# Patient Record
Sex: MALE | Race: WHITE | Employment: OTHER | URBAN - NONMETROPOLITAN AREA
[De-identification: names, ages, dates, MRNs, and addresses within clinical notes are randomized per-mention and may not be internally consistent; named-entity substitution may affect disease eponyms.]

---

## 2017-01-17 ENCOUNTER — TELEPHONE (OUTPATIENT)
Dept: FAMILY MEDICINE CLINIC | Age: 77
End: 2017-01-17

## 2017-01-19 ENCOUNTER — OFFICE VISIT (OUTPATIENT)
Dept: CARDIOLOGY | Age: 77
End: 2017-01-19

## 2017-01-19 VITALS
DIASTOLIC BLOOD PRESSURE: 54 MMHG | WEIGHT: 201.2 LBS | BODY MASS INDEX: 29.8 KG/M2 | HEART RATE: 64 BPM | SYSTOLIC BLOOD PRESSURE: 132 MMHG | HEIGHT: 69 IN

## 2017-01-19 DIAGNOSIS — E78.01 FAMILIAL HYPERCHOLESTEROLEMIA: ICD-10-CM

## 2017-01-19 DIAGNOSIS — I25.10 CORONARY ARTERY DISEASE INVOLVING NATIVE CORONARY ARTERY OF NATIVE HEART WITHOUT ANGINA PECTORIS: Primary | ICD-10-CM

## 2017-01-19 DIAGNOSIS — I10 ESSENTIAL HYPERTENSION: ICD-10-CM

## 2017-01-19 PROCEDURE — G8598 ASA/ANTIPLAT THER USED: HCPCS | Performed by: NUCLEAR MEDICINE

## 2017-01-19 PROCEDURE — 99213 OFFICE O/P EST LOW 20 MIN: CPT | Performed by: NUCLEAR MEDICINE

## 2017-01-19 PROCEDURE — G8420 CALC BMI NORM PARAMETERS: HCPCS | Performed by: NUCLEAR MEDICINE

## 2017-01-19 PROCEDURE — G8484 FLU IMMUNIZE NO ADMIN: HCPCS | Performed by: NUCLEAR MEDICINE

## 2017-01-19 PROCEDURE — 1123F ACP DISCUSS/DSCN MKR DOCD: CPT | Performed by: NUCLEAR MEDICINE

## 2017-01-19 PROCEDURE — 1036F TOBACCO NON-USER: CPT | Performed by: NUCLEAR MEDICINE

## 2017-01-19 PROCEDURE — G8427 DOCREV CUR MEDS BY ELIG CLIN: HCPCS | Performed by: NUCLEAR MEDICINE

## 2017-01-19 PROCEDURE — 4040F PNEUMOC VAC/ADMIN/RCVD: CPT | Performed by: NUCLEAR MEDICINE

## 2017-01-19 RX ORDER — SIMETHICONE 80 MG
80 TABLET,CHEWABLE ORAL 4 TIMES DAILY
COMMUNITY
End: 2018-01-23 | Stop reason: SDUPTHER

## 2017-01-19 RX ORDER — ONDANSETRON 4 MG/1
4 TABLET, FILM COATED ORAL EVERY 8 HOURS PRN
COMMUNITY
End: 2017-11-28

## 2017-01-23 ENCOUNTER — OFFICE VISIT (OUTPATIENT)
Dept: FAMILY MEDICINE CLINIC | Age: 77
End: 2017-01-23

## 2017-01-23 VITALS
BODY MASS INDEX: 30.62 KG/M2 | SYSTOLIC BLOOD PRESSURE: 146 MMHG | HEIGHT: 68 IN | HEART RATE: 50 BPM | RESPIRATION RATE: 10 BRPM | WEIGHT: 202 LBS | DIASTOLIC BLOOD PRESSURE: 60 MMHG | TEMPERATURE: 99.7 F

## 2017-01-23 DIAGNOSIS — E78.00 PURE HYPERCHOLESTEROLEMIA: ICD-10-CM

## 2017-01-23 DIAGNOSIS — I10 ESSENTIAL HYPERTENSION: Primary | ICD-10-CM

## 2017-01-23 DIAGNOSIS — Z23 NEED FOR INFLUENZA VACCINATION: ICD-10-CM

## 2017-01-23 PROCEDURE — G0008 ADMIN INFLUENZA VIRUS VAC: HCPCS | Performed by: FAMILY MEDICINE

## 2017-01-23 PROCEDURE — 90686 IIV4 VACC NO PRSV 0.5 ML IM: CPT | Performed by: FAMILY MEDICINE

## 2017-01-23 PROCEDURE — G8484 FLU IMMUNIZE NO ADMIN: HCPCS | Performed by: FAMILY MEDICINE

## 2017-01-23 PROCEDURE — 1036F TOBACCO NON-USER: CPT | Performed by: FAMILY MEDICINE

## 2017-01-23 PROCEDURE — G8598 ASA/ANTIPLAT THER USED: HCPCS | Performed by: FAMILY MEDICINE

## 2017-01-23 PROCEDURE — 1123F ACP DISCUSS/DSCN MKR DOCD: CPT | Performed by: FAMILY MEDICINE

## 2017-01-23 PROCEDURE — 99213 OFFICE O/P EST LOW 20 MIN: CPT | Performed by: FAMILY MEDICINE

## 2017-01-23 PROCEDURE — G8427 DOCREV CUR MEDS BY ELIG CLIN: HCPCS | Performed by: FAMILY MEDICINE

## 2017-01-23 PROCEDURE — 4040F PNEUMOC VAC/ADMIN/RCVD: CPT | Performed by: FAMILY MEDICINE

## 2017-01-23 PROCEDURE — G8419 CALC BMI OUT NRM PARAM NOF/U: HCPCS | Performed by: FAMILY MEDICINE

## 2017-01-23 ASSESSMENT — PATIENT HEALTH QUESTIONNAIRE - PHQ9
1. LITTLE INTEREST OR PLEASURE IN DOING THINGS: 0
SUM OF ALL RESPONSES TO PHQ9 QUESTIONS 1 & 2: 0
SUM OF ALL RESPONSES TO PHQ QUESTIONS 1-9: 0
2. FEELING DOWN, DEPRESSED OR HOPELESS: 0

## 2017-04-26 LAB
ALBUMIN SERPL-MCNC: 4.3 G/DL
ALP BLD-CCNC: 96 U/L
ALT SERPL-CCNC: 13 U/L
AMYLASE: 44 UNITS/L
AST SERPL-CCNC: 17 U/L
BASOPHILS ABSOLUTE: NORMAL /ΜL
BASOPHILS RELATIVE PERCENT: NORMAL %
BILIRUB SERPL-MCNC: 0.8 MG/DL (ref 0.1–1.4)
BUN BLDV-MCNC: 37 MG/DL
C-REACTIVE PROTEIN: 2.48
CALCIUM SERPL-MCNC: 9.6 MG/DL
CHLORIDE BLD-SCNC: 104 MMOL/L
CO2: 29 MMOL/L
CREAT SERPL-MCNC: 2 MG/DL
EOSINOPHILS ABSOLUTE: NORMAL /ΜL
EOSINOPHILS RELATIVE PERCENT: NORMAL %
GFR CALCULATED: NORMAL
GLUCOSE BLD-MCNC: 111 MG/DL
HCT VFR BLD CALC: 42.6 % (ref 41–53)
HEMOGLOBIN: 14.1 G/DL (ref 13.5–17.5)
LIPASE: 40 UNITS/L
LYMPHOCYTES ABSOLUTE: NORMAL /ΜL
LYMPHOCYTES RELATIVE PERCENT: NORMAL %
MCH RBC QN AUTO: NORMAL PG
MCHC RBC AUTO-ENTMCNC: NORMAL G/DL
MCV RBC AUTO: NORMAL FL
MONOCYTES ABSOLUTE: NORMAL /ΜL
MONOCYTES RELATIVE PERCENT: NORMAL %
NEUTROPHILS ABSOLUTE: NORMAL /ΜL
NEUTROPHILS RELATIVE PERCENT: NORMAL %
PDW BLD-RTO: NORMAL %
PLATELET # BLD: 162 K/ΜL
PMV BLD AUTO: NORMAL FL
POTASSIUM SERPL-SCNC: 4.5 MMOL/L
RBC # BLD: 4.96 10^6/ΜL
SODIUM BLD-SCNC: 138 MMOL/L
TOTAL PROTEIN: 7
WBC # BLD: 4.9 10^3/ML

## 2017-06-09 ENCOUNTER — OFFICE VISIT (OUTPATIENT)
Dept: UROLOGY | Age: 77
End: 2017-06-09

## 2017-06-09 VITALS
SYSTOLIC BLOOD PRESSURE: 150 MMHG | WEIGHT: 206.7 LBS | HEIGHT: 70 IN | DIASTOLIC BLOOD PRESSURE: 70 MMHG | BODY MASS INDEX: 29.59 KG/M2

## 2017-06-09 DIAGNOSIS — N40.2 PROSTATE NODULE: Primary | ICD-10-CM

## 2017-06-09 DIAGNOSIS — R68.89 ABNORMAL DIGITAL RECTAL EXAM: ICD-10-CM

## 2017-06-09 LAB
BILIRUBIN URINE: NEGATIVE
BLOOD URINE, POC: NEGATIVE
CHARACTER, URINE: CLEAR
COLOR, URINE: YELLOW
GLUCOSE URINE: NEGATIVE MG/DL
KETONES, URINE: NEGATIVE
LEUKOCYTE CLUMPS, URINE: NEGATIVE
NITRITE, URINE: NEGATIVE
PH, URINE: 6.5
PROTEIN, URINE: NEGATIVE MG/DL
SPECIFIC GRAVITY, URINE: 1.02 (ref 1–1.03)
UROBILINOGEN, URINE: 0.2 EU/DL

## 2017-06-09 PROCEDURE — 1123F ACP DISCUSS/DSCN MKR DOCD: CPT | Performed by: NURSE PRACTITIONER

## 2017-06-09 PROCEDURE — 81003 URINALYSIS AUTO W/O SCOPE: CPT | Performed by: NURSE PRACTITIONER

## 2017-06-09 PROCEDURE — 4040F PNEUMOC VAC/ADMIN/RCVD: CPT | Performed by: NURSE PRACTITIONER

## 2017-06-09 PROCEDURE — G8427 DOCREV CUR MEDS BY ELIG CLIN: HCPCS | Performed by: NURSE PRACTITIONER

## 2017-06-09 PROCEDURE — G8598 ASA/ANTIPLAT THER USED: HCPCS | Performed by: NURSE PRACTITIONER

## 2017-06-09 PROCEDURE — 1036F TOBACCO NON-USER: CPT | Performed by: NURSE PRACTITIONER

## 2017-06-09 PROCEDURE — G8420 CALC BMI NORM PARAMETERS: HCPCS | Performed by: NURSE PRACTITIONER

## 2017-06-09 PROCEDURE — 99213 OFFICE O/P EST LOW 20 MIN: CPT | Performed by: NURSE PRACTITIONER

## 2017-06-09 RX ORDER — DICYCLOMINE HYDROCHLORIDE 10 MG/1
10 CAPSULE ORAL
COMMUNITY
End: 2022-09-26

## 2017-06-26 RX ORDER — LOSARTAN POTASSIUM 100 MG/1
TABLET ORAL
Qty: 30 TABLET | Refills: 11 | Status: SHIPPED | OUTPATIENT
Start: 2017-06-26 | End: 2018-01-19

## 2017-06-30 ENCOUNTER — TELEPHONE (OUTPATIENT)
Dept: UROLOGY | Age: 77
End: 2017-06-30

## 2017-10-17 ENCOUNTER — HOSPITAL ENCOUNTER (OUTPATIENT)
Age: 77
Discharge: HOME | End: 2017-10-17
Payer: MEDICARE

## 2017-10-17 DIAGNOSIS — N40.0 PROSTATE ENLARGEMENT: ICD-10-CM

## 2017-10-17 LAB — PROSTATE SPECIFIC ANTIGEN: 2.12 NG/ML (ref 0–1)

## 2017-10-17 PROCEDURE — 84153 ASSAY OF PSA TOTAL: CPT

## 2017-10-17 PROCEDURE — 36415 COLL VENOUS BLD VENIPUNCTURE: CPT

## 2017-11-28 ENCOUNTER — OFFICE VISIT (OUTPATIENT)
Dept: UROLOGY | Age: 77
End: 2017-11-28
Payer: MEDICARE

## 2017-11-28 VITALS
SYSTOLIC BLOOD PRESSURE: 136 MMHG | BODY MASS INDEX: 31.25 KG/M2 | DIASTOLIC BLOOD PRESSURE: 70 MMHG | HEIGHT: 69 IN | WEIGHT: 211 LBS

## 2017-11-28 DIAGNOSIS — R35.0 URINARY FREQUENCY: Primary | ICD-10-CM

## 2017-11-28 DIAGNOSIS — N40.2 PROSTATE NODULE: ICD-10-CM

## 2017-11-28 LAB
BILIRUBIN URINE: NEGATIVE
BLOOD URINE, POC: NEGATIVE
CHARACTER, URINE: CLEAR
COLOR, URINE: YELLOW
GLUCOSE URINE: NEGATIVE MG/DL
KETONES, URINE: NEGATIVE
LEUKOCYTE CLUMPS, URINE: NEGATIVE
NITRITE, URINE: NEGATIVE
PH, URINE: 5.5
POST VOID RESIDUAL (PVR): 0 ML
PROTEIN, URINE: NEGATIVE MG/DL
SPECIFIC GRAVITY, URINE: 1.02 (ref 1–1.03)
UROBILINOGEN, URINE: 0.2 EU/DL

## 2017-11-28 PROCEDURE — 51798 US URINE CAPACITY MEASURE: CPT | Performed by: NURSE PRACTITIONER

## 2017-11-28 PROCEDURE — G8598 ASA/ANTIPLAT THER USED: HCPCS | Performed by: NURSE PRACTITIONER

## 2017-11-28 PROCEDURE — 1036F TOBACCO NON-USER: CPT | Performed by: NURSE PRACTITIONER

## 2017-11-28 PROCEDURE — G8417 CALC BMI ABV UP PARAM F/U: HCPCS | Performed by: NURSE PRACTITIONER

## 2017-11-28 PROCEDURE — G8427 DOCREV CUR MEDS BY ELIG CLIN: HCPCS | Performed by: NURSE PRACTITIONER

## 2017-11-28 PROCEDURE — 81003 URINALYSIS AUTO W/O SCOPE: CPT | Performed by: NURSE PRACTITIONER

## 2017-11-28 PROCEDURE — 99213 OFFICE O/P EST LOW 20 MIN: CPT | Performed by: NURSE PRACTITIONER

## 2017-11-28 PROCEDURE — G8484 FLU IMMUNIZE NO ADMIN: HCPCS | Performed by: NURSE PRACTITIONER

## 2017-11-28 PROCEDURE — 1123F ACP DISCUSS/DSCN MKR DOCD: CPT | Performed by: NURSE PRACTITIONER

## 2017-11-28 PROCEDURE — 4040F PNEUMOC VAC/ADMIN/RCVD: CPT | Performed by: NURSE PRACTITIONER

## 2017-11-28 NOTE — PROGRESS NOTES
[facility] PROFESSIONAL SERVS  [facility]  [address] [address]  Dept: [phone]  Loc: [phone]  Visit Date: 11/28/2017      HPI:     Eleno Howard is a 76 y.o. male who is here today for follow up for abnormal prostate exam. He was last evaluated on 06/09/2017. He was noted to have abnormal prostate by GI physician after colonoscopy. Dr. Vamshi Gonzalez reported prostate mildly enlarged with a somewhat firm area, which may be secondary to prosthetic scar s/p TURPs in the past. There is a family history of prostate cancer with his brother. We have been monitoring his PSA every 6 months. PSA on 10/17/17 was 2.12   PSA on 04/20/2017 was 2.01,   6 months prior was 2.51. Currently complains of nocturia 1 time nightly, denies incomplete bladder emptying. We obtained a MRI pelvis with and without contrast on 6/20/17. Findings included transitional zone small consistent with previous TURP. Mild nodular appearance to the small residual portion of the transitional zone consistent with BPH, score 2. There are patchy areas of decreased T2 signal within the peripheral zone which may be post inflammatory, score 2. PI-RADS assessment category 2, low clinically significant cancer is unlikely to be present. There is a signal abnormality throughout the seminal vesicles which were bright signal intensity on the T1 images and low signal intensity on T2 consistent with hemorrhage, proteinaceous debris. There is no enhancement associated with this, etiology is indeterminate. Today he comes in by himself, history is obtained from the patient and the medical record.            Current Outpatient Prescriptions   Medication Sig Dispense Refill    losartan (COZAAR) 100 MG tablet TAKE 1 TABLET BY MOUTH EVERY DAY 30 tablet 11    dicyclomine (BENTYL) 10 MG capsule Take 10 mg by mouth 4 times daily (before meals and nightly)      triamterene-hydrochlorothiazide (MAXZIDE-25) 37.5-25 MG per tablet TAKE 1 10/17/2017    PSA 2.01 04/20/2017    PSA 2.51 (H) 11/02/2016        Recent BUN/Creatinine:  Lab Results   Component Value Date    BUN 37 04/26/2017    CREATININE 1.7 06/28/2017       Radiology  No recent imaging       Assessment & Plan:     Prostate Nodule  BPH with Darroll Orts follows up today for enlarged prostate. We have been monitoring his PSA closely over the past 6 months, most recent PSA was 2.12, in comparison to 1 year ago, PSA was 2.51. MRI Pelvis was consistent with PI-RADS score 2. Plan to obtain PSA every 6 months and follow up in 1 year. ROSE MARIE to be performed at next visit. PVR was obtained today and he his completely empty.      Shahbaz Arias CNP  Urology

## 2017-12-01 ENCOUNTER — HOSPITAL ENCOUNTER (OUTPATIENT)
Age: 77
Discharge: HOME | End: 2017-12-01
Payer: MEDICARE

## 2017-12-01 DIAGNOSIS — N18.30 CKD (CHRONIC KIDNEY DISEASE) STAGE 3, GFR 30-59 ML/MIN (HCC): ICD-10-CM

## 2017-12-01 LAB
ANION GAP SERPL CALCULATED.3IONS-SCNC: 7 MEQ/L (ref 8–16)
BASOPHILS # BLD: 1 %
BASOPHILS ABSOLUTE: 0 THOU/MM3 (ref 0–0.1)
BUN BLDV-MCNC: 31 MG/DL (ref 7–22)
CALCIUM SERPL-MCNC: 9.3 MG/DL (ref 8.5–10.5)
CHLORIDE BLD-SCNC: 107 MEQ/L (ref 98–111)
CO2: 27 MEQ/L (ref 23–33)
CREAT SERPL-MCNC: 1.7 MG/DL (ref 0.4–1.2)
EOSINOPHIL # BLD: 5 %
EOSINOPHILS ABSOLUTE: 0.2 THOU/MM3 (ref 0–0.4)
GFR SERPL CREATININE-BSD FRML MDRD: 39 ML/MIN/1.73M2
GLUCOSE BLD-MCNC: 116 MG/DL (ref 70–108)
HCT VFR BLD CALC: 43.4 % (ref 42–52)
HEMOGLOBIN: 14.4 GM/DL (ref 14–18)
LYMPHOCYTES # BLD: 21.5 %
LYMPHOCYTES ABSOLUTE: 1 THOU/MM3 (ref 1–4.8)
MCH RBC QN AUTO: 29.9 PG (ref 27–31)
MCHC RBC AUTO-ENTMCNC: 33.2 GM/DL (ref 33–37)
MCV RBC AUTO: 89.9 FL (ref 80–94)
MONOCYTES # BLD: 7.1 %
MONOCYTES ABSOLUTE: 0.3 THOU/MM3 (ref 0.4–1.3)
NUCLEATED RED BLOOD CELLS: 0 /100 WBC
PDW BLD-RTO: 14.1 % (ref 11.5–14.5)
PLATELET # BLD: 139 THOU/MM3 (ref 130–400)
PMV BLD AUTO: 8.9 MCM (ref 7.4–10.4)
POTASSIUM SERPL-SCNC: 4.5 MEQ/L (ref 3.5–5.2)
RBC # BLD: 4.83 MILL/MM3 (ref 4.7–6.1)
SEG NEUTROPHILS: 65.4 %
SEGMENTED NEUTROPHILS ABSOLUTE COUNT: 3 THOU/MM3 (ref 1.8–7.7)
SODIUM BLD-SCNC: 141 MEQ/L (ref 135–145)
WBC # BLD: 4.6 THOU/MM3 (ref 4.8–10.8)

## 2017-12-01 PROCEDURE — 36415 COLL VENOUS BLD VENIPUNCTURE: CPT

## 2017-12-01 PROCEDURE — 85025 COMPLETE CBC W/AUTO DIFF WBC: CPT

## 2017-12-01 PROCEDURE — 80048 BASIC METABOLIC PNL TOTAL CA: CPT

## 2017-12-06 ENCOUNTER — OFFICE VISIT (OUTPATIENT)
Dept: NEPHROLOGY | Age: 77
End: 2017-12-06
Payer: MEDICARE

## 2017-12-06 VITALS
HEIGHT: 69 IN | HEART RATE: 70 BPM | SYSTOLIC BLOOD PRESSURE: 148 MMHG | OXYGEN SATURATION: 98 % | WEIGHT: 211 LBS | DIASTOLIC BLOOD PRESSURE: 86 MMHG | RESPIRATION RATE: 18 BRPM | BODY MASS INDEX: 31.25 KG/M2

## 2017-12-06 DIAGNOSIS — N18.30 CKD (CHRONIC KIDNEY DISEASE), STAGE III (HCC): Primary | ICD-10-CM

## 2017-12-06 PROCEDURE — G8598 ASA/ANTIPLAT THER USED: HCPCS | Performed by: INTERNAL MEDICINE

## 2017-12-06 PROCEDURE — 99213 OFFICE O/P EST LOW 20 MIN: CPT | Performed by: INTERNAL MEDICINE

## 2017-12-06 PROCEDURE — 4040F PNEUMOC VAC/ADMIN/RCVD: CPT | Performed by: INTERNAL MEDICINE

## 2017-12-06 PROCEDURE — G8484 FLU IMMUNIZE NO ADMIN: HCPCS | Performed by: INTERNAL MEDICINE

## 2017-12-06 PROCEDURE — 1036F TOBACCO NON-USER: CPT | Performed by: INTERNAL MEDICINE

## 2017-12-06 PROCEDURE — G8427 DOCREV CUR MEDS BY ELIG CLIN: HCPCS | Performed by: INTERNAL MEDICINE

## 2017-12-06 PROCEDURE — G8417 CALC BMI ABV UP PARAM F/U: HCPCS | Performed by: INTERNAL MEDICINE

## 2017-12-06 PROCEDURE — 1123F ACP DISCUSS/DSCN MKR DOCD: CPT | Performed by: INTERNAL MEDICINE

## 2017-12-06 RX ORDER — FLUTICASONE PROPIONATE 50 MCG
1 SPRAY, SUSPENSION (ML) NASAL DAILY
COMMUNITY
End: 2022-03-24

## 2017-12-06 NOTE — PROGRESS NOTES
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 Subjective:  I last saw the patient in clinic 12/14/16. I follow the patient for Chronic Kidney disease stage IIIB. Since our last visit the patient has not been hospitalized. The patient is sleeping well at night with 1-2 times per night nocturia. The patient has a good appetite and is remaining active. The patient denied N/V/C/D/SOB/CP. EGFR=39 Ml/Min       Objective:  VITALS:  BP (!) 148/86 (Site: Right Arm, Position: Sitting, Cuff Size: Small Adult)   Pulse 70   Resp 18   Ht 5' 9\" (1.753 m)   Wt 211 lb (95.7 kg)   SpO2 98%   BMI 31.16 kg/m²   Weight:   Wt Readings from Last 3 Encounters:   12/06/17 211 lb (95.7 kg)   11/28/17 211 lb (95.7 kg)   06/09/17 206 lb 11.2 oz (93.8 kg)     Body mass index is 31.16 kg/m². Physical examination    General:  Alert and cooperative with exam  HEENT:  Head: Normocephalic, no lesions, without obvious abnormality. Neck:   No JVD and no bruits. Thyroid gland is normal  Lungs:  clear to auscultation bilaterally  Heart:  regular rate and rhythm, S1, S2 normal, no murmur, click, rub or gallop  Abdomen:  soft, non-tender; bowel sounds normal; no masses,  no organomegaly  Extremities:  extremities normal, atraumatic, no cyanosis or edema  Neurologic:  Mental status: Alert, oriented, thought content appropriate  Skin:                Warm and dry with no rashes. Muscles:         Hand  and leg strength are equal and strong bilaterally.      Lab Data      CBC:   Lab Results   Component Value Date    WBC 4.6 (L) 12/01/2017    HGB 14.4 12/01/2017    HCT 43.4 12/01/2017    MCV 89.9 12/01/2017     12/01/2017     BMP:    Lab Results   Component Value Date     12/01/2017     04/26/2017     12/06/2016    K 4.5 12/01/2017    K 4.5 04/26/2017    K 4.5 12/06/2016     12/01/2017     04/26/2017     12/06/2016    CO2 27 12/01/2017    CO2 29 04/26/2017    CO2 28 12/06/2016    BUN 31 (H) 12/01/2017    BUN 37 04/26/2017    BUN 23 (H) 12/06/2016    CREATININE 1.7 (H) 12/01/2017    CREATININE 1.7 (H) 06/28/2017    CREATININE 2.0 04/26/2017    GLUCOSE 116 (H) 12/01/2017    GLUCOSE 111 04/26/2017    GLUCOSE 113 (H) 12/06/2016      Hepatic:   Lab Results   Component Value Date    AST 17 04/26/2017    AST 21 05/02/2016    AST 15 04/27/2016    ALT 13 04/26/2017    ALT 15 05/02/2016    ALT 10 (L) 04/27/2016    BILITOT 0.8 04/26/2017    BILITOT 0.7 05/02/2016    BILITOT 1.0 04/27/2016    ALKPHOS 96 04/26/2017    ALKPHOS 84 05/02/2016    ALKPHOS 96 04/27/2016     BNP: No results found for: BNP  Lipids:   Lab Results   Component Value Date    CHOL 135 01/17/2017    HDL 41 01/17/2017     INR:   Lab Results   Component Value Date    INR 0.94 03/25/2015     URINE:   Lab Results   Component Value Date    PROTUR Negative 11/28/2017     Lab Results   Component Value Date    NITRU Negative 11/28/2017    COLORU Yellow 11/28/2017    COLORU DK YELLOW 05/02/2016    PHUR 6.0 05/02/2016    LABCAST NONE SEEN 05/26/2014    WBCUA 0-2 05/02/2016    RBCUA 0-2 05/02/2016    YEAST NONE SEEN 05/02/2016    BACTERIA NONE 05/02/2016    CLARITYU Clear 12/11/2013    SPECGRAV 1.016 05/26/2014    LEUKOCYTESUR neg 12/14/2016    LEUKOCYTESUR SMALL 05/02/2016    UROBILINOGEN 0.20 11/28/2017    BILIRUBINUR Negative 11/28/2017    BLOODU Negative 11/28/2017    BLOODU NEGATIVE 05/02/2016    GLUCOSEU Negative 11/28/2017    KETUA Negative 11/28/2017      Microalbumen/Creatinine ratio:  No components found for: RUCREAT Medications:    Current Outpatient Prescriptions   Medication Sig Dispense Refill    fluticasone (FLONASE) 50 MCG/ACT nasal spray 1 spray by Nasal route daily      losartan (COZAAR) 100 MG tablet TAKE 1 TABLET BY MOUTH EVERY DAY 30 tablet 11    dicyclomine (BENTYL) 10 MG capsule Take 10 mg by mouth 4 times daily (before meals and nightly)      triamterene-hydrochlorothiazide (MAXZIDE-25) 37.5-25 MG per tablet TAKE 1 TABLET BY MOUTH DAILY 30 tablet 11    pravastatin (PRAVACHOL) 40 MG tablet TAKE 1 TABLET BY MOUTH EVERY EVENING 30 tablet 11    simethicone (MYLICON) 80 MG chewable tablet Take 80 mg by mouth 4 times daily      folic acid (FOLVITE) 1 MG tablet Take 2.5 tablets by mouth daily 225 tablet 3    orphenadrine (NORFLEX) 100 MG ER tablet Take 1 tablet by mouth 2 times daily. (Patient taking differently: Take 100 mg by mouth 2 times daily as needed ) 60 tablet 5    pantoprazole (PROTONIX) 40 MG tablet Take 40 mg by mouth daily.  hydrocortisone (WESTCORT) 0.2 % cream Apply  topically daily as needed. Apply to facial rash      aspirin 325 MG tablet Take 325 mg by mouth nightly        No current facility-administered medications for this visit. IMPRESSIONS:      1.    CKD stage IIIA from HTN and bladder outlet obstruction  2. HTN         PLAN:  1. We discussed the eGFR today. 2. We will continue all current medications without changes. 3. We will see the patient back in 12 months.               _________________________________  Neldon Marker.  Turner Fernández DO  [facility]      CC  Barbie Murdock DO

## 2017-12-12 RX ORDER — FOLIC ACID 1 MG/1
1 TABLET ORAL DAILY
Qty: 225 TABLET | Refills: 3 | Status: SHIPPED | OUTPATIENT
Start: 2017-12-12 | End: 2017-12-12

## 2017-12-12 RX ORDER — FOLIC ACID 1 MG/1
2.5 TABLET ORAL DAILY
Qty: 225 TABLET | Refills: 3 | Status: SHIPPED | OUTPATIENT
Start: 2017-12-12 | End: 2019-02-15 | Stop reason: SDUPTHER

## 2018-01-19 ENCOUNTER — OFFICE VISIT (OUTPATIENT)
Dept: CARDIOLOGY CLINIC | Age: 78
End: 2018-01-19
Payer: MEDICARE

## 2018-01-19 VITALS
BODY MASS INDEX: 31.16 KG/M2 | WEIGHT: 211 LBS | SYSTOLIC BLOOD PRESSURE: 162 MMHG | DIASTOLIC BLOOD PRESSURE: 60 MMHG | HEART RATE: 64 BPM

## 2018-01-19 DIAGNOSIS — R06.09 DYSPNEA ON EXERTION: ICD-10-CM

## 2018-01-19 DIAGNOSIS — I10 ESSENTIAL HYPERTENSION: ICD-10-CM

## 2018-01-19 DIAGNOSIS — E78.01 FAMILIAL HYPERCHOLESTEROLEMIA: ICD-10-CM

## 2018-01-19 DIAGNOSIS — I25.10 CORONARY ARTERY DISEASE INVOLVING NATIVE CORONARY ARTERY OF NATIVE HEART WITHOUT ANGINA PECTORIS: Primary | ICD-10-CM

## 2018-01-19 PROCEDURE — G8598 ASA/ANTIPLAT THER USED: HCPCS | Performed by: NUCLEAR MEDICINE

## 2018-01-19 PROCEDURE — 4040F PNEUMOC VAC/ADMIN/RCVD: CPT | Performed by: NUCLEAR MEDICINE

## 2018-01-19 PROCEDURE — G8484 FLU IMMUNIZE NO ADMIN: HCPCS | Performed by: NUCLEAR MEDICINE

## 2018-01-19 PROCEDURE — 1123F ACP DISCUSS/DSCN MKR DOCD: CPT | Performed by: NUCLEAR MEDICINE

## 2018-01-19 PROCEDURE — 93000 ELECTROCARDIOGRAM COMPLETE: CPT | Performed by: NUCLEAR MEDICINE

## 2018-01-19 PROCEDURE — 1036F TOBACCO NON-USER: CPT | Performed by: NUCLEAR MEDICINE

## 2018-01-19 PROCEDURE — G8428 CUR MEDS NOT DOCUMENT: HCPCS | Performed by: NUCLEAR MEDICINE

## 2018-01-19 PROCEDURE — G8417 CALC BMI ABV UP PARAM F/U: HCPCS | Performed by: NUCLEAR MEDICINE

## 2018-01-19 PROCEDURE — 99214 OFFICE O/P EST MOD 30 MIN: CPT | Performed by: NUCLEAR MEDICINE

## 2018-01-19 RX ORDER — VALSARTAN 320 MG/1
320 TABLET ORAL DAILY
Qty: 30 TABLET | Refills: 11 | Status: SHIPPED | OUTPATIENT
Start: 2018-01-19 | End: 2018-07-26 | Stop reason: ALTCHOICE

## 2018-01-19 NOTE — PROGRESS NOTES
[facility]  [facility] [address]  [address]  [address] [facility] [address]  Dept: [phone]  Dept Fax: [phone]  Loc: [phone]    Visit Date: 1/19/2018    Susie Gardner is a 76 y.o. male who presents today for:  Chief Complaint   Patient presents with    1 Year Follow Up     cad    Hypertension    Coronary Artery Disease    Hyperlipidemia   stage 3 renal   Non obstructive CAD cath before  Some chest heaviness at times   Does have dyspnea on exertion   Cath 2010  Active for most part  No ER visits  BP is stable   Higher BP today   Concerning patient    HPI:  HPI  Past Medical History:   Diagnosis Date    Anemia     Arthritis     Bronchiolitis 11/2016    Cancer ([address]     SKIN CANCER    CKD (chronic kidney disease), stage III     Diverticula of colon 5/2014    tx with antibiotics per pt; NO surgery    Diverticulosis     DVT [facility]deep venous thrombosis) ([facility])     s/p    Esophagitis 12/10/2014    Hiatal hernia     HTN (hypertension)     Hyperlipidemia     Kidney stones     Nephrolithiasis     Obesity     Prostate enlargement     benign    Renal insufficiency       Past Surgical History:   Procedure Laterality Date    BLEPHAROPLASTY Bilateral 03/2010    CARDIAC CATHETERIZATION  2000,2010    CATARACT REMOVAL WITH IMPLANT Bilateral 2006,2007    COLONOSCOPY  05/26/2017    HERNIA REPAIR Bilateral 1996    NASAL SINUS SURGERY      SHOULDER SURGERY      SKIN BIOPSY      SKIN CANCER EXCISION  Feb 2015    top of head    TONSILLECTOMY      TURP  7396,7592    UPPER GASTROINTESTINAL ENDOSCOPY       Family History   Problem Relation Age of Onset    Diabetes Mother     High Blood Pressure Father     Heart Disease Father      Social History   Substance Use Topics    Smoking status: Never Smoker    Smokeless tobacco: Never Used    Alcohol use No      Current Outpatient Prescriptions   Medication Sig Dispense Refill    Coenzyme Q10 (COQ10) 400 MG CAPS Take by vomiting, no abdominal pain  Neuro:    No dizziness or light headedness,   Musculoskeletal:  No recent active issues  Extremities:   No edema, good peripheral pulses      Objective:  Physical Exam  BP (!) 160/68   Pulse 64   Wt 211 lb (95.7 kg)   BMI 31.16 kg/m²   General:   Well developed, well nourished  Lungs:    Clear to auscultation  Heart:    Normal S1 S2, Slight murmur. no rubs, no gallops  Abdomen:   Soft, non tender, no organomegalies, positive bowel sounds  Extremities:   No edema, no cyanosis, good peripheral pulses  Neurological:   Awake, alert, oriented. No obvious focal deficits  Musculoskelatal:  No obvious deformities    Assessment:     1. Coronary artery disease involving native coronary artery of native heart without angina pectoris  EKG 12 Lead   2. Essential hypertension     3. Familial hypercholesterolemia       Bp issues  Symptoms as above could be multifactorial   ECG in office was done today. I reviewed the ECG. No acute findings    Plan:  No Follow-up on file. Consider Diovan instead of losartan   Consider a follow  Up stress test and echo   Continue risk factor modification and medical management  Thank you for allowing me to participate in the care of your patient. Please don't hesitate to contact me regarding any further issues related to the patient care    Orders Placed:  Orders Placed This Encounter   Procedures    EKG 12 Lead     Order Specific Question:   Reason for Exam?     Answer: Other       Medications Prescribed:  No orders of the defined types were placed in this encounter. Discussed use, benefit, and side effects of prescribed medications. All patient questions answered. Pt voiced understanding. Instructed to continue current medications, diet and exercise. Continue risk factor modification and medical management. Patient agreed with treatment plan. Follow up as directed.     Electronically signed by Liz Norris MD on 1/19/2018 at 10:24 AM

## 2018-01-23 ENCOUNTER — OFFICE VISIT (OUTPATIENT)
Dept: FAMILY MEDICINE CLINIC | Age: 78
End: 2018-01-23
Payer: MEDICARE

## 2018-01-23 VITALS
HEIGHT: 69 IN | SYSTOLIC BLOOD PRESSURE: 138 MMHG | TEMPERATURE: 97.7 F | DIASTOLIC BLOOD PRESSURE: 58 MMHG | WEIGHT: 212.6 LBS | BODY MASS INDEX: 31.49 KG/M2 | HEART RATE: 49 BPM | RESPIRATION RATE: 12 BRPM

## 2018-01-23 DIAGNOSIS — I10 ESSENTIAL HYPERTENSION: ICD-10-CM

## 2018-01-23 DIAGNOSIS — E78.00 PURE HYPERCHOLESTEROLEMIA: Primary | ICD-10-CM

## 2018-01-23 DIAGNOSIS — R14.3 EXCESSIVE GAS: ICD-10-CM

## 2018-01-23 DIAGNOSIS — Z23 NEED FOR IMMUNIZATION AGAINST INFLUENZA: ICD-10-CM

## 2018-01-23 PROCEDURE — 90686 IIV4 VACC NO PRSV 0.5 ML IM: CPT | Performed by: FAMILY MEDICINE

## 2018-01-23 PROCEDURE — G8417 CALC BMI ABV UP PARAM F/U: HCPCS | Performed by: FAMILY MEDICINE

## 2018-01-23 PROCEDURE — 1123F ACP DISCUSS/DSCN MKR DOCD: CPT | Performed by: FAMILY MEDICINE

## 2018-01-23 PROCEDURE — G0008 ADMIN INFLUENZA VIRUS VAC: HCPCS | Performed by: FAMILY MEDICINE

## 2018-01-23 PROCEDURE — G8484 FLU IMMUNIZE NO ADMIN: HCPCS | Performed by: FAMILY MEDICINE

## 2018-01-23 PROCEDURE — 4040F PNEUMOC VAC/ADMIN/RCVD: CPT | Performed by: FAMILY MEDICINE

## 2018-01-23 PROCEDURE — 1036F TOBACCO NON-USER: CPT | Performed by: FAMILY MEDICINE

## 2018-01-23 PROCEDURE — 99214 OFFICE O/P EST MOD 30 MIN: CPT | Performed by: FAMILY MEDICINE

## 2018-01-23 PROCEDURE — G8598 ASA/ANTIPLAT THER USED: HCPCS | Performed by: FAMILY MEDICINE

## 2018-01-23 PROCEDURE — G8427 DOCREV CUR MEDS BY ELIG CLIN: HCPCS | Performed by: FAMILY MEDICINE

## 2018-01-23 RX ORDER — SIMETHICONE 80 MG
80 TABLET,CHEWABLE ORAL EVERY 6 HOURS PRN
Qty: 120 TABLET | Refills: 3 | Status: SHIPPED | OUTPATIENT
Start: 2018-01-23 | End: 2019-03-04 | Stop reason: SDUPTHER

## 2018-01-23 RX ORDER — ONDANSETRON 4 MG/1
4 TABLET, FILM COATED ORAL EVERY 8 HOURS PRN
COMMUNITY
End: 2020-06-11

## 2018-01-23 NOTE — PROGRESS NOTES
Visit Information    Have you changed or started any medications since your last visit including any over-the-counter medicines, vitamins, or herbal medicines? no   Are you having any side effects from any of your medications? -  no  Have you stopped taking any of your medications? Is so, why? -  no    Have you seen any other physician or provider since your last visit? Yes - Records Obtained  Have you had any other diagnostic tests since your last visit? Yes - Records Obtained  Have you been seen in the emergency room and/or had an admission to a hospital since we last saw you? No  Have you had your routine dental cleaning in the past 6 months? yes     Have you activated your [employer] account? If not, what are your barriers?  Yes     Patient Care Team:  Madeline Hannah DO as PCP - General  Madeline Hannah DO as PCP - [facility] Attributed Provider        Health Maintenance   Topic Date Due    DTaP/Tdap/Td vaccine (1 - Tdap) 10[birth year]/1957    Zostavax vaccine  10/14/1998    Pneumococcal low/med risk (2 of 2 - PPSV23) 01/20/2017    Flu vaccine (1) 09/01/2017    Potassium monitoring  12/01/2018    Creatinine monitoring  12/01/2018    Lipid screen  01/17/2022
Nephrolithiasis     Obesity     Prostate enlargement     benign    Renal insufficiency        Past Surgical History:   Procedure Laterality Date    BLEPHAROPLASTY Bilateral 03/2010    CARDIAC CATHETERIZATION  2000,2010    CATARACT REMOVAL WITH IMPLANT Bilateral 2006,2007    COLONOSCOPY  05/26/2017    HERNIA REPAIR Bilateral 1996    NASAL SINUS SURGERY      SHOULDER SURGERY      SKIN BIOPSY      SKIN CANCER EXCISION  Feb 2015    top of head    TONSILLECTOMY      TURP  5450,3161    UPPER GASTROINTESTINAL ENDOSCOPY         Social History   Substance Use Topics    Smoking status: Never Smoker    Smokeless tobacco: Never Used    Alcohol use No       Family History   Problem Relation Age of Onset    Diabetes Mother     High Blood Pressure Father     Heart Disease Father          I have reviewed the patient's past medical history, past surgical history, allergies, medications, social and family history and I have made updates where appropriate.     ROS        PHYSICAL EXAM:    BP (!) 138/58   Pulse (!) 49   Temp 97.7 °F (36.5 °C) (Oral)   Resp 12   Ht 5' 8.5\" (1.74 m)   Wt 212 lb 9.6 oz (96.4 kg)   BMI 31.86 kg/m²       General Appearance: well developed and well- nourished, in no acute distress  Head: normocephalic and atraumatic  ENT: external ear and ear canal normal bilaterally, nose without deformity, nasal mucosa and turbinates normal without polyps, oropharynx normal, dentition is normal for age, no lip or gum lesions noted  Neck: supple and non-tender without mass, no thyromegaly or thyroid nodules, no cervical lymphadenopathy  Pulmonary/Chest: clear to auscultation bilaterally- no wheezes, rales or rhonchi, normal air movement, no respiratory distress or retractions  Cardiovascular: normal rate, regular rhythm, normal S1 and S2, no murmurs, rubs, clicks, or gallops, distal pulses intact  Extremities: no cyanosis, clubbing or edema of the lower extremities  Psych:  Normal affect without

## 2018-01-26 ENCOUNTER — HOSPITAL ENCOUNTER (OUTPATIENT)
Dept: NON INVASIVE DIAGNOSTICS | Age: 78
Discharge: HOME | End: 2018-01-26
Payer: MEDICARE

## 2018-01-26 DIAGNOSIS — R06.09 DYSPNEA ON EXERTION: ICD-10-CM

## 2018-01-26 DIAGNOSIS — I10 ESSENTIAL HYPERTENSION: ICD-10-CM

## 2018-01-26 DIAGNOSIS — I25.10 CORONARY ARTERY DISEASE INVOLVING NATIVE CORONARY ARTERY OF NATIVE HEART WITHOUT ANGINA PECTORIS: ICD-10-CM

## 2018-01-26 LAB
LV EF: 50 %
LVEF MODALITY: NORMAL

## 2018-01-26 PROCEDURE — 78452 HT MUSCLE IMAGE SPECT MULT: CPT

## 2018-01-26 PROCEDURE — 93306 TTE W/DOPPLER COMPLETE: CPT

## 2018-01-26 PROCEDURE — A9500 TC99M SESTAMIBI: HCPCS | Performed by: NUCLEAR MEDICINE

## 2018-01-26 PROCEDURE — 3430000000 HC RX DIAGNOSTIC RADIOPHARMACEUTICAL: Performed by: NUCLEAR MEDICINE

## 2018-01-26 PROCEDURE — 93017 CV STRESS TEST TRACING ONLY: CPT | Performed by: NUCLEAR MEDICINE

## 2018-01-26 PROCEDURE — 6360000002 HC RX W HCPCS

## 2018-01-26 RX ADMIN — Medication 10.8 MILLICURIE: at 11:30

## 2018-01-26 RX ADMIN — Medication 35 MILLICURIE: at 12:20

## 2018-01-29 ENCOUNTER — TELEPHONE (OUTPATIENT)
Dept: CARDIOLOGY CLINIC | Age: 78
End: 2018-01-29

## 2018-02-16 ENCOUNTER — OFFICE VISIT (OUTPATIENT)
Dept: CARDIOLOGY CLINIC | Age: 78
End: 2018-02-16
Payer: MEDICARE

## 2018-02-16 VITALS
DIASTOLIC BLOOD PRESSURE: 78 MMHG | WEIGHT: 210 LBS | BODY MASS INDEX: 31.1 KG/M2 | HEART RATE: 60 BPM | HEIGHT: 69 IN | SYSTOLIC BLOOD PRESSURE: 174 MMHG

## 2018-02-16 DIAGNOSIS — I25.119 CORONARY ARTERY DISEASE INVOLVING NATIVE CORONARY ARTERY OF NATIVE HEART WITH ANGINA PECTORIS (HCC): ICD-10-CM

## 2018-02-16 DIAGNOSIS — E78.01 FAMILIAL HYPERCHOLESTEROLEMIA: ICD-10-CM

## 2018-02-16 DIAGNOSIS — I10 ESSENTIAL HYPERTENSION: Primary | ICD-10-CM

## 2018-02-16 DIAGNOSIS — I35.1 NONRHEUMATIC AORTIC VALVE INSUFFICIENCY: ICD-10-CM

## 2018-02-16 PROCEDURE — G8484 FLU IMMUNIZE NO ADMIN: HCPCS | Performed by: NUCLEAR MEDICINE

## 2018-02-16 PROCEDURE — G8598 ASA/ANTIPLAT THER USED: HCPCS | Performed by: NUCLEAR MEDICINE

## 2018-02-16 PROCEDURE — G8417 CALC BMI ABV UP PARAM F/U: HCPCS | Performed by: NUCLEAR MEDICINE

## 2018-02-16 PROCEDURE — 99214 OFFICE O/P EST MOD 30 MIN: CPT | Performed by: NUCLEAR MEDICINE

## 2018-02-16 PROCEDURE — G8427 DOCREV CUR MEDS BY ELIG CLIN: HCPCS | Performed by: NUCLEAR MEDICINE

## 2018-02-16 PROCEDURE — 1123F ACP DISCUSS/DSCN MKR DOCD: CPT | Performed by: NUCLEAR MEDICINE

## 2018-02-16 PROCEDURE — 1036F TOBACCO NON-USER: CPT | Performed by: NUCLEAR MEDICINE

## 2018-02-16 PROCEDURE — 4040F PNEUMOC VAC/ADMIN/RCVD: CPT | Performed by: NUCLEAR MEDICINE

## 2018-02-16 NOTE — PROGRESS NOTES
[facility]  [facility] [place]  [address]  [address]  [address] [facility] [address]  Dept: [phone]  Dept Fax: [phone]  Loc: [phone]    Visit Date: 2/16/2018    Tima Lloyd is a 76 y.o. male who presents today for:  Chief Complaint   Patient presents with    Follow Up After Procedure     stress test    Hyperlipidemia    Coronary Artery Disease    Hypertension     Cath 2010  Diffuse Cad  Here to discuss the stress test   ?/ possible need for a cath  Stage 3 renal   Creatinine 1.7  Does have some more dyspnea  Some more dyspnea on exertion   Some chest discomfort at times  No specific though   Not always exertional   Does have AI that is moderate in nature  BP is higher today   Running higher   HPI:  HPI  Past Medical History:   Diagnosis Date    Anemia     Arthritis     Bronchiolitis 11/2016    Cancer ([address]     SKIN CANCER    CKD (chronic kidney disease), stage III     Diverticula of colon 5/2014    tx with antibiotics per pt; NO surgery    Diverticulosis     DVT (deep venous thrombosis) ([facility])     s/p    Esophagitis 12/10/2014    Hiatal hernia     HTN (hypertension)     Hyperlipidemia     Kidney stones     Nephrolithiasis     Obesity     Prostate enlargement     benign    Renal insufficiency       Past Surgical History:   Procedure Laterality Date    BLEPHAROPLASTY Bilateral 03/2010    CARDIAC CATHETERIZATION  2000,2010    CATARACT REMOVAL WITH IMPLANT Bilateral 2006,2007    COLONOSCOPY  05/26/2017    HERNIA REPAIR Bilateral 1996    NASAL SINUS SURGERY      SHOULDER SURGERY      SKIN BIOPSY      SKIN CANCER EXCISION  Feb 2015    top of head    TONSILLECTOMY      TURP  1262,4319    UPPER GASTROINTESTINAL ENDOSCOPY       Family History   Problem Relation Age of Onset    Diabetes Mother     High Blood Pressure Father     Heart Disease Father      Social History   Substance Use Topics    Smoking status: Never Smoker    Smokeless tobacco: Never

## 2018-02-23 ENCOUNTER — PREP FOR PROCEDURE (OUTPATIENT)
Dept: CARDIOLOGY | Age: 78
End: 2018-02-23

## 2018-02-23 RX ORDER — SODIUM CHLORIDE 0.9 % (FLUSH) 0.9 %
10 SYRINGE (ML) INJECTION EVERY 12 HOURS SCHEDULED
Status: CANCELLED | OUTPATIENT
Start: 2018-02-23

## 2018-02-23 RX ORDER — ASPIRIN 325 MG
325 TABLET ORAL ONCE
Status: CANCELLED | OUTPATIENT
Start: 2018-02-23 | End: 2018-02-23

## 2018-02-23 RX ORDER — SODIUM CHLORIDE 0.9 % (FLUSH) 0.9 %
10 SYRINGE (ML) INJECTION PRN
Status: CANCELLED | OUTPATIENT
Start: 2018-02-23

## 2018-02-23 RX ORDER — SODIUM CHLORIDE 9 MG/ML
INJECTION, SOLUTION INTRAVENOUS CONTINUOUS
Status: CANCELLED | OUTPATIENT
Start: 2018-02-23

## 2018-02-23 RX ORDER — NITROGLYCERIN 0.4 MG/1
0.4 TABLET SUBLINGUAL EVERY 5 MIN PRN
Status: CANCELLED | OUTPATIENT
Start: 2018-02-23

## 2018-02-26 ENCOUNTER — APPOINTMENT (OUTPATIENT)
Dept: CARDIAC CATH/INVASIVE PROCEDURES | Age: 78
End: 2018-02-26
Payer: MEDICARE

## 2018-02-26 ENCOUNTER — HOSPITAL ENCOUNTER (OUTPATIENT)
Dept: INPATIENT UNIT | Age: 78
Discharge: HOME | End: 2018-02-26
Attending: NUCLEAR MEDICINE | Admitting: NUCLEAR MEDICINE
Payer: MEDICARE

## 2018-02-26 VITALS
BODY MASS INDEX: 31.1 KG/M2 | RESPIRATION RATE: 16 BRPM | OXYGEN SATURATION: 98 % | TEMPERATURE: 98.6 F | WEIGHT: 210 LBS | HEIGHT: 69 IN | SYSTOLIC BLOOD PRESSURE: 131 MMHG | DIASTOLIC BLOOD PRESSURE: 51 MMHG | HEART RATE: 63 BPM

## 2018-02-26 DIAGNOSIS — N18.30 CKD (CHRONIC KIDNEY DISEASE), STAGE III (HCC): Primary | ICD-10-CM

## 2018-02-26 DIAGNOSIS — I10 HYPERTENSION, UNSPECIFIED TYPE: ICD-10-CM

## 2018-02-26 LAB
ABO: NORMAL
ANION GAP SERPL CALCULATED.3IONS-SCNC: 12 MEQ/L (ref 8–16)
ANTIBODY SCREEN: NORMAL
BUN BLDV-MCNC: 33 MG/DL (ref 7–22)
CALCIUM SERPL-MCNC: 9.9 MG/DL (ref 8.5–10.5)
CHLORIDE BLD-SCNC: 101 MEQ/L (ref 98–111)
CO2: 27 MEQ/L (ref 23–33)
CREAT SERPL-MCNC: 1.7 MG/DL (ref 0.4–1.2)
EKG ATRIAL RATE: 45 BPM
EKG P AXIS: 53 DEGREES
EKG P-R INTERVAL: 214 MS
EKG Q-T INTERVAL: 530 MS
EKG QRS DURATION: 84 MS
EKG QTC CALCULATION (BAZETT): 458 MS
EKG R AXIS: -3 DEGREES
EKG T AXIS: 75 DEGREES
EKG VENTRICULAR RATE: 45 BPM
GFR SERPL CREATININE-BSD FRML MDRD: 39 ML/MIN/1.73M2
GLUCOSE BLD-MCNC: 120 MG/DL (ref 70–108)
HCT VFR BLD CALC: 41 % (ref 42–52)
HEMOGLOBIN: 13.8 GM/DL (ref 14–18)
INR BLD: 0.91 (ref 0.85–1.13)
MCH RBC QN AUTO: 29.3 PG (ref 27–31)
MCHC RBC AUTO-ENTMCNC: 33.5 GM/DL (ref 33–37)
MCV RBC AUTO: 87.3 FL (ref 80–94)
PDW BLD-RTO: 14.7 % (ref 11.5–14.5)
PLATELET # BLD: 149 THOU/MM3 (ref 130–400)
PMV BLD AUTO: 8.7 FL (ref 7.4–10.4)
POTASSIUM REFLEX MAGNESIUM: 4.3 MEQ/L (ref 3.5–5.2)
RBC # BLD: 4.7 MILL/MM3 (ref 4.7–6.1)
RH FACTOR: NORMAL
SODIUM BLD-SCNC: 140 MEQ/L (ref 135–145)
WBC # BLD: 6.9 THOU/MM3 (ref 4.8–10.8)

## 2018-02-26 PROCEDURE — 6360000002 HC RX W HCPCS: Performed by: NUCLEAR MEDICINE

## 2018-02-26 PROCEDURE — 2780000010 HC IMPLANT OTHER

## 2018-02-26 PROCEDURE — 2500000003 HC RX 250 WO HCPCS

## 2018-02-26 PROCEDURE — 2580000003 HC RX 258: Performed by: PHYSICIAN ASSISTANT

## 2018-02-26 PROCEDURE — 85610 PROTHROMBIN TIME: CPT

## 2018-02-26 PROCEDURE — 93010 ELECTROCARDIOGRAM REPORT: CPT | Performed by: NUCLEAR MEDICINE

## 2018-02-26 PROCEDURE — C1894 INTRO/SHEATH, NON-LASER: HCPCS

## 2018-02-26 PROCEDURE — 86900 BLOOD TYPING SEROLOGIC ABO: CPT

## 2018-02-26 PROCEDURE — 93458 L HRT ARTERY/VENTRICLE ANGIO: CPT | Performed by: NUCLEAR MEDICINE

## 2018-02-26 PROCEDURE — 85027 COMPLETE CBC AUTOMATED: CPT

## 2018-02-26 PROCEDURE — 86850 RBC ANTIBODY SCREEN: CPT

## 2018-02-26 PROCEDURE — 2720000010 HC SURG SUPPLY STERILE

## 2018-02-26 PROCEDURE — 36415 COLL VENOUS BLD VENIPUNCTURE: CPT

## 2018-02-26 PROCEDURE — C1769 GUIDE WIRE: HCPCS

## 2018-02-26 PROCEDURE — 86901 BLOOD TYPING SEROLOGIC RH(D): CPT

## 2018-02-26 PROCEDURE — 80048 BASIC METABOLIC PNL TOTAL CA: CPT

## 2018-02-26 PROCEDURE — 93005 ELECTROCARDIOGRAM TRACING: CPT | Performed by: PHYSICIAN ASSISTANT

## 2018-02-26 PROCEDURE — 6360000002 HC RX W HCPCS

## 2018-02-26 PROCEDURE — 6370000000 HC RX 637 (ALT 250 FOR IP): Performed by: PHYSICIAN ASSISTANT

## 2018-02-26 RX ORDER — SODIUM CHLORIDE 0.9 % (FLUSH) 0.9 %
10 SYRINGE (ML) INJECTION EVERY 12 HOURS SCHEDULED
Status: DISCONTINUED | OUTPATIENT
Start: 2018-02-26 | End: 2018-02-26 | Stop reason: HOSPADM

## 2018-02-26 RX ORDER — SODIUM CHLORIDE 0.9 % (FLUSH) 0.9 %
10 SYRINGE (ML) INJECTION PRN
Status: DISCONTINUED | OUTPATIENT
Start: 2018-02-26 | End: 2018-02-26 | Stop reason: SDUPTHER

## 2018-02-26 RX ORDER — SODIUM CHLORIDE 9 MG/ML
INJECTION, SOLUTION INTRAVENOUS CONTINUOUS
Status: DISCONTINUED | OUTPATIENT
Start: 2018-02-26 | End: 2018-02-26 | Stop reason: HOSPADM

## 2018-02-26 RX ORDER — SODIUM CHLORIDE 9 MG/ML
INJECTION, SOLUTION INTRAVENOUS CONTINUOUS
Status: DISCONTINUED | OUTPATIENT
Start: 2018-02-26 | End: 2018-02-26 | Stop reason: SDUPTHER

## 2018-02-26 RX ORDER — KETOROLAC TROMETHAMINE 5 MG/ML
1 SOLUTION OPHTHALMIC 3 TIMES DAILY
COMMUNITY
End: 2020-06-11

## 2018-02-26 RX ORDER — NITROGLYCERIN 0.4 MG/1
0.4 TABLET SUBLINGUAL EVERY 5 MIN PRN
Status: DISCONTINUED | OUTPATIENT
Start: 2018-02-26 | End: 2018-02-26 | Stop reason: HOSPADM

## 2018-02-26 RX ORDER — DIPHENHYDRAMINE HYDROCHLORIDE 50 MG/ML
25 INJECTION INTRAMUSCULAR; INTRAVENOUS ONCE
Status: COMPLETED | OUTPATIENT
Start: 2018-02-26 | End: 2018-02-26

## 2018-02-26 RX ORDER — ACETAMINOPHEN 325 MG/1
650 TABLET ORAL EVERY 4 HOURS PRN
Status: DISCONTINUED | OUTPATIENT
Start: 2018-02-26 | End: 2018-02-26 | Stop reason: HOSPADM

## 2018-02-26 RX ORDER — PREDNISOLONE ACETATE 10 MG/ML
1 SUSPENSION/ DROPS OPHTHALMIC 3 TIMES DAILY
COMMUNITY
End: 2020-06-11

## 2018-02-26 RX ORDER — ASPIRIN 325 MG
325 TABLET ORAL ONCE
Status: COMPLETED | OUTPATIENT
Start: 2018-02-26 | End: 2018-02-26

## 2018-02-26 RX ORDER — ATROPINE SULFATE 0.4 MG/ML
0.5 AMPUL (ML) INJECTION
Status: DISCONTINUED | OUTPATIENT
Start: 2018-02-26 | End: 2018-02-26 | Stop reason: HOSPADM

## 2018-02-26 RX ORDER — SODIUM CHLORIDE 0.9 % (FLUSH) 0.9 %
10 SYRINGE (ML) INJECTION PRN
Status: DISCONTINUED | OUTPATIENT
Start: 2018-02-26 | End: 2018-02-26 | Stop reason: HOSPADM

## 2018-02-26 RX ORDER — ONDANSETRON 2 MG/ML
4 INJECTION INTRAMUSCULAR; INTRAVENOUS EVERY 6 HOURS PRN
Status: DISCONTINUED | OUTPATIENT
Start: 2018-02-26 | End: 2018-02-26 | Stop reason: HOSPADM

## 2018-02-26 RX ADMIN — HYDROCORTISONE SODIUM SUCCINATE 100 MG: 100 INJECTION, POWDER, FOR SOLUTION INTRAMUSCULAR; INTRAVENOUS at 14:10

## 2018-02-26 RX ADMIN — DIPHENHYDRAMINE HYDROCHLORIDE 25 MG: 50 INJECTION, SOLUTION INTRAMUSCULAR; INTRAVENOUS at 14:10

## 2018-02-26 RX ADMIN — SODIUM CHLORIDE: 9 INJECTION, SOLUTION INTRAVENOUS at 09:20

## 2018-02-26 RX ADMIN — ASPIRIN 325 MG: 325 TABLET ORAL at 09:21

## 2018-02-26 NOTE — CONSULTS
[sucralfate] Other (See Comments) 05/02/2016    Flomax [tamsulosin hcl]  12/03/2013    Iv dye [iodides]  12/03/2013    Lipitor [atorvastatin] Other (See Comments) 03/18/2015    Motrin [ibuprofen]  05/27/2014     Additional information:       MEDICATIONS   Aspirin  [x] 81 mg  [] 325 mg  [] None  Antiplatelet drug therapy use last 5 days  []No []Yes  Coumadin Use Last 5 Days []No []Yes  Other anticoagulant use last 5 days  []No []Yes    Current Facility-Administered Medications:     0.9 % sodium chloride infusion, , Intravenous, Continuous, Tylene Croak, PA-C, Last Rate: 75 mL/hr at 02/26/18 0920    nitroGLYCERIN (NITROSTAT) SL tablet 0.4 mg, 0.4 mg, Sublingual, Q5 Min PRN, Tylene Croak, PA-C    sodium chloride flush 0.9 % injection 10 mL, 10 mL, Intravenous, PRN, Tylene Croak, PA-C  Prior to Admission medications    Medication Sig Start Date End Date Taking?  Authorizing Provider   prednisoLONE acetate (PRED FORTE) 1 % ophthalmic suspension 1 drop 3 times daily Left eye   Yes Historical Provider, MD   ketorolac (ACULAR) 0.5 % ophthalmic solution Place 1 drop into the left eye 3 times daily   Yes Historical Provider, MD   metoprolol tartrate (LOPRESSOR) 25 MG tablet Take 1 tablet by mouth 2 times daily 2/16/18  Yes Ti Lozano MD   simethicone (MYLICON) 80 MG chewable tablet Take 1 tablet by mouth every 6 hours as needed for Flatulence 1/23/18  Yes Dylon Shaikh, DO   Coenzyme Q10 (COQ10) 400 MG CAPS Take by mouth   Yes Historical Provider, MD   valsartan (DIOVAN) 320 MG tablet Take 1 tablet by mouth daily 1/19/18  Yes Ti Lozano MD   folic acid (FOLVITE) 1 MG tablet Take 2.5 tablets by mouth daily 12/12/17  Yes Chad Nathan,    fluticasone (FLONASE) 50 MCG/ACT nasal spray 1 spray by Nasal route daily   Yes Historical Provider, MD   dicyclomine (BENTYL) 10 MG capsule Take 10 mg by mouth 4 times daily (before meals and nightly)   Yes Historical Provider, MD

## 2018-02-27 ENCOUNTER — TELEPHONE (OUTPATIENT)
Dept: FAMILY MEDICINE CLINIC | Age: 78
End: 2018-02-27

## 2018-02-28 RX ORDER — PRAVASTATIN SODIUM 40 MG
TABLET ORAL
Qty: 30 TABLET | Refills: 11 | Status: SHIPPED | OUTPATIENT
Start: 2018-02-28 | End: 2018-10-22 | Stop reason: SDUPTHER

## 2018-03-06 ENCOUNTER — HOSPITAL ENCOUNTER (OUTPATIENT)
Age: 78
Discharge: HOME | End: 2018-03-06
Payer: MEDICARE

## 2018-03-06 LAB
ANION GAP SERPL CALCULATED.3IONS-SCNC: 12 MEQ/L (ref 8–16)
BUN BLDV-MCNC: 24 MG/DL (ref 7–22)
CALCIUM SERPL-MCNC: 9.2 MG/DL (ref 8.5–10.5)
CHLORIDE BLD-SCNC: 101 MEQ/L (ref 98–111)
CO2: 27 MEQ/L (ref 23–33)
CREAT SERPL-MCNC: 1.6 MG/DL (ref 0.4–1.2)
GFR SERPL CREATININE-BSD FRML MDRD: 42 ML/MIN/1.73M2
GLUCOSE BLD-MCNC: 114 MG/DL (ref 70–108)
POTASSIUM SERPL-SCNC: 4.3 MEQ/L (ref 3.5–5.2)
SODIUM BLD-SCNC: 140 MEQ/L (ref 135–145)

## 2018-03-06 PROCEDURE — 80048 BASIC METABOLIC PNL TOTAL CA: CPT

## 2018-03-06 PROCEDURE — 36415 COLL VENOUS BLD VENIPUNCTURE: CPT

## 2018-03-29 RX ORDER — TRIAMTERENE AND HYDROCHLOROTHIAZIDE 37.5; 25 MG/1; MG/1
TABLET ORAL
Qty: 90 TABLET | Refills: 3 | Status: SHIPPED | OUTPATIENT
Start: 2018-03-29 | End: 2019-03-04 | Stop reason: SDUPTHER

## 2018-04-07 ENCOUNTER — HOSPITAL ENCOUNTER (EMERGENCY)
Age: 78
Discharge: HOME | End: 2018-04-07
Attending: EMERGENCY MEDICINE
Payer: MEDICARE

## 2018-04-07 VITALS
DIASTOLIC BLOOD PRESSURE: 65 MMHG | SYSTOLIC BLOOD PRESSURE: 138 MMHG | TEMPERATURE: 98.3 F | BODY MASS INDEX: 31.01 KG/M2 | OXYGEN SATURATION: 98 % | RESPIRATION RATE: 16 BRPM | WEIGHT: 210 LBS | HEART RATE: 87 BPM

## 2018-04-07 DIAGNOSIS — N18.30 CKD (CHRONIC KIDNEY DISEASE), STAGE III (HCC): ICD-10-CM

## 2018-04-07 DIAGNOSIS — J02.9 ACUTE PHARYNGITIS, UNSPECIFIED ETIOLOGY: Primary | ICD-10-CM

## 2018-04-07 PROCEDURE — 99213 OFFICE O/P EST LOW 20 MIN: CPT

## 2018-04-07 PROCEDURE — 99213 OFFICE O/P EST LOW 20 MIN: CPT | Performed by: EMERGENCY MEDICINE

## 2018-04-07 RX ORDER — PROMETHAZINE HYDROCHLORIDE AND CODEINE PHOSPHATE 6.25; 1 MG/5ML; MG/5ML
5 SYRUP ORAL 4 TIMES DAILY PRN
Qty: 120 ML | Refills: 0 | Status: SHIPPED | OUTPATIENT
Start: 2018-04-07 | End: 2018-04-12

## 2018-04-07 RX ORDER — AMOXICILLIN 500 MG/1
500 CAPSULE ORAL 3 TIMES DAILY
Qty: 21 CAPSULE | Refills: 0 | Status: SHIPPED | OUTPATIENT
Start: 2018-04-07 | End: 2018-04-14

## 2018-04-07 ASSESSMENT — PAIN DESCRIPTION - LOCATION: LOCATION: RIB CAGE

## 2018-04-07 ASSESSMENT — ENCOUNTER SYMPTOMS
EYE DISCHARGE: 0
EYE REDNESS: 0
COUGH: 1
SHORTNESS OF BREATH: 0
WHEEZING: 0
ABDOMINAL PAIN: 0
DIARRHEA: 0
EYE PAIN: 0
NAUSEA: 0
TROUBLE SWALLOWING: 0
SINUS PRESSURE: 0
VOMITING: 0
SORE THROAT: 1
RHINORRHEA: 1
VOICE CHANGE: 0
STRIDOR: 0
BACK PAIN: 0

## 2018-04-07 ASSESSMENT — PAIN SCALES - GENERAL: PAINLEVEL_OUTOF10: 2

## 2018-04-07 ASSESSMENT — PAIN DESCRIPTION - PAIN TYPE: TYPE: ACUTE PAIN

## 2018-04-18 ENCOUNTER — APPOINTMENT (OUTPATIENT)
Dept: GENERAL RADIOLOGY | Age: 78
End: 2018-04-18
Payer: MEDICARE

## 2018-04-18 ENCOUNTER — HOSPITAL ENCOUNTER (EMERGENCY)
Age: 78
Discharge: HOME | End: 2018-04-18
Attending: INTERNAL MEDICINE
Payer: MEDICARE

## 2018-04-18 ENCOUNTER — APPOINTMENT (OUTPATIENT)
Dept: CT IMAGING | Age: 78
End: 2018-04-18
Payer: MEDICARE

## 2018-04-18 VITALS
DIASTOLIC BLOOD PRESSURE: 53 MMHG | RESPIRATION RATE: 19 BRPM | SYSTOLIC BLOOD PRESSURE: 136 MMHG | HEIGHT: 69 IN | TEMPERATURE: 97.6 F | OXYGEN SATURATION: 96 % | HEART RATE: 62 BPM

## 2018-04-18 DIAGNOSIS — F41.9 ANXIETY: ICD-10-CM

## 2018-04-18 DIAGNOSIS — R06.02 SHORTNESS OF BREATH: Primary | ICD-10-CM

## 2018-04-18 LAB
ALLEN TEST: POSITIVE
ANION GAP SERPL CALCULATED.3IONS-SCNC: 15 MEQ/L (ref 8–16)
BASE EXCESS (CALCULATED): -1.8 MMOL/L (ref -2.5–2.5)
BASOPHILS # BLD: 0.8 %
BASOPHILS ABSOLUTE: 0.1 THOU/MM3 (ref 0–0.1)
BILIRUBIN URINE: NEGATIVE
BLOOD, URINE: NEGATIVE
BUN BLDV-MCNC: 26 MG/DL (ref 7–22)
CALCIUM SERPL-MCNC: 10 MG/DL (ref 8.5–10.5)
CARBON MONOXIDE, BLOOD: 5.1 % CO SAT
CHARACTER, URINE: CLEAR
CHLORIDE BLD-SCNC: 100 MEQ/L (ref 98–111)
CO2: 22 MEQ/L (ref 23–33)
COLLECTED BY:: ABNORMAL
COLOR: YELLOW
CREAT SERPL-MCNC: 2 MG/DL (ref 0.4–1.2)
D-DIMER QUANTITATIVE: 458 NG/ML FEU (ref 0–500)
DEVICE: ABNORMAL
EKG ATRIAL RATE: 66 BPM
EKG P AXIS: 47 DEGREES
EKG P-R INTERVAL: 198 MS
EKG Q-T INTERVAL: 420 MS
EKG QRS DURATION: 82 MS
EKG QTC CALCULATION (BAZETT): 440 MS
EKG R AXIS: -13 DEGREES
EKG T AXIS: 79 DEGREES
EKG VENTRICULAR RATE: 66 BPM
EOSINOPHIL # BLD: 2.2 %
EOSINOPHILS ABSOLUTE: 0.2 THOU/MM3 (ref 0–0.4)
FLU A ANTIGEN: NEGATIVE
FLU B ANTIGEN: NEGATIVE
GFR SERPL CREATININE-BSD FRML MDRD: 32 ML/MIN/1.73M2
GLUCOSE BLD-MCNC: 110 MG/DL (ref 70–108)
GLUCOSE URINE: NEGATIVE MG/DL
HCO3: 19 MMOL/L (ref 23–28)
HCT VFR BLD CALC: 43.3 % (ref 42–52)
HEMOGLOBIN: 15 GM/DL (ref 14–18)
INR BLD: 1.03 (ref 0.85–1.13)
KETONES, URINE: NEGATIVE
LACTIC ACID: 1.5 MMOL/L (ref 0.5–2.2)
LEUKOCYTE ESTERASE, URINE: NEGATIVE
LYMPHOCYTES # BLD: 14.5 %
LYMPHOCYTES ABSOLUTE: 1.1 THOU/MM3 (ref 1–4.8)
MCH RBC QN AUTO: 29.7 PG (ref 27–31)
MCHC RBC AUTO-ENTMCNC: 34.6 GM/DL (ref 33–37)
MCV RBC AUTO: 86 FL (ref 80–94)
MONOCYTES # BLD: 5.9 %
MONOCYTES ABSOLUTE: 0.4 THOU/MM3 (ref 0.4–1.3)
NITRITE, URINE: NEGATIVE
NUCLEATED RED BLOOD CELLS: 0 /100 WBC
O2 SATURATION: 98 %
OSMOLALITY CALCULATION: 279.2 MOSMOL/KG (ref 275–300)
PCO2: 23 MMHG (ref 35–45)
PDW BLD-RTO: 14.5 % (ref 11.5–14.5)
PH BLOOD GAS: 7.52 (ref 7.35–7.45)
PH UA: 7
PLATELET # BLD: 210 THOU/MM3 (ref 130–400)
PMV BLD AUTO: 8.8 FL (ref 7.4–10.4)
PO2: 96 MMHG (ref 71–104)
POTASSIUM SERPL-SCNC: 4.3 MEQ/L (ref 3.5–5.2)
PRO-BNP: 111.2 PG/ML (ref 0–1800)
PROTEIN UA: NEGATIVE
RBC # BLD: 5.03 MILL/MM3 (ref 4.7–6.1)
SEG NEUTROPHILS: 76.6 %
SEGMENTED NEUTROPHILS ABSOLUTE COUNT: 5.6 THOU/MM3 (ref 1.8–7.7)
SODIUM BLD-SCNC: 137 MEQ/L (ref 135–145)
SOURCE, BLOOD GAS: ABNORMAL
SPECIFIC GRAVITY, URINE: 1.01 (ref 1–1.03)
TROPONIN T: < 0.01 NG/ML
UROBILINOGEN, URINE: 0.2 EU/DL
WBC # BLD: 7.3 THOU/MM3 (ref 4.8–10.8)

## 2018-04-18 PROCEDURE — 80048 BASIC METABOLIC PNL TOTAL CA: CPT

## 2018-04-18 PROCEDURE — 82803 BLOOD GASES ANY COMBINATION: CPT

## 2018-04-18 PROCEDURE — 71046 X-RAY EXAM CHEST 2 VIEWS: CPT

## 2018-04-18 PROCEDURE — 85025 COMPLETE CBC W/AUTO DIFF WBC: CPT

## 2018-04-18 PROCEDURE — 93005 ELECTROCARDIOGRAM TRACING: CPT | Performed by: INTERNAL MEDICINE

## 2018-04-18 PROCEDURE — 87804 INFLUENZA ASSAY W/OPTIC: CPT

## 2018-04-18 PROCEDURE — 85379 FIBRIN DEGRADATION QUANT: CPT

## 2018-04-18 PROCEDURE — 36415 COLL VENOUS BLD VENIPUNCTURE: CPT

## 2018-04-18 PROCEDURE — 36600 WITHDRAWAL OF ARTERIAL BLOOD: CPT

## 2018-04-18 PROCEDURE — 83605 ASSAY OF LACTIC ACID: CPT

## 2018-04-18 PROCEDURE — 85610 PROTHROMBIN TIME: CPT

## 2018-04-18 PROCEDURE — 99285 EMERGENCY DEPT VISIT HI MDM: CPT

## 2018-04-18 PROCEDURE — 84484 ASSAY OF TROPONIN QUANT: CPT

## 2018-04-18 PROCEDURE — 87040 BLOOD CULTURE FOR BACTERIA: CPT

## 2018-04-18 PROCEDURE — 70450 CT HEAD/BRAIN W/O DYE: CPT

## 2018-04-18 PROCEDURE — 82375 ASSAY CARBOXYHB QUANT: CPT

## 2018-04-18 PROCEDURE — 83880 ASSAY OF NATRIURETIC PEPTIDE: CPT

## 2018-04-18 PROCEDURE — 81003 URINALYSIS AUTO W/O SCOPE: CPT

## 2018-04-18 ASSESSMENT — ENCOUNTER SYMPTOMS
EYE REDNESS: 0
SHORTNESS OF BREATH: 1
NAUSEA: 0
SORE THROAT: 0
BACK PAIN: 0
EYE DISCHARGE: 0
DIARRHEA: 0
VOMITING: 0
COUGH: 0
RHINORRHEA: 0
WHEEZING: 0
ABDOMINAL PAIN: 0

## 2018-04-19 ENCOUNTER — TELEPHONE (OUTPATIENT)
Dept: FAMILY MEDICINE CLINIC | Age: 78
End: 2018-04-19

## 2018-04-19 ENCOUNTER — OFFICE VISIT (OUTPATIENT)
Dept: FAMILY MEDICINE CLINIC | Age: 78
End: 2018-04-19
Payer: MEDICARE

## 2018-04-19 VITALS
SYSTOLIC BLOOD PRESSURE: 138 MMHG | HEART RATE: 68 BPM | DIASTOLIC BLOOD PRESSURE: 70 MMHG | HEIGHT: 68 IN | TEMPERATURE: 97.9 F | RESPIRATION RATE: 20 BRPM | WEIGHT: 209 LBS | BODY MASS INDEX: 31.67 KG/M2

## 2018-04-19 DIAGNOSIS — F41.9 ANXIETY: Primary | ICD-10-CM

## 2018-04-19 DIAGNOSIS — R06.02 SHORTNESS OF BREATH: ICD-10-CM

## 2018-04-19 PROCEDURE — G8417 CALC BMI ABV UP PARAM F/U: HCPCS | Performed by: FAMILY MEDICINE

## 2018-04-19 PROCEDURE — G8598 ASA/ANTIPLAT THER USED: HCPCS | Performed by: FAMILY MEDICINE

## 2018-04-19 PROCEDURE — 99214 OFFICE O/P EST MOD 30 MIN: CPT | Performed by: FAMILY MEDICINE

## 2018-04-19 PROCEDURE — G8427 DOCREV CUR MEDS BY ELIG CLIN: HCPCS | Performed by: FAMILY MEDICINE

## 2018-04-19 PROCEDURE — 1036F TOBACCO NON-USER: CPT | Performed by: FAMILY MEDICINE

## 2018-04-19 PROCEDURE — 4040F PNEUMOC VAC/ADMIN/RCVD: CPT | Performed by: FAMILY MEDICINE

## 2018-04-19 PROCEDURE — 1123F ACP DISCUSS/DSCN MKR DOCD: CPT | Performed by: FAMILY MEDICINE

## 2018-04-19 RX ORDER — LORAZEPAM 0.5 MG/1
0.5 TABLET ORAL EVERY 8 HOURS PRN
Qty: 60 TABLET | Refills: 0 | Status: SHIPPED | OUTPATIENT
Start: 2018-04-19 | End: 2018-05-19

## 2018-04-19 RX ORDER — CITALOPRAM 20 MG/1
20 TABLET ORAL DAILY
Qty: 30 TABLET | Refills: 3 | Status: SHIPPED | OUTPATIENT
Start: 2018-04-19 | End: 2018-08-09 | Stop reason: SDUPTHER

## 2018-04-19 ASSESSMENT — PATIENT HEALTH QUESTIONNAIRE - PHQ9
6. FEELING BAD ABOUT YOURSELF - OR THAT YOU ARE A FAILURE OR HAVE LET YOURSELF OR YOUR FAMILY DOWN: 0
7. TROUBLE CONCENTRATING ON THINGS, SUCH AS READING THE NEWSPAPER OR WATCHING TELEVISION: 0
8. MOVING OR SPEAKING SO SLOWLY THAT OTHER PEOPLE COULD HAVE NOTICED. OR THE OPPOSITE, BEING SO FIGETY OR RESTLESS THAT YOU HAVE BEEN MOVING AROUND A LOT MORE THAN USUAL: 0
5. POOR APPETITE OR OVEREATING: 2
SUM OF ALL RESPONSES TO PHQ QUESTIONS 1-9: 9
SUM OF ALL RESPONSES TO PHQ9 QUESTIONS 1 & 2: 4
4. FEELING TIRED OR HAVING LITTLE ENERGY: 3
2. FEELING DOWN, DEPRESSED OR HOPELESS: 3
1. LITTLE INTEREST OR PLEASURE IN DOING THINGS: 1
3. TROUBLE FALLING OR STAYING ASLEEP: 0
10. IF YOU CHECKED OFF ANY PROBLEMS, HOW DIFFICULT HAVE THESE PROBLEMS MADE IT FOR YOU TO DO YOUR WORK, TAKE CARE OF THINGS AT HOME, OR GET ALONG WITH OTHER PEOPLE: 1
9. THOUGHTS THAT YOU WOULD BE BETTER OFF DEAD, OR OF HURTING YOURSELF: 0

## 2018-04-24 LAB
BLOOD CULTURE, ROUTINE: NORMAL
BLOOD CULTURE, ROUTINE: NORMAL

## 2018-04-25 ENCOUNTER — OFFICE VISIT (OUTPATIENT)
Dept: CARDIOLOGY CLINIC | Age: 78
End: 2018-04-25
Payer: MEDICARE

## 2018-04-25 VITALS
SYSTOLIC BLOOD PRESSURE: 124 MMHG | WEIGHT: 208.6 LBS | HEART RATE: 75 BPM | BODY MASS INDEX: 30.9 KG/M2 | DIASTOLIC BLOOD PRESSURE: 72 MMHG | HEIGHT: 69 IN

## 2018-04-25 DIAGNOSIS — I10 ESSENTIAL HYPERTENSION: ICD-10-CM

## 2018-04-25 DIAGNOSIS — F41.9 ANXIETY: ICD-10-CM

## 2018-04-25 DIAGNOSIS — I25.10 CORONARY ARTERY DISEASE INVOLVING NATIVE CORONARY ARTERY OF NATIVE HEART WITHOUT ANGINA PECTORIS: Primary | ICD-10-CM

## 2018-04-25 DIAGNOSIS — E78.01 FAMILIAL HYPERCHOLESTEROLEMIA: ICD-10-CM

## 2018-04-25 PROCEDURE — 1123F ACP DISCUSS/DSCN MKR DOCD: CPT | Performed by: PHYSICIAN ASSISTANT

## 2018-04-25 PROCEDURE — G8598 ASA/ANTIPLAT THER USED: HCPCS | Performed by: PHYSICIAN ASSISTANT

## 2018-04-25 PROCEDURE — G8417 CALC BMI ABV UP PARAM F/U: HCPCS | Performed by: PHYSICIAN ASSISTANT

## 2018-04-25 PROCEDURE — G8427 DOCREV CUR MEDS BY ELIG CLIN: HCPCS | Performed by: PHYSICIAN ASSISTANT

## 2018-04-25 PROCEDURE — 4040F PNEUMOC VAC/ADMIN/RCVD: CPT | Performed by: PHYSICIAN ASSISTANT

## 2018-04-25 PROCEDURE — 99213 OFFICE O/P EST LOW 20 MIN: CPT | Performed by: PHYSICIAN ASSISTANT

## 2018-04-25 PROCEDURE — 1036F TOBACCO NON-USER: CPT | Performed by: PHYSICIAN ASSISTANT

## 2018-05-02 ENCOUNTER — HOSPITAL ENCOUNTER (OUTPATIENT)
Dept: PULMONOLOGY | Age: 78
Discharge: HOME | End: 2018-05-02
Payer: MEDICARE

## 2018-05-02 DIAGNOSIS — R06.02 SHORTNESS OF BREATH: ICD-10-CM

## 2018-05-02 PROCEDURE — 94060 EVALUATION OF WHEEZING: CPT

## 2018-05-02 PROCEDURE — 94729 DIFFUSING CAPACITY: CPT

## 2018-05-02 PROCEDURE — 94726 PLETHYSMOGRAPHY LUNG VOLUMES: CPT

## 2018-05-07 ENCOUNTER — TELEPHONE (OUTPATIENT)
Dept: FAMILY MEDICINE CLINIC | Age: 78
End: 2018-05-07

## 2018-05-17 ENCOUNTER — OFFICE VISIT (OUTPATIENT)
Dept: FAMILY MEDICINE CLINIC | Age: 78
End: 2018-05-17
Payer: MEDICARE

## 2018-05-17 VITALS
HEIGHT: 69 IN | BODY MASS INDEX: 30.66 KG/M2 | DIASTOLIC BLOOD PRESSURE: 54 MMHG | TEMPERATURE: 98.3 F | SYSTOLIC BLOOD PRESSURE: 128 MMHG | HEART RATE: 53 BPM | RESPIRATION RATE: 14 BRPM | WEIGHT: 207 LBS

## 2018-05-17 DIAGNOSIS — G25.0 BENIGN ESSENTIAL TREMOR: Primary | ICD-10-CM

## 2018-05-17 DIAGNOSIS — F41.1 GAD (GENERALIZED ANXIETY DISORDER): ICD-10-CM

## 2018-05-17 PROCEDURE — G8417 CALC BMI ABV UP PARAM F/U: HCPCS | Performed by: FAMILY MEDICINE

## 2018-05-17 PROCEDURE — 1036F TOBACCO NON-USER: CPT | Performed by: FAMILY MEDICINE

## 2018-05-17 PROCEDURE — 4040F PNEUMOC VAC/ADMIN/RCVD: CPT | Performed by: FAMILY MEDICINE

## 2018-05-17 PROCEDURE — 1123F ACP DISCUSS/DSCN MKR DOCD: CPT | Performed by: FAMILY MEDICINE

## 2018-05-17 PROCEDURE — 99214 OFFICE O/P EST MOD 30 MIN: CPT | Performed by: FAMILY MEDICINE

## 2018-05-17 PROCEDURE — G8427 DOCREV CUR MEDS BY ELIG CLIN: HCPCS | Performed by: FAMILY MEDICINE

## 2018-05-17 PROCEDURE — G8598 ASA/ANTIPLAT THER USED: HCPCS | Performed by: FAMILY MEDICINE

## 2018-05-17 RX ORDER — PRIMIDONE 50 MG/1
50 TABLET ORAL 2 TIMES DAILY
Qty: 60 TABLET | Refills: 3 | Status: SHIPPED | OUTPATIENT
Start: 2018-05-17 | End: 2018-10-13 | Stop reason: SDUPTHER

## 2018-05-19 ENCOUNTER — HOSPITAL ENCOUNTER (OUTPATIENT)
Age: 78
Discharge: HOME | End: 2018-05-19
Payer: MEDICARE

## 2018-05-19 DIAGNOSIS — N40.2 PROSTATE NODULE: ICD-10-CM

## 2018-05-19 DIAGNOSIS — E78.00 PURE HYPERCHOLESTEROLEMIA: ICD-10-CM

## 2018-05-19 LAB
ALBUMIN SERPL-MCNC: 4.2 G/DL (ref 3.5–5.1)
ALP BLD-CCNC: 74 U/L (ref 38–126)
ALT SERPL-CCNC: 13 U/L (ref 11–66)
AST SERPL-CCNC: 18 U/L (ref 5–40)
BILIRUB SERPL-MCNC: 0.6 MG/DL (ref 0.3–1.2)
BILIRUBIN DIRECT: < 0.2 MG/DL (ref 0–0.3)
CHOLESTEROL, TOTAL: 158 MG/DL (ref 100–199)
HDLC SERPL-MCNC: 53 MG/DL
LDL CHOLESTEROL CALCULATED: 85 MG/DL
PROSTATE SPECIFIC ANTIGEN: 2.3 NG/ML (ref 0–1)
TOTAL PROTEIN: 7 G/DL (ref 6.1–8)
TRIGL SERPL-MCNC: 99 MG/DL (ref 0–199)

## 2018-05-19 PROCEDURE — 80061 LIPID PANEL: CPT

## 2018-05-19 PROCEDURE — 84153 ASSAY OF PSA TOTAL: CPT

## 2018-05-19 PROCEDURE — 36415 COLL VENOUS BLD VENIPUNCTURE: CPT

## 2018-05-19 PROCEDURE — 80076 HEPATIC FUNCTION PANEL: CPT

## 2018-05-21 ENCOUNTER — TELEPHONE (OUTPATIENT)
Dept: FAMILY MEDICINE CLINIC | Age: 78
End: 2018-05-21

## 2018-06-13 ENCOUNTER — OFFICE VISIT (OUTPATIENT)
Dept: CARDIOLOGY CLINIC | Age: 78
End: 2018-06-13
Payer: MEDICARE

## 2018-06-13 VITALS
WEIGHT: 212.6 LBS | SYSTOLIC BLOOD PRESSURE: 142 MMHG | HEART RATE: 56 BPM | BODY MASS INDEX: 31.49 KG/M2 | DIASTOLIC BLOOD PRESSURE: 68 MMHG | HEIGHT: 69 IN

## 2018-06-13 DIAGNOSIS — I10 ESSENTIAL HYPERTENSION: ICD-10-CM

## 2018-06-13 DIAGNOSIS — I25.10 CORONARY ARTERY DISEASE INVOLVING NATIVE CORONARY ARTERY OF NATIVE HEART WITHOUT ANGINA PECTORIS: Primary | ICD-10-CM

## 2018-06-13 DIAGNOSIS — E78.01 FAMILIAL HYPERCHOLESTEROLEMIA: ICD-10-CM

## 2018-06-13 PROCEDURE — G8598 ASA/ANTIPLAT THER USED: HCPCS | Performed by: NUCLEAR MEDICINE

## 2018-06-13 PROCEDURE — 99213 OFFICE O/P EST LOW 20 MIN: CPT | Performed by: NUCLEAR MEDICINE

## 2018-06-13 PROCEDURE — G8417 CALC BMI ABV UP PARAM F/U: HCPCS | Performed by: NUCLEAR MEDICINE

## 2018-06-13 PROCEDURE — G8427 DOCREV CUR MEDS BY ELIG CLIN: HCPCS | Performed by: NUCLEAR MEDICINE

## 2018-06-13 PROCEDURE — 1123F ACP DISCUSS/DSCN MKR DOCD: CPT | Performed by: NUCLEAR MEDICINE

## 2018-06-13 PROCEDURE — 1036F TOBACCO NON-USER: CPT | Performed by: NUCLEAR MEDICINE

## 2018-06-13 PROCEDURE — 4040F PNEUMOC VAC/ADMIN/RCVD: CPT | Performed by: NUCLEAR MEDICINE

## 2018-06-13 RX ORDER — TRAMADOL HYDROCHLORIDE 50 MG/1
50 TABLET ORAL EVERY 8 HOURS PRN
COMMUNITY
End: 2022-09-26

## 2018-06-13 RX ORDER — LORAZEPAM 0.5 MG/1
0.5 TABLET ORAL EVERY 8 HOURS PRN
COMMUNITY
End: 2022-03-24

## 2018-06-19 ENCOUNTER — PATIENT MESSAGE (OUTPATIENT)
Dept: FAMILY MEDICINE CLINIC | Age: 78
End: 2018-06-19

## 2018-07-26 NOTE — TELEPHONE ENCOUNTER
Per protocol Diovan 320 mg daily will be switched to Avapro 300 mg daily. [employer] message sent to patient.

## 2018-07-26 NOTE — TELEPHONE ENCOUNTER
From  Hardeep Left To  [facility] Clinical Support Pool Sent  2018  2:45 PM   Hi Dr. Shweta Lin   [birth year]  received a letter today from Tim Mason that the Valsartan is recalled. Last week you switched me to Avalide. , as I needed a refill at [address] and apparently you knew the Valsartan was not available. He just picked up Valsartan and has #25 left in his bottle. I tried to call [facility] and waited and waited and never did talk to anyone. I guess he needs to be switched also. Please send to [facility]([address]) I know you all must be thrilled that this is happening.  What a pain for the office. Yvonnie Ahumada for your help again. Edwige Pandya

## 2018-07-27 RX ORDER — IRBESARTAN 300 MG/1
300 TABLET ORAL DAILY
Qty: 90 TABLET | Refills: 0 | Status: SHIPPED | OUTPATIENT
Start: 2018-07-27 | End: 2018-10-27 | Stop reason: SDUPTHER

## 2018-08-09 DIAGNOSIS — F41.9 ANXIETY: ICD-10-CM

## 2018-08-09 DIAGNOSIS — R06.02 SHORTNESS OF BREATH: ICD-10-CM

## 2018-08-09 RX ORDER — CITALOPRAM 20 MG/1
20 TABLET ORAL DAILY
Qty: 30 TABLET | Refills: 5 | Status: SHIPPED | OUTPATIENT
Start: 2018-08-09 | End: 2019-02-25 | Stop reason: SDUPTHER

## 2018-10-13 DIAGNOSIS — G25.0 BENIGN ESSENTIAL TREMOR: ICD-10-CM

## 2018-10-13 RX ORDER — PRIMIDONE 50 MG/1
50 TABLET ORAL 2 TIMES DAILY
Qty: 60 TABLET | Refills: 3 | Status: SHIPPED | OUTPATIENT
Start: 2018-10-13 | End: 2019-01-23 | Stop reason: SDUPTHER

## 2018-10-22 RX ORDER — PRAVASTATIN SODIUM 40 MG
TABLET ORAL
Qty: 90 TABLET | Refills: 3 | Status: SHIPPED | OUTPATIENT
Start: 2018-10-22 | End: 2019-10-13 | Stop reason: SDUPTHER

## 2018-10-29 RX ORDER — IRBESARTAN 300 MG/1
300 TABLET ORAL DAILY
Qty: 90 TABLET | Refills: 1 | Status: SHIPPED | OUTPATIENT
Start: 2018-10-29 | End: 2019-04-24 | Stop reason: SDUPTHER

## 2018-11-21 ENCOUNTER — HOSPITAL ENCOUNTER (OUTPATIENT)
Age: 78
Discharge: HOME | End: 2018-11-21
Payer: MEDICARE

## 2018-11-21 DIAGNOSIS — N18.30 CKD (CHRONIC KIDNEY DISEASE), STAGE III (HCC): ICD-10-CM

## 2018-11-21 LAB
ANION GAP SERPL CALCULATED.3IONS-SCNC: 13 MEQ/L (ref 8–16)
BASOPHILS # BLD: 0.8 %
BASOPHILS ABSOLUTE: 0 THOU/MM3 (ref 0–0.1)
BUN BLDV-MCNC: 33 MG/DL (ref 7–22)
CALCIUM SERPL-MCNC: 9.7 MG/DL (ref 8.5–10.5)
CHLORIDE BLD-SCNC: 101 MEQ/L (ref 98–111)
CO2: 27 MEQ/L (ref 23–33)
CREAT SERPL-MCNC: 1.7 MG/DL (ref 0.4–1.2)
EOSINOPHIL # BLD: 4.6 %
EOSINOPHILS ABSOLUTE: 0.2 THOU/MM3 (ref 0–0.4)
ERYTHROCYTE [DISTWIDTH] IN BLOOD BY AUTOMATED COUNT: 13.5 % (ref 11.5–14.5)
ERYTHROCYTE [DISTWIDTH] IN BLOOD BY AUTOMATED COUNT: 45.4 FL (ref 35–45)
GFR SERPL CREATININE-BSD FRML MDRD: 39 ML/MIN/1.73M2
GLUCOSE BLD-MCNC: 106 MG/DL (ref 70–108)
HCT VFR BLD CALC: 44.3 % (ref 42–52)
HEMOGLOBIN: 14.4 GM/DL (ref 14–18)
IMMATURE GRANS (ABS): 0.02 THOU/MM3 (ref 0–0.07)
IMMATURE GRANULOCYTES: 0.4 %
LYMPHOCYTES # BLD: 23.5 %
LYMPHOCYTES ABSOLUTE: 1.2 THOU/MM3 (ref 1–4.8)
MCH RBC QN AUTO: 29.9 PG (ref 26–33)
MCHC RBC AUTO-ENTMCNC: 32.5 GM/DL (ref 32.2–35.5)
MCV RBC AUTO: 91.9 FL (ref 80–94)
MONOCYTES # BLD: 7.8 %
MONOCYTES ABSOLUTE: 0.4 THOU/MM3 (ref 0.4–1.3)
NUCLEATED RED BLOOD CELLS: 0 /100 WBC
PLATELET # BLD: 145 THOU/MM3 (ref 130–400)
PMV BLD AUTO: 11 FL (ref 9.4–12.4)
POTASSIUM SERPL-SCNC: 5 MEQ/L (ref 3.5–5.2)
PROSTATE SPECIFIC ANTIGEN: 2.56 NG/ML (ref 0–1)
RBC # BLD: 4.82 MILL/MM3 (ref 4.7–6.1)
SEG NEUTROPHILS: 62.9 %
SEGMENTED NEUTROPHILS ABSOLUTE COUNT: 3.1 THOU/MM3 (ref 1.8–7.7)
SODIUM BLD-SCNC: 141 MEQ/L (ref 135–145)
WBC # BLD: 5 THOU/MM3 (ref 4.8–10.8)

## 2018-11-21 PROCEDURE — 84153 ASSAY OF PSA TOTAL: CPT

## 2018-11-21 PROCEDURE — 80048 BASIC METABOLIC PNL TOTAL CA: CPT

## 2018-11-21 PROCEDURE — 85025 COMPLETE CBC W/AUTO DIFF WBC: CPT

## 2018-11-21 PROCEDURE — 36415 COLL VENOUS BLD VENIPUNCTURE: CPT

## 2018-11-28 ENCOUNTER — OFFICE VISIT (OUTPATIENT)
Dept: UROLOGY | Age: 78
End: 2018-11-28
Payer: MEDICARE

## 2018-11-28 VITALS
SYSTOLIC BLOOD PRESSURE: 120 MMHG | WEIGHT: 214 LBS | HEIGHT: 69 IN | BODY MASS INDEX: 31.7 KG/M2 | DIASTOLIC BLOOD PRESSURE: 82 MMHG

## 2018-11-28 DIAGNOSIS — R39.15 URINARY URGENCY: ICD-10-CM

## 2018-11-28 DIAGNOSIS — N13.8 BPH WITH OBSTRUCTION/LOWER URINARY TRACT SYMPTOMS: ICD-10-CM

## 2018-11-28 DIAGNOSIS — N40.1 BPH WITH OBSTRUCTION/LOWER URINARY TRACT SYMPTOMS: ICD-10-CM

## 2018-11-28 DIAGNOSIS — N40.2 PROSTATE NODULE: Primary | ICD-10-CM

## 2018-11-28 LAB
BILIRUBIN URINE: NEGATIVE
BLOOD URINE, POC: NEGATIVE
CHARACTER, URINE: CLEAR
COLOR, URINE: YELLOW
GLUCOSE URINE: NEGATIVE MG/DL
KETONES, URINE: NEGATIVE
LEUKOCYTE CLUMPS, URINE: NEGATIVE
NITRITE, URINE: NEGATIVE
PH, URINE: 7
POST VOID RESIDUAL (PVR): 0 ML
PROTEIN, URINE: NEGATIVE MG/DL
SPECIFIC GRAVITY, URINE: 1.02 (ref 1–1.03)
UROBILINOGEN, URINE: 0.2 EU/DL

## 2018-11-28 PROCEDURE — G8598 ASA/ANTIPLAT THER USED: HCPCS | Performed by: NURSE PRACTITIONER

## 2018-11-28 PROCEDURE — G8417 CALC BMI ABV UP PARAM F/U: HCPCS | Performed by: NURSE PRACTITIONER

## 2018-11-28 PROCEDURE — 4040F PNEUMOC VAC/ADMIN/RCVD: CPT | Performed by: NURSE PRACTITIONER

## 2018-11-28 PROCEDURE — G8484 FLU IMMUNIZE NO ADMIN: HCPCS | Performed by: NURSE PRACTITIONER

## 2018-11-28 PROCEDURE — 1101F PT FALLS ASSESS-DOCD LE1/YR: CPT | Performed by: NURSE PRACTITIONER

## 2018-11-28 PROCEDURE — 1036F TOBACCO NON-USER: CPT | Performed by: NURSE PRACTITIONER

## 2018-11-28 PROCEDURE — G8428 CUR MEDS NOT DOCUMENT: HCPCS | Performed by: NURSE PRACTITIONER

## 2018-11-28 PROCEDURE — 99213 OFFICE O/P EST LOW 20 MIN: CPT | Performed by: NURSE PRACTITIONER

## 2018-11-28 PROCEDURE — 1123F ACP DISCUSS/DSCN MKR DOCD: CPT | Performed by: NURSE PRACTITIONER

## 2018-11-28 PROCEDURE — 81003 URINALYSIS AUTO W/O SCOPE: CPT | Performed by: NURSE PRACTITIONER

## 2018-11-28 PROCEDURE — 51798 US URINE CAPACITY MEASURE: CPT | Performed by: NURSE PRACTITIONER

## 2018-11-28 RX ORDER — OXYBUTYNIN CHLORIDE 5 MG/1
5 TABLET, EXTENDED RELEASE ORAL DAILY
Qty: 30 TABLET | Refills: 2 | Status: SHIPPED | OUTPATIENT
Start: 2018-11-28 | End: 2018-11-28 | Stop reason: SDUPTHER

## 2018-11-28 RX ORDER — OXYBUTYNIN CHLORIDE 5 MG/1
5 TABLET, EXTENDED RELEASE ORAL DAILY
Qty: 90 TABLET | Refills: 2 | Status: SHIPPED | OUTPATIENT
Start: 2018-11-28 | End: 2020-11-17 | Stop reason: SDUPTHER

## 2018-11-28 NOTE — PROGRESS NOTES
Systems  No problems with ears, nose or throat. No problems with eyes. No chest pain, shortness of breath, abdominal pain, extremity pain or weakness, and no neurological deficits. No rashes. No swollen glands or lymph nodes.  symptoms per HPI. The remainder of the review of symptoms is negative. Objective:     PE:   Vitals:    11/28/18 1412   BP: 120/82   Weight: 214 lb (97.1 kg)   Height: 5' 9\" (1.753 m)       Constitutional: Alert and oriented times 3, no acute distress and cooperative to examination with appropriate mood and affect. HENT:   Head:        Normocephalic and atraumatic. Mouth/Throat:         Mucous membranes are normal.   Eyes:         EOM are normal. No scleral icterus. PERRLA. Neck:        Supple, symmetrical  Pulmonary/Chest:      Chest symmetric with normal A/P diameter,Normal respiratory rate and rhthym. No use of accessory muscles. Abdominal:         Soft. No tenderness. No rebound, no guarding and no CVA tenderness. Bowel sounds present. Musculoskeletal:         Normal range of motion. No edema or tenderness of lower extremities. Extremities: No cyanosis, clubbing, or edema present. Neurological:        Alert and oriented. Psychiatric:        Normal mood and affect. Rectal: firm prostate, median sulci palpated, nodule/scar noted along the right side of prostate, R>L,  induration palpated, smooth and freely movable    Labs   Urine dip demonstrates   No results found for this visit on 11/28/18. Patients recent PSA values are as follows  Lab Results   Component Value Date    PSA 2.56 (H) 11/21/2018    PSA 2.30 (H) 05/19/2018    PSA 2.12 (H) 10/17/2017        Recent BUN/Creatinine:  Lab Results   Component Value Date    BUN 33 11/21/2018    CREATININE 1.7 11/21/2018       Radiology  No recent imaging       Assessment & Plan:     Abnormal ROSE MARIE  BPH w/ LUTs  Urinary Urgency     Janes Luke f/u today for abnormal ROSE MARIE.  Firm, scar tissue noted along the right, median sulci

## 2018-12-05 ENCOUNTER — OFFICE VISIT (OUTPATIENT)
Dept: NEPHROLOGY | Age: 78
End: 2018-12-05
Payer: MEDICARE

## 2018-12-05 VITALS
BODY MASS INDEX: 31.7 KG/M2 | HEIGHT: 69 IN | WEIGHT: 214 LBS | SYSTOLIC BLOOD PRESSURE: 163 MMHG | HEART RATE: 56 BPM | OXYGEN SATURATION: 97 % | DIASTOLIC BLOOD PRESSURE: 67 MMHG | RESPIRATION RATE: 18 BRPM

## 2018-12-05 DIAGNOSIS — N18.30 CHRONIC KIDNEY DISEASE, STAGE III (MODERATE) (HCC): ICD-10-CM

## 2018-12-05 PROCEDURE — G8598 ASA/ANTIPLAT THER USED: HCPCS | Performed by: INTERNAL MEDICINE

## 2018-12-05 PROCEDURE — 99213 OFFICE O/P EST LOW 20 MIN: CPT | Performed by: INTERNAL MEDICINE

## 2018-12-05 PROCEDURE — G8417 CALC BMI ABV UP PARAM F/U: HCPCS | Performed by: INTERNAL MEDICINE

## 2018-12-05 PROCEDURE — 1123F ACP DISCUSS/DSCN MKR DOCD: CPT | Performed by: INTERNAL MEDICINE

## 2018-12-05 PROCEDURE — 1036F TOBACCO NON-USER: CPT | Performed by: INTERNAL MEDICINE

## 2018-12-05 PROCEDURE — 1101F PT FALLS ASSESS-DOCD LE1/YR: CPT | Performed by: INTERNAL MEDICINE

## 2018-12-05 PROCEDURE — 4040F PNEUMOC VAC/ADMIN/RCVD: CPT | Performed by: INTERNAL MEDICINE

## 2018-12-05 PROCEDURE — G8427 DOCREV CUR MEDS BY ELIG CLIN: HCPCS | Performed by: INTERNAL MEDICINE

## 2018-12-05 PROCEDURE — G8484 FLU IMMUNIZE NO ADMIN: HCPCS | Performed by: INTERNAL MEDICINE

## 2018-12-05 NOTE — PROGRESS NOTES
appropriate  Skin:                Warm and dry with no rashes. Muscles:         Hand  and leg strength are equal and strong bilaterally.      Lab Data      CBC:   Lab Results   Component Value Date    WBC 5.0 11/21/2018    HGB 14.4 11/21/2018    HCT 44.3 11/21/2018    MCV 91.9 11/21/2018     11/21/2018     BMP:    Lab Results   Component Value Date     11/21/2018     04/18/2018     03/06/2018    K 5.0 11/21/2018    K 4.3 04/18/2018    K 4.3 03/06/2018     11/21/2018     04/18/2018     03/06/2018    CO2 27 11/21/2018    CO2 22 (L) 04/18/2018    CO2 27 03/06/2018    BUN 33 (H) 11/21/2018    BUN 26 (H) 04/18/2018    BUN 24 (H) 03/06/2018    CREATININE 1.7 (H) 11/21/2018    CREATININE 2.0 (H) 04/18/2018    CREATININE 1.6 (H) 03/06/2018    GLUCOSE 106 11/21/2018    GLUCOSE 110 (H) 04/18/2018    GLUCOSE 114 (H) 03/06/2018      Hepatic:   Lab Results   Component Value Date    AST 18 05/19/2018    AST 17 04/26/2017    AST 21 05/02/2016    ALT 13 05/19/2018    ALT 13 04/26/2017    ALT 15 05/02/2016    BILITOT 0.6 05/19/2018    BILITOT 0.8 04/26/2017    BILITOT 0.7 05/02/2016    ALKPHOS 74 05/19/2018    ALKPHOS 96 04/26/2017    ALKPHOS 84 05/02/2016     BNP: No results found for: BNP  Lipids:   Lab Results   Component Value Date    CHOL 158 05/19/2018    HDL 53 05/19/2018     INR:   Lab Results   Component Value Date    INR 1.03 04/18/2018    INR 0.91 02/26/2018    INR 0.94 03/25/2015     URINE:   Lab Results   Component Value Date    PROTUR Negative 11/28/2018     Lab Results   Component Value Date    NITRU Negative 11/28/2018    COLORU Yellow 11/28/2018    COLORU YELLOW 04/18/2018    PHUR 7.0 04/18/2018    LABCAST NONE SEEN 05/26/2014    WBCUA 0-2 05/02/2016    RBCUA 0-2 05/02/2016    YEAST NONE SEEN 05/02/2016    BACTERIA NONE 05/02/2016    CLARITYU Clear 12/11/2013    SPECGRAV 1.016 05/26/2014    LEUKOCYTESUR NEGATIVE 04/18/2018    UROBILINOGEN 0.20 11/28/2018    BILIRUBINUR

## 2018-12-06 ENCOUNTER — NURSE ONLY (OUTPATIENT)
Dept: FAMILY MEDICINE CLINIC | Age: 78
End: 2018-12-06
Payer: MEDICARE

## 2018-12-06 DIAGNOSIS — Z23 NEED FOR INFLUENZA VACCINATION: Primary | ICD-10-CM

## 2018-12-06 PROCEDURE — G0008 ADMIN INFLUENZA VIRUS VAC: HCPCS | Performed by: FAMILY MEDICINE

## 2018-12-06 PROCEDURE — 90662 IIV NO PRSV INCREASED AG IM: CPT | Performed by: FAMILY MEDICINE

## 2019-01-03 ENCOUNTER — OFFICE VISIT (OUTPATIENT)
Dept: FAMILY MEDICINE CLINIC | Age: 79
End: 2019-01-03
Payer: MEDICARE

## 2019-01-03 VITALS
DIASTOLIC BLOOD PRESSURE: 62 MMHG | TEMPERATURE: 98.4 F | HEART RATE: 68 BPM | BODY MASS INDEX: 32.29 KG/M2 | RESPIRATION RATE: 16 BRPM | WEIGHT: 218 LBS | HEIGHT: 69 IN | SYSTOLIC BLOOD PRESSURE: 132 MMHG

## 2019-01-03 DIAGNOSIS — I20.8 ANGINA EFFORT: ICD-10-CM

## 2019-01-03 DIAGNOSIS — H60.502 ACUTE OTITIS EXTERNA OF LEFT EAR, UNSPECIFIED TYPE: Primary | ICD-10-CM

## 2019-01-03 DIAGNOSIS — N18.30 CHRONIC KIDNEY DISEASE, STAGE III (MODERATE) (HCC): ICD-10-CM

## 2019-01-03 PROCEDURE — 99213 OFFICE O/P EST LOW 20 MIN: CPT | Performed by: FAMILY MEDICINE

## 2019-01-03 PROCEDURE — G8482 FLU IMMUNIZE ORDER/ADMIN: HCPCS | Performed by: FAMILY MEDICINE

## 2019-01-03 PROCEDURE — 1036F TOBACCO NON-USER: CPT | Performed by: FAMILY MEDICINE

## 2019-01-03 PROCEDURE — 1123F ACP DISCUSS/DSCN MKR DOCD: CPT | Performed by: FAMILY MEDICINE

## 2019-01-03 PROCEDURE — G8417 CALC BMI ABV UP PARAM F/U: HCPCS | Performed by: FAMILY MEDICINE

## 2019-01-03 PROCEDURE — G8428 CUR MEDS NOT DOCUMENT: HCPCS | Performed by: FAMILY MEDICINE

## 2019-01-03 PROCEDURE — 1101F PT FALLS ASSESS-DOCD LE1/YR: CPT | Performed by: FAMILY MEDICINE

## 2019-01-03 PROCEDURE — 4040F PNEUMOC VAC/ADMIN/RCVD: CPT | Performed by: FAMILY MEDICINE

## 2019-01-03 PROCEDURE — G8598 ASA/ANTIPLAT THER USED: HCPCS | Performed by: FAMILY MEDICINE

## 2019-01-03 PROCEDURE — 4130F TOPICAL PREP RX AOE: CPT | Performed by: FAMILY MEDICINE

## 2019-01-23 ENCOUNTER — OFFICE VISIT (OUTPATIENT)
Dept: FAMILY MEDICINE CLINIC | Age: 79
End: 2019-01-23
Payer: MEDICARE

## 2019-01-23 VITALS
SYSTOLIC BLOOD PRESSURE: 146 MMHG | RESPIRATION RATE: 16 BRPM | WEIGHT: 214.4 LBS | TEMPERATURE: 97.9 F | DIASTOLIC BLOOD PRESSURE: 64 MMHG | BODY MASS INDEX: 31.76 KG/M2 | HEART RATE: 60 BPM | HEIGHT: 69 IN

## 2019-01-23 DIAGNOSIS — G25.0 BENIGN ESSENTIAL TREMOR: ICD-10-CM

## 2019-01-23 DIAGNOSIS — I10 ESSENTIAL HYPERTENSION: Primary | ICD-10-CM

## 2019-01-23 PROCEDURE — G8417 CALC BMI ABV UP PARAM F/U: HCPCS | Performed by: FAMILY MEDICINE

## 2019-01-23 PROCEDURE — 1123F ACP DISCUSS/DSCN MKR DOCD: CPT | Performed by: FAMILY MEDICINE

## 2019-01-23 PROCEDURE — 99213 OFFICE O/P EST LOW 20 MIN: CPT | Performed by: FAMILY MEDICINE

## 2019-01-23 PROCEDURE — G8428 CUR MEDS NOT DOCUMENT: HCPCS | Performed by: FAMILY MEDICINE

## 2019-01-23 PROCEDURE — 1036F TOBACCO NON-USER: CPT | Performed by: FAMILY MEDICINE

## 2019-01-23 PROCEDURE — G8598 ASA/ANTIPLAT THER USED: HCPCS | Performed by: FAMILY MEDICINE

## 2019-01-23 PROCEDURE — 1101F PT FALLS ASSESS-DOCD LE1/YR: CPT | Performed by: FAMILY MEDICINE

## 2019-01-23 PROCEDURE — 4040F PNEUMOC VAC/ADMIN/RCVD: CPT | Performed by: FAMILY MEDICINE

## 2019-01-23 PROCEDURE — G8482 FLU IMMUNIZE ORDER/ADMIN: HCPCS | Performed by: FAMILY MEDICINE

## 2019-01-23 RX ORDER — AMLODIPINE BESYLATE 5 MG/1
5 TABLET ORAL DAILY
Qty: 30 TABLET | Refills: 3 | Status: SHIPPED | OUTPATIENT
Start: 2019-01-23 | End: 2019-05-16 | Stop reason: SDUPTHER

## 2019-01-23 RX ORDER — PRIMIDONE 50 MG/1
50 TABLET ORAL 2 TIMES DAILY
Qty: 60 TABLET | Refills: 11 | Status: SHIPPED | OUTPATIENT
Start: 2019-01-23 | End: 2019-02-18 | Stop reason: SDUPTHER

## 2019-02-15 RX ORDER — FOLIC ACID 1 MG/1
TABLET ORAL
Qty: 525 TABLET | Refills: 1 | Status: SHIPPED | OUTPATIENT
Start: 2019-02-15 | End: 2020-04-23

## 2019-02-18 DIAGNOSIS — G25.0 BENIGN ESSENTIAL TREMOR: ICD-10-CM

## 2019-02-18 RX ORDER — PRIMIDONE 50 MG/1
50 TABLET ORAL 2 TIMES DAILY
Qty: 60 TABLET | Refills: 11 | Status: SHIPPED | OUTPATIENT
Start: 2019-02-18 | End: 2019-07-15

## 2019-02-25 DIAGNOSIS — R06.02 SHORTNESS OF BREATH: ICD-10-CM

## 2019-02-25 DIAGNOSIS — F41.9 ANXIETY: ICD-10-CM

## 2019-02-25 RX ORDER — CITALOPRAM 20 MG/1
20 TABLET ORAL DAILY
Qty: 30 TABLET | Refills: 5 | Status: SHIPPED | OUTPATIENT
Start: 2019-02-25 | End: 2019-03-04 | Stop reason: SDUPTHER

## 2019-03-04 ENCOUNTER — OFFICE VISIT (OUTPATIENT)
Dept: FAMILY MEDICINE CLINIC | Age: 79
End: 2019-03-04
Payer: MEDICARE

## 2019-03-04 VITALS
HEIGHT: 69 IN | DIASTOLIC BLOOD PRESSURE: 62 MMHG | RESPIRATION RATE: 16 BRPM | TEMPERATURE: 97.7 F | HEART RATE: 64 BPM | WEIGHT: 213 LBS | SYSTOLIC BLOOD PRESSURE: 136 MMHG | BODY MASS INDEX: 31.55 KG/M2

## 2019-03-04 DIAGNOSIS — I10 ESSENTIAL HYPERTENSION: Primary | ICD-10-CM

## 2019-03-04 DIAGNOSIS — R14.3 EXCESSIVE GAS: ICD-10-CM

## 2019-03-04 DIAGNOSIS — R06.02 SHORTNESS OF BREATH: ICD-10-CM

## 2019-03-04 DIAGNOSIS — F41.9 ANXIETY: ICD-10-CM

## 2019-03-04 PROCEDURE — 4040F PNEUMOC VAC/ADMIN/RCVD: CPT | Performed by: FAMILY MEDICINE

## 2019-03-04 PROCEDURE — G8417 CALC BMI ABV UP PARAM F/U: HCPCS | Performed by: FAMILY MEDICINE

## 2019-03-04 PROCEDURE — 1123F ACP DISCUSS/DSCN MKR DOCD: CPT | Performed by: FAMILY MEDICINE

## 2019-03-04 PROCEDURE — 1101F PT FALLS ASSESS-DOCD LE1/YR: CPT | Performed by: FAMILY MEDICINE

## 2019-03-04 PROCEDURE — 99213 OFFICE O/P EST LOW 20 MIN: CPT | Performed by: FAMILY MEDICINE

## 2019-03-04 PROCEDURE — G8482 FLU IMMUNIZE ORDER/ADMIN: HCPCS | Performed by: FAMILY MEDICINE

## 2019-03-04 PROCEDURE — G8427 DOCREV CUR MEDS BY ELIG CLIN: HCPCS | Performed by: FAMILY MEDICINE

## 2019-03-04 PROCEDURE — G8598 ASA/ANTIPLAT THER USED: HCPCS | Performed by: FAMILY MEDICINE

## 2019-03-04 PROCEDURE — 1036F TOBACCO NON-USER: CPT | Performed by: FAMILY MEDICINE

## 2019-03-04 RX ORDER — TRIAMTERENE AND HYDROCHLOROTHIAZIDE 37.5; 25 MG/1; MG/1
TABLET ORAL
Qty: 90 TABLET | Refills: 3 | Status: SHIPPED | OUTPATIENT
Start: 2019-03-04 | End: 2020-01-09 | Stop reason: SDUPTHER

## 2019-03-04 RX ORDER — SIMETHICONE 80 MG
80 TABLET,CHEWABLE ORAL EVERY 6 HOURS PRN
Qty: 60 TABLET | Refills: 3 | Status: SHIPPED | OUTPATIENT
Start: 2019-03-04 | End: 2022-09-26

## 2019-03-04 RX ORDER — CITALOPRAM 20 MG/1
20 TABLET ORAL DAILY
Qty: 30 TABLET | Refills: 5 | Status: SHIPPED | OUTPATIENT
Start: 2019-03-04 | End: 2019-07-15 | Stop reason: SDUPTHER

## 2019-03-04 ASSESSMENT — PATIENT HEALTH QUESTIONNAIRE - PHQ9
1. LITTLE INTEREST OR PLEASURE IN DOING THINGS: 0
SUM OF ALL RESPONSES TO PHQ QUESTIONS 1-9: 0
SUM OF ALL RESPONSES TO PHQ9 QUESTIONS 1 & 2: 0
SUM OF ALL RESPONSES TO PHQ QUESTIONS 1-9: 0
2. FEELING DOWN, DEPRESSED OR HOPELESS: 0

## 2019-04-24 RX ORDER — IRBESARTAN 300 MG/1
300 TABLET ORAL DAILY
Qty: 90 TABLET | Refills: 0 | Status: SHIPPED | OUTPATIENT
Start: 2019-04-24 | End: 2019-07-15 | Stop reason: SDUPTHER

## 2019-05-16 DIAGNOSIS — I10 ESSENTIAL HYPERTENSION: ICD-10-CM

## 2019-05-16 RX ORDER — AMLODIPINE BESYLATE 5 MG/1
5 TABLET ORAL DAILY
Qty: 90 TABLET | Refills: 3 | Status: SHIPPED | OUTPATIENT
Start: 2019-05-16 | End: 2020-03-05 | Stop reason: SDUPTHER

## 2019-05-22 ENCOUNTER — PATIENT MESSAGE (OUTPATIENT)
Dept: CARDIOLOGY CLINIC | Age: 79
End: 2019-05-22

## 2019-05-22 NOTE — TELEPHONE ENCOUNTER
From: Bernadine Rodriguez  To: Yovany Burns MD  Sent: 5/22/2019 10:07 AM EDT  Subject: Test Results Question    Why is there an outstanding $18.00 bill on the EKG from February 26. We have never paid any medical bills since we have Medicare and [employer]. What is the change and getting bills now? What was this $18.00 bill for, and why did Medicare and [employer] not pay you? Are we now having to expect bills to pay personally, because something has changed with our insurance? I do not know if this bill is from [facility] or [place]'s office. Just saw this on the computer.  Alex Pedersen

## 2019-06-13 ENCOUNTER — OFFICE VISIT (OUTPATIENT)
Dept: CARDIOLOGY CLINIC | Age: 79
End: 2019-06-13
Payer: MEDICARE

## 2019-06-13 VITALS
SYSTOLIC BLOOD PRESSURE: 140 MMHG | WEIGHT: 217 LBS | HEIGHT: 70 IN | DIASTOLIC BLOOD PRESSURE: 60 MMHG | BODY MASS INDEX: 31.07 KG/M2 | HEART RATE: 57 BPM

## 2019-06-13 DIAGNOSIS — I10 ESSENTIAL HYPERTENSION: ICD-10-CM

## 2019-06-13 DIAGNOSIS — I25.10 CORONARY ARTERY DISEASE INVOLVING NATIVE CORONARY ARTERY OF NATIVE HEART WITHOUT ANGINA PECTORIS: Primary | ICD-10-CM

## 2019-06-13 DIAGNOSIS — I35.1 NONRHEUMATIC AORTIC VALVE INSUFFICIENCY: ICD-10-CM

## 2019-06-13 DIAGNOSIS — E78.01 FAMILIAL HYPERCHOLESTEROLEMIA: ICD-10-CM

## 2019-06-13 PROCEDURE — G8427 DOCREV CUR MEDS BY ELIG CLIN: HCPCS | Performed by: NUCLEAR MEDICINE

## 2019-06-13 PROCEDURE — 93000 ELECTROCARDIOGRAM COMPLETE: CPT | Performed by: NUCLEAR MEDICINE

## 2019-06-13 PROCEDURE — 1123F ACP DISCUSS/DSCN MKR DOCD: CPT | Performed by: NUCLEAR MEDICINE

## 2019-06-13 PROCEDURE — 99213 OFFICE O/P EST LOW 20 MIN: CPT | Performed by: NUCLEAR MEDICINE

## 2019-06-13 PROCEDURE — G8598 ASA/ANTIPLAT THER USED: HCPCS | Performed by: NUCLEAR MEDICINE

## 2019-06-13 PROCEDURE — G8417 CALC BMI ABV UP PARAM F/U: HCPCS | Performed by: NUCLEAR MEDICINE

## 2019-06-13 PROCEDURE — 1036F TOBACCO NON-USER: CPT | Performed by: NUCLEAR MEDICINE

## 2019-06-13 PROCEDURE — 4040F PNEUMOC VAC/ADMIN/RCVD: CPT | Performed by: NUCLEAR MEDICINE

## 2019-06-13 NOTE — PROGRESS NOTES
[address] [facility] CARDIOLOGY  [address]  [address]  [address] [address]  Dept: [phone]  Dept Fax: [phone]  Loc: [phone]    Visit Date: 6/13/2019    Elissa Zavala is a [de-identified] y.o. male who presents todayfor:  Chief Complaint   Patient presents with    Check-Up    Hypertension    Hyperlipidemia     Know mild CAD  No chest pain   Does have some more fatigue  More tired  Does have AI   No changes in breathing  Moderate AI January 2018     HPI:  HPI  Past Medical History:   Diagnosis Date    Anemia     Arthritis     Bronchiolitis 11/2016    Cancer ([facility] [address]     SKIN CANCER    CKD (chronic kidney disease), stage III ([facility] [address]     Diverticula of colon 5/2014    tx with antibiotics per pt; NO surgery    Diverticulosis     DVT (deep venous thrombosis) (HCC)     s/p    Esophagitis 12/10/2014    Hiatal hernia     HTN (hypertension)     Hyperlipidemia     Kidney stones     Nephrolithiasis     Obesity     Prostate enlargement     benign    Renal insufficiency       Past Surgical History:   Procedure Laterality Date    BLEPHAROPLASTY Bilateral 03/2010    CARDIAC CATHETERIZATION  2000,2010    CATARACT REMOVAL WITH IMPLANT Bilateral 2006,2007    COLONOSCOPY  05/26/2017    HERNIA REPAIR Bilateral 1996    NASAL SINUS SURGERY      SHOULDER SURGERY      SKIN BIOPSY      SKIN CANCER EXCISION  Feb 2015    top of head    TONSILLECTOMY      TURP  4131,0715    UPPER GASTROINTESTINAL ENDOSCOPY       Family History   Problem Relation Age of Onset    Diabetes Mother     High Blood Pressure Father     Heart Disease Father      Social History     Tobacco Use    Smoking status: Never Smoker    Smokeless tobacco: Never Used   Substance Use Topics    Alcohol use: No      Current Outpatient Medications   Medication Sig Dispense Refill    amLODIPine (NORVASC) 5 MG tablet Take 1 tablet by mouth daily 90 tablet 3    irbesartan (AVAPRO) 300 MG tablet TAKE 1 TABLET BY MOUTH DAILY 90 tablet 0    triamterene-hydrochlorothiazide (MAXZIDE-25) 37.5-25 MG per tablet TAKE 1 TABLET BY MOUTH DAILY 90 tablet 3    simethicone (MYLICON) 80 MG chewable tablet Take 1 tablet by mouth every 6 hours as needed for Flatulence 60 tablet 3    citalopram (CELEXA) 20 MG tablet Take 1 tablet by mouth daily 30 tablet 5    primidone (MYSOLINE) 50 MG tablet Take 1 tablet by mouth 2 times daily 60 tablet 11    folic acid (FOLVITE) 1 MG tablet TAKE TWO AND ONE-HALF TABLETS BY MOUTH ONCE DAILY 525 tablet 1    oxybutynin (DITROPAN-XL) 5 MG extended release tablet TAKE 1 TABLET BY MOUTH DAILY 90 tablet 2    pravastatin (PRAVACHOL) 40 MG tablet TAKE 1 TABLET BY MOUTH EVERY EVENING 90 tablet 3    LORazepam (ATIVAN) 0.5 MG tablet Take 0.5 mg by mouth every 8 hours as needed for Anxiety. [facility] traMADol (ULTRAM) 50 MG tablet Take 50 mg by mouth every 8 hours as needed for Pain. [facility] prednisoLONE acetate (PRED FORTE) 1 % ophthalmic suspension 1 drop 3 times daily Left eye      ketorolac (ACULAR) 0.5 % ophthalmic solution Place 1 drop into the left eye 3 times daily      ondansetron (ZOFRAN) 4 MG tablet Take 4 mg by mouth every 8 hours as needed for Nausea or Vomiting      fluticasone (FLONASE) 50 MCG/ACT nasal spray 1 spray by Nasal route daily      dicyclomine (BENTYL) 10 MG capsule Take 10 mg by mouth 4 times daily (before meals and nightly)      pantoprazole (PROTONIX) 40 MG tablet Take 40 mg by mouth daily.  hydrocortisone (WESTCORT) 0.2 % cream Apply  topically daily as needed. Apply to facial rash      aspirin 325 MG tablet Take 325 mg by mouth nightly        No current facility-administered medications for this visit.       Allergies   Allergen Reactions    Ace Inhibitors      Intolerant to      Carafate [Sucralfate] Other (See Comments)     Tongue swelling    Flomax [Tamsulosin Hcl]     Iv Dye [Iodides]     Lipitor [Atorvastatin] Other (See Comments)     Myalgias    Lopressor [Metoprolol]     Motrin [Ibuprofen]      Was told not to take due to kidneys     Health Maintenance   Topic Date Due    DTaP/Tdap/Td vaccine (1 - Tdap) 10/14/1957    Shingles Vaccine (1 of 2) 10/14/1988    Pneumococcal 65+ years Vaccine (2 of 2 - PPSV23) 01/20/2017    Potassium monitoring  11/21/2019    Creatinine monitoring  11/21/2019    Flu vaccine  Completed       Subjective:  Review of Systems  General:   No fever, no chills, some fatigue or weight loss  Pulmonary:    some dyspnea, no wheezing  Cardiac:    Denies recent chest pain,   GI:     No nausea or vomiting, no abdominal pain  Neuro:    No dizziness or light headedness,   Musculoskeletal:  No recent active issues  Extremities:   No edema, good peripheral pulses      Objective:  Physical Exam  BP (!) 140/60   Pulse 57   Ht 5' 10\" (1.778 m)   Wt 217 lb (98.4 kg)   BMI 31.14 kg/m²   General:   Well developed, well nourished  Lungs:   Clear to auscultation  Heart:    Normal S1 S2, Slight murmur. no rubs, no gallops  Abdomen:   Soft, non tender, no organomegalies, positive bowel sounds  Extremities:   No edema, no cyanosis, good peripheral pulses  Neurological:   Awake, alert, oriented. No obvious focal deficits  Musculoskelatal:  No obvious deformities    Assessment:      Diagnosis Orders   1. Coronary artery disease involving native coronary artery of native heart without angina pectoris  EKG 12 lead   2. Essential hypertension  EKG 12 lead   3. Familial hypercholesterolemia  EKG 12 lead   as above  Some more dyspnea  AI needs followed  ECG in office was done today. I reviewed the ECG. No acute findings      Plan:  No follow-ups on file. Echo   Continue risk factor modification and medical management  Thank you for allowing me to participate in the care of your patient.  Please don't hesitate to contact me regarding any further issues related to the patient care    Orders Placed:  Orders Placed This Encounter   Procedures    EKG 12 lead     Order Specific Question:   Reason for Exam?     Answer: Other       Medications Prescribed:  No orders of the defined types were placed in this encounter. Discussed use, benefit, and side effects of prescribed medications. All patient questions answered. Pt voicedunderstanding. Instructed to continue current medications, diet and exercise. Continue risk factor modification and medical management. Patient agreed with treatment plan. Follow up as directed.     Electronically signedby Imtiaz Alas MD on 6/13/2019 at 10:13 AM

## 2019-06-14 ENCOUNTER — HOSPITAL ENCOUNTER (OUTPATIENT)
Dept: NON INVASIVE DIAGNOSTICS | Age: 79
Discharge: HOME | End: 2019-06-14
Payer: MEDICARE

## 2019-06-14 DIAGNOSIS — I35.1 NONRHEUMATIC AORTIC VALVE INSUFFICIENCY: ICD-10-CM

## 2019-06-14 LAB
LV EF: 50 %
LVEF MODALITY: NORMAL

## 2019-06-14 PROCEDURE — 93306 TTE W/DOPPLER COMPLETE: CPT

## 2019-07-15 RX ORDER — IRBESARTAN 300 MG/1
300 TABLET ORAL DAILY
Qty: 90 TABLET | Refills: 1 | Status: SHIPPED | OUTPATIENT
Start: 2019-07-15 | End: 2020-01-09

## 2019-09-05 ENCOUNTER — OFFICE VISIT (OUTPATIENT)
Dept: FAMILY MEDICINE CLINIC | Age: 79
End: 2019-09-05
Payer: MEDICARE

## 2019-09-05 VITALS
BODY MASS INDEX: 31.81 KG/M2 | HEART RATE: 54 BPM | DIASTOLIC BLOOD PRESSURE: 60 MMHG | TEMPERATURE: 97.8 F | HEIGHT: 69 IN | WEIGHT: 214.8 LBS | SYSTOLIC BLOOD PRESSURE: 138 MMHG

## 2019-09-05 DIAGNOSIS — F41.9 ANXIETY: ICD-10-CM

## 2019-09-05 DIAGNOSIS — G25.0 BENIGN ESSENTIAL TREMOR: ICD-10-CM

## 2019-09-05 DIAGNOSIS — Z00.00 ROUTINE GENERAL MEDICAL EXAMINATION AT A HEALTH CARE FACILITY: Primary | ICD-10-CM

## 2019-09-05 DIAGNOSIS — I10 ESSENTIAL HYPERTENSION: ICD-10-CM

## 2019-09-05 PROCEDURE — G0438 PPPS, INITIAL VISIT: HCPCS | Performed by: FAMILY MEDICINE

## 2019-09-05 PROCEDURE — 1036F TOBACCO NON-USER: CPT | Performed by: FAMILY MEDICINE

## 2019-09-05 PROCEDURE — 1123F ACP DISCUSS/DSCN MKR DOCD: CPT | Performed by: FAMILY MEDICINE

## 2019-09-05 PROCEDURE — G8417 CALC BMI ABV UP PARAM F/U: HCPCS | Performed by: FAMILY MEDICINE

## 2019-09-05 PROCEDURE — G8598 ASA/ANTIPLAT THER USED: HCPCS | Performed by: FAMILY MEDICINE

## 2019-09-05 PROCEDURE — 4040F PNEUMOC VAC/ADMIN/RCVD: CPT | Performed by: FAMILY MEDICINE

## 2019-09-05 PROCEDURE — G8427 DOCREV CUR MEDS BY ELIG CLIN: HCPCS | Performed by: FAMILY MEDICINE

## 2019-09-05 PROCEDURE — 99212 OFFICE O/P EST SF 10 MIN: CPT | Performed by: FAMILY MEDICINE

## 2019-09-05 ASSESSMENT — PATIENT HEALTH QUESTIONNAIRE - PHQ9
SUM OF ALL RESPONSES TO PHQ QUESTIONS 1-9: 0
SUM OF ALL RESPONSES TO PHQ QUESTIONS 1-9: 0

## 2019-09-05 ASSESSMENT — LIFESTYLE VARIABLES: HOW OFTEN DO YOU HAVE A DRINK CONTAINING ALCOHOL: 0

## 2019-09-05 NOTE — PATIENT INSTRUCTIONS
Personalized Preventive Plan for Noe Rebolledo - 9/5/2019  Medicare offers a range of preventive health benefits. Some of the tests and screenings are paid in full while other may be subject to a deductible, co-insurance, and/or copay. Some of these benefits include a comprehensive review of your medical history including lifestyle, illnesses that may run in your family, and various assessments and screenings as appropriate. After reviewing your medical record and screening and assessments performed today your provider may have ordered immunizations, labs, imaging, and/or referrals for you. A list of these orders (if applicable) as well as your Preventive Care list are included within your After Visit Summary for your review. Other Preventive Recommendations:    · A preventive eye exam performed by an eye specialist is recommended every 1-2 years to screen for glaucoma; cataracts, macular degeneration, and other eye disorders. · A preventive dental visit is recommended every 6 months. · Try to get at least 150 minutes of exercise per week or 10,000 steps per day on a pedometer . · Order or download the FREE \"Exercise & Physical Activity: Your Everyday Guide\" from [employer]. Call 1-488.715.4010 or search [employer] online. · You need 7140-4624 mg of calcium and 8572-9996 IU of vitamin D per day. It is possible to meet your calcium requirement with diet alone, but a vitamin D supplement is usually necessary to meet this goal.  · When exposed to the sun, use a sunscreen that protects against both UVA and UVB radiation with an SPF of 30 or greater. Reapply every 2 to 3 hours or after sweating, drying off with a towel, or swimming. · Always wear a seat belt when traveling in a car. Always wear a helmet when riding a bicycle or motorcycle.

## 2019-09-05 NOTE — PROGRESS NOTES
George Laura is a [de-identified] y.o. male that presents for     Chief Complaint   Patient presents with    Medicare AWV       /60   Pulse 54   Temp 97.8 °F (36.6 °C) (Oral)   Ht 5' 9\" (1.753 m)   Wt 214 lb 12.8 oz (97.4 kg)   BMI 31.72 kg/m²       HPI:    Currently on Primidone for essential tremor. States that he is noting the symptoms are getting a little worse. Notes this mostly with fine motor activities. We discussed neurology referral today, he wants to hold on this unless sx are worsening. Anxiety has been reasonably controlled. He is on celexa and ativan. He rarely has to take the ativan. Notes that it has been situation. Sleep is doing well. HTN    Does patient check BP regularly at home? - No  Current Medication regimen - Maxzide, irbesartan, amlodipine  Tolerating medications well? - yes    Shortness of breath or chest pain? No  Headache or visual complaints? No  Neurologic changes like confusion? No  Extremity edema?  Yes - 'slight swelling' around the ankles    BP Readings from Last 3 Encounters:   09/05/19 138/60   06/13/19 (!) 140/60   03/04/19 136/62         Health Maintenance   Topic Date Due    DTaP/Tdap/Td vaccine (1 - Tdap) 10/14/1957    Shingles Vaccine (1 of 2) 10/14/1988    Annual Wellness Visit (AWV)  10/14/2001    Pneumococcal 65+ years Vaccine (2 of 2 - PPSV23) 01/20/2017    Flu vaccine (1) 09/01/2019    Potassium monitoring  11/21/2019    Creatinine monitoring  11/21/2019       Past Medical History:   Diagnosis Date    Anemia     Arthritis     Bronchiolitis 11/2016    Cancer ([facility] [address]     SKIN CANCER    CKD (chronic kidney disease), stage III ([facility] [address]     Diverticula of colon 5/2014    tx with antibiotics per pt; NO surgery    Diverticulosis     DVT (deep venous thrombosis) (HCC)     s/p    Esophagitis 12/10/2014    Hiatal hernia     HTN (hypertension)     Hyperlipidemia     Kidney stones     Nephrolithiasis     Obesity     Prostate enlargement     benign clubbing or edema of the lower extremities  Psych:  Normal affect without evidence of depression oranxiety, insight and judgement are appropriate, memory appears intact  Skin: warm and dry, no rash or erythema      ASSESSMENT & PLAN  Arjun Mora was seen today for medicare awv. Diagnoses and all orders for this visit:    Routine general medical examination at a health care facility    Benign essential tremor    Essential hypertension    Anxiety    -Tremor is worsening, but he wants to hold on further evaluation on treatment, he will let me know if any worsening or symptoms or if he wants to see Neuro, continue primidone for now. -Chronic issues stable, continue current medications  -Advised to call if any issues      Return in about 6 months (around 3/5/2020), or if symptoms worsen or fail to improve. Controlled Substances Monitoring:                     Arjun Mora received counseling on the following healthy behaviors: medication adherence  Reviewed prior labs and health maintenance. Continue current medications, diet and exercise. Discussed use, benefit, and side effects of prescribed medications. Barriers to medication compliance addressed. Patient given educational materials - see patient instructions. All patient questions answered. Patient voiced understanding.

## 2019-09-05 NOTE — PROGRESS NOTES
ENDOSCOPY       Family History   Problem Relation Age of Onset    Diabetes Mother     High Blood Pressure Father     Heart Disease Father        CareTeam (Including outside providers/suppliers regularly involved in providing care):   Patient Care Team:  Marylou Parisi DO as PCP - General  Marylou Parisi DO as PCP - [facility] Empaneled Provider    Wt Readings from Last 3 Encounters:   09/05/19 214 lb 12.8 oz (97.4 kg)   06/13/19 217 lb (98.4 kg)   03/04/19 213 lb (96.6 kg)     Vitals:    09/05/19 1141   BP: 138/60   Pulse: 54   Temp: 97.8 °F (36.6 °C)   TempSrc: Oral   Weight: 214 lb 12.8 oz (97.4 kg)   Height: 5' 9\" (1.753 m)     Body mass index is 31.72 kg/m². Based upon direct observation of the patient, evaluation of cognition reveals recent and remote memory intact. Patient's complete Health Risk Assessment and screening values have been reviewed and are found in Flowsheets. The following problems were reviewed today and where indicated follow up appointments were made and/or referrals ordered. Positive Risk Factor Screenings with Interventions:     Health Habits/Nutrition:  Health Habits/Nutrition  Do you exercise for at least 20 minutes 2-3 times per week?: Yes  Have you lost any weight without trying in the past 3 months?: No  Do you eat fewer than 2 meals per day?: (!) Yes  Have you seen a dentist within the past year?: Yes  Body mass index is 31.72 kg/m².   Health Habits/Nutrition Interventions:  · Advised on diet and exercise    Hearing/Vision:  No exam data present  Hearing/Vision  Do you or your family notice any trouble with your hearing?: (!) Yes  Do you have difficulty driving, watching TV, or doing any of your daily activities because of your eyesight?: (!) Yes  Have you had an eye exam within the past year?: Yes  Hearing/Vision Interventions:  · Hearing concerns:  Patient has hearing aids, but does not wear consistnetly  · Vision concerns:  patient encouraged to make appointment with his/her eye specialist    Personalized Preventive Plan   Current Health Maintenance Status  Immunization History   Administered Date(s) Administered    Influenza Virus Vaccine 09/01/2014, 01/20/2016    Influenza, High Dose (Fluzone 65 yrs and older) 12/06/2018    Influenza, Quadv, IM, PF (6 mo and older Fluzone, Flulaval, Fluarix, and 3 yrs and older Afluria) 01/23/2017, 01/23/2018    Pneumococcal Conjugate 13-valent (Kee Headley) 01/20/2016        Health Maintenance   Topic Date Due    DTaP/Tdap/Td vaccine (1 - Tdap) 10/14/1957    Shingles Vaccine (1 of 2) 10/14/1988    Annual Wellness Visit (AWV)  10/14/2001    Pneumococcal 65+ years Vaccine (2 of 2 - PPSV23) 01/20/2017    Flu vaccine (1) 09/01/2019    Potassium monitoring  11/21/2019    Creatinine monitoring  11/21/2019     Recommendations for Preventive Services Due: see orders and patient instructions/AVS.  . Recommended screening schedule for the next 5-10 years is provided to the patient in written form: see Patient Instructions/AVS.    Edy Salinas was seen today for medicare awv.     Diagnoses and all orders for this visit:    Routine general medical examination at a health care facility

## 2019-11-20 ENCOUNTER — NURSE ONLY (OUTPATIENT)
Dept: LAB | Age: 79
End: 2019-11-20

## 2019-11-20 DIAGNOSIS — N18.30 CHRONIC KIDNEY DISEASE, STAGE III (MODERATE) (HCC): ICD-10-CM

## 2019-11-20 DIAGNOSIS — N40.1 BPH WITH OBSTRUCTION/LOWER URINARY TRACT SYMPTOMS: ICD-10-CM

## 2019-11-20 DIAGNOSIS — N13.8 BPH WITH OBSTRUCTION/LOWER URINARY TRACT SYMPTOMS: ICD-10-CM

## 2019-11-20 LAB
ANION GAP SERPL CALCULATED.3IONS-SCNC: 12 MEQ/L (ref 8–16)
BASOPHILS # BLD: 0.9 %
BASOPHILS ABSOLUTE: 0 THOU/MM3 (ref 0–0.1)
BUN BLDV-MCNC: 27 MG/DL (ref 7–22)
CALCIUM SERPL-MCNC: 9.6 MG/DL (ref 8.5–10.5)
CHLORIDE BLD-SCNC: 99 MEQ/L (ref 98–111)
CO2: 29 MEQ/L (ref 23–33)
CREAT SERPL-MCNC: 1.5 MG/DL (ref 0.4–1.2)
EOSINOPHIL # BLD: 5.9 %
EOSINOPHILS ABSOLUTE: 0.3 THOU/MM3 (ref 0–0.4)
ERYTHROCYTE [DISTWIDTH] IN BLOOD BY AUTOMATED COUNT: 13.2 % (ref 11.5–14.5)
ERYTHROCYTE [DISTWIDTH] IN BLOOD BY AUTOMATED COUNT: 44.2 FL (ref 35–45)
GFR SERPL CREATININE-BSD FRML MDRD: 45 ML/MIN/1.73M2
GLUCOSE BLD-MCNC: 116 MG/DL (ref 70–108)
HCT VFR BLD CALC: 42.8 % (ref 42–52)
HEMOGLOBIN: 13.8 GM/DL (ref 14–18)
IMMATURE GRANS (ABS): 0.02 THOU/MM3 (ref 0–0.07)
IMMATURE GRANULOCYTES: 0.5 %
LYMPHOCYTES # BLD: 24.5 %
LYMPHOCYTES ABSOLUTE: 1.1 THOU/MM3 (ref 1–4.8)
MCH RBC QN AUTO: 29.8 PG (ref 26–33)
MCHC RBC AUTO-ENTMCNC: 32.2 GM/DL (ref 32.2–35.5)
MCV RBC AUTO: 92.4 FL (ref 80–94)
MONOCYTES # BLD: 8.2 %
MONOCYTES ABSOLUTE: 0.4 THOU/MM3 (ref 0.4–1.3)
NUCLEATED RED BLOOD CELLS: 0 /100 WBC
PLATELET # BLD: 150 THOU/MM3 (ref 130–400)
PMV BLD AUTO: 10.9 FL (ref 9.4–12.4)
POTASSIUM SERPL-SCNC: 4.4 MEQ/L (ref 3.5–5.2)
PROSTATE SPECIFIC ANTIGEN: 2.97 NG/ML (ref 0–1)
RBC # BLD: 4.63 MILL/MM3 (ref 4.7–6.1)
SEG NEUTROPHILS: 60 %
SEGMENTED NEUTROPHILS ABSOLUTE COUNT: 2.6 THOU/MM3 (ref 1.8–7.7)
SODIUM BLD-SCNC: 140 MEQ/L (ref 135–145)
WBC # BLD: 4.4 THOU/MM3 (ref 4.8–10.8)

## 2019-11-26 ENCOUNTER — OFFICE VISIT (OUTPATIENT)
Dept: UROLOGY | Age: 79
End: 2019-11-26
Payer: MEDICARE

## 2019-11-26 VITALS
WEIGHT: 212 LBS | DIASTOLIC BLOOD PRESSURE: 62 MMHG | BODY MASS INDEX: 31.4 KG/M2 | SYSTOLIC BLOOD PRESSURE: 124 MMHG | HEIGHT: 69 IN

## 2019-11-26 DIAGNOSIS — N40.1 BENIGN PROSTATIC HYPERPLASIA WITH NOCTURIA: ICD-10-CM

## 2019-11-26 DIAGNOSIS — N40.0 PROSTATE ENLARGEMENT: Primary | ICD-10-CM

## 2019-11-26 DIAGNOSIS — R35.1 BENIGN PROSTATIC HYPERPLASIA WITH NOCTURIA: ICD-10-CM

## 2019-11-26 PROCEDURE — G8427 DOCREV CUR MEDS BY ELIG CLIN: HCPCS | Performed by: NURSE PRACTITIONER

## 2019-11-26 PROCEDURE — 1123F ACP DISCUSS/DSCN MKR DOCD: CPT | Performed by: NURSE PRACTITIONER

## 2019-11-26 PROCEDURE — G8484 FLU IMMUNIZE NO ADMIN: HCPCS | Performed by: NURSE PRACTITIONER

## 2019-11-26 PROCEDURE — 1036F TOBACCO NON-USER: CPT | Performed by: NURSE PRACTITIONER

## 2019-11-26 PROCEDURE — G8598 ASA/ANTIPLAT THER USED: HCPCS | Performed by: NURSE PRACTITIONER

## 2019-11-26 PROCEDURE — G8417 CALC BMI ABV UP PARAM F/U: HCPCS | Performed by: NURSE PRACTITIONER

## 2019-11-26 PROCEDURE — 4040F PNEUMOC VAC/ADMIN/RCVD: CPT | Performed by: NURSE PRACTITIONER

## 2019-11-26 PROCEDURE — 99213 OFFICE O/P EST LOW 20 MIN: CPT | Performed by: NURSE PRACTITIONER

## 2019-12-04 ENCOUNTER — OFFICE VISIT (OUTPATIENT)
Dept: NEPHROLOGY | Age: 79
End: 2019-12-04
Payer: MEDICARE

## 2019-12-04 VITALS
BODY MASS INDEX: 31.99 KG/M2 | SYSTOLIC BLOOD PRESSURE: 128 MMHG | WEIGHT: 216 LBS | OXYGEN SATURATION: 98 % | HEART RATE: 60 BPM | DIASTOLIC BLOOD PRESSURE: 60 MMHG | HEIGHT: 69 IN | RESPIRATION RATE: 18 BRPM

## 2019-12-04 DIAGNOSIS — N18.30 CKD (CHRONIC KIDNEY DISEASE), STAGE III (HCC): Primary | ICD-10-CM

## 2019-12-04 PROCEDURE — G8417 CALC BMI ABV UP PARAM F/U: HCPCS | Performed by: INTERNAL MEDICINE

## 2019-12-04 PROCEDURE — G8598 ASA/ANTIPLAT THER USED: HCPCS | Performed by: INTERNAL MEDICINE

## 2019-12-04 PROCEDURE — 99213 OFFICE O/P EST LOW 20 MIN: CPT | Performed by: INTERNAL MEDICINE

## 2019-12-04 PROCEDURE — G8484 FLU IMMUNIZE NO ADMIN: HCPCS | Performed by: INTERNAL MEDICINE

## 2019-12-04 PROCEDURE — 1123F ACP DISCUSS/DSCN MKR DOCD: CPT | Performed by: INTERNAL MEDICINE

## 2019-12-04 PROCEDURE — 4040F PNEUMOC VAC/ADMIN/RCVD: CPT | Performed by: INTERNAL MEDICINE

## 2019-12-04 PROCEDURE — G8427 DOCREV CUR MEDS BY ELIG CLIN: HCPCS | Performed by: INTERNAL MEDICINE

## 2019-12-04 PROCEDURE — 1036F TOBACCO NON-USER: CPT | Performed by: INTERNAL MEDICINE

## 2019-12-05 ENCOUNTER — NURSE ONLY (OUTPATIENT)
Dept: FAMILY MEDICINE CLINIC | Age: 79
End: 2019-12-05
Payer: MEDICARE

## 2019-12-05 DIAGNOSIS — Z23 NEED FOR INFLUENZA VACCINATION: Primary | ICD-10-CM

## 2019-12-05 PROCEDURE — G0008 ADMIN INFLUENZA VIRUS VAC: HCPCS | Performed by: FAMILY MEDICINE

## 2019-12-05 PROCEDURE — 90653 IIV ADJUVANT VACCINE IM: CPT | Performed by: FAMILY MEDICINE

## 2020-01-09 RX ORDER — IRBESARTAN 300 MG/1
300 TABLET ORAL DAILY
Qty: 90 TABLET | Refills: 1 | Status: SHIPPED | OUTPATIENT
Start: 2020-01-09 | End: 2020-07-09

## 2020-01-09 RX ORDER — TRIAMTERENE AND HYDROCHLOROTHIAZIDE 37.5; 25 MG/1; MG/1
TABLET ORAL
Qty: 90 TABLET | Refills: 3 | Status: SHIPPED | OUTPATIENT
Start: 2020-01-09 | End: 2020-02-13 | Stop reason: SDUPTHER

## 2020-02-13 ENCOUNTER — PATIENT MESSAGE (OUTPATIENT)
Dept: FAMILY MEDICINE CLINIC | Age: 80
End: 2020-02-13

## 2020-02-14 RX ORDER — TRIAMTERENE AND HYDROCHLOROTHIAZIDE 37.5; 25 MG/1; MG/1
TABLET ORAL
Qty: 90 TABLET | Refills: 3 | Status: SHIPPED | OUTPATIENT
Start: 2020-02-14 | End: 2020-12-31

## 2020-03-05 ENCOUNTER — OFFICE VISIT (OUTPATIENT)
Dept: FAMILY MEDICINE CLINIC | Age: 80
End: 2020-03-05
Payer: MEDICARE

## 2020-03-05 VITALS
BODY MASS INDEX: 31.84 KG/M2 | WEIGHT: 215 LBS | HEART RATE: 50 BPM | TEMPERATURE: 98.1 F | HEIGHT: 69 IN | DIASTOLIC BLOOD PRESSURE: 60 MMHG | RESPIRATION RATE: 14 BRPM | SYSTOLIC BLOOD PRESSURE: 126 MMHG

## 2020-03-05 PROCEDURE — G8417 CALC BMI ABV UP PARAM F/U: HCPCS | Performed by: FAMILY MEDICINE

## 2020-03-05 PROCEDURE — 1123F ACP DISCUSS/DSCN MKR DOCD: CPT | Performed by: FAMILY MEDICINE

## 2020-03-05 PROCEDURE — G8427 DOCREV CUR MEDS BY ELIG CLIN: HCPCS | Performed by: FAMILY MEDICINE

## 2020-03-05 PROCEDURE — 99214 OFFICE O/P EST MOD 30 MIN: CPT | Performed by: FAMILY MEDICINE

## 2020-03-05 PROCEDURE — 4040F PNEUMOC VAC/ADMIN/RCVD: CPT | Performed by: FAMILY MEDICINE

## 2020-03-05 PROCEDURE — G8482 FLU IMMUNIZE ORDER/ADMIN: HCPCS | Performed by: FAMILY MEDICINE

## 2020-03-05 PROCEDURE — 1036F TOBACCO NON-USER: CPT | Performed by: FAMILY MEDICINE

## 2020-03-05 RX ORDER — PREDNISONE 20 MG/1
40 TABLET ORAL DAILY
Qty: 10 TABLET | Refills: 0 | Status: SHIPPED | OUTPATIENT
Start: 2020-03-05 | End: 2020-03-10

## 2020-03-05 RX ORDER — AMLODIPINE BESYLATE 5 MG/1
5 TABLET ORAL DAILY
Qty: 90 TABLET | Refills: 3 | Status: SHIPPED | OUTPATIENT
Start: 2020-03-05 | End: 2021-03-08 | Stop reason: SDUPTHER

## 2020-03-05 ASSESSMENT — PATIENT HEALTH QUESTIONNAIRE - PHQ9
SUM OF ALL RESPONSES TO PHQ QUESTIONS 1-9: 0
2. FEELING DOWN, DEPRESSED OR HOPELESS: 0
SUM OF ALL RESPONSES TO PHQ QUESTIONS 1-9: 0
1. LITTLE INTEREST OR PLEASURE IN DOING THINGS: 0
SUM OF ALL RESPONSES TO PHQ9 QUESTIONS 1 & 2: 0

## 2020-03-05 NOTE — PROGRESS NOTES
Fazal Roman is a 78 y.o. male that presents for     Chief Complaint   Patient presents with    6 Month Follow-Up    Arm Pain     x1mo shoulder area in certain positions (left upper arm shoulder)       /60   Pulse 50   Temp 98.1 °F (36.7 °C) (Oral)   Resp 14   Ht 5' 8.5\" (1.74 m)   Wt 215 lb (97.5 kg)   BMI 32.22 kg/m²       HPI:    Shoulder Pain    HPI:    Symptoms have been present for 1 month(s) in the left shoulder and arm. The pain is intermittent. The patient describes the pain as aching. Inciting injury or history of trauma? No  Pain is relieved by - resting at the side  Pain is aggravated by - laying on the left side at night, flexing at the elbow with weight  Radiation of the pain? Yes, pain tends to radiate to the anterior bicepts. Paresthesias of the extremities? No  Decreased ROM? No      Currently on Primidone for essential tremor. 'I still got it'. Has not noted any significant worsening of sx recently. Still notes some difficulty with using utensils, but this is tolerable. Anxiety has been reasonably controlled. Reports that he is feeling 'pretty calm'. He is on celexa and ativan. Tolerating meds well. HTN    Does patient check BP regularly at home? - No  Current Medication regimen - Maxzide, irbesartan, amlodipine  Tolerating medications well? - yes    Shortness of breath or chest pain? No  Headache or visual complaints? No  Neurologic changes like confusion? No  Extremity edema?  Yes - mild, chronic issue    BP Readings from Last 3 Encounters:   03/05/20 126/60   12/04/19 128/60   11/26/19 124/62         Health Maintenance   Topic Date Due    DTaP/Tdap/Td vaccine (1 - Tdap) 10[birth year]14/1949    Shingles Vaccine (1 of 2) 10/14/1988    Pneumococcal 65+ years Vaccine (2 of 2 - PPSV23) 01/20/2017    Lipid screen  05/19/2019    Annual Wellness Visit (AWV)  09/05/2020    Potassium monitoring  11/20/2020    Creatinine monitoring  11/20/2020    Flu vaccine  Completed    Hepatitis A vaccine  Aged Out    Hepatitis B vaccine  Aged Out    Hib vaccine  Aged Out    Meningococcal (ACWY) vaccine  Aged Out       Past Medical History:   Diagnosis Date    Anemia     Arthritis     Bronchiolitis 11/2016    Cancer ([facility] [address]     SKIN CANCER    CKD (chronic kidney disease), stage III (HCC)     Diverticula of colon 5/2014    tx with antibiotics per pt; NO surgery    Diverticulosis     DVT (deep venous thrombosis) (HCC)     s/p    Esophagitis 12/10/2014    Hiatal hernia     HTN (hypertension)     Hyperlipidemia     Kidney stones     Nephrolithiasis     Obesity     Prostate enlargement     benign    Renal insufficiency        Past Surgical History:   Procedure Laterality Date    BLEPHAROPLASTY Bilateral 03/2010    CARDIAC CATHETERIZATION  2000,2010    CATARACT REMOVAL WITH IMPLANT Bilateral 2006,2007    COLONOSCOPY  05/26/2017    HERNIA REPAIR Bilateral 1996    NASAL SINUS SURGERY      SHOULDER SURGERY      SKIN BIOPSY      SKIN CANCER EXCISION  Feb 2015    top of head    TONSILLECTOMY      TURP  9590,0948    UPPER GASTROINTESTINAL ENDOSCOPY         Social History     Tobacco Use    Smoking status: Never Smoker    Smokeless tobacco: Never Used   Substance Use Topics    Alcohol use: No    Drug use: No       Family History   Problem Relation Age of Onset    Diabetes Mother     High Blood Pressure Father     Heart Disease Father          I have reviewed the patient's past medical history, past surgical history, allergies, medications, social and family history and I have made updates where appropriate.     Review of Systems        PHYSICAL EXAM:  /60   Pulse 50   Temp 98.1 °F (36.7 °C) (Oral)   Resp 14   Ht 5' 8.5\" (1.74 m)   Wt 215 lb (97.5 kg)   BMI 32.22 kg/m²     General Appearance: well developed and well- nourished, in no acute distress  Head: normocephalic and atraumatic  ENT: external ear and ear canal normal bilaterally, nose without deformity, nasal mucosa and turbinates normal without polyps, oropharynx normal, dentition is normal for age, no lipor gum lesions noted  Neck: supple and non-tender without mass, no thyromegaly or thyroid nodules, no cervical lymphadenopathy  Pulmonary/Chest: clear to auscultation bilaterally- no wheezes, rales or rhonchi, normal air movement, no respiratory distress or retractions  Cardiovascular: normal rate, regular rhythm, normal S1 and S2, no murmurs, rubs, clicks, or gallops, distal pulses intact  Extremities: no cyanosis, clubbing or edema of the lower extremities  Psych:  Normal affect without evidence of depression oranxiety, insight and judgement are appropriate, memory appears intact  Skin: warm and dry, no rash or erythema    5/5 strength in the UEs  ROM is normal bilaterally. Palpitation of the clavicle, AC joint and shoulder joint revealed no tenderness  The patient has a negative standard lift off test.  Neer's test is negative  Empty Can test is negative  Shi Test is negative      ASSESSMENT & PLAN  Leno Hollins was seen today for 6 month follow-up and arm pain. Diagnoses and all orders for this visit:    Biceps strain, left, initial encounter  -     XR SHOULDER LEFT (MIN 2 VIEWS); Future  -     predniSONE (DELTASONE) 20 MG tablet; Take 2 tablets by mouth daily for 5 days    Essential hypertension  -     amLODIPine (NORVASC) 5 MG tablet; Take 1 tablet by mouth daily    Benign essential tremor    Anxiety    -Left shoulder/arm pain consistent with biceps strain, will start prednisone for 5 days and obtian XR. If sx persist, will refer to PT. -Chronic issues stable, continue current medications  -Advised to call if any issues      Return in about 6 months (around 9/5/2020). Controlled Substances Monitoring:                     Leno Hollins received counseling on the following healthy behaviors: medication adherence  Reviewed prior labs and health maintenance.   Continue current medications, diet and exercise. Discussed use, benefit, and side effects of prescribed medications. Barriers to medication compliance addressed. Patient given educational materials - see patient instructions. All patient questions answered. Patient voiced understanding.

## 2020-03-06 ENCOUNTER — HOSPITAL ENCOUNTER (OUTPATIENT)
Age: 80
Discharge: HOME | End: 2020-03-06
Payer: MEDICARE

## 2020-03-06 ENCOUNTER — HOSPITAL ENCOUNTER (OUTPATIENT)
Dept: GENERAL RADIOLOGY | Age: 80
Discharge: HOME | End: 2020-03-06
Payer: MEDICARE

## 2020-03-06 PROCEDURE — 73030 X-RAY EXAM OF SHOULDER: CPT

## 2020-03-09 ENCOUNTER — TELEPHONE (OUTPATIENT)
Dept: FAMILY MEDICINE CLINIC | Age: 80
End: 2020-03-09

## 2020-03-09 NOTE — TELEPHONE ENCOUNTER
----- Message from Iona Hinson DO sent at 3/9/2020  8:13 AM EDT -----  Janna Vieira of the shoulder looked ok. Is his pain doing any better?

## 2020-04-23 RX ORDER — FOLIC ACID 1 MG/1
TABLET ORAL
Qty: 225 TABLET | Refills: 1 | Status: SHIPPED | OUTPATIENT
Start: 2020-04-23 | End: 2020-05-11

## 2020-05-11 RX ORDER — FOLIC ACID 1 MG/1
TABLET ORAL
Qty: 180 TABLET | Refills: 1 | Status: SHIPPED | OUTPATIENT
Start: 2020-05-11 | End: 2020-05-11 | Stop reason: SDUPTHER

## 2020-05-12 RX ORDER — FOLIC ACID 1 MG/1
TABLET ORAL
Qty: 180 TABLET | Refills: 1 | Status: SHIPPED | OUTPATIENT
Start: 2020-05-12 | End: 2020-11-17 | Stop reason: SDUPTHER

## 2020-06-11 ENCOUNTER — OFFICE VISIT (OUTPATIENT)
Dept: CARDIOLOGY CLINIC | Age: 80
End: 2020-06-11
Payer: MEDICARE

## 2020-06-11 ENCOUNTER — TELEPHONE (OUTPATIENT)
Dept: CARDIOLOGY CLINIC | Age: 80
End: 2020-06-11

## 2020-06-11 VITALS
HEIGHT: 69 IN | WEIGHT: 211.6 LBS | DIASTOLIC BLOOD PRESSURE: 82 MMHG | BODY MASS INDEX: 31.34 KG/M2 | HEART RATE: 59 BPM | SYSTOLIC BLOOD PRESSURE: 140 MMHG

## 2020-06-11 PROCEDURE — 93000 ELECTROCARDIOGRAM COMPLETE: CPT | Performed by: NUCLEAR MEDICINE

## 2020-06-11 PROCEDURE — 1123F ACP DISCUSS/DSCN MKR DOCD: CPT | Performed by: NUCLEAR MEDICINE

## 2020-06-11 PROCEDURE — 99213 OFFICE O/P EST LOW 20 MIN: CPT | Performed by: NUCLEAR MEDICINE

## 2020-06-11 PROCEDURE — 1036F TOBACCO NON-USER: CPT | Performed by: NUCLEAR MEDICINE

## 2020-06-11 PROCEDURE — G8417 CALC BMI ABV UP PARAM F/U: HCPCS | Performed by: NUCLEAR MEDICINE

## 2020-06-11 PROCEDURE — G8427 DOCREV CUR MEDS BY ELIG CLIN: HCPCS | Performed by: NUCLEAR MEDICINE

## 2020-06-11 PROCEDURE — 4040F PNEUMOC VAC/ADMIN/RCVD: CPT | Performed by: NUCLEAR MEDICINE

## 2020-06-11 NOTE — PROGRESS NOTES
[address] [address]  Dept: [phone]  Dept Fax: [phone]  Loc: [phone]    Visit Date: 6/11/2020    Veronika Le is a 78 y.o. male who presents todayfor:  Chief Complaint   Patient presents with    1 Year Follow Up    Hypertension    Coronary Artery Disease    Hyperlipidemia    Cardiac Valve Problem   known mild CAD  Known mild AI  No chest pain   No changes in breathing  Active patient   No new symptoms  BP is stable   No dizziness        HPI:  HPI  Past Medical History:   Diagnosis Date    Anemia     Arthritis     Bronchiolitis 11/2016    Cancer ([facility] [address]     SKIN CANCER    CKD (chronic kidney disease), stage III ([facility] [address]     Diverticula of colon 5/2014    tx with antibiotics per pt; NO surgery    Diverticulosis     DVT (deep venous thrombosis) (HCC)     s/p    Esophagitis 12/10/2014    Hiatal hernia     HTN (hypertension)     Hyperlipidemia     Kidney stones     Nephrolithiasis     Obesity     Prostate enlargement     benign    Renal insufficiency       Past Surgical History:   Procedure Laterality Date    BLEPHAROPLASTY Bilateral 03/2010    CARDIAC CATHETERIZATION  2000,2010    CATARACT REMOVAL WITH IMPLANT Bilateral 2006,2007    COLONOSCOPY  05/26/2017    HERNIA REPAIR Bilateral 1996    NASAL SINUS SURGERY      SHOULDER SURGERY      SKIN BIOPSY      SKIN CANCER EXCISION  Feb 2015    top of head    TONSILLECTOMY      TURP  9426,9303    UPPER GASTROINTESTINAL ENDOSCOPY       Family History   Problem Relation Age of Onset    Diabetes Mother     High Blood Pressure Father     Heart Disease Father      Social History     Tobacco Use    Smoking status: Never Smoker    Smokeless tobacco: Never Used   Substance Use Topics    Alcohol use: No      Current Outpatient Medications   Medication Sig Dispense Refill    folic acid (FOLVITE) 1 MG tablet TAKE 2 & 1/2 (TWO & ONE-HALF) years Vaccine (2 of 2 - PPSV23) 01/20/2017    Lipid screen  05/19/2019    Annual Wellness Visit (AWV)  09/05/2020    Potassium monitoring  11/20/2020    Creatinine monitoring  11/20/2020    Flu vaccine  Completed    Hepatitis A vaccine  Aged Out    Hepatitis B vaccine  Aged Out    Hib vaccine  Aged Out    Meningococcal (ACWY) vaccine  Aged Out       Subjective:  Review of Systems  General:   No fever, no chills, some fatigue or weight loss  Pulmonary:    some dyspnea, no wheezing  Cardiac:    Denies recent chest pain,   GI:     No nausea or vomiting, no abdominal pain  Neuro:     No dizziness or light headedness,   Musculoskeletal:  No recent active issues  Extremities:   No edema, no obvious claudication       Objective:  Physical Exam  BP (!) 150/78   Pulse 59   Ht 5' 9\" (1.753 m)   Wt 211 lb 9.6 oz (96 kg)   BMI 31.25 kg/m²   General:   Well developed, well nourished  Lungs:    Clear to auscultation  Heart:    Normal S1 S2, Slight murmur. no rubs, no gallops  Abdomen:   Soft, non tender, no organomegalies, positive bowel sounds  Extremities:   No edema, no cyanosis, good peripheral pulses  Neurological:   Awake, alert, oriented. No obvious focal deficits  Musculoskelatal:  No obvious deformities    Assessment:      Diagnosis Orders   1. Coronary artery disease involving native coronary artery of native heart without angina pectoris  EKG 12 lead   2. Essential hypertension  EKG 12 lead   3. Familial hypercholesterolemia  EKG 12 lead   4. Nonrheumatic aortic valve insufficiency     cardiac fair for now   ECG in office was done today. I reviewed the ECG. No acute findings      Plan:  No follow-ups on file. As above  Continue risk factor modification and medical management  Thank you for allowing me to participate in the care of your patient.  Please don't hesitate to contact me regarding any further issues related to the patient care    Orders Placed:  Orders Placed This Encounter   Procedures    EKG

## 2020-06-11 NOTE — TELEPHONE ENCOUNTER
Patient left his medications. LM for patient to call our office back. Medications in the sample room. Patient stopped and picked his medications up.

## 2020-06-20 ENCOUNTER — PATIENT MESSAGE (OUTPATIENT)
Dept: FAMILY MEDICINE CLINIC | Age: 80
End: 2020-06-20

## 2020-07-09 RX ORDER — IRBESARTAN 300 MG/1
300 TABLET ORAL DAILY
Qty: 90 TABLET | Refills: 3 | Status: SHIPPED | OUTPATIENT
Start: 2020-07-09 | End: 2021-06-11 | Stop reason: SDUPTHER

## 2020-07-09 RX ORDER — PRIMIDONE 50 MG/1
TABLET ORAL
Qty: 180 TABLET | Refills: 3 | Status: SHIPPED | OUTPATIENT
Start: 2020-07-09 | End: 2021-06-25

## 2020-07-09 RX ORDER — CITALOPRAM 20 MG/1
20 TABLET ORAL DAILY
Qty: 90 TABLET | Refills: 3 | Status: SHIPPED | OUTPATIENT
Start: 2020-07-09 | End: 2021-06-25

## 2020-07-20 ENCOUNTER — NURSE ONLY (OUTPATIENT)
Dept: LAB | Age: 80
End: 2020-07-20

## 2020-07-20 LAB
CHOLESTEROL, TOTAL: 192 MG/DL (ref 100–199)
HDLC SERPL-MCNC: 50 MG/DL
LDL CHOLESTEROL CALCULATED: 103 MG/DL
TRIGL SERPL-MCNC: 193 MG/DL (ref 0–199)

## 2020-09-08 ENCOUNTER — OFFICE VISIT (OUTPATIENT)
Dept: FAMILY MEDICINE CLINIC | Age: 80
End: 2020-09-08
Payer: MEDICARE

## 2020-09-08 VITALS
HEIGHT: 69 IN | BODY MASS INDEX: 31.7 KG/M2 | TEMPERATURE: 97.7 F | WEIGHT: 214 LBS | DIASTOLIC BLOOD PRESSURE: 70 MMHG | SYSTOLIC BLOOD PRESSURE: 132 MMHG | HEART RATE: 60 BPM | RESPIRATION RATE: 16 BRPM

## 2020-09-08 PROCEDURE — 4040F PNEUMOC VAC/ADMIN/RCVD: CPT | Performed by: FAMILY MEDICINE

## 2020-09-08 PROCEDURE — G0439 PPPS, SUBSEQ VISIT: HCPCS | Performed by: FAMILY MEDICINE

## 2020-09-08 PROCEDURE — 1123F ACP DISCUSS/DSCN MKR DOCD: CPT | Performed by: FAMILY MEDICINE

## 2020-09-08 ASSESSMENT — PATIENT HEALTH QUESTIONNAIRE - PHQ9
1. LITTLE INTEREST OR PLEASURE IN DOING THINGS: 0
SUM OF ALL RESPONSES TO PHQ9 QUESTIONS 1 & 2: 0
SUM OF ALL RESPONSES TO PHQ QUESTIONS 1-9: 0
2. FEELING DOWN, DEPRESSED OR HOPELESS: 0
SUM OF ALL RESPONSES TO PHQ QUESTIONS 1-9: 0

## 2020-09-08 ASSESSMENT — LIFESTYLE VARIABLES: HOW OFTEN DO YOU HAVE A DRINK CONTAINING ALCOHOL: 0

## 2020-09-08 NOTE — PROGRESS NOTES
Nataliya Cho is a 78 y.o. male that presents for     Chief Complaint   Patient presents with    Medicare AWV    Other     bicep is \"pretty much normal\"       /70   Pulse 60   Temp 97.7 °F (36.5 °C)   Resp 16   Ht 5' 8.5\" (1.74 m)   Wt 214 lb (97.1 kg)   BMI 32.07 kg/m²       HPI:      Currently on Primidone for essential tremor. States that symptoms are at his baseline. Notes intermittent worsening of the sx, but tolerable overall. CAD    History of myocardial infarction? No    History of heart failure? No.    Current symptoms of angina? Has had this intermittently in the past, stable recently   Patient compliant with therapy? Yes  Tobacco use? No      Antiplatelet therapy? Yes    Beta-blocker therapy? No   ACE/ARB therapy - Yes      HTN  BMP ordered by Dr Eleanor Duffy  Does patient check BP regularly at home? - No  Current Medication regimen - Maxzide, irbesartan, amlodipine  Tolerating medications well? - yes    Shortness of breath or chest pain? No  Headache or visual complaints? No  Neurologic changes like confusion? No  Extremity edema?  Yes - mild, chronic issue    BP Readings from Last 3 Encounters:   09/08/20 132/70   06/11/20 (!) 140/82   03/05/20 126/60         Health Maintenance   Topic Date Due    DTaP/Tdap/Td vaccine (1 - Tdap) 10/14/1957    Shingles Vaccine (1 of 2) 10/14/1988    Pneumococcal 65+ years Vaccine (2 of 2 - PPSV23) 01/20/2017    Annual Wellness Visit (AWV)  05/29/2019    Flu vaccine (1) 09/01/2020    Potassium monitoring  11/20/2020    Creatinine monitoring  11/20/2020    Lipid screen  07/20/2021    Hepatitis A vaccine  Aged Out    Hepatitis B vaccine  Aged Out    Hib vaccine  Aged Out    Meningococcal (ACWY) vaccine  Aged Out       Past Medical History:   Diagnosis Date    Anemia     Arthritis     Bronchiolitis 11/2016    Cancer ([facility] [address]     SKIN CANCER    CKD (chronic kidney disease), stage III ([facility] [address]     Diverticula of colon 5/2014    tx with antibiotics per pt; NO surgery    Diverticulosis     DVT [facility] thrombosis) ([facility])     s/p    Esophagitis 12/10/2014    Hiatal hernia     HTN (hypertension)     Hyperlipidemia     Kidney stones     Nephrolithiasis     Obesity     Prostate enlargement     benign    Renal insufficiency        Past Surgical History:   Procedure Laterality Date    BLEPHAROPLASTY Bilateral 03/2010    CARDIAC CATHETERIZATION  2000,2010    CATARACT REMOVAL WITH IMPLANT Bilateral 2006,2007    COLONOSCOPY  05/26/2017    HERNIA REPAIR Bilateral 1996    NASAL SINUS SURGERY      SHOULDER SURGERY      SKIN BIOPSY      SKIN CANCER EXCISION  Feb 2015    top of head    TONSILLECTOMY      TURP  0923,0395    UPPER GASTROINTESTINAL ENDOSCOPY         Social History     Tobacco Use    Smoking status: Never Smoker    Smokeless tobacco: Never Used   Substance Use Topics    Alcohol use: No    Drug use: No       Family History   Problem Relation Age of Onset    Diabetes Mother     High Blood Pressure Father     Heart Disease Father          I have reviewed the patient's past medical history, past surgical history, allergies, medications, social and family history and I have made updates where appropriate.     Review of Systems        PHYSICAL EXAM:  /70   Pulse 60   Temp 97.7 °F (36.5 °C)   Resp 16   Ht 5' 8.5\" (1.74 m)   Wt 214 lb (97.1 kg)   BMI 32.07 kg/m²     General Appearance: well developed and well- nourished, in no acute distress  Head: normocephalic and atraumatic  ENT: external ear and ear canal normal bilaterally, nose without deformity, nasal mucosa and turbinates normal without polyps, oropharynx normal, dentition is normal for age, no lipor gum lesions noted  Neck: supple and non-tender without mass, no thyromegaly or thyroid nodules, no cervical lymphadenopathy  Pulmonary/Chest: clear to auscultation bilaterally- no wheezes, rales or rhonchi, normal air movement, no respiratory distress or retractions  Cardiovascular: normal rate, regular rhythm, normal S1 and S2, no murmurs, rubs, clicks, or gallops, distal pulses intact  Extremities: no cyanosis, clubbing or edema of the lower extremities  Psych:  Normal affect without evidence of depression oranxiety, insight and judgement are appropriate, memory appears intact  Skin: warm and dry, no rash or erythema      ASSESSMENT & PLAN  Melissa Fuchs was seen today for medicare awv and other. Diagnoses and all orders for this visit:    Routine general medical examination at a health care facility    Other forms of angina pectoris ([facility] [address]    Benign essential tremor    Essential hypertension      -Chronic issues stable, continue current medications  -Advised to call if any issues      Return for Medicare Annual Wellness Visit in 1 year. Controlled Substances Monitoring:                     Melissa Fuchs received counseling on the following healthy behaviors: medication adherence  Reviewed prior labs and health maintenance. Continue current medications, diet and exercise. Discussed use, benefit, and side effects of prescribed medications. Barriers to medication compliance addressed. Patient given educational materials - see patient instructions. All patient questions answered. Patient voiced understanding.

## 2020-09-08 NOTE — PROGRESS NOTES
Medicare Annual Wellness Visit  Name: Olimpia Min Date: 2020   MRN: [id number] Sex: Male   Age: 78 y.o. Ethnicity: Non-/Non    : 1940 Race: Halle Sandhu is here for Medicare AWV and Other (bicep is \"pretty much normal\")    Screenings for behavioral, psychosocial and functional/safety risks, and cognitive dysfunction are all negative except as indicated below. These results, as well as other patient data from the [address] form, are documented in Flowsheets linked to this Encounter. Allergies   Allergen Reactions    Ace Inhibitors      Intolerant to      Carafate [Sucralfate] Other (See Comments)     Tongue swelling    Flomax [Tamsulosin Hcl]     Iv Dye [Iodides]     Lipitor [Atorvastatin] Other (See Comments)     Myalgias    Lopressor [Metoprolol]     Motrin [Ibuprofen]      Was told not to take due to kidneys         Prior to Visit Medications    Medication Sig Taking? Authorizing Provider   irbesartan (AVAPRO) 300 MG tablet TAKE 1 TABLET BY MOUTH DAILY Yes Vika Angeles MD   primidone (MYSOLINE) 50 MG tablet TAKE 1 TABLET BY MOUTH TWICE DAILY Yes Dylon Shaikh, DO   citalopram (CELEXA) 20 MG tablet Take 1 tablet by mouth daily Yes Maribel Kingsley, DO   folic acid (FOLVITE) 1 MG tablet TAKE 2 & 1/2 (TWO & ONE-HALF) TABLETS BY MOUTH ONCE DAILY Yes Shavonne Nathan, DO   amLODIPine (NORVASC) 5 MG tablet Take 1 tablet by mouth daily Yes Dylon Shaikh, DO   triamterene-hydrochlorothiazide (MAXZIDE-25) 37.5-25 MG per tablet TAKE 1 TABLET BY MOUTH DAILY Yes Dylon Shaikh, DO   pravastatin (PRAVACHOL) 40 MG tablet TAKE 1 TABLET BY MOUTH EVERY EVENING Yes Dylon Shaikh, DO   simethicone (MYLICON) 80 MG chewable tablet Take 1 tablet by mouth every 6 hours as needed for Flatulence Yes Dylon Shaikh, DO   LORazepam (ATIVAN) 0.5 MG tablet Take 0.5 mg by mouth every 8 hours as needed for Anxiety. Terry Hough Historical Provider, MD   traMADol (ULTRAM) 50 MG tablet Take 50 mg by mouth every 8 hours as needed for Pain. . Yes Historical Provider, MD   fluticasone (FLONASE) 50 MCG/ACT nasal spray 1 spray by Nasal route daily Yes Historical Provider, MD   dicyclomine (BENTYL) 10 MG capsule Take 10 mg by mouth 4 times daily (before meals and nightly) Yes Historical Provider, MD   pantoprazole (PROTONIX) 40 MG tablet Take 40 mg by mouth daily. Yes Historical Provider, MD   hydrocortisone (WESTCORT) 0.2 % cream Apply  topically daily as needed.  Apply to facial rash Yes Historical Provider, MD   aspirin 325 MG tablet Take 325 mg by mouth nightly  Yes Historical Provider, MD   oxybutynin (DITROPAN-XL) 5 MG extended release tablet TAKE 1 TABLET BY MOUTH DAILY  Garland Perez, APRN - CNP         Past Medical History:   Diagnosis Date    Anemia     Arthritis     Bronchiolitis 11/2016    Cancer [facility])     SKIN CANCER    CKD (chronic kidney disease), stage III ([facility] [address]     Diverticula of colon 5/2014    tx with antibiotics per pt; NO surgery    Diverticulosis     [facility]) ([facility])     s/p    Esophagitis 12/10/2014    Hiatal hernia     HTN (hypertension)     Hyperlipidemia     Kidney stones     Nephrolithiasis     Obesity     Prostate enlargement     benign    Renal insufficiency        Past Surgical History:   Procedure Laterality Date    BLEPHAROPLASTY Bilateral 03/2010    CARDIAC CATHETERIZATION  2000,2010    CATARACT REMOVAL WITH IMPLANT Bilateral 2006,2007    COLONOSCOPY  05/26/2017    HERNIA REPAIR Bilateral 1996    NASAL SINUS SURGERY      SHOULDER SURGERY      SKIN BIOPSY      SKIN CANCER EXCISION  Feb 2015    top of head    TONSILLECTOMY      TURP  7361,5254    UPPER GASTROINTESTINAL ENDOSCOPY           Family History   Problem Relation Age of Onset    Diabetes Mother     High Blood Pressure Father     Heart Disease Father        CareTeam (Including outside providers/suppliers regularly involved in providing care):   Patient Care Team:  Eduardo Schofield DO as PCP - General  Eduardo Schofield DO as PCP - [facility] Empaneled Provider    Wt Readings from Last 3 Encounters:   09/08/20 214 lb (97.1 kg)   06/11/20 211 lb 9.6 oz (96 kg)   03/05/20 215 lb (97.5 kg)     Vitals:    09/08/20 1406   BP: 132/70   Pulse: 60   Resp: 16   Temp: 97.7 °F (36.5 °C)   Weight: 214 lb (97.1 kg)   Height: 5' 8.5\" (1.74 m)     Body mass index is 32.07 kg/m². Based upon direct observation of the patient, evaluation of cognition reveals recent and remote memory intact. Patient's complete Health Risk Assessment and screening values have been reviewed and are found in Flowsheets. The following problems were reviewed today and where indicated follow up appointments were made and/or referrals ordered. Positive Risk Factor Screenings with Interventions:     Health Habits/Nutrition:  Health Habits/Nutrition  Do you exercise for at least 20 minutes 2-3 times per week?: (!) No  Have you lost any weight without trying in the past 3 months?: No  Do you eat fewer than 2 meals per day?: No  Have you seen a dentist within the past year?: Yes  Body mass index is 32.07 kg/m².   Health Habits/Nutrition Interventions:  · Inadequate physical activity:  encouraged on activity as tolerated    Hearing/Vision:  No exam data present  Hearing/Vision  Do you or your family notice any trouble with your hearing?: (!) Yes(hearing aids work- when he wears them)  Do you have difficulty driving, watching TV, or doing any of your daily activities because of your eyesight?: No  Have you had an eye exam within the past year?: Yes  Hearing/Vision Interventions:  · Hearing concerns:  patient declines any further evaluation/treatment for hearing issues    Safety:  Safety  Do you have working smoke detectors?: Yes  Have all throw rugs been removed or fastened?: (!) No  Do you have non-slip mats or surfaces in all bathtubs/showers?: Yes  Do all of your stairways have a railing or banister?: Yes  Are your doorways, halls and stairs free of clutter?: Yes  Do you always fasten your seatbelt when you are in a car?: Yes  Safety Interventions:  · Home safety tips provided    Personalized Preventive Plan   Current Health Maintenance Status  Immunization History   Administered Date(s) Administered    Influenza Virus Vaccine 09/01/2014, 01/20/2016    Influenza Whole 09/01/2014    Influenza, High Dose (Fluzone 65 yrs and older) 12/06/2018    Influenza, Quadv, IM, PF (6 mo and older Fluzone, Flulaval, Fluarix, and 3 yrs and older Afluria) 01/23/2017, 01/23/2018    Influenza, Triv, inactivated, subunit, adjuvanted, IM (Fluad 65 yrs and older) 12/05/2019    Pneumococcal Conjugate 13-valent ([facility]) 01/20/2016        Health Maintenance   Topic Date Due    DTaP/Tdap/Td vaccine (1 - Tdap) 10/14/1957    Shingles Vaccine (1 of 2) 10/14/1988    Pneumococcal 65+ years Vaccine (2 of 2 - PPSV23) 01/20/2017    Annual Wellness Visit (AWV)  05/29/2019    Flu vaccine (1) 09/01/2020    Potassium monitoring  11/20/2020    Creatinine monitoring  11/20/2020    Lipid screen  07/20/2021    Hepatitis A vaccine  Aged Out    Hepatitis B vaccine  Aged Out    Hib vaccine  Aged Out    Meningococcal (ACWY) vaccine  Aged Out     Recommendations for [employer] Due: see orders and patient instructions/AVS.  . Recommended screening schedule for the next 5-10 years is provided to the patient in written form: see Patient Instructions/AVS.    Dax Alves was seen today for medicare awv and other.     Diagnoses and all orders for this visit:    Routine general medical examination at a health care facility    Other forms of angina pectoris ([address]

## 2020-10-05 RX ORDER — PRAVASTATIN SODIUM 40 MG
TABLET ORAL
Qty: 90 TABLET | Refills: 3 | Status: SHIPPED | OUTPATIENT
Start: 2020-10-05 | End: 2021-12-17 | Stop reason: SDUPTHER

## 2020-11-17 ENCOUNTER — TELEPHONE (OUTPATIENT)
Dept: UROLOGY | Age: 80
End: 2020-11-17

## 2020-11-17 RX ORDER — OXYBUTYNIN CHLORIDE 5 MG/1
5 TABLET, EXTENDED RELEASE ORAL DAILY
Qty: 90 TABLET | Refills: 2 | Status: SHIPPED | OUTPATIENT
Start: 2020-11-17 | End: 2021-07-28

## 2020-11-17 RX ORDER — FOLIC ACID 1 MG/1
TABLET ORAL
Qty: 180 TABLET | Refills: 1 | Status: SHIPPED | OUTPATIENT
Start: 2020-11-17 | End: 2021-05-07 | Stop reason: SDUPTHER

## 2020-11-17 NOTE — TELEPHONE ENCOUNTER
Patient request refill for oxybutynin. Next office appointment is 12/08/2020 with Dr Juanis Stephens. Thank you.

## 2020-11-20 ENCOUNTER — NURSE ONLY (OUTPATIENT)
Dept: LAB | Age: 80
End: 2020-11-20

## 2020-11-20 ENCOUNTER — NURSE ONLY (OUTPATIENT)
Dept: FAMILY MEDICINE CLINIC | Age: 80
End: 2020-11-20
Payer: MEDICARE

## 2020-11-20 LAB
ANION GAP SERPL CALCULATED.3IONS-SCNC: 7 MEQ/L (ref 8–16)
BASOPHILS # BLD: 1.2 %
BASOPHILS ABSOLUTE: 0.1 THOU/MM3 (ref 0–0.1)
BUN BLDV-MCNC: 28 MG/DL (ref 7–22)
CALCIUM SERPL-MCNC: 9.4 MG/DL (ref 8.5–10.5)
CHLORIDE BLD-SCNC: 100 MEQ/L (ref 98–111)
CO2: 28 MEQ/L (ref 23–33)
CREAT SERPL-MCNC: 1.8 MG/DL (ref 0.4–1.2)
EOSINOPHIL # BLD: 6.5 %
EOSINOPHILS ABSOLUTE: 0.3 THOU/MM3 (ref 0–0.4)
ERYTHROCYTE [DISTWIDTH] IN BLOOD BY AUTOMATED COUNT: 13.3 % (ref 11.5–14.5)
ERYTHROCYTE [DISTWIDTH] IN BLOOD BY AUTOMATED COUNT: 45.5 FL (ref 35–45)
GFR SERPL CREATININE-BSD FRML MDRD: 36 ML/MIN/1.73M2
GLUCOSE BLD-MCNC: 117 MG/DL (ref 70–108)
HCT VFR BLD CALC: 41.5 % (ref 42–52)
HEMOGLOBIN: 13.6 GM/DL (ref 14–18)
IMMATURE GRANS (ABS): 0.01 THOU/MM3 (ref 0–0.07)
IMMATURE GRANULOCYTES: 0.2 %
LYMPHOCYTES # BLD: 23.6 %
LYMPHOCYTES ABSOLUTE: 1 THOU/MM3 (ref 1–4.8)
MCH RBC QN AUTO: 30.4 PG (ref 26–33)
MCHC RBC AUTO-ENTMCNC: 32.8 GM/DL (ref 32.2–35.5)
MCV RBC AUTO: 92.6 FL (ref 80–94)
MONOCYTES # BLD: 8.1 %
MONOCYTES ABSOLUTE: 0.3 THOU/MM3 (ref 0.4–1.3)
NUCLEATED RED BLOOD CELLS: 0 /100 WBC
PLATELET # BLD: 145 THOU/MM3 (ref 130–400)
PMV BLD AUTO: 10.9 FL (ref 9.4–12.4)
POTASSIUM SERPL-SCNC: 4.3 MEQ/L (ref 3.5–5.2)
PROSTATE SPECIFIC ANTIGEN: 2.82 NG/ML (ref 0–1)
RBC # BLD: 4.48 MILL/MM3 (ref 4.7–6.1)
SEG NEUTROPHILS: 60.4 %
SEGMENTED NEUTROPHILS ABSOLUTE COUNT: 2.6 THOU/MM3 (ref 1.8–7.7)
SODIUM BLD-SCNC: 135 MEQ/L (ref 135–145)
WBC # BLD: 4.3 THOU/MM3 (ref 4.8–10.8)

## 2020-11-20 PROCEDURE — 90694 VACC AIIV4 NO PRSRV 0.5ML IM: CPT | Performed by: FAMILY MEDICINE

## 2020-11-20 PROCEDURE — G0008 ADMIN INFLUENZA VIRUS VAC: HCPCS | Performed by: FAMILY MEDICINE

## 2020-11-20 NOTE — PROGRESS NOTES
Immunization(s) given during visit:    Immunizations Administered     Name Date Dose Route    Influenza, Quadv, adjuvanted, 65 yrs +, IM, PF (Fluad) 11/20/2020 0.5 mL Intramuscular    Site: Deltoid- Left    Lot: [id number]    NDC: 88935-349-23          Most recent Vaccine Information Sheet dated 08.15.2019 given to pt

## 2020-11-25 ENCOUNTER — OFFICE VISIT (OUTPATIENT)
Dept: NEPHROLOGY | Age: 80
End: 2020-11-25
Payer: MEDICARE

## 2020-11-25 VITALS
DIASTOLIC BLOOD PRESSURE: 56 MMHG | HEART RATE: 72 BPM | RESPIRATION RATE: 18 BRPM | SYSTOLIC BLOOD PRESSURE: 115 MMHG | OXYGEN SATURATION: 96 % | HEIGHT: 69 IN | BODY MASS INDEX: 31.7 KG/M2 | WEIGHT: 214 LBS | TEMPERATURE: 97 F

## 2020-11-25 PROCEDURE — G8427 DOCREV CUR MEDS BY ELIG CLIN: HCPCS | Performed by: INTERNAL MEDICINE

## 2020-11-25 PROCEDURE — G8484 FLU IMMUNIZE NO ADMIN: HCPCS | Performed by: INTERNAL MEDICINE

## 2020-11-25 PROCEDURE — 99214 OFFICE O/P EST MOD 30 MIN: CPT | Performed by: INTERNAL MEDICINE

## 2020-11-25 PROCEDURE — 1123F ACP DISCUSS/DSCN MKR DOCD: CPT | Performed by: INTERNAL MEDICINE

## 2020-11-25 PROCEDURE — G8417 CALC BMI ABV UP PARAM F/U: HCPCS | Performed by: INTERNAL MEDICINE

## 2020-11-25 PROCEDURE — 1036F TOBACCO NON-USER: CPT | Performed by: INTERNAL MEDICINE

## 2020-11-25 PROCEDURE — 4040F PNEUMOC VAC/ADMIN/RCVD: CPT | Performed by: INTERNAL MEDICINE

## 2020-11-25 NOTE — PATIENT INSTRUCTIONS
KNOW YOUR KIDNEY NUMBERS    Your kidney speed (eGFR) was 36 ml/min this visit (normal is  ml/min)(Ml/min=milliliters of blood filtered per minute). The higher this number is, the better your kidney function is. Your serum creatinine was 1.8 (normal 0.8-1.2 mg/dl at most labs). The higher this number is, the worse your kidney function is. You are in stage G-IIIB-A1 of chronic kidney disease. Your kidney function has declined as compared to your last visit. Your last eGFR was  45 Ml/Min. Stages of kidney disease    EGFR (estimated glomerular filtration rate)    G-I > 90 ml/min Kidney damage with normal kidney function (blood or protein in the urine)  G-II 60-89 ml/min Normal kidney function with mild damage with or without blood or protein in the urine  G-IIIA 45-59 ml/min Mild to moderate loss of kidney function. G-IIIB 30-44 ml/min Moderate to severe loss of kidney function  G-IV 15-29 ml/min Severe loss of kidney function  G-V < 15 mlmin     May need dialysis or kidney transplant    ACR (urine albumin/creatinine ratio) (Mg/Gm)    A-1      ACR<30 Normal to mildly increased protein in the urine. A-2 ACR  Moderate increase in urine protein loss. A-3 ACR >300 Severe increase in urine protein loss    Our goal is to keep your eGFR going as fast as possible ( ml/min is normal). If your eGFR declines to 15-24 ml/min and stays there without recovery,  we will begin to educate you about dialysis or kidney transplant. We also want to keep the protein in your urine as low as possible. The leading cause of kidney disease in the world is diabetes mellitus. Keep your sugar  as much as possible. The second leading cause is hypertension. Keep Your blood pressure 120-140/70-80 as much as possible. If you need refills, call the office or your drug store. You may call the office any time with any questions or concerns.     DUE TO THE CORONAVIRUS CONCERN, PLEASE LIMIT YOUR TIME IN PUBLIC. [facility] YOUR HANDS COMPLETELY AND FREQUENTLY. Padmini Muro

## 2020-11-25 NOTE — PROGRESS NOTES
bruits. Thyroid gland is normal  Lungs:  Breathing easily. No rales nor rhonchi. No cough nor sputum production. Heart[de-identified]            RRR. No murmurs nor rubs. PMI is not enlarged nor displaced. Abdomen:  Soft and non tender. Bowel sounds are active in all four quadrants. Extremities:  1+ edema  Neurologic:  CN II-XII are intact. No deficits noted. Muscle strength and tone are equal throughout. Skin:                Warm and dry with no rashes. Muscles:         Hand  and leg strength are equal and strong bilaterally.      Lab Data      CBC:   Lab Results   Component Value Date    WBC 4.3 (L) 11/20/2020    HGB 13.6 (L) 11/20/2020    HCT 41.5 (L) 11/20/2020    MCV 92.6 11/20/2020     11/20/2020     BMP:    Lab Results   Component Value Date     11/20/2020     11/20/2019     11/21/2018    K 4.3 11/20/2020    K 4.4 11/20/2019    K 5.0 11/21/2018     11/20/2020    CL 99 11/20/2019     11/21/2018    CO2 28 11/20/2020    CO2 29 11/20/2019    CO2 27 11/21/2018    BUN 28 (H) 11/20/2020    BUN 27 (H) 11/20/2019    BUN 33 (H) 11/21/2018    CREATININE 1.8 (H) 11/20/2020    CREATININE 1.5 (H) 11/20/2019    CREATININE 1.7 (H) 11/21/2018    GLUCOSE 117 (H) 11/20/2020    GLUCOSE 116 (H) 11/20/2019    GLUCOSE 106 11/21/2018      Hepatic:   Lab Results   Component Value Date    AST 18 05/19/2018    AST 17 04/26/2017    AST 21 05/02/2016    ALT 13 05/19/2018    ALT 13 04/26/2017    ALT 15 05/02/2016    BILITOT 0.6 05/19/2018    BILITOT 0.8 04/26/2017    BILITOT 0.7 05/02/2016    ALKPHOS 74 05/19/2018    ALKPHOS 96 04/26/2017    ALKPHOS 84 05/02/2016     BNP: No results found for: BNP  Lipids:   Lab Results   Component Value Date    CHOL 192 07/20/2020    HDL 50 07/20/2020     INR:   Lab Results   Component Value Date    INR 1.03 04/18/2018    INR 0.91 02/26/2018    INR 0.94 03/25/2015     URINE:   Lab Results   Component Value Date    PROTUR Negative 11/28/2018     Lab Results   Component Value Date    NITRU Negative 11/28/2018    COLORU Yellow 11/28/2018    COLORU YELLOW 04/18/2018    PHUR 7.0 04/18/2018    LABCAST NONE SEEN 05/26/2014    WBCUA 0-2 05/02/2016    RBCUA 0-2 05/02/2016    YEAST NONE SEEN 05/02/2016    BACTERIA NONE 05/02/2016    CLARITYU Clear 12/11/2013    SPECGRAV 1.016 05/26/2014    LEUKOCYTESUR NEGATIVE 04/18/2018    UROBILINOGEN 0.20 11/28/2018    BILIRUBINUR Negative 11/28/2018    BLOODU Negative 11/28/2018    BLOODU NEGATIVE 04/18/2018    GLUCOSEU Negative 11/28/2018    KETUA Negative 11/28/2018      Microalbumen/Creatinine ratio:  No components found for: RUCREAT        Medications:    Current Outpatient Medications   Medication Sig Dispense Refill    folic acid (FOLVITE) 1 MG tablet TAKE 2 & 1/2 (TWO & ONE-HALF) TABLETS BY MOUTH ONCE DAILY 180 tablet 1    oxybutynin (DITROPAN-XL) 5 MG extended release tablet Take 1 tablet by mouth daily 90 tablet 2    pravastatin (PRAVACHOL) 40 MG tablet TAKE 1 TABLET BY MOUTH EVERY EVENING 90 tablet 3    irbesartan (AVAPRO) 300 MG tablet TAKE 1 TABLET BY MOUTH DAILY 90 tablet 3    primidone (MYSOLINE) 50 MG tablet TAKE 1 TABLET BY MOUTH TWICE DAILY 180 tablet 3    citalopram (CELEXA) 20 MG tablet Take 1 tablet by mouth daily 90 tablet 3    amLODIPine (NORVASC) 5 MG tablet Take 1 tablet by mouth daily 90 tablet 3    triamterene-hydrochlorothiazide (MAXZIDE-25) 37.5-25 MG per tablet TAKE 1 TABLET BY MOUTH DAILY 90 tablet 3    simethicone (MYLICON) 80 MG chewable tablet Take 1 tablet by mouth every 6 hours as needed for Flatulence 60 tablet 3    LORazepam (ATIVAN) 0.5 MG tablet Take 0.5 mg by mouth every 8 hours as needed for Anxiety. Adriana Mosley traMADol (ULTRAM) 50 MG tablet Take 50 mg by mouth every 8 hours as needed for Pain. .      fluticasone (FLONASE) 50 MCG/ACT nasal spray 1 spray by Nasal route daily      dicyclomine (BENTYL) 10 MG capsule Take 10 mg by mouth 4 times daily (before meals and nightly)      pantoprazole (PROTONIX) 40 MG tablet Take 40 mg by mouth daily.  hydrocortisone (WESTCORT) 0.2 % cream Apply  topically daily as needed. Apply to facial rash      aspirin 325 MG tablet Take 325 mg by mouth nightly        No current facility-administered medications for this visit. IMPRESSIONS:      Kidney disease: CKD stage [id number]  Anemia: Anemia remains stable. No need for an erythrocyte stimulating agent (SHELBY). Bone and mineral metabolism: There is no complaint of bone pain. PLAN:  1. We discussed the eGFR today. 2. We will continue all current medications without changes. 3. He ha sbeen advised to take the oxybutinin every day. 4. Checking iron, K46, folic acid for anemia. 5. We will see the patient back in 3 months.       _________________________________  Ladell Clines.  Zak Mcnair, DO  [facility]      BROWN Stewart, DO

## 2020-12-08 ENCOUNTER — OFFICE VISIT (OUTPATIENT)
Dept: UROLOGY | Age: 80
End: 2020-12-08
Payer: MEDICARE

## 2020-12-08 VITALS — HEIGHT: 69 IN | WEIGHT: 215 LBS | BODY MASS INDEX: 31.84 KG/M2 | TEMPERATURE: 97 F

## 2020-12-08 LAB
BILIRUBIN URINE: NEGATIVE
BLOOD URINE, POC: NEGATIVE
CHARACTER, URINE: CLEAR
COLOR, URINE: YELLOW
GLUCOSE URINE: NEGATIVE MG/DL
KETONES, URINE: ABNORMAL
LEUKOCYTE CLUMPS, URINE: NEGATIVE
NITRITE, URINE: NEGATIVE
PH, URINE: 6 (ref 5–9)
POST VOID RESIDUAL (PVR): 133 ML
PROTEIN, URINE: 30 MG/DL
SPECIFIC GRAVITY, URINE: 1.02 (ref 1–1.03)
UROBILINOGEN, URINE: 0.2 EU/DL (ref 0–1)

## 2020-12-08 PROCEDURE — G8427 DOCREV CUR MEDS BY ELIG CLIN: HCPCS | Performed by: UROLOGY

## 2020-12-08 PROCEDURE — G8417 CALC BMI ABV UP PARAM F/U: HCPCS | Performed by: UROLOGY

## 2020-12-08 PROCEDURE — 99214 OFFICE O/P EST MOD 30 MIN: CPT | Performed by: UROLOGY

## 2020-12-08 PROCEDURE — 81003 URINALYSIS AUTO W/O SCOPE: CPT | Performed by: UROLOGY

## 2020-12-08 PROCEDURE — 1123F ACP DISCUSS/DSCN MKR DOCD: CPT | Performed by: UROLOGY

## 2020-12-08 PROCEDURE — 51798 US URINE CAPACITY MEASURE: CPT | Performed by: UROLOGY

## 2020-12-08 PROCEDURE — 1036F TOBACCO NON-USER: CPT | Performed by: UROLOGY

## 2020-12-08 PROCEDURE — G8484 FLU IMMUNIZE NO ADMIN: HCPCS | Performed by: UROLOGY

## 2020-12-08 PROCEDURE — 4040F PNEUMOC VAC/ADMIN/RCVD: CPT | Performed by: UROLOGY

## 2020-12-08 NOTE — PROGRESS NOTES
Brandie Marcano MD        [address] [place] [address] [id number]  Dept: [phone]  Dept Fax: [phone]: [address] [address] Urology Office Note      Patient:  Larry Qureshi  YOB: 1940    The patient is a 78 y.o. male who presents today for evaluation of the following problems:   Chief Complaint   Patient presents with    Follow-up     psa prior    Benign Prostatic Hypertrophy        HISTORY OF PRESENT ILLNESS:     BPH  Onset was  Years ago  Overall, the problem(s) are worse. Severity is described as moderate to severe. Associated Symptoms: No dysuria, no gross hematuria. Current Pain Severity: 2      Worsening urinary stream  Had possible BNC vs regrowth after his initial turp  Now with pencil like stream  aua ss 18 moderate mixed bother. Can't take flomax  Taking oxybuytnin    Summary of Previous Records:  Mr. Oneil Butst continues to do well with stable symptoms of prostatism. His PSA is 2.97. He has symptoms of nocturia 1-2 times nightly. He admits to occasional oxybutynin usage at night which helps his nocturia symptoms. He denies difficulty urinating. He mainly complains of sunken penis that causes difficulty. Has hx of TURP in the past, abnormal ROSE MARIE was felt to be consistent with scar from previous TURPs in the past.    There is a family history of prostate cancer with his brother. MRI Pelvis on 06/2017 was consistent with PI-RADS score 2 lesions.       Requested/reviewed records from Keesha Roy DO office and/or outside physician/EMR    (Patient's old records have been requested, reviewed and pertinent findings summarized in today's note.)    Procedures Today: N/A    Last several PSA's:  Lab Results   Component Value Date    PSA 2.82 (H) 11/20/2020    PSA 2.97 (H) 11/20/2019    PSA 2.56 (H) 11/21/2018       Last total testosterone:  No results found for: TESTOSTERONE    Urinalysis today: Results for POC orders placed in visit on 12/08/20   POCT Urinalysis No Micro (Auto)   Result Value Ref Range    Glucose, Ur Negative NEGATIVE mg/dl    Bilirubin Urine Negative     Ketones, Urine Trace (A) NEGATIVE    Specific Gravity, Urine 1.025 1.002 - 1.030    Blood, UA POC Negative NEGATIVE    pH, Urine 6.00 5.0 - 9.0    Protein, Urine 30 (A) NEGATIVE mg/dl    Urobilinogen, Urine 0.20 0.0 - 1.0 eu/dl    Nitrite, Urine Negative NEGATIVE    Leukocyte Clumps, Urine Negative NEGATIVE    Color, Urine Yellow YELLOW-STRAW    Character, Urine Clear CLR-SL.CLOUD   poct post void residual   Result Value Ref Range    post void residual 133 ml       Last BUN and creatinine:  Lab Results   Component Value Date    BUN 28 (H) 11/20/2020     Lab Results   Component Value Date    CREATININE 1.8 (H) 11/20/2020       Imaging Reviewed during this Office Visit:   Dangelo Burroughs MD independently reviewed the images and verified the radiology reports from:    Xr Shoulder Left (min 2 Views)    Result Date: 3/6/2020  PROCEDURE: XR SHOULDER LEFT (MIN 2 VIEWS) CLINICAL INFORMATION: 68-year-old male with left shoulder pain. COMPARISON: No prior study. TECHNIQUE: 4 views of the left shoulder were obtained. FINDINGS: There is no fracture or dislocation. No suspicious osseous lesions are present. The joint spaces are preserved. The clavicle is normal.  The soft tissues are normal.  There are no suspicious findings in the visualized aspects of the upper lung. No acute fracture or dislocation involving the left shoulder. **This report has been created using voice recognition software. It may contain minor errors which are inherent in voice recognition technology. ** Final report electronically signed by Dr Jayda Srinivasan on 3/6/2020 3:35 PM      PAST MEDICAL, FAMILY AND SOCIAL HISTORY:  Past Medical History:   Diagnosis Date    Anemia     Arthritis     Bronchiolitis 11/2016    Cancer ([facility] [address]     SKIN CANCER    CKD (chronic kidney disease), stage III     Diverticula of colon 5/2014    tx with antibiotics per pt; NO surgery    Diverticulosis     DVT [facility]deep [facility] thrombosis) ([facility])     s/p    Esophagitis 12/10/2014    Hiatal hernia     HTN (hypertension)     Hyperlipidemia     Kidney stones     Nephrolithiasis     Obesity     Prostate enlargement     benign    Renal insufficiency      Past Surgical History:   Procedure Laterality Date    BLEPHAROPLASTY Bilateral 03/2010    CARDIAC CATHETERIZATION  2000,2010    CATARACT REMOVAL WITH IMPLANT Bilateral 2006,2007    COLONOSCOPY  05/26/2017    HERNIA REPAIR Bilateral 1996    NASAL SINUS SURGERY      SHOULDER SURGERY      SKIN BIOPSY      SKIN CANCER EXCISION  Feb 2015    top of head    TONSILLECTOMY      TURP  4425,7078    UPPER GASTROINTESTINAL ENDOSCOPY       Family History   Problem Relation Age of Onset    Diabetes Mother     High Blood Pressure Father     Heart Disease Father      Outpatient Medications Marked as Taking for the 12/8/20 encounter (Office Visit) with Saint Bran, MD   Medication Sig Dispense Refill    folic acid (FOLVITE) 1 MG tablet TAKE 2 & 1/2 (TWO & ONE-HALF) TABLETS BY MOUTH ONCE DAILY 180 tablet 1    oxybutynin (DITROPAN-XL) 5 MG extended release tablet Take 1 tablet by mouth daily 90 tablet 2    pravastatin (PRAVACHOL) 40 MG tablet TAKE 1 TABLET BY MOUTH EVERY EVENING 90 tablet 3    irbesartan (AVAPRO) 300 MG tablet TAKE 1 TABLET BY MOUTH DAILY 90 tablet 3    primidone (MYSOLINE) 50 MG tablet TAKE 1 TABLET BY MOUTH TWICE DAILY 180 tablet 3    citalopram (CELEXA) 20 MG tablet Take 1 tablet by mouth daily 90 tablet 3    amLODIPine (NORVASC) 5 MG tablet Take 1 tablet by mouth daily 90 tablet 3    triamterene-hydrochlorothiazide (MAXZIDE-25) 37.5-25 MG per tablet TAKE 1 TABLET BY MOUTH DAILY 90 tablet 3    simethicone (MYLICON) 80 MG chewable tablet Take 1 tablet by mouth every 6 hours as needed for Flatulence 60 tablet 3    LORazepam (ATIVAN) 0.5 MG tablet Take 0.5 mg by mouth every 8 hours as needed for Anxiety. Illene Soles traMADol (ULTRAM) 50 MG tablet Take 50 mg by mouth every 8 hours as needed for Pain. .      fluticasone (FLONASE) 50 MCG/ACT nasal spray 1 spray by Nasal route daily      dicyclomine (BENTYL) 10 MG capsule Take 10 mg by mouth 4 times daily (before meals and nightly)      pantoprazole (PROTONIX) 40 MG tablet Take 40 mg by mouth daily.  hydrocortisone (WESTCORT) 0.2 % cream Apply  topically daily as needed. Apply to facial rash      aspirin 325 MG tablet Take 325 mg by mouth nightly          Ace inhibitors, Carafate [sucralfate], Flomax [tamsulosin hcl], Iv dye [iodides], Lipitor [atorvastatin], Lopressor [metoprolol], and Motrin [ibuprofen]  Social History     Tobacco Use   Smoking Status Never Smoker   Smokeless Tobacco Never Used      (If patient a smoker, smoking cessation counseling offered)   Social History     Substance and Sexual Activity   Alcohol Use No       REVIEW OF SYSTEMS:  Constitutional: negative  Eyes: negative  Respiratory: negative  Cardiovascular: negative  Gastrointestinal: negative  Genitourinary: see HPI  Musculoskeletal: negative  Skin: negative   Neurological: negative  Hematological/Lymphatic: negative  Psychological: negative      Physical Exam:    This a 78 y.o. male  Vitals:    12/08/20 1417   Temp: 97 °F (36.1 °C)     Body mass index is 31.75 kg/m². Constitutional: Patient in no acute distress;         Assessment and Plan        1. BPH with obstruction/lower urinary tract symptoms    2. Family history of prostate cancer    3. Prostate cancer screening               Plan:         Pvr 133 cc  Worsening stream  Cysto in office/UC      Prescriptions Ordered:  No orders of the defined types were placed in this encounter.      Orders Placed:  Orders Placed This Encounter   Procedures    POCT Urinalysis No Micro (Auto)    poct post void residual     Bladder scan            DEISY Ding MD

## 2020-12-16 ENCOUNTER — PROCEDURE VISIT (OUTPATIENT)
Dept: UROLOGY | Age: 80
End: 2020-12-16
Payer: MEDICARE

## 2020-12-16 VITALS — TEMPERATURE: 96.2 F | BODY MASS INDEX: 31.1 KG/M2 | WEIGHT: 210 LBS | HEIGHT: 69 IN

## 2020-12-16 LAB
BILIRUBIN URINE: NEGATIVE
BLOOD URINE, POC: NEGATIVE
CHARACTER, URINE: CLEAR
COLOR, URINE: YELLOW
GLUCOSE URINE: NEGATIVE MG/DL
KETONES, URINE: NEGATIVE
LEUKOCYTE CLUMPS, URINE: NEGATIVE
NITRITE, URINE: NEGATIVE
PH, URINE: 6 (ref 5–9)
POST VOID RESIDUAL (PVR): 0 ML
PROTEIN, URINE: 30 MG/DL
SPECIFIC GRAVITY, URINE: >= 1.03 (ref 1–1.03)
UROBILINOGEN, URINE: 0.2 EU/DL (ref 0–1)

## 2020-12-16 PROCEDURE — 51798 US URINE CAPACITY MEASURE: CPT | Performed by: UROLOGY

## 2020-12-16 PROCEDURE — 52281 CYSTOSCOPY AND TREATMENT: CPT | Performed by: UROLOGY

## 2020-12-16 PROCEDURE — 81003 URINALYSIS AUTO W/O SCOPE: CPT | Performed by: UROLOGY

## 2020-12-16 NOTE — PROGRESS NOTES
Cystoscopy/dilation Operative Note  Surgeon: Tessa LAZCANO [facility]   Anesthesia: Urethral 2%  Indications: irritative void symptoms  Position: supine  Findings: meatal stricture dilated up to a 24 fr without difficulty, open bladder neck, regrowth of adenoma on the right lateral wall causing obstruction  The patient was prepped and draped in the usual sterile fashion. The flexible cystoscope was advanced through the urethra and into the bladder. The bladder was thoroughly inspected and the following was noted:    Residual Urine: Minimal   Urethra: No abnormalities of the urethra are noted. Prostate: right lateral wall obstruction  Bladder: No tumors or CIS noted. No bladder diverticulum. Moderate trabeculation noted. Ureters: Clear efflux from both ureters. Orifices with normal configuration and location. The cystoscope was removed. The patient tolerated the procedure well. Obstructing likely from meatal stricture, but does have some regrowth of adenoma. If occurs may need outlet surgery (greenlight) in OR.   Return in 3 months with PVR

## 2020-12-31 RX ORDER — TRIAMTERENE AND HYDROCHLOROTHIAZIDE 37.5; 25 MG/1; MG/1
TABLET ORAL
Qty: 90 TABLET | Refills: 3 | Status: SHIPPED | OUTPATIENT
Start: 2020-12-31 | End: 2021-03-08 | Stop reason: SDUPTHER

## 2021-02-22 ENCOUNTER — NURSE ONLY (OUTPATIENT)
Dept: LAB | Age: 81
End: 2021-02-22

## 2021-02-22 DIAGNOSIS — D50.0 IRON DEFICIENCY ANEMIA DUE TO CHRONIC BLOOD LOSS: ICD-10-CM

## 2021-02-22 DIAGNOSIS — N18.32 STAGE 3B CHRONIC KIDNEY DISEASE (HCC): ICD-10-CM

## 2021-02-22 LAB
ANION GAP SERPL CALCULATED.3IONS-SCNC: 9 MEQ/L (ref 8–16)
BASOPHILS # BLD: 1 %
BASOPHILS ABSOLUTE: 0.1 THOU/MM3 (ref 0–0.1)
BUN BLDV-MCNC: 31 MG/DL (ref 7–22)
CALCIUM SERPL-MCNC: 9.5 MG/DL (ref 8.5–10.5)
CHLORIDE BLD-SCNC: 100 MEQ/L (ref 98–111)
CO2: 28 MEQ/L (ref 23–33)
CREAT SERPL-MCNC: 1.6 MG/DL (ref 0.4–1.2)
EOSINOPHIL # BLD: 4.7 %
EOSINOPHILS ABSOLUTE: 0.2 THOU/MM3 (ref 0–0.4)
ERYTHROCYTE [DISTWIDTH] IN BLOOD BY AUTOMATED COUNT: 13.6 % (ref 11.5–14.5)
ERYTHROCYTE [DISTWIDTH] IN BLOOD BY AUTOMATED COUNT: 46.4 FL (ref 35–45)
FOLATE: > 20 NG/ML (ref 4.8–24.2)
GFR SERPL CREATININE-BSD FRML MDRD: 42 ML/MIN/1.73M2
GLUCOSE BLD-MCNC: 106 MG/DL (ref 70–108)
HCT VFR BLD CALC: 44.8 % (ref 42–52)
HEMOGLOBIN: 13.9 GM/DL (ref 14–18)
IMMATURE GRANS (ABS): 0.04 THOU/MM3 (ref 0–0.07)
IMMATURE GRANULOCYTES: 0.8 %
IRON SATURATION: 30 % (ref 20–50)
IRON: 85 UG/DL (ref 65–195)
LYMPHOCYTES # BLD: 20.2 %
LYMPHOCYTES ABSOLUTE: 1 THOU/MM3 (ref 1–4.8)
MCH RBC QN AUTO: 28.7 PG (ref 26–33)
MCHC RBC AUTO-ENTMCNC: 31 GM/DL (ref 32.2–35.5)
MCV RBC AUTO: 92.6 FL (ref 80–94)
MONOCYTES # BLD: 8.8 %
MONOCYTES ABSOLUTE: 0.4 THOU/MM3 (ref 0.4–1.3)
NUCLEATED RED BLOOD CELLS: 0 /100 WBC
PLATELET # BLD: 151 THOU/MM3 (ref 130–400)
PMV BLD AUTO: 10.9 FL (ref 9.4–12.4)
POTASSIUM SERPL-SCNC: 4.3 MEQ/L (ref 3.5–5.2)
RBC # BLD: 4.84 MILL/MM3 (ref 4.7–6.1)
SEG NEUTROPHILS: 64.5 %
SEGMENTED NEUTROPHILS ABSOLUTE COUNT: 3.3 THOU/MM3 (ref 1.8–7.7)
SODIUM BLD-SCNC: 137 MEQ/L (ref 135–145)
TOTAL IRON BINDING CAPACITY: 279 UG/DL (ref 171–450)
VITAMIN B-12: 320 PG/ML (ref 211–911)
WBC # BLD: 5.1 THOU/MM3 (ref 4.8–10.8)

## 2021-03-03 ENCOUNTER — OFFICE VISIT (OUTPATIENT)
Dept: NEPHROLOGY | Age: 81
End: 2021-03-03
Payer: MEDICARE

## 2021-03-03 VITALS
HEIGHT: 69 IN | HEART RATE: 52 BPM | DIASTOLIC BLOOD PRESSURE: 63 MMHG | SYSTOLIC BLOOD PRESSURE: 150 MMHG | RESPIRATION RATE: 18 BRPM | WEIGHT: 217 LBS | OXYGEN SATURATION: 97 % | TEMPERATURE: 97.5 F | BODY MASS INDEX: 32.14 KG/M2

## 2021-03-03 DIAGNOSIS — N18.32 STAGE 3B CHRONIC KIDNEY DISEASE (HCC): ICD-10-CM

## 2021-03-03 DIAGNOSIS — N18.9 ANEMIA ASSOCIATED WITH CHRONIC RENAL FAILURE: Primary | ICD-10-CM

## 2021-03-03 DIAGNOSIS — D63.1 ANEMIA ASSOCIATED WITH CHRONIC RENAL FAILURE: Primary | ICD-10-CM

## 2021-03-03 PROCEDURE — G8427 DOCREV CUR MEDS BY ELIG CLIN: HCPCS | Performed by: INTERNAL MEDICINE

## 2021-03-03 PROCEDURE — 99214 OFFICE O/P EST MOD 30 MIN: CPT | Performed by: INTERNAL MEDICINE

## 2021-03-03 PROCEDURE — G8417 CALC BMI ABV UP PARAM F/U: HCPCS | Performed by: INTERNAL MEDICINE

## 2021-03-03 PROCEDURE — 1036F TOBACCO NON-USER: CPT | Performed by: INTERNAL MEDICINE

## 2021-03-03 PROCEDURE — G8484 FLU IMMUNIZE NO ADMIN: HCPCS | Performed by: INTERNAL MEDICINE

## 2021-03-03 PROCEDURE — 4040F PNEUMOC VAC/ADMIN/RCVD: CPT | Performed by: INTERNAL MEDICINE

## 2021-03-03 PROCEDURE — 1123F ACP DISCUSS/DSCN MKR DOCD: CPT | Performed by: INTERNAL MEDICINE

## 2021-03-03 NOTE — PATIENT INSTRUCTIONS

## 2021-03-03 NOTE — PROGRESS NOTES
HEENT:  Normocephalic. No lesions nor rashes. HENRIK. EOMI  Neck:   No JVD and no bruits. Thyroid gland is normal  Lungs:  Breathing easily. No rales nor rhonchi. No cough nor sputum production. Heart[de-identified]            RRR. No murmurs nor rubs. PMI is not enlarged nor displaced. Abdomen:  Soft and non tender. Bowel sounds are active in all four quadrants. Extremities:  1+ edema  Neurologic:  CN II-XII are intact. No deficits noted. Muscle strength and tone are equal throughout. Skin:                Warm and dry with no rashes. Muscles:         Hand  and leg strength are equal and strong bilaterally.      Lab Data      CBC:   Lab Results   Component Value Date    WBC 5.1 02/22/2021    HGB 13.9 (L) 02/22/2021    HCT 44.8 02/22/2021    MCV 92.6 02/22/2021     02/22/2021     BMP:    Lab Results   Component Value Date     02/22/2021     11/20/2020     11/20/2019    K 4.3 02/22/2021    K 4.3 11/20/2020    K 4.4 11/20/2019     02/22/2021     11/20/2020    CL 99 11/20/2019    CO2 28 02/22/2021    CO2 28 11/20/2020    CO2 29 11/20/2019    BUN 31 (H) 02/22/2021    BUN 28 (H) 11/20/2020    BUN 27 (H) 11/20/2019    CREATININE 1.6 (H) 02/22/2021    CREATININE 1.8 (H) 11/20/2020    CREATININE 1.5 (H) 11/20/2019    GLUCOSE 106 02/22/2021    GLUCOSE 117 (H) 11/20/2020    GLUCOSE 116 (H) 11/20/2019      Hepatic:   Lab Results   Component Value Date    AST 18 05/19/2018    AST 17 04/26/2017    AST 21 05/02/2016    ALT 13 05/19/2018    ALT 13 04/26/2017    ALT 15 05/02/2016    BILITOT 0.6 05/19/2018    BILITOT 0.8 04/26/2017    BILITOT 0.7 05/02/2016    ALKPHOS 74 05/19/2018    ALKPHOS 96 04/26/2017    ALKPHOS 84 05/02/2016     BNP: No results found for: BNP  Lipids:   Lab Results   Component Value Date    CHOL 192 07/20/2020    HDL 50 07/20/2020     INR:   Lab Results   Component Value Date    INR 1.03 04/18/2018    INR 0.91 02/26/2018    INR 0.94 03/25/2015     URINE:   Lab Results Component Value Date    PROTUR 30 12/16/2020     Lab Results   Component Value Date    NITRU Negative 12/16/2020    COLORU Yellow 12/16/2020    COLORU YELLOW 04/18/2018    PHUR 7.0 04/18/2018    LABCAST NONE SEEN 05/26/2014    WBCUA 0-2 05/02/2016    RBCUA 0-2 05/02/2016    YEAST NONE SEEN 05/02/2016    BACTERIA NONE 05/02/2016    CLARITYU Clear 12/11/2013    SPECGRAV 1.016 05/26/2014    LEUKOCYTESUR NEGATIVE 04/18/2018    UROBILINOGEN 0.20 12/16/2020    BILIRUBINUR Negative 12/16/2020    BLOODU Negative 12/16/2020    BLOODU NEGATIVE 04/18/2018    GLUCOSEU Negative 12/16/2020    KETUA Negative 12/16/2020      Microalbumen/Creatinine ratio:  No components found for: RUCREAT        Medications:    Current Outpatient Medications   Medication Sig Dispense Refill    triamterene-hydroCHLOROthiazide (MAXZIDE-25) 37.5-25 MG per tablet TAKE 1 TABLET BY MOUTH DAILY 90 tablet 3    folic acid (FOLVITE) 1 MG tablet TAKE 2 & 1/2 (TWO & ONE-HALF) TABLETS BY MOUTH ONCE DAILY 180 tablet 1    oxybutynin (DITROPAN-XL) 5 MG extended release tablet Take 1 tablet by mouth daily 90 tablet 2    pravastatin (PRAVACHOL) 40 MG tablet TAKE 1 TABLET BY MOUTH EVERY EVENING 90 tablet 3    irbesartan (AVAPRO) 300 MG tablet TAKE 1 TABLET BY MOUTH DAILY 90 tablet 3    primidone (MYSOLINE) 50 MG tablet TAKE 1 TABLET BY MOUTH TWICE DAILY 180 tablet 3    citalopram (CELEXA) 20 MG tablet Take 1 tablet by mouth daily 90 tablet 3    amLODIPine (NORVASC) 5 MG tablet Take 1 tablet by mouth daily 90 tablet 3    simethicone (MYLICON) 80 MG chewable tablet Take 1 tablet by mouth every 6 hours as needed for Flatulence 60 tablet 3    LORazepam (ATIVAN) 0.5 MG tablet Take 0.5 mg by mouth every 8 hours as needed for Anxiety. [facility] traMADol (ULTRAM) 50 MG tablet Take 50 mg by mouth every 8 hours as needed for Pain. .      fluticasone (FLONASE) 50 MCG/ACT nasal spray 1 spray by Nasal route daily  dicyclomine (BENTYL) 10 MG capsule Take 10 mg by mouth 4 times daily (before meals and nightly)      pantoprazole (PROTONIX) 40 MG tablet Take 40 mg by mouth daily.  hydrocortisone (WESTCORT) 0.2 % cream Apply  topically daily as needed. Apply to facial rash      aspirin 325 MG tablet Take 325 mg by mouth nightly        No current facility-administered medications for this visit. IMPRESSIONS:        Kidney disease: CKD stage R-[id number]-improved  Anemia: Anemia remains stable. No need for an erythrocyte stimulating agent (SHELBY). Iron, N90 and folic acid were normal.   Bone and mineral metabolism: There is no complaint of bone pain. PLAN:  1. We discussed the eGFR today. 2. We will continue all current medications without changes. 3. Checking stool for blood  4. We will see the patient back in 3 months.       _________________________________  Vladislav Mediate.  Debora Hardin DO  [facility]      CC  Juve Cantrell DO

## 2021-03-04 ENCOUNTER — IMMUNIZATION (OUTPATIENT)
Dept: PRIMARY CARE CLINIC | Age: 81
End: 2021-03-04
Payer: MEDICARE

## 2021-03-04 PROCEDURE — 0001A COVID-19, PFIZER VACCINE 30MCG/0.3ML DOSE: CPT | Performed by: FAMILY MEDICINE

## 2021-03-04 PROCEDURE — 91300 COVID-19, PFIZER VACCINE 30MCG/0.3ML DOSE: CPT | Performed by: FAMILY MEDICINE

## 2021-03-08 ENCOUNTER — OFFICE VISIT (OUTPATIENT)
Dept: FAMILY MEDICINE CLINIC | Age: 81
End: 2021-03-08
Payer: MEDICARE

## 2021-03-08 VITALS
RESPIRATION RATE: 16 BRPM | HEIGHT: 69 IN | TEMPERATURE: 97.2 F | BODY MASS INDEX: 31.76 KG/M2 | DIASTOLIC BLOOD PRESSURE: 57 MMHG | WEIGHT: 214.4 LBS | HEART RATE: 58 BPM | OXYGEN SATURATION: 98 % | SYSTOLIC BLOOD PRESSURE: 152 MMHG

## 2021-03-08 DIAGNOSIS — F41.9 ANXIETY: ICD-10-CM

## 2021-03-08 DIAGNOSIS — G25.0 BENIGN ESSENTIAL TREMOR: Primary | ICD-10-CM

## 2021-03-08 DIAGNOSIS — I10 ESSENTIAL HYPERTENSION: ICD-10-CM

## 2021-03-08 DIAGNOSIS — I20.8 OTHER FORMS OF ANGINA PECTORIS (HCC): ICD-10-CM

## 2021-03-08 PROCEDURE — 99214 OFFICE O/P EST MOD 30 MIN: CPT | Performed by: FAMILY MEDICINE

## 2021-03-08 PROCEDURE — G8417 CALC BMI ABV UP PARAM F/U: HCPCS | Performed by: FAMILY MEDICINE

## 2021-03-08 PROCEDURE — 4040F PNEUMOC VAC/ADMIN/RCVD: CPT | Performed by: FAMILY MEDICINE

## 2021-03-08 PROCEDURE — 1036F TOBACCO NON-USER: CPT | Performed by: FAMILY MEDICINE

## 2021-03-08 PROCEDURE — G8427 DOCREV CUR MEDS BY ELIG CLIN: HCPCS | Performed by: FAMILY MEDICINE

## 2021-03-08 PROCEDURE — 1123F ACP DISCUSS/DSCN MKR DOCD: CPT | Performed by: FAMILY MEDICINE

## 2021-03-08 PROCEDURE — G8484 FLU IMMUNIZE NO ADMIN: HCPCS | Performed by: FAMILY MEDICINE

## 2021-03-08 RX ORDER — TRIAMTERENE AND HYDROCHLOROTHIAZIDE 37.5; 25 MG/1; MG/1
1 TABLET ORAL DAILY
Qty: 90 TABLET | Refills: 3 | Status: SHIPPED | OUTPATIENT
Start: 2021-03-08 | End: 2021-12-14 | Stop reason: SDUPTHER

## 2021-03-08 RX ORDER — AMLODIPINE BESYLATE 10 MG/1
10 TABLET ORAL DAILY
Qty: 90 TABLET | Refills: 3 | Status: SHIPPED | OUTPATIENT
Start: 2021-03-08 | End: 2022-03-07

## 2021-03-08 ASSESSMENT — PATIENT HEALTH QUESTIONNAIRE - PHQ9
DEPRESSION UNABLE TO ASSESS: FUNCTIONAL CAPACITY MOTIVATION LIMITS ACCURACY
SUM OF ALL RESPONSES TO PHQ QUESTIONS 1-9: 0
SUM OF ALL RESPONSES TO PHQ9 QUESTIONS 1 & 2: 0
2. FEELING DOWN, DEPRESSED OR HOPELESS: 0

## 2021-03-08 NOTE — PROGRESS NOTES
Mookie Ladd is a 78 y.o. male that presents for     Chief Complaint   Patient presents with    6 Month Follow-Up       BP (!) 152/57   Pulse 58   Temp 97.2 °F (36.2 °C)   Resp 16   Ht 5' 9\" (1.753 m)   Wt 214 lb 6.4 oz (97.3 kg)   SpO2 98%   BMI 31.66 kg/m²       HPI:    HTN    Does patient check BP regularly at home? - No  Current Medication regimen - maxzide, norvasc, irbesartan  Tolerating medications well? - yes    Shortness of breath or chest pain? No  Headache or visual complaints? No  Neurologic changes like confusion? No  Extremity edema? No    BP Readings from Last 3 Encounters:   03/08/21 (!) 152/57   03/03/21 (!) 150/63   11/25/20 (!) 115/56     Tremor:  'better', states that it will occasionally get worse, but not noticing the symptoms as much recently. Anxiety:  'doing pretty good'. Currently on Celexa.       Health Maintenance   Topic Date Due    DTaP/Tdap/Td vaccine (1 - Tdap) Never done    Shingles Vaccine (1 of 2) Never done    Pneumococcal 65+ years Vaccine (2 of 2 - PPSV23) 01/20/2017    COVID-19 Vaccine (2 of 2 - Pfizer series) 03/25/2021    Lipid screen  07/20/2021    Annual Wellness Visit (AWV)  09/09/2021    Potassium monitoring  02/22/2022    Creatinine monitoring  02/22/2022    Flu vaccine  Completed    Hepatitis A vaccine  Aged Out    Hepatitis B vaccine  Aged Out    Hib vaccine  Aged Out    Meningococcal (ACWY) vaccine  Aged Out       Past Medical History:   Diagnosis Date    Anemia     Arthritis     Bronchiolitis 11/2016    Cancer ([facility] [address]     SKIN CANCER    CKD (chronic kidney disease), stage III     Diverticula of colon 5/2014    tx with antibiotics per pt; NO surgery    Diverticulosis     DVT (deep venous thrombosis) ([facility])     s/p    Esophagitis 12/10/2014    Hiatal hernia     HTN (hypertension)     Hyperlipidemia     Kidney stones     Nephrolithiasis     Obesity     Prostate enlargement     benign    Renal insufficiency        Past Surgical lower extremities  Psych:  Normal affect without evidence of depression oranxiety, insight and judgement are appropriate, memory appears intact  Skin: warm and dry, no rash or erythema      ASSESSMENT & PLAN  Speedy Machado was seen today for 6 month follow-up. Diagnoses and all orders for this visit:    Benign essential tremor    Essential hypertension  -     triamterene-hydroCHLOROthiazide (MAXZIDE-25) 37.5-25 MG per tablet; Take 1 tablet by mouth daily  -     amLODIPine (NORVASC) 10 MG tablet; Take 1 tablet by mouth daily    Other forms of angina pectoris (HCC)    Anxiety      -HTN uncontrolled, increase norvasc to 10mg q daily  -Chronic issues stable, continue current medications  -Advised to call if any issues      Return in about 6 months (around 9/8/2021). All copied or forwarded information in the progress note was verified by me to be accurate at the time of visit  Patient's past medical, surgical, social and family history were reviewed and updated     All patient questions answered. Patient voiced understanding.

## 2021-03-16 ENCOUNTER — OFFICE VISIT (OUTPATIENT)
Dept: UROLOGY | Age: 81
End: 2021-03-16
Payer: MEDICARE

## 2021-03-16 VITALS — BODY MASS INDEX: 31.1 KG/M2 | WEIGHT: 210 LBS | TEMPERATURE: 97.1 F | HEIGHT: 69 IN

## 2021-03-16 DIAGNOSIS — N13.8 BPH WITH OBSTRUCTION/LOWER URINARY TRACT SYMPTOMS: Primary | ICD-10-CM

## 2021-03-16 DIAGNOSIS — N40.1 BPH WITH OBSTRUCTION/LOWER URINARY TRACT SYMPTOMS: Primary | ICD-10-CM

## 2021-03-16 DIAGNOSIS — N35.911 STRICTURE OF URETHRAL MEATUS IN MALE, UNSPECIFIED STRICTURE TYPE: ICD-10-CM

## 2021-03-16 LAB
BILIRUBIN URINE: NEGATIVE
BLOOD URINE, POC: NEGATIVE
CHARACTER, URINE: CLEAR
COLOR, URINE: YELLOW
GLUCOSE URINE: NEGATIVE MG/DL
KETONES, URINE: ABNORMAL
LEUKOCYTE CLUMPS, URINE: ABNORMAL
NITRITE, URINE: NEGATIVE
PH, URINE: 6 (ref 5–9)
POST VOID RESIDUAL (PVR): 0 ML
PROTEIN, URINE: 100 MG/DL
SPECIFIC GRAVITY, URINE: 1.02 (ref 1–1.03)
UROBILINOGEN, URINE: 0.2 EU/DL (ref 0–1)

## 2021-03-16 PROCEDURE — 99214 OFFICE O/P EST MOD 30 MIN: CPT | Performed by: UROLOGY

## 2021-03-16 PROCEDURE — G8417 CALC BMI ABV UP PARAM F/U: HCPCS | Performed by: UROLOGY

## 2021-03-16 PROCEDURE — 1123F ACP DISCUSS/DSCN MKR DOCD: CPT | Performed by: UROLOGY

## 2021-03-16 PROCEDURE — G8484 FLU IMMUNIZE NO ADMIN: HCPCS | Performed by: UROLOGY

## 2021-03-16 PROCEDURE — 81003 URINALYSIS AUTO W/O SCOPE: CPT | Performed by: UROLOGY

## 2021-03-16 PROCEDURE — 1036F TOBACCO NON-USER: CPT | Performed by: UROLOGY

## 2021-03-16 PROCEDURE — G8427 DOCREV CUR MEDS BY ELIG CLIN: HCPCS | Performed by: UROLOGY

## 2021-03-16 PROCEDURE — 51798 US URINE CAPACITY MEASURE: CPT | Performed by: UROLOGY

## 2021-03-16 PROCEDURE — 4040F PNEUMOC VAC/ADMIN/RCVD: CPT | Performed by: UROLOGY

## 2021-03-16 NOTE — PROGRESS NOTES
Symone Monte MD        [address] [place] [address] [phone]  Dept: [phone]  Dept Fax: [phone]: [address] [address] Urology Office Note      Patient:  Preston Daniel  YOB: 1940    The patient is a 78 y.o. male who presents today for evaluation of the following problems:   Chief Complaint   Patient presents with    Follow-up     bph and hx of stricture-pvr today         HISTORY OF PRESENT ILLNESS:     BPH  Urinary stream not at baseline  Recent cystoscopy demonstrated narrow meatus with regrowth of adneoma  aua ss 18 moderate mixed bother. Can't take flomax  Taking oxybuytnin      Recent cystoscopy findings: meatal stricture dilated up to a 24 fr without difficulty, open bladder neck, regrowth of adenoma on the right lateral wall causing obstruction      Summary of Previous Records:  Mr. Cortez Gaytan continues to do well with stable symptoms of prostatism. His PSA is 2.97. He has symptoms of nocturia 1-2 times nightly. He admits to occasional oxybutynin usage at night which helps his nocturia symptoms. He denies difficulty urinating. He mainly complains of sunken penis that causes difficulty. Has hx of TURP in the past, abnormal ROSE MARIE was felt to be consistent with scar from previous TURPs in the past.    There is a family history of prostate cancer with his brother. MRI Pelvis on 06/2017 was consistent with PI-RADS score 2 lesions.       Requested/reviewed records from Deana Mcburney, DO office and/or outside physician/EMR    (Patient's old records have been requested, reviewed and pertinent findings summarized in today's note.)    Procedures Today: N/A    Last several PSA's:  Lab Results   Component Value Date    PSA 2.82 (H) 11/20/2020    PSA 2.97 (H) 11/20/2019    PSA 2.56 (H) 11/21/2018       Last total testosterone:  No results found for: TESTOSTERONE    Urinalysis today:  Results for POC orders placed in visit on 03/16/21   POCT Urinalysis No Micro (Auto)   Result Value Ref Range    Glucose, Ur Negative NEGATIVE mg/dl    Bilirubin Urine Negative     Ketones, Urine Trace (A) NEGATIVE    Specific Gravity, Urine 1.020 1.002 - 1.030    Blood, UA POC Negative NEGATIVE    pH, Urine 6.00 5.0 - 9.0    Protein, Urine 100 (A) NEGATIVE mg/dl    Urobilinogen, Urine 0.20 0.0 - 1.0 eu/dl    Nitrite, Urine Negative NEGATIVE    Leukocyte Clumps, Urine Small (A) NEGATIVE    Color, Urine Yellow YELLOW-STRAW    Character, Urine Clear CLR-SL.CLOUD   poct post void residual   Result Value Ref Range    post void residual 0 ml       Last BUN and creatinine:  Lab Results   Component Value Date    BUN 31 (H) 02/22/2021     Lab Results   Component Value Date    CREATININE 1.6 (H) 02/22/2021       Imaging Reviewed during this Office Visit:   Dilshad Mullen MD independently reviewed the images and verified the radiology reports from:    Xr Shoulder Left (min 2 Views)    Result Date: 3/6/2020  PROCEDURE: XR SHOULDER LEFT (MIN 2 VIEWS) CLINICAL INFORMATION: 49-year-old male with left shoulder pain. COMPARISON: No prior study. TECHNIQUE: 4 views of the left shoulder were obtained. FINDINGS: There is no fracture or dislocation. No suspicious osseous lesions are present. The joint spaces are preserved. The clavicle is normal.  The soft tissues are normal.  There are no suspicious findings in the visualized aspects of the upper lung. No acute fracture or dislocation involving the left shoulder. **This report has been created using voice recognition software. It may contain minor errors which are inherent in voice recognition technology. ** Final report electronically signed by Dr Ama Rivera on 3/6/2020 3:35 PM      PAST MEDICAL, FAMILY AND SOCIAL HISTORY:  Past Medical History:   Diagnosis Date    Anemia     Arthritis     Bronchiolitis 11/2016    Cancer ([facility] [address]     SKIN CANCER    CKD (chronic kidney disease), stage III     Plan        1. BPH with obstruction/lower urinary tract symptoms    2. Stricture of urethral meatus in male, unspecified stricture type               Plan:       Cont ditropan. PVR  Some voiding issues but not bad enough for surgery  Follow up in one year with pvr        Prescriptions Ordered:  No orders of the defined types were placed in this encounter.      Orders Placed:  Orders Placed This Encounter   Procedures    POCT Urinalysis No Micro (Auto)    poct post void residual     Bladder scan            DEISY Alamo MD

## 2021-03-17 ENCOUNTER — NURSE ONLY (OUTPATIENT)
Dept: LAB | Age: 81
End: 2021-03-17

## 2021-03-17 DIAGNOSIS — N18.32 STAGE 3B CHRONIC KIDNEY DISEASE (HCC): ICD-10-CM

## 2021-03-17 DIAGNOSIS — D63.1 ANEMIA ASSOCIATED WITH CHRONIC RENAL FAILURE: ICD-10-CM

## 2021-03-17 DIAGNOSIS — N18.9 ANEMIA ASSOCIATED WITH CHRONIC RENAL FAILURE: ICD-10-CM

## 2021-03-17 LAB — HEMOCCULT STL QL: NEGATIVE

## 2021-03-25 ENCOUNTER — IMMUNIZATION (OUTPATIENT)
Dept: PRIMARY CARE CLINIC | Age: 81
End: 2021-03-25
Payer: MEDICARE

## 2021-03-25 PROCEDURE — 91300 COVID-19, PFIZER VACCINE 30MCG/0.3ML DOSE: CPT | Performed by: FAMILY MEDICINE

## 2021-03-25 PROCEDURE — 0002A COVID-19, PFIZER VACCINE 30MCG/0.3ML DOSE: CPT | Performed by: FAMILY MEDICINE

## 2021-05-07 RX ORDER — FOLIC ACID 1 MG/1
TABLET ORAL
Qty: 180 TABLET | Refills: 1 | Status: SHIPPED | OUTPATIENT
Start: 2021-05-07 | End: 2021-11-03

## 2021-05-25 ENCOUNTER — NURSE ONLY (OUTPATIENT)
Dept: LAB | Age: 81
End: 2021-05-25

## 2021-05-25 DIAGNOSIS — N18.32 STAGE 3B CHRONIC KIDNEY DISEASE (HCC): ICD-10-CM

## 2021-05-25 DIAGNOSIS — N18.9 ANEMIA ASSOCIATED WITH CHRONIC RENAL FAILURE: ICD-10-CM

## 2021-05-25 DIAGNOSIS — D63.1 ANEMIA ASSOCIATED WITH CHRONIC RENAL FAILURE: ICD-10-CM

## 2021-05-25 LAB
ANION GAP SERPL CALCULATED.3IONS-SCNC: 8 MEQ/L (ref 8–16)
BASOPHILS # BLD: 1.1 %
BASOPHILS ABSOLUTE: 0.1 THOU/MM3 (ref 0–0.1)
BUN BLDV-MCNC: 25 MG/DL (ref 7–22)
CALCIUM SERPL-MCNC: 9.3 MG/DL (ref 8.5–10.5)
CHLORIDE BLD-SCNC: 103 MEQ/L (ref 98–111)
CO2: 27 MEQ/L (ref 23–33)
CREAT SERPL-MCNC: 1.6 MG/DL (ref 0.4–1.2)
EOSINOPHIL # BLD: 6.3 %
EOSINOPHILS ABSOLUTE: 0.3 THOU/MM3 (ref 0–0.4)
ERYTHROCYTE [DISTWIDTH] IN BLOOD BY AUTOMATED COUNT: 13.8 % (ref 11.5–14.5)
ERYTHROCYTE [DISTWIDTH] IN BLOOD BY AUTOMATED COUNT: 46.9 FL (ref 35–45)
GFR SERPL CREATININE-BSD FRML MDRD: 42 ML/MIN/1.73M2
GLUCOSE BLD-MCNC: 110 MG/DL (ref 70–108)
HCT VFR BLD CALC: 41.1 % (ref 42–52)
HEMOGLOBIN: 13.3 GM/DL (ref 14–18)
IMMATURE GRANS (ABS): 0.02 THOU/MM3 (ref 0–0.07)
IMMATURE GRANULOCYTES: 0.4 %
LYMPHOCYTES # BLD: 25.2 %
LYMPHOCYTES ABSOLUTE: 1.2 THOU/MM3 (ref 1–4.8)
MCH RBC QN AUTO: 29.8 PG (ref 26–33)
MCHC RBC AUTO-ENTMCNC: 32.4 GM/DL (ref 32.2–35.5)
MCV RBC AUTO: 91.9 FL (ref 80–94)
MONOCYTES # BLD: 6.7 %
MONOCYTES ABSOLUTE: 0.3 THOU/MM3 (ref 0.4–1.3)
NUCLEATED RED BLOOD CELLS: 0 /100 WBC
PLATELET # BLD: 158 THOU/MM3 (ref 130–400)
PMV BLD AUTO: 10.9 FL (ref 9.4–12.4)
POTASSIUM SERPL-SCNC: 4.4 MEQ/L (ref 3.5–5.2)
RBC # BLD: 4.47 MILL/MM3 (ref 4.7–6.1)
SEG NEUTROPHILS: 60.3 %
SEGMENTED NEUTROPHILS ABSOLUTE COUNT: 2.8 THOU/MM3 (ref 1.8–7.7)
SODIUM BLD-SCNC: 138 MEQ/L (ref 135–145)
WBC # BLD: 4.6 THOU/MM3 (ref 4.8–10.8)

## 2021-06-02 ENCOUNTER — OFFICE VISIT (OUTPATIENT)
Dept: NEPHROLOGY | Age: 81
End: 2021-06-02
Payer: MEDICARE

## 2021-06-02 VITALS
HEART RATE: 51 BPM | TEMPERATURE: 97.5 F | RESPIRATION RATE: 18 BRPM | OXYGEN SATURATION: 97 % | DIASTOLIC BLOOD PRESSURE: 53 MMHG | SYSTOLIC BLOOD PRESSURE: 134 MMHG | HEIGHT: 69 IN | WEIGHT: 215 LBS | BODY MASS INDEX: 31.84 KG/M2

## 2021-06-02 DIAGNOSIS — N18.32 STAGE 3B CHRONIC KIDNEY DISEASE (HCC): Primary | ICD-10-CM

## 2021-06-02 PROCEDURE — G8417 CALC BMI ABV UP PARAM F/U: HCPCS | Performed by: INTERNAL MEDICINE

## 2021-06-02 PROCEDURE — G8427 DOCREV CUR MEDS BY ELIG CLIN: HCPCS | Performed by: INTERNAL MEDICINE

## 2021-06-02 PROCEDURE — 1123F ACP DISCUSS/DSCN MKR DOCD: CPT | Performed by: INTERNAL MEDICINE

## 2021-06-02 PROCEDURE — 1036F TOBACCO NON-USER: CPT | Performed by: INTERNAL MEDICINE

## 2021-06-02 PROCEDURE — 99214 OFFICE O/P EST MOD 30 MIN: CPT | Performed by: INTERNAL MEDICINE

## 2021-06-02 PROCEDURE — 4040F PNEUMOC VAC/ADMIN/RCVD: CPT | Performed by: INTERNAL MEDICINE

## 2021-06-02 NOTE — PROGRESS NOTES
[facility]    [facility]  [address], [address]  [phone]       Progress Note    6/2/2021 4:16 PM    Pt Name:    Jayant Hernandez  YOB: 1940  Primary Care Physician:  Alexa Egan DO       Chief Complaint:   Chief Complaint   Patient presents with    Anemia    Chronic Kidney Disease    Hypertension    Benign Prostatic Hypertrophy        History of Chief Complaint: CKD stage IIIB from HTN and outlet obstruction. Stable since January 2005. Subjective:  I last saw the patient in clinic 03/03/2021. I follow the patient for Chronic Kidney disease stage G-IIIB-A1. Since our last visit the patient has not been hospitalized. The patient is sleeping well at night with 1-2 times per night nocturia. The patient has a good appetite and is remaining active. The patient denied N/V/C/D/SOB/CP. He has no trouble at bladder emptying. All iron studies and stool for occult blood sere normal.     COVID-19 screening  Fever: none  Cough: none  Exposure: none  Shortness of breath: none    Protein/creatinine ratio:   eGFR: 42 Ml/min (it was 42 Ml/Min last visit)  SCr: 1.6 Mg/Dl (It was 1.6 Mg/Dl last visit)      Last six eGFR readings:  Lab Results   Component Value Date    LABGLOM 42 05/25/2021    LABGLOM 42 02/22/2021    LABGLOM 36 11/20/2020    LABGLOM 45 11/20/2019    LABGLOM 39 11/21/2018    LABGLOM 32 04/18/2018          Objective:  VITALS:  BP (!) 134/53 (Site: Left Upper Arm, Position: Sitting, Cuff Size: Large Adult)   Pulse 51   Temp 97.5 °F (36.4 °C)   Resp 18   Ht 5' 9\" (1.753 m)   Wt 215 lb (97.5 kg)   SpO2 97%   BMI 31.75 kg/m²   Weight:   Wt Readings from Last 3 Encounters:   06/02/21 215 lb (97.5 kg)   03/16/21 210 lb (95.3 kg)   03/08/21 214 lb 6.4 oz (97.3 kg)     Body mass index is 31.75 kg/m². Physical examination    General:  Alert and cooperative with exam  HEENT:  Normocephalic. No lesions nor rashes. HENRIK.  EOMI  Neck:   No JVD and no bruits. Thyroid gland is normal  Lungs:  Breathing easily. No rales nor rhonchi. No cough nor sputum production. Heart[de-identified]            RRR. No murmurs nor rubs. PMI is not enlarged nor displaced. Abdomen:  Soft and non tender. Bowel sounds are active in all four quadrants. Extremities:  no edema  Neurologic:  CN II-XII are intact. No deficits noted. Muscle strength and tone are equal throughout. Skin:                Warm and dry with no rashes. Muscles:         Hand  and leg strength are equal and strong bilaterally.      Lab Data      CBC:   Lab Results   Component Value Date    WBC 4.6 (L) 05/25/2021    HGB 13.3 (L) 05/25/2021    HCT 41.1 (L) 05/25/2021    MCV 91.9 05/25/2021     05/25/2021     BMP:    Lab Results   Component Value Date     05/25/2021     02/22/2021     11/20/2020    K 4.4 05/25/2021    K 4.3 02/22/2021    K 4.3 11/20/2020     05/25/2021     02/22/2021     11/20/2020    CO2 27 05/25/2021    CO2 28 02/22/2021    CO2 28 11/20/2020    BUN 25 (H) 05/25/2021    BUN 31 (H) 02/22/2021    BUN 28 (H) 11/20/2020    CREATININE 1.6 (H) 05/25/2021    CREATININE 1.6 (H) 02/22/2021    CREATININE 1.8 (H) 11/20/2020    GLUCOSE 110 (H) 05/25/2021    GLUCOSE 106 02/22/2021    GLUCOSE 117 (H) 11/20/2020      Hepatic:   Lab Results   Component Value Date    AST 18 05/19/2018    AST 17 04/26/2017    AST 21 05/02/2016    ALT 13 05/19/2018    ALT 13 04/26/2017    ALT 15 05/02/2016    BILITOT 0.6 05/19/2018    BILITOT 0.8 04/26/2017    BILITOT 0.7 05/02/2016    ALKPHOS 74 05/19/2018    ALKPHOS 96 04/26/2017    ALKPHOS 84 05/02/2016     BNP: No results found for: BNP  Lipids:   Lab Results   Component Value Date    CHOL 192 07/20/2020    HDL 50 07/20/2020     INR:   Lab Results   Component Value Date    INR 1.03 04/18/2018    INR 0.91 02/26/2018    INR 0.94 03/25/2015     URINE:   Lab Results   Component Value Date    PROTUR 100 03/16/2021     Lab Results   Component Value Date    NITRU Negative 03/16/2021    COLORU Yellow 03/16/2021    COLORU YELLOW 04/18/2018    PHUR 7.0 04/18/2018    LABCAST NONE SEEN 05/26/2014    WBCUA 0-2 05/02/2016    RBCUA 0-2 05/02/2016    YEAST NONE SEEN 05/02/2016    BACTERIA NONE 05/02/2016    CLARITYU Clear 12/11/2013    SPECGRAV 1.016 05/26/2014    LEUKOCYTESUR NEGATIVE 04/18/2018    UROBILINOGEN 0.20 03/16/2021    BILIRUBINUR Negative 03/16/2021    BLOODU Negative 03/16/2021    BLOODU NEGATIVE 04/18/2018    GLUCOSEU Negative 03/16/2021    KETUA Trace 03/16/2021      Microalbumen/Creatinine ratio:  No components found for: RUCREAT        Medications:    Current Outpatient Medications   Medication Sig Dispense Refill    folic acid (FOLVITE) 1 MG tablet TAKE 2 & 1/2 (TWO & ONE-HALF) TABLETS BY MOUTH ONCE DAILY 180 tablet 1    triamterene-hydroCHLOROthiazide (MAXZIDE-25) 37.5-25 MG per tablet Take 1 tablet by mouth daily 90 tablet 3    amLODIPine (NORVASC) 10 MG tablet Take 1 tablet by mouth daily 90 tablet 3    oxybutynin (DITROPAN-XL) 5 MG extended release tablet Take 1 tablet by mouth daily 90 tablet 2    pravastatin (PRAVACHOL) 40 MG tablet TAKE 1 TABLET BY MOUTH EVERY EVENING 90 tablet 3    irbesartan (AVAPRO) 300 MG tablet TAKE 1 TABLET BY MOUTH DAILY 90 tablet 3    primidone (MYSOLINE) 50 MG tablet TAKE 1 TABLET BY MOUTH TWICE DAILY 180 tablet 3    citalopram (CELEXA) 20 MG tablet Take 1 tablet by mouth daily 90 tablet 3    simethicone (MYLICON) 80 MG chewable tablet Take 1 tablet by mouth every 6 hours as needed for Flatulence 60 tablet 3    LORazepam (ATIVAN) 0.5 MG tablet Take 0.5 mg by mouth every 8 hours as needed for Anxiety. Darly[employer] Skill traMADol (ULTRAM) 50 MG tablet Take 50 mg by mouth every 8 hours as needed for Pain. .      fluticasone (FLONASE) 50 MCG/ACT nasal spray 1 spray by Nasal route daily      dicyclomine (BENTYL) 10 MG capsule Take 10 mg by mouth 4 times daily (before meals and nightly)      pantoprazole (PROTONIX) 40 MG

## 2021-06-02 NOTE — PATIENT INSTRUCTIONS
KNOW YOUR KIDNEY NUMBERS    Your kidney speed (eGFR) was 42 ml/min this visit (normal is  ml/min)(Ml/min=milliliters of blood filtered per minute). The higher this number is, the better your kidney function is. Your serum creatinine was 1.6 (normal 0.8-1.2 mg/dl at most labs). The higher this number is, the worse your kidney function is. You are in stage G-IIIB-A1 of chronic kidney disease. Your kidney function has remained stable as compared to your last visit. Your last eGFR was  42 Ml/Min. Stages of kidney disease    EGFR (estimated glomerular filtration rate)    G-I > 90 ml/min Kidney damage with normal kidney function (blood or protein in the urine)  G-II 60-89 ml/min Normal kidney function with mild damage with or without blood or protein in the urine  G-IIIA 45-59 ml/min Mild to moderate loss of kidney function. G-IIIB 30-44 ml/min Moderate to severe loss of kidney function  G-IV 15-29 ml/min Severe loss of kidney function  G-V < 15 mlmin     May need dialysis or kidney transplant    ACR (urine albumin/creatinine ratio) (Mg/Gm)    A-1      ACR<30 Normal to mildly increased protein in the urine. A-2 ACR  Moderate increase in urine protein loss. A-3 ACR >300 Severe increase in urine protein loss    Our goal is to keep your eGFR going as fast as possible ( ml/min is normal). If your eGFR declines to 15-24 ml/min and stays there without recovery,  we will begin to educate you about dialysis or kidney transplant. We also want to keep the protein in your urine as low as possible. The leading cause of kidney disease in the world is diabetes mellitus. Keep your sugar  as much as possible. The second leading cause is hypertension. Keep Your blood pressure 120-140/70-80 as much as possible. If you need refills, call the office or your drug store. You may call the office any time with any questions or concerns.     DUE TO THE CORONAVIRUS CONCERN, PLEASE LIMIT YOUR TIME IN PUBLIC. [address] YOUR HANDS COMPLETELY AND FREQUENTLY. Dewayne Fleeting  Mary Wallace

## 2021-06-11 ENCOUNTER — OFFICE VISIT (OUTPATIENT)
Dept: CARDIOLOGY CLINIC | Age: 81
End: 2021-06-11
Payer: MEDICARE

## 2021-06-11 VITALS
HEIGHT: 69 IN | DIASTOLIC BLOOD PRESSURE: 44 MMHG | BODY MASS INDEX: 31.4 KG/M2 | HEART RATE: 48 BPM | SYSTOLIC BLOOD PRESSURE: 121 MMHG | WEIGHT: 212 LBS

## 2021-06-11 DIAGNOSIS — I10 ESSENTIAL HYPERTENSION: Primary | ICD-10-CM

## 2021-06-11 DIAGNOSIS — I25.10 CORONARY ARTERY DISEASE INVOLVING NATIVE CORONARY ARTERY OF NATIVE HEART WITHOUT ANGINA PECTORIS: ICD-10-CM

## 2021-06-11 DIAGNOSIS — R06.02 SHORTNESS OF BREATH: ICD-10-CM

## 2021-06-11 DIAGNOSIS — R06.02 SOB (SHORTNESS OF BREATH) ON EXERTION: ICD-10-CM

## 2021-06-11 PROCEDURE — G8417 CALC BMI ABV UP PARAM F/U: HCPCS | Performed by: NUCLEAR MEDICINE

## 2021-06-11 PROCEDURE — G8427 DOCREV CUR MEDS BY ELIG CLIN: HCPCS | Performed by: NUCLEAR MEDICINE

## 2021-06-11 PROCEDURE — 1123F ACP DISCUSS/DSCN MKR DOCD: CPT | Performed by: NUCLEAR MEDICINE

## 2021-06-11 PROCEDURE — 93000 ELECTROCARDIOGRAM COMPLETE: CPT | Performed by: NUCLEAR MEDICINE

## 2021-06-11 PROCEDURE — 99213 OFFICE O/P EST LOW 20 MIN: CPT | Performed by: NUCLEAR MEDICINE

## 2021-06-11 PROCEDURE — 1036F TOBACCO NON-USER: CPT | Performed by: NUCLEAR MEDICINE

## 2021-06-11 PROCEDURE — 4040F PNEUMOC VAC/ADMIN/RCVD: CPT | Performed by: NUCLEAR MEDICINE

## 2021-06-11 RX ORDER — IRBESARTAN 300 MG/1
300 TABLET ORAL DAILY
Qty: 90 TABLET | Refills: 3 | Status: SHIPPED | OUTPATIENT
Start: 2021-06-11 | End: 2022-06-03

## 2021-06-11 NOTE — PROGRESS NOTES
[address]  [address]  [facility] [address]  Dept: [phone]  Dept Fax: [phone]  Loc: [phone]    Visit Date: 6/11/2021    Iveth Crooks is a 78 y.o. male who presents todayfor:  Chief Complaint   Patient presents with    1 Year Follow Up    Coronary Artery Disease    Hypertension    Cardiac Valve Problem     Know mild CAd  Known AI   Some dyspnea  Dyspnea on exertion at times  No chest pain   No use of nitro   Bp is better at home  No dizziness  No syncope      HPI:  HPI  Past Medical History:   Diagnosis Date    Anemia     Arthritis     Bronchiolitis 11/2016    Cancer ([facility] [address]     SKIN CANCER    CKD (chronic kidney disease), stage III ([facility] [address]     Diverticula of colon 5/2014    tx with antibiotics per pt; NO surgery    Diverticulosis     [facility]     s/p    Esophagitis 12/10/2014    Hiatal hernia     HTN (hypertension)     Hyperlipidemia     Kidney stones     Nephrolithiasis     Obesity     Prostate enlargement     benign    Renal insufficiency       Past Surgical History:   Procedure Laterality Date    BLEPHAROPLASTY Bilateral 03/2010    CARDIAC CATHETERIZATION  2000,2010    CATARACT REMOVAL WITH IMPLANT Bilateral 2006,2007    COLONOSCOPY  05/26/2017    HERNIA REPAIR Bilateral 1996    NASAL SINUS SURGERY      SHOULDER SURGERY      SKIN BIOPSY      SKIN CANCER EXCISION  Feb 2015    top of head    TONSILLECTOMY      TURP  2101,2505    UPPER GASTROINTESTINAL ENDOSCOPY       Family History   Problem Relation Age of Onset    Diabetes Mother     High Blood Pressure Father     Heart Disease Father      Social History     Tobacco Use    Smoking status: Never Smoker    Smokeless tobacco: Never Used    Tobacco comment: never smoked   Substance Use Topics    Alcohol use: Never      Current Outpatient Medications   Medication Sig Dispense Refill    folic acid (FOLVITE) 1 MG Never done    Pneumococcal 65+ years Vaccine (2 of 2 - PPSV23) 01/20/2017    Lipid screen  07/20/2021    Annual Wellness Visit (AWV)  09/09/2021    Potassium monitoring  05/25/2022    Creatinine monitoring  05/25/2022    Flu vaccine  Completed    COVID-19 Vaccine  Completed    Hepatitis A vaccine  Aged Out    Hepatitis B vaccine  Aged Out    Hib vaccine  Aged Out    Meningococcal (ACWY) vaccine  Aged Out       Subjective:  Review of Systems  General:   No fever, no chills, some fatigue or weight loss  Pulmonary:    some dyspnea, no wheezing  Cardiac:    Denies recent chest pain,   GI:     No nausea or vomiting, no abdominal pain  Neuro:    No dizziness or light headedness,   Musculoskeletal:  No recent active issues  Extremities:   No edema, no obvious claudication       Objective:  Physical Exam  BP (!) 167/59   Pulse 53   Ht 5' 9\" (1.753 m)   Wt 212 lb (96.2 kg)   BMI 31.31 kg/m²   General:   Well developed, well nourished  Lungs:   Clear to auscultation  Heart:    Normal S1 S2, Slight murmur. no rubs, no gallops  Abdomen:   Soft, non tender, no organomegalies, positive bowel sounds  Extremities:   No edema, no cyanosis, good peripheral pulses  Neurological:   Awake, alert, oriented. No obvious focal deficits  Musculoskelatal:  No obvious deformities    Assessment:      Diagnosis Orders   1. Essential hypertension  EKG 12 lead   2. SOB (shortness of breath) on exertion  EKG 12 lead   3. Shortness of breath     4. Coronary artery disease involving native coronary artery of native heart without angina pectoris     some more dyspnea  Known AS   Mild CAd  ECG in office was done today. I reviewed the ECG. No acute findings'    Plan:  No follow-ups on file. As above  Echo   Consider a stress test   Continue risk factor modification and medical management  Thank you for allowing me to participate in the care of your patient.  Please don't hesitate to contact me regarding any further issues related to the patient care    Orders Placed:  Orders Placed This Encounter   Procedures    EKG 12 lead     Order Specific Question:   Reason for Exam?     Answer: Other       Medications Prescribed:  No orders of the defined types were placed in this encounter. Discussed use, benefit, and side effects of prescribed medications. All patient questions answered. Pt voicedunderstanding. Instructed to continue current medications, diet and exercise. Continue risk factor modification and medical management. Patient agreed with treatment plan. Follow up as directed.     Electronically signedby Carlyn Rolon MD on 6/11/2021 at 10:15 AM

## 2021-06-18 ENCOUNTER — HOSPITAL ENCOUNTER (OUTPATIENT)
Dept: NON INVASIVE DIAGNOSTICS | Age: 81
Discharge: HOME | End: 2021-06-18
Payer: MEDICARE

## 2021-06-18 DIAGNOSIS — R06.02 SOB (SHORTNESS OF BREATH) ON EXERTION: ICD-10-CM

## 2021-06-18 DIAGNOSIS — I25.10 CORONARY ARTERY DISEASE INVOLVING NATIVE CORONARY ARTERY OF NATIVE HEART WITHOUT ANGINA PECTORIS: ICD-10-CM

## 2021-06-18 DIAGNOSIS — R06.02 SHORTNESS OF BREATH: ICD-10-CM

## 2021-06-18 DIAGNOSIS — I10 ESSENTIAL HYPERTENSION: ICD-10-CM

## 2021-06-18 LAB
LV EF: 50 %
LVEF MODALITY: NORMAL

## 2021-06-18 PROCEDURE — 93306 TTE W/DOPPLER COMPLETE: CPT

## 2021-06-25 DIAGNOSIS — F41.9 ANXIETY: ICD-10-CM

## 2021-06-25 DIAGNOSIS — G25.0 BENIGN ESSENTIAL TREMOR: ICD-10-CM

## 2021-06-25 DIAGNOSIS — R06.02 SHORTNESS OF BREATH: ICD-10-CM

## 2021-06-25 RX ORDER — CITALOPRAM 20 MG/1
20 TABLET ORAL DAILY
Qty: 90 TABLET | Refills: 3 | Status: SHIPPED | OUTPATIENT
Start: 2021-06-25 | End: 2022-06-20

## 2021-06-25 RX ORDER — PRIMIDONE 50 MG/1
TABLET ORAL
Qty: 180 TABLET | Refills: 3 | Status: SHIPPED | OUTPATIENT
Start: 2021-06-25 | End: 2022-06-20

## 2021-07-28 RX ORDER — OXYBUTYNIN CHLORIDE 5 MG/1
5 TABLET, EXTENDED RELEASE ORAL DAILY
Qty: 90 TABLET | Refills: 2 | Status: SHIPPED | OUTPATIENT
Start: 2021-07-28 | End: 2022-04-25

## 2021-07-28 NOTE — TELEPHONE ENCOUNTER
Santosh Maddox called requesting a refill on the following medications:  Requested Prescriptions     Pending Prescriptions Disp Refills    oxybutynin (DITROPAN-XL) 5 MG extended release tablet [Pharmacy Med Name: OXYBUTYNIN ER 5MG TABLETS] 90 tablet 2     Sig: [id number] verified:  .[email or web address]      Date of last visit: 03/16/2021  Date of next visit (if applicable): 68/86/9111

## 2021-08-23 ENCOUNTER — HOSPITAL ENCOUNTER (OUTPATIENT)
Dept: ULTRASOUND IMAGING | Age: 81
Discharge: HOME | End: 2021-08-23
Payer: MEDICARE

## 2021-08-23 DIAGNOSIS — Q44.6 CYSTIC DISEASE OF LIVER: ICD-10-CM

## 2021-08-23 PROCEDURE — 93975 VASCULAR STUDY: CPT

## 2021-08-23 PROCEDURE — 76705 ECHO EXAM OF ABDOMEN: CPT

## 2021-09-13 ENCOUNTER — OFFICE VISIT (OUTPATIENT)
Dept: FAMILY MEDICINE CLINIC | Age: 81
End: 2021-09-13
Payer: MEDICARE

## 2021-09-13 VITALS
WEIGHT: 216 LBS | HEART RATE: 71 BPM | TEMPERATURE: 97.2 F | OXYGEN SATURATION: 97 % | BODY MASS INDEX: 31.99 KG/M2 | SYSTOLIC BLOOD PRESSURE: 110 MMHG | DIASTOLIC BLOOD PRESSURE: 62 MMHG | HEIGHT: 69 IN | RESPIRATION RATE: 18 BRPM

## 2021-09-13 DIAGNOSIS — Z00.00 ROUTINE GENERAL MEDICAL EXAMINATION AT A HEALTH CARE FACILITY: Primary | ICD-10-CM

## 2021-09-13 DIAGNOSIS — K76.0 FATTY LIVER: ICD-10-CM

## 2021-09-13 PROCEDURE — G0439 PPPS, SUBSEQ VISIT: HCPCS | Performed by: FAMILY MEDICINE

## 2021-09-13 PROCEDURE — 1123F ACP DISCUSS/DSCN MKR DOCD: CPT | Performed by: FAMILY MEDICINE

## 2021-09-13 PROCEDURE — 4040F PNEUMOC VAC/ADMIN/RCVD: CPT | Performed by: FAMILY MEDICINE

## 2021-09-13 SDOH — ECONOMIC STABILITY: FOOD INSECURITY: WITHIN THE PAST 12 MONTHS, YOU WORRIED THAT YOUR FOOD WOULD RUN OUT BEFORE YOU GOT MONEY TO BUY MORE.: NEVER TRUE

## 2021-09-13 SDOH — ECONOMIC STABILITY: FOOD INSECURITY: WITHIN THE PAST 12 MONTHS, THE FOOD YOU BOUGHT JUST DIDN'T LAST AND YOU DIDN'T HAVE MONEY TO GET MORE.: NEVER TRUE

## 2021-09-13 ASSESSMENT — SOCIAL DETERMINANTS OF HEALTH (SDOH): HOW HARD IS IT FOR YOU TO PAY FOR THE VERY BASICS LIKE FOOD, HOUSING, MEDICAL CARE, AND HEATING?: NOT HARD AT ALL

## 2021-09-13 ASSESSMENT — LIFESTYLE VARIABLES
HOW OFTEN DO YOU HAVE A DRINK CONTAINING ALCOHOL: NEVER
AUDIT TOTAL SCORE: INCOMPLETE
HOW OFTEN DO YOU HAVE A DRINK CONTAINING ALCOHOL: 0
AUDIT-C TOTAL SCORE: INCOMPLETE

## 2021-09-13 ASSESSMENT — PATIENT HEALTH QUESTIONNAIRE - PHQ9
2. FEELING DOWN, DEPRESSED OR HOPELESS: 0
SUM OF ALL RESPONSES TO PHQ QUESTIONS 1-9: 1
SUM OF ALL RESPONSES TO PHQ9 QUESTIONS 1 & 2: 1
SUM OF ALL RESPONSES TO PHQ QUESTIONS 1-9: 1
SUM OF ALL RESPONSES TO PHQ QUESTIONS 1-9: 1
1. LITTLE INTEREST OR PLEASURE IN DOING THINGS: 1

## 2021-09-13 NOTE — PROGRESS NOTES
Medicare Annual Wellness Visit  Name: Jorje Gonzalez Date: 2021   MRN: [id number] Sex: Male   Age: 78 y.o. Ethnicity: Non- / Non    : 1940 Race: White (non-)      Sasha Sanabria is here for Medicare AWV (? liver enzymes)    Screenings for behavioral, psychosocial and functional/safety risks, and cognitive dysfunction are all negative except as indicated below. These results, as well as other patient data from the [address] form, are documented in Flowsheets linked to this Encounter. Allergies   Allergen Reactions    Ace Inhibitors      Intolerant to      Carafate [Sucralfate] Other (See Comments)     Tongue swelling    Flomax [Tamsulosin Hcl]     Iv Dye [Iodides]     Lipitor [Atorvastatin] Other (See Comments)     Myalgias    Lopressor [Metoprolol]     Motrin [Ibuprofen]      Was told not to take due to kidneys         Prior to Visit Medications    Medication Sig Taking?  Authorizing Provider   oxybutynin (DITROPAN-XL) 5 MG extended release tablet TAKE 1 TABLET BY MOUTH DAILY  ALVINO Wen CNP   citalopram (CELEXA) 20 MG tablet TAKE 1 TABLET BY MOUTH DAILY  Dylon Shaikh, DO   primidone (MYSOLINE) 50 MG tablet TAKE 1 TABLET BY MOUTH TWICE DAILY  Dylon Shaikh, DO   irbesartan (AVAPRO) 300 MG tablet Take 1 tablet by mouth daily  Best Duran MD   folic acid (FOLVITE) 1 MG tablet TAKE 2 & 1/2 (TWO & ONE-HALF) TABLETS BY MOUTH ONCE DAILY  Esau Casper,    triamterene-hydroCHLOROthiazide (GIGHOWV-62) 37.5-25 MG per tablet Take 1 tablet by mouth daily  Dylon Shaikh, DO   amLODIPine (NORVASC) 10 MG tablet Take 1 tablet by mouth daily  Dylon Shaikh, DO   pravastatin (PRAVACHOL) 40 MG tablet TAKE 1 TABLET BY MOUTH EVERY EVENING  ALVINO Garcia CNP   simethicone (MYLICON) 80 MG chewable tablet Take 1 tablet by mouth every 6 hours as needed for Flatulence  Dylon Shaikh, DO   LORazepam (ATIVAN) 0.5 MG tablet Take 0.5 mg by mouth every 8 hours as needed for Anxiety. Rowan Maza Historical Provider, MD   traMADol (ULTRAM) 50 MG tablet Take 50 mg by mouth every 8 hours as needed for Pain. Rowan Maza Historical Provider, MD   fluticasone (FLONASE) 50 MCG/ACT nasal spray 1 spray by Nasal route daily  Historical Provider, MD   dicyclomine (BENTYL) 10 MG capsule Take 10 mg by mouth 4 times daily (before meals and nightly)  Historical Provider, MD   pantoprazole (PROTONIX) 40 MG tablet Take 40 mg by mouth daily. Historical Provider, MD   hydrocortisone (WESTCORT) 0.2 % cream Apply  topically daily as needed.  Apply to facial rash  Historical Provider, MD   aspirin 325 MG tablet Take 325 mg by mouth nightly   Historical Provider, MD         Past Medical History:   Diagnosis Date    Anemia     Arthritis     Bronchiolitis 11/2016    Cancer ([facility] [address]     SKIN CANCER    CKD (chronic kidney disease), stage III ([facility] [address]     Diverticula of colon 5/2014    tx with antibiotics per pt; NO surgery    Diverticulosis     DVT (deep venous thrombosis) (HCC)     s/p    Esophagitis 12/10/2014    Hiatal hernia     HTN (hypertension)     Hyperlipidemia     Kidney stones     Nephrolithiasis     Obesity     Prostate enlargement     benign    Renal insufficiency        Past Surgical History:   Procedure Laterality Date    BLEPHAROPLASTY Bilateral 03/2010    CARDIAC CATHETERIZATION  2000,2010    CATARACT REMOVAL WITH IMPLANT Bilateral 2006,2007    COLONOSCOPY  05/26/2017    HERNIA REPAIR Bilateral 1996    NASAL SINUS SURGERY      SHOULDER SURGERY      SKIN BIOPSY      SKIN CANCER EXCISION  Feb 2015    top of head    TONSILLECTOMY      TURP  0328,0096    UPPER GASTROINTESTINAL ENDOSCOPY           Family History   Problem Relation Age of Onset    Diabetes Mother     High Blood Pressure Father     Heart Disease Father        CareTeam (Including outside providers/suppliers regularly involved in providing care):   Patient Care Team:  Neo Kendrick DO as PCP - General Janey Tinoco DO as PCP - [facility] Empaneled Provider    Wt Readings from Last 3 Encounters:   09/13/21 216 lb (98 kg)   06/11/21 212 lb (96.2 kg)   06/02/21 215 lb (97.5 kg)     Vitals:    09/13/21 1418   BP: 110/62   Pulse: 71   Resp: 18   Temp: 97.2 °F (36.2 °C)   TempSrc: Infrared   SpO2: 97%   Weight: 216 lb (98 kg)   Height: 5' 9\" (1.753 m)     Body mass index is 31.9 kg/m². Based upon direct observation of the patient, evaluation of cognition reveals recent and remote memory intact. General Appearance: alert and oriented to person, place and time, well-developed and well-nourished, in no acute distress  Pulmonary/Chest: clear to auscultation bilaterally- no wheezes, rales or rhonchi, normal air movement, no respiratory distress  Cardiovascular: normal rate, normal S1 and S2, no gallops, intact distal pulses and no carotid bruits  Extremities: no cyanosis and no clubbing    Patient's complete Health Risk Assessment and screening values have been reviewed and are found in Flowsheets. The following problems were reviewed today and where indicated follow up appointments were made and/or referrals ordered. Positive Risk Factor Screenings with Interventions:     Fall Risk:  2 or more falls in past year?: (!) yes  Fall with injury in past year?: no  Fall Risk Interventions:    · Home safety tips provided  · Reports that his balance is doing much better overall          General Health and ACP:  General  In general, how would you say your health is?: Very Good  In the past 7 days, have you experienced any of the following?  New or Increased Pain, New or Increased Fatigue, Loneliness, Social Isolation, Stress or Anger?: (!) New or Increased Pain  Do you get the social and emotional support that you need?: Yes  Do you have a Living Will?: Yes  Advance Directives     Power of  Living Will ACP-Advance Directive ACP-Power of     Not on File Not on File Not on File Not on File      General Health Risk Interventions:  · Pain issues: recent tooth pain, has been following with dentist    Health Habits/Nutrition:  Health Habits/Nutrition  Do you exercise for at least 20 minutes 2-3 times per week?: (!) No  Have you lost any weight without trying in the past 3 months?: No  Do you eat only one meal per day?: No  Have you seen the dentist within the past year?: Yes  Body mass index: (!) 31.89  Health Habits/Nutrition Interventions:  · Encouraged on activity as tolerated     Safety:  Safety  Do you have working smoke detectors?: Yes  Have all throw rugs been removed or fastened?: (!) No  Do you have non-slip mats or surfaces in all bathtubs/showers?: Yes  Do all of your stairways have a railing or banister?: Yes  Are your doorways, halls and stairs free of clutter?: Yes  Do you always fasten your seatbelt when you are in a car?: Yes  Safety Interventions:  · Home safety tips provided     Personalized Preventive Plan   Current Health Maintenance Status  Immunization History   Administered Date(s) Administered    COVID-19, Pfizer, PF, 30mcg/0.3mL 03/04/2021, 03/25/2021    Influenza Virus Vaccine 09/01/2014, 01/20/2016    Influenza Whole 09/01/2014    Influenza, High Dose (Fluzone 65 yrs and older) 12/06/2018    Influenza, Quadv, IM, PF (6 mo and older Fluzone, Flulaval, Fluarix, and 3 yrs and older Afluria) 01/23/2017, 01/23/2018    Influenza, Quadv, adjuvanted, 65 yrs +, IM, PF (Fluad) 11/20/2020    Influenza, Triv, inactivated, subunit, adjuvanted, IM (Fluad 65 yrs and older) 12/05/2019    Pneumococcal Conjugate 13-valent ([place]) 01/20/2016        Health Maintenance   Topic Date Due    DTaP/Tdap/Td vaccine (1 - Tdap) Never done    Shingles Vaccine (1 of 2) Never done    Pneumococcal 65+ years Vaccine (2 of 2 - PPSV23) 01/20/2017    Annual Wellness Visit (AWV)  Never done    Lipid screen  07/20/2021    Flu vaccine (1) 09/01/2021    Potassium monitoring  05/25/2022    Creatinine monitoring 05/25/2022    COVID-19 Vaccine  Completed    Hepatitis A vaccine  Aged Out    Hepatitis B vaccine  Aged Out    Hib vaccine  Aged Out    Meningococcal (ACWY) vaccine  Aged Out     Recommendations for [employer] Due: see orders and patient instructions/AVS.  . Recommended screening schedule for the next 5-10 years is provided to the patient in written form: see Patient Instructions/AVS.    Federica Hoover was seen today for medicare aw. Diagnoses and all orders for this visit:    Routine general medical examination at a health care facility    Fatty liver  -     Hepatic Function Panel;  Future

## 2021-09-13 NOTE — PATIENT INSTRUCTIONS
Personalized Preventive Plan for Sasha Sanabria - 9/13/2021  Medicare offers a range of preventive health benefits. Some of the tests and screenings are paid in full while other may be subject to a deductible, co-insurance, and/or copay. Some of these benefits include a comprehensive review of your medical history including lifestyle, illnesses that may run in your family, and various assessments and screenings as appropriate. After reviewing your medical record and screening and assessments performed today your provider may have ordered immunizations, labs, imaging, and/or referrals for you. A list of these orders (if applicable) as well as your Preventive Care list are included within your After Visit Summary for your review. Other Preventive Recommendations:    · A preventive eye exam performed by an eye specialist is recommended every 1-2 years to screen for glaucoma; cataracts, macular degeneration, and other eye disorders. · A preventive dental visit is recommended every 6 months. · Try to get at least 150 minutes of exercise per week or 10,000 steps per day on a pedometer . · Order or download the FREE \"Exercise & Physical Activity: Your Everyday Guide\" from [employer]. Call 5-661.741.3685 or search [employer] online. · You need 2335-1246 mg of calcium and 5765-5730 IU of vitamin D per day. It is possible to meet your calcium requirement with diet alone, but a vitamin D supplement is usually necessary to meet this goal.  · When exposed to the sun, use a sunscreen that protects against both UVA and UVB radiation with an SPF of 30 or greater. Reapply every 2 to 3 hours or after sweating, drying off with a towel, or swimming. · Always wear a seat belt when traveling in a car. Always wear a helmet when riding a bicycle or motorcycle.

## 2021-10-07 ENCOUNTER — NURSE ONLY (OUTPATIENT)
Dept: LAB | Age: 81
End: 2021-10-07

## 2021-10-07 DIAGNOSIS — K76.0 FATTY LIVER: ICD-10-CM

## 2021-10-07 LAB
ALBUMIN SERPL-MCNC: 4.6 G/DL (ref 3.5–5.1)
ALP BLD-CCNC: 100 U/L (ref 38–126)
ALT SERPL-CCNC: 16 U/L (ref 11–66)
ANION GAP SERPL CALCULATED.3IONS-SCNC: 11 MEQ/L (ref 8–16)
AST SERPL-CCNC: 23 U/L (ref 5–40)
BASOPHILS # BLD: 0.8 %
BASOPHILS ABSOLUTE: 0 THOU/MM3 (ref 0–0.1)
BILIRUB SERPL-MCNC: 0.4 MG/DL (ref 0.3–1.2)
BILIRUBIN DIRECT: < 0.2 MG/DL (ref 0–0.3)
BUN BLDV-MCNC: 31 MG/DL (ref 7–22)
CALCIUM SERPL-MCNC: 9.3 MG/DL (ref 8.5–10.5)
CHLORIDE BLD-SCNC: 102 MEQ/L (ref 98–111)
CO2: 26 MEQ/L (ref 23–33)
CREAT SERPL-MCNC: 1.7 MG/DL (ref 0.4–1.2)
EOSINOPHIL # BLD: 5.5 %
EOSINOPHILS ABSOLUTE: 0.3 THOU/MM3 (ref 0–0.4)
ERYTHROCYTE [DISTWIDTH] IN BLOOD BY AUTOMATED COUNT: 13.9 % (ref 11.5–14.5)
ERYTHROCYTE [DISTWIDTH] IN BLOOD BY AUTOMATED COUNT: 48.1 FL (ref 35–45)
GFR SERPL CREATININE-BSD FRML MDRD: 39 ML/MIN/1.73M2
GLUCOSE BLD-MCNC: 120 MG/DL (ref 70–108)
HCT VFR BLD CALC: 43.7 % (ref 42–52)
HEMOGLOBIN: 13.7 GM/DL (ref 14–18)
IMMATURE GRANS (ABS): 0.02 THOU/MM3 (ref 0–0.07)
IMMATURE GRANULOCYTES: 0.4 %
LYMPHOCYTES # BLD: 22 %
LYMPHOCYTES ABSOLUTE: 1.1 THOU/MM3 (ref 1–4.8)
MCH RBC QN AUTO: 29.7 PG (ref 26–33)
MCHC RBC AUTO-ENTMCNC: 31.4 GM/DL (ref 32.2–35.5)
MCV RBC AUTO: 94.6 FL (ref 80–94)
MONOCYTES # BLD: 8 %
MONOCYTES ABSOLUTE: 0.4 THOU/MM3 (ref 0.4–1.3)
NUCLEATED RED BLOOD CELLS: 0 /100 WBC
PLATELET # BLD: 159 THOU/MM3 (ref 130–400)
PMV BLD AUTO: 11.1 FL (ref 9.4–12.4)
POTASSIUM SERPL-SCNC: 4.5 MEQ/L (ref 3.5–5.2)
RBC # BLD: 4.62 MILL/MM3 (ref 4.7–6.1)
SEG NEUTROPHILS: 63.3 %
SEGMENTED NEUTROPHILS ABSOLUTE COUNT: 3.1 THOU/MM3 (ref 1.8–7.7)
SODIUM BLD-SCNC: 139 MEQ/L (ref 135–145)
TOTAL PROTEIN: 6.9 G/DL (ref 6.1–8)
TSH SERPL DL<=0.05 MIU/L-ACNC: 3.21 UIU/ML (ref 0.4–4.2)
WBC # BLD: 4.9 THOU/MM3 (ref 4.8–10.8)

## 2021-10-08 ENCOUNTER — OUTSIDE SERVICES (OUTPATIENT)
Dept: FAMILY MEDICINE CLINIC | Age: 81
End: 2021-10-08

## 2021-10-11 ENCOUNTER — NURSE ONLY (OUTPATIENT)
Dept: LAB | Age: 81
End: 2021-10-11

## 2021-10-14 LAB
FECAL FAT, QUALITATIVE: NORMAL
PANCREATIC ELASTASE, FECAL: > 800 UG/G

## 2021-10-15 LAB — CALPROTECTIN: 8 UG/G

## 2021-11-04 RX ORDER — FOLIC ACID 1 MG/1
TABLET ORAL
Qty: 180 TABLET | Refills: 2 | Status: SHIPPED | OUTPATIENT
Start: 2021-11-04 | End: 2022-09-26

## 2021-11-23 ENCOUNTER — NURSE ONLY (OUTPATIENT)
Dept: LAB | Age: 81
End: 2021-11-23

## 2021-11-23 DIAGNOSIS — N40.1 BPH WITH OBSTRUCTION/LOWER URINARY TRACT SYMPTOMS: ICD-10-CM

## 2021-11-23 DIAGNOSIS — N13.8 BPH WITH OBSTRUCTION/LOWER URINARY TRACT SYMPTOMS: ICD-10-CM

## 2021-11-23 DIAGNOSIS — N18.32 STAGE 3B CHRONIC KIDNEY DISEASE (HCC): ICD-10-CM

## 2021-11-23 LAB
ANION GAP SERPL CALCULATED.3IONS-SCNC: 10 MEQ/L (ref 8–16)
BASOPHILS # BLD: 0.8 %
BASOPHILS ABSOLUTE: 0 THOU/MM3 (ref 0–0.1)
BUN BLDV-MCNC: 32 MG/DL (ref 7–22)
CALCIUM SERPL-MCNC: 9.7 MG/DL (ref 8.5–10.5)
CHLORIDE BLD-SCNC: 102 MEQ/L (ref 98–111)
CO2: 27 MEQ/L (ref 23–33)
CREAT SERPL-MCNC: 1.8 MG/DL (ref 0.4–1.2)
EOSINOPHIL # BLD: 6.5 %
EOSINOPHILS ABSOLUTE: 0.3 THOU/MM3 (ref 0–0.4)
ERYTHROCYTE [DISTWIDTH] IN BLOOD BY AUTOMATED COUNT: 13.6 % (ref 11.5–14.5)
ERYTHROCYTE [DISTWIDTH] IN BLOOD BY AUTOMATED COUNT: 47 FL (ref 35–45)
GFR SERPL CREATININE-BSD FRML MDRD: 36 ML/MIN/1.73M2
GLUCOSE BLD-MCNC: 103 MG/DL (ref 70–108)
HCT VFR BLD CALC: 41.2 % (ref 42–52)
HEMOGLOBIN: 13.3 GM/DL (ref 14–18)
IMMATURE GRANS (ABS): 0.02 THOU/MM3 (ref 0–0.07)
IMMATURE GRANULOCYTES: 0.4 %
LYMPHOCYTES # BLD: 24.6 %
LYMPHOCYTES ABSOLUTE: 1.2 THOU/MM3 (ref 1–4.8)
MCH RBC QN AUTO: 30.2 PG (ref 26–33)
MCHC RBC AUTO-ENTMCNC: 32.3 GM/DL (ref 32.2–35.5)
MCV RBC AUTO: 93.4 FL (ref 80–94)
MONOCYTES # BLD: 9.5 %
MONOCYTES ABSOLUTE: 0.5 THOU/MM3 (ref 0.4–1.3)
NUCLEATED RED BLOOD CELLS: 0 /100 WBC
PLATELET # BLD: 161 THOU/MM3 (ref 130–400)
PMV BLD AUTO: 10.7 FL (ref 9.4–12.4)
POTASSIUM SERPL-SCNC: 4.7 MEQ/L (ref 3.5–5.2)
PROSTATE SPECIFIC ANTIGEN: 3.81 NG/ML (ref 0–1)
RBC # BLD: 4.41 MILL/MM3 (ref 4.7–6.1)
SEG NEUTROPHILS: 58.2 %
SEGMENTED NEUTROPHILS ABSOLUTE COUNT: 2.8 THOU/MM3 (ref 1.8–7.7)
SODIUM BLD-SCNC: 139 MEQ/L (ref 135–145)
WBC # BLD: 4.8 THOU/MM3 (ref 4.8–10.8)

## 2021-12-01 ENCOUNTER — OFFICE VISIT (OUTPATIENT)
Dept: NEPHROLOGY | Age: 81
End: 2021-12-01
Payer: MEDICARE

## 2021-12-01 ENCOUNTER — TELEPHONE (OUTPATIENT)
Dept: NEPHROLOGY | Age: 81
End: 2021-12-01

## 2021-12-01 VITALS
OXYGEN SATURATION: 97 % | BODY MASS INDEX: 30.96 KG/M2 | DIASTOLIC BLOOD PRESSURE: 71 MMHG | RESPIRATION RATE: 18 BRPM | HEART RATE: 58 BPM | SYSTOLIC BLOOD PRESSURE: 161 MMHG | HEIGHT: 69 IN | WEIGHT: 209 LBS | TEMPERATURE: 97.9 F

## 2021-12-01 DIAGNOSIS — D50.0 IRON DEFICIENCY ANEMIA DUE TO CHRONIC BLOOD LOSS: Primary | ICD-10-CM

## 2021-12-01 DIAGNOSIS — N18.31 STAGE 3A CHRONIC KIDNEY DISEASE (HCC): ICD-10-CM

## 2021-12-01 PROCEDURE — G8427 DOCREV CUR MEDS BY ELIG CLIN: HCPCS | Performed by: INTERNAL MEDICINE

## 2021-12-01 PROCEDURE — G8417 CALC BMI ABV UP PARAM F/U: HCPCS | Performed by: INTERNAL MEDICINE

## 2021-12-01 PROCEDURE — 4040F PNEUMOC VAC/ADMIN/RCVD: CPT | Performed by: INTERNAL MEDICINE

## 2021-12-01 PROCEDURE — 1123F ACP DISCUSS/DSCN MKR DOCD: CPT | Performed by: INTERNAL MEDICINE

## 2021-12-01 PROCEDURE — 99214 OFFICE O/P EST MOD 30 MIN: CPT | Performed by: INTERNAL MEDICINE

## 2021-12-01 PROCEDURE — 1036F TOBACCO NON-USER: CPT | Performed by: INTERNAL MEDICINE

## 2021-12-01 PROCEDURE — G8484 FLU IMMUNIZE NO ADMIN: HCPCS | Performed by: INTERNAL MEDICINE

## 2021-12-01 NOTE — PATIENT INSTRUCTIONS
KNOW YOUR KIDNEY NUMBERS    Your kidney speed (eGFR) was 36 ml/min this visit (normal is  ml/min)(Ml/min=milliliters of blood filtered per minute). The higher this number is, the better your kidney function is. Your serum creatinine was 1.8 (normal 0.8-1.2 mg/dl at most labs). The higher this number is, the worse your kidney function is. You are in stage G-IIIB-A1 of chronic kidney disease. Your kidney function has declined as compared to your last visit. Your last eGFR was  42 Ml/Min. Stages of kidney disease  EGFR (estimated glomerular filtration rate)  G-I > 90 ml/min Kidney damage with normal kidney function (blood or protein in the urine)  G-II 60-89 ml/min Normal kidney function with mild damage with or without blood or protein in the urine  G-IIIA 45-59 ml/min Mild to moderate loss of kidney function. G-IIIB 30-44 ml/min Moderate to severe loss of kidney function  G-IV 15-29 ml/min Severe loss of kidney function  G-V < 15 mlmin     May need dialysis or kidney transplant    ACR (urine albumin/creatinine ratio) (Mg/Gm)  A-1      ACR<30 Normal to mildly increased protein in the urine. A-2 ACR  Moderate increase in urine protein loss. A-3 ACR >300 Severe increase in urine protein loss    Our goal is to keep your eGFR going as fast as possible ( ml/min is normal). If your eGFR declines to 15-24 ml/min and stays there without recovery,  we will begin to educate you about dialysis or kidney transplant. We also want to keep the protein in your urine as low as possible. The leading cause of kidney disease in the world is diabetes mellitus. Keep your sugar  as much as possible. The second leading cause is hypertension. Keep Your blood pressure 120-140/70-80 as much as possible. If you need refills, call the office or your drug store. You may call the office any time with any questions or concerns. We use Epic software to manage your private patient data securely.  Any health care provider or hospital in the world that uses Epic software can see your data if they have the appropriate credentials. DUE TO THE CORONAVIRUS CONCERN, PLEASE LIMIT YOUR TIME IN PUBLIC. [address] Soraida Choudhary Labidi YOUR HANDS COMPLETELY AND FREQUENTLY. Obtain POMI-T from [facility] for the prostate. Your PSA was 3.81  (0.0-6.5)    Quiana Liu.  Rina Aschoff

## 2021-12-01 NOTE — PROGRESS NOTES
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 Stable since January 2005. Subjective:  I last saw the patient in clinic 06/02/2021. I follow the patient for Chronic Kidney disease stage G-IIIA-A1. Since our last visit the patient has not been hospitalized. The patient is sleeping well at night with 1-2 times per night nocturia. The patient has a good appetite and is remaining active. The patient denied N/V/C/D/SOB/CP. He has very occasional  trouble at bladder emptying. His recent colonoscopy and stool for blood was normal. Iron studies are normal. He saw urology for urethral dilation. PSA was 3.81 (0-6.5 for his age)    COVID-23 screening  Fever: none  Cough: none  Exposure: none  Shortness of breath: none    Albumin/creatinine ratio:   eGFR: 36 Ml/min (it was 42 Ml/Min last visit)  SCr: 1.8 Mg/Dl (It was 1.6 Mg/Dl last visit)      Last six eGFR readings:  Lab Results   Component Value Date    LABGLOM 36 11/23/2021    LABGLOM 39 10/07/2021    LABGLOM 42 05/25/2021    LABGLOM 42 02/22/2021    LABGLOM 36 11/20/2020    LABGLOM 45 11/20/2019          Objective:  VITALS:  BP (!) 161/71 (Site: Left Upper Arm, Position: Sitting, Cuff Size: Large Adult)   Pulse 58   Temp 97.9 °F (36.6 °C)   Resp 18   Ht 5' 9\" (1.753 m)   Wt 209 lb (94.8 kg)   SpO2 97%   BMI 30.86 kg/m²   Weight:   Wt Readings from Last 3 Encounters:   12/01/21 209 lb (94.8 kg)   09/13/21 216 lb (98 kg)   06/11/21 212 lb (96.2 kg)     Body mass index is 30.86 kg/m².      Physical examination    General:  Alert and cooperative with Results   Component Value Date    PROTUR 100 03/16/2021     Lab Results   Component Value Date    NITRU Negative 03/16/2021    COLORU Yellow 03/16/2021    COLORU YELLOW 04/18/2018    PHUR 7.0 04/18/2018    LABCAST NONE SEEN 05/26/2014    WBCUA 0-2 05/02/2016    RBCUA 0-2 05/02/2016    YEAST NONE SEEN 05/02/2016    BACTERIA NONE 05/02/2016    CLARITYU Clear 12/11/2013    SPECGRAV 1.016 05/26/2014    LEUKOCYTESUR NEGATIVE 04/18/2018    UROBILINOGEN 0.20 03/16/2021    BILIRUBINUR Negative 03/16/2021    BLOODU Negative 03/16/2021    BLOODU NEGATIVE 04/18/2018    GLUCOSEU Negative 03/16/2021    KETUA Trace 03/16/2021      Microalbumen/Creatinine ratio:  No components found for: RUCREAT        Medications:    Current Outpatient Medications   Medication Sig Dispense Refill    folic acid (FOLVITE) 1 MG tablet TAKE 2 & 1/2 (TWO & ONE-HALF) TABLETS BY MOUTH ONCE DAILY 180 tablet 2    oxybutynin (DITROPAN-XL) 5 MG extended release tablet TAKE 1 TABLET BY MOUTH DAILY 90 tablet 2    citalopram (CELEXA) 20 MG tablet TAKE 1 TABLET BY MOUTH DAILY 90 tablet 3    primidone (MYSOLINE) 50 MG tablet TAKE 1 TABLET BY MOUTH TWICE DAILY 180 tablet 3    irbesartan (AVAPRO) 300 MG tablet Take 1 tablet by mouth daily 90 tablet 3    triamterene-hydroCHLOROthiazide (MAXZIDE-25) 37.5-25 MG per tablet Take 1 tablet by mouth daily 90 tablet 3    amLODIPine (NORVASC) 10 MG tablet Take 1 tablet by mouth daily 90 tablet 3    pravastatin (PRAVACHOL) 40 MG tablet TAKE 1 TABLET BY MOUTH EVERY EVENING 90 tablet 3    simethicone (MYLICON) 80 MG chewable tablet Take 1 tablet by mouth every 6 hours as needed for Flatulence 60 tablet 3    LORazepam (ATIVAN) 0.5 MG tablet Take 0.5 mg by mouth every 8 hours as needed for Anxiety. Allen Wills traMADol (ULTRAM) 50 MG tablet Take 50 mg by mouth every 8 hours as needed for Pain. .      fluticasone (FLONASE) 50 MCG/ACT nasal spray 1 spray by Nasal route daily      dicyclomine (BENTYL) 10 MG capsule Take 10 mg by mouth 4 times daily (before meals and nightly)      pantoprazole (PROTONIX) 40 MG tablet Take 40 mg by mouth daily.  hydrocortisone (WESTCORT) 0.2 % cream Apply  topically daily as needed. Apply to facial rash      aspirin 325 MG tablet Take 325 mg by mouth nightly        No current facility-administered medications for this visit. IMPRESSIONS:      Kidney disease: CKD stage G-IIIA-A1  Anemia: Anemia remains stable. No need for an erythrocyte stimulating agent (SHELBY). Bone and mineral metabolism: There is no complaint of bone pain. PLAN:  1. We discussed the eGFR today. 2. We will continue all current medications without changes. 3. Checking iron, A90, folic  4. We will see the patient back in 6 months.       _________________________________  Fern Hodge.  Debbie Crooks DO  [facility]      CC  Neo Santiago DO

## 2021-12-01 NOTE — TELEPHONE ENCOUNTER
I wrote it on his \"know your kidney number\" note.    The medication is available from [employer] and is called BIBIANA-BONNIE Trotter

## 2021-12-14 ENCOUNTER — NURSE TRIAGE (OUTPATIENT)
Dept: OTHER | Facility: CLINIC | Age: 81
End: 2021-12-14

## 2021-12-14 ENCOUNTER — OFFICE VISIT (OUTPATIENT)
Dept: FAMILY MEDICINE CLINIC | Age: 81
End: 2021-12-14
Payer: MEDICARE

## 2021-12-14 VITALS
SYSTOLIC BLOOD PRESSURE: 142 MMHG | BODY MASS INDEX: 30.66 KG/M2 | WEIGHT: 207 LBS | HEART RATE: 55 BPM | RESPIRATION RATE: 18 BRPM | DIASTOLIC BLOOD PRESSURE: 62 MMHG | OXYGEN SATURATION: 97 % | HEIGHT: 69 IN | TEMPERATURE: 97.5 F

## 2021-12-14 DIAGNOSIS — I10 ESSENTIAL HYPERTENSION: ICD-10-CM

## 2021-12-14 PROCEDURE — G8484 FLU IMMUNIZE NO ADMIN: HCPCS | Performed by: NURSE PRACTITIONER

## 2021-12-14 PROCEDURE — 4040F PNEUMOC VAC/ADMIN/RCVD: CPT | Performed by: NURSE PRACTITIONER

## 2021-12-14 PROCEDURE — 1123F ACP DISCUSS/DSCN MKR DOCD: CPT | Performed by: NURSE PRACTITIONER

## 2021-12-14 PROCEDURE — G8417 CALC BMI ABV UP PARAM F/U: HCPCS | Performed by: NURSE PRACTITIONER

## 2021-12-14 PROCEDURE — G8427 DOCREV CUR MEDS BY ELIG CLIN: HCPCS | Performed by: NURSE PRACTITIONER

## 2021-12-14 PROCEDURE — 1036F TOBACCO NON-USER: CPT | Performed by: NURSE PRACTITIONER

## 2021-12-14 PROCEDURE — 99214 OFFICE O/P EST MOD 30 MIN: CPT | Performed by: NURSE PRACTITIONER

## 2021-12-14 RX ORDER — TRIAMTERENE AND HYDROCHLOROTHIAZIDE 37.5; 25 MG/1; MG/1
1.5 TABLET ORAL DAILY
Qty: 45 TABLET | Refills: 3 | Status: SHIPPED | OUTPATIENT
Start: 2021-12-14 | End: 2021-12-16

## 2021-12-14 NOTE — PROGRESS NOTES
[address] MEDICINE [address]  [address] [place] [address]  Dept: [phone]  Loc: [phone]  Visit Date: 12/14/2021      Chief Complaint:   Rita Serrato is a 78 y.o. male thatpresents for Hypertension (pressure in head and blurred vision) and Fatigue        HPI:  HTN    Does patient check BP regularly at home? - No  Current Medication regimen - Irbesartan, Maxzide, Amlodpine  Tolerating medications well? - yes    Shortness of breath or chest pain? No  Headache or visual complaints? Yes, yesterday, not currently  Neurologic changes like confusion? No  Extremity edema? No    BP Readings from Last 3 Encounters:   12/14/21 (!) 142/62   12/01/21 (!) 161/71   09/13/21 110/62     Wife got >200/100 yesterday manually  She checked it d/t him not feeling well, HA, eye pain  Dermatologist had /62 this am   Reports his BP has to be well controlled per Ophthalmology    The patient comes in alone today. History obtained from pt and medical records. I have reviewed the patient's past medical history, past surgical history, allergies,medications, social and family history and I have made updates where appropriate.     Past Medical History:   Diagnosis Date    Anemia     Arthritis     Bronchiolitis 11/2016    Cancer [facility])     SKIN CANCER    CKD (chronic kidney disease), stage III ([facility] [address]     Diverticula of colon 5/2014    tx with antibiotics per pt; NO surgery    Diverticulosis     DVT (deep venous thrombosis) (HCC)     s/p    Esophagitis 12/10/2014    Hiatal hernia     HTN (hypertension)     Hyperlipidemia     Kidney stones     Nephrolithiasis     Obesity     Prostate enlargement     benign    Renal insufficiency        Past Surgical History:   Procedure Laterality Date    BLEPHAROPLASTY Bilateral 03/2010    CARDIAC CATHETERIZATION  2000,2010    CATARACT REMOVAL WITH IMPLANT Bilateral 2006,2007    COLONOSCOPY  05/26/2017    HERNIA REPAIR Bilateral 1996  NASAL SINUS SURGERY      SHOULDER SURGERY      SKIN BIOPSY      SKIN CANCER EXCISION  Feb 2015    top of head    TONSILLECTOMY      TURP  6049,8058    UPPER GASTROINTESTINAL ENDOSCOPY         Social History     Tobacco Use    Smoking status: Never Smoker    Smokeless tobacco: Never Used    Tobacco comment: never smoked   Vaping Use    Vaping Use: Never used   Substance Use Topics    Alcohol use: Never    Drug use: No       Family History   Problem Relation Age of Onset    Diabetes Mother     High Blood Pressure Father     Heart Disease Father          PHYSICAL EXAM:  BP (!) 142/62   Pulse 55   Temp 97.5 °F (36.4 °C) (Infrared)   Resp 18   Ht 5' 9\" (1.753 m)   Wt 207 lb (93.9 kg)   SpO2 97%   BMI 30.57 kg/m²     Physical Exam  Constitutional:       Appearance: Normal appearance. HENT:      Head: Normocephalic and atraumatic. Right Ear: External ear normal.      Left Ear: External ear normal.      Nose: Nose normal.      Mouth/Throat:      Mouth: Mucous membranes are moist.   Eyes:      Conjunctiva/sclera: Conjunctivae normal.   Cardiovascular:      Rate and Rhythm: Normal rate and regular rhythm. Pulses: Normal pulses. Heart sounds: Normal heart sounds. Pulmonary:      Effort: Pulmonary effort is normal.      Breath sounds: Normal breath sounds. Abdominal:      General: Bowel sounds are normal.      Palpations: Abdomen is soft. Musculoskeletal:         General: Normal range of motion. Cervical back: Normal range of motion. Skin:     General: Skin is warm and dry. Neurological:      General: No focal deficit present. Mental Status: He is alert and oriented to person, place, and time. Psychiatric:         Mood and Affect: Mood normal.         Behavior: Behavior normal.           ASSESSMENT & PLAN  Jennifer Garg was seen today for hypertension and fatigue.     Diagnoses and all orders for this visit:    Essential hypertension  -

## 2021-12-14 NOTE — TELEPHONE ENCOUNTER
Received call from [facility] with Red Flag Complaint. Brief description of triage: HTN    Triage indicates for patient to see PCP today. UCC if no appointments available. Care advice provided, patient verbalizes understanding; denies any other questions or concerns; instructed to call back for any new or worsening symptoms. Writer provided warm transfer to  [facility] for appointment scheduling. Attention Provider: Thank you for allowing me to participate in the care of your patient. The patient was connected to triage in response to information provided to the ECC/PSC. Please do not respond through this encounter as the response is not directed to a shared pool. Reason for Disposition   Systolic BP >= 199 OR Diastolic >= 870    Answer Assessment - Initial Assessment Questions  1. BLOOD PRESSURE: \"What is the blood pressure? \" \"Did you take at least two measurements 5 minutes apart? \"      Dermatologist appt [id number]/61 by nurse    2. ONSET: \"When did you take your blood pressure? \"       Felt pressure H/A last elder 200/60 and 202/60,taken by wife who is a nurse    3. HOW: \"How did you obtain the blood pressure? \" (e.g., visiting nurse, automatic home BP monitor)      Manual by wife    4. HISTORY: \"Do you have a history of high blood pressure? \"      Yes     5. MEDICATIONS: [place] Crate you taking any medications for blood pressure? \" \"Have you missed any doses recently? \"      Yes     6. OTHER SYMPTOMS: \"Do you have any symptoms? \" (e.g., headache, chest pain, blurred vision, difficulty breathing, weakness)     Pressure H/A,generally not feeling well. 7. PREGNANCY: \"Is there any chance you are pregnant? \" \"When was your last menstrual period? \"      N/A    Protocols used: HIGH BLOOD PRESSURE-ADULT-OH Subjective:  Lacho Ervin is a 51 year old male who was admitted on 10/6/2017 with dehydration, cocaine and polysubstance abuse, depression and suicidal ideation and hyponatremia.    On evaluation, complains of pain all over. Still has had a spasms. Appetite low. Still suicidal thoughts, sitter at bedside.    Objective:  Temp:  [97.5 °F (36.4 °C)-98.6 °F (37 °C)] 97.5 °F (36.4 °C)  Pulse:  [84-92] 84  Resp:  [16-20] 20  BP: ()/(52-76) 114/57  Visit Vitals  /57 (BP Location: LUE, Patient Position: Left side lying)   Pulse 84   Temp 97.5 °F (36.4 °C) (Oral)   Resp 20   Ht 5' 6\" (1.676 m)   Wt 48.2 kg   SpO2 97%   BMI 17.16 kg/m²       Intake/Output Summary (Last 24 hours) at 10/14/17 1044  Last data filed at 10/14/17 0900   Gross per 24 hour   Intake              880 ml   Output             2625 ml   Net            -1745 ml     Gen: Awake, alert.  Eyes: Conjunctiva pale.  ENT: No nasal erythema, moist oral mucosa.  Neck: Supple with no JVD or thyromegaly. Trachea midline. No cervical or supracavicular lymphadenopathy.  Cardiovascular: S1, S2 regular, no S3. No murmurs or gallop.   Resp: Lungs clear to auscultation bilaterally. Respiratory effort adequate. No wheezing or crackles noted.  GI: Soft, non-tender. Ostomy in place with semisoft stool. No hepatosplenomegaly, bowel sounds audible.  : Rome in place, clear urine.  Extremities: No pedal edema noted. No calf tenderness. Pedal pulses palpable bilaterally.  Skin: warm and moist. No rashes noted.  Neuro: Alert and oriented  x 3. Cranial nerves 2-12 intact. No gross neuro deficits.   Psych: Depressed affect.    Labs:     Recent Labs  Lab 10/13/17  0507 10/09/17  0937 10/08/17  1400 10/08/17  0725   SODIUM 137 139 138 138   POTASSIUM 4.7 4.5 4.5 4.1   CHLORIDE 101 108* 109* 109*   CO2 30 24 22 23   BUN 25* 5* 11 13   CREATININE 0.71 0.63* 0.73 0.77   GLUCOSE 91 89 107* 93   ALBUMIN  --  2.3*  --  2.4*   PHOS 2.6 3.2  --   --    AST  --  15  --  17    BILIRUBIN  --  0.1*  --  0.1*   TSH  --   --   --  2.530         Recent Labs  Lab 10/13/17  0507 10/09/17  0937 10/08/17  0725   WBC 7.0 6.2 9.3   HGB 12.1* 11.5* 12.2*   HCT 38.9* 35.9* 38.0*    249 323   MCV 87.8 88.0 87.0       I/O last 3 completed shifts:  In: 1120 [P.O.:1120]  Out: 2375 [Urine:1375; Stool:1000]    Microbiology:   Urine culture: VRE    Assessment/Plan:  Lacho Ervin is and 51 year old male who was admitted on 10/6/2017 with multiple medical problems outlined above.    1. Hyponatremia. Likely secondary to volume depletion. Sodium normalized. Nephrology follow-up noted.  2. Hypomagnesemia. Likely secondary to GI losses. Plan continue replacement orally  3. VRE urine infection. Continue Zyvox   4. Urinary Retention with bladder spasm. Failed voiding trial, Rome in place. Urology recommends continued Rome catheter with follow-up as outpatient. Ditropan admitted for bladder spasm  5. Depression with suicidal ideation. Psychiatry following, sitter at bedside.  6. DVT prophylaxis. Lovenox    Case discussed with patient at bedside. Plan transfer to inpatient psych facility once medically stable    Emanuel Barakat MD  10/14/2017

## 2021-12-16 DIAGNOSIS — I10 ESSENTIAL HYPERTENSION: ICD-10-CM

## 2021-12-16 RX ORDER — TRIAMTERENE AND HYDROCHLOROTHIAZIDE 37.5; 25 MG/1; MG/1
TABLET ORAL
Qty: 135 TABLET | Refills: 3 | Status: SHIPPED | OUTPATIENT
Start: 2021-12-16 | End: 2022-06-13 | Stop reason: SDUPTHER

## 2021-12-17 ENCOUNTER — OFFICE VISIT (OUTPATIENT)
Dept: FAMILY MEDICINE CLINIC | Age: 81
End: 2021-12-17
Payer: MEDICARE

## 2021-12-17 VITALS
HEIGHT: 69 IN | BODY MASS INDEX: 30.51 KG/M2 | TEMPERATURE: 97.6 F | SYSTOLIC BLOOD PRESSURE: 132 MMHG | DIASTOLIC BLOOD PRESSURE: 56 MMHG | OXYGEN SATURATION: 96 % | WEIGHT: 206 LBS | RESPIRATION RATE: 14 BRPM | HEART RATE: 56 BPM

## 2021-12-17 DIAGNOSIS — E78.5 HYPERLIPIDEMIA, UNSPECIFIED HYPERLIPIDEMIA TYPE: Primary | ICD-10-CM

## 2021-12-17 DIAGNOSIS — B36.9 FUNGAL SKIN INFECTION: ICD-10-CM

## 2021-12-17 PROCEDURE — 90694 VACC AIIV4 NO PRSRV 0.5ML IM: CPT | Performed by: FAMILY MEDICINE

## 2021-12-17 PROCEDURE — G8427 DOCREV CUR MEDS BY ELIG CLIN: HCPCS | Performed by: FAMILY MEDICINE

## 2021-12-17 PROCEDURE — G8484 FLU IMMUNIZE NO ADMIN: HCPCS | Performed by: FAMILY MEDICINE

## 2021-12-17 PROCEDURE — 99214 OFFICE O/P EST MOD 30 MIN: CPT | Performed by: FAMILY MEDICINE

## 2021-12-17 PROCEDURE — G8417 CALC BMI ABV UP PARAM F/U: HCPCS | Performed by: FAMILY MEDICINE

## 2021-12-17 PROCEDURE — 1123F ACP DISCUSS/DSCN MKR DOCD: CPT | Performed by: FAMILY MEDICINE

## 2021-12-17 PROCEDURE — 1036F TOBACCO NON-USER: CPT | Performed by: FAMILY MEDICINE

## 2021-12-17 PROCEDURE — G0008 ADMIN INFLUENZA VIRUS VAC: HCPCS | Performed by: FAMILY MEDICINE

## 2021-12-17 PROCEDURE — 4040F PNEUMOC VAC/ADMIN/RCVD: CPT | Performed by: FAMILY MEDICINE

## 2021-12-17 RX ORDER — NYSTATIN 100000 U/G
CREAM TOPICAL
Qty: 1 EACH | Refills: 1 | Status: SHIPPED | OUTPATIENT
Start: 2021-12-17 | End: 2022-09-26

## 2021-12-17 RX ORDER — PRAVASTATIN SODIUM 40 MG
TABLET ORAL
Qty: 90 TABLET | Refills: 3 | Status: SHIPPED | OUTPATIENT
Start: 2021-12-17

## 2021-12-17 NOTE — PROGRESS NOTES
Vaccine Information Sheet, \"Influenza - Inactivated\"  given to Guillermo Atwood, or parent/legal guardian of  Guillermo Atwood and verbalized understanding. Patient responses:    Have you ever had a reaction to a flu vaccine? No  Do you have an allergy to eggs, neomycin or polymixin? No  Do you have an allergy to Thimerosal, contact lens solution, or Merthiolate? No  Have you ever had Guillian Bouse Syndrome? No  Do you have any current illness? No  Do you have a temperature above 100 degrees? No  Are you pregnant? No  If pregnant, permission obtained from physician? No  Do you have an active neurological disorder? No      Flu vaccine given per order. Please see immunization tab.

## 2021-12-17 NOTE — PROGRESS NOTES
Stacey Gandhi is a 78 y.o. male that presents for     Chief Complaint   Patient presents with    Hypertension    Diarrhea     urgency       BP (!) 132/56   Pulse 56   Temp 97.6 °F (36.4 °C) (Infrared)   Resp 14   Ht 5' 9\" (1.753 m)   Wt 206 lb (93.4 kg)   SpO2 96%   BMI 30.42 kg/m²       HPI    HTN    Does patient check BP regularly at home? - Yes - reviewed with patient today  Current Medication regimen - triamterene increased earlier this week, improvement noted in readings recently. On norvasc, avapro  Tolerating medications well? - yes    Shortness of breath or chest pain? No  Headache or visual complaints? Yes, intermittent  Neurologic changes like confusion? No  Extremity edema? No    BP Readings from Last 3 Encounters:   12/17/21 (!) 132/56   12/14/21 (!) 142/62   12/01/21 (!) 161/71     Notes that he continues to have some fecal urgency intermittently. Follows with GI for this issue. Does not occur daily. Started on imodium by GI. Notes white discoloration of the glans of the penis. Unsure how long, no pain.     Health Maintenance   Topic Date Due    DTaP/Tdap/Td vaccine (1 - Tdap) Never done    Shingles Vaccine (1 of 2) Never done    Pneumococcal 65+ years Vaccine (2 of 2 - PPSV23) 01/20/2017    Lipid screen  07/20/2021    COVID-19 Vaccine (3 - Booster for Ruff Peter series) 09/25/2021    Annual Wellness Visit (AWV)  09/14/2022    Potassium monitoring  11/23/2022    Creatinine monitoring  11/23/2022    Flu vaccine  Completed    Hepatitis A vaccine  Aged Out    Hepatitis B vaccine  Aged Out    Hib vaccine  Aged Out    Meningococcal (ACWY) vaccine  Aged Out       Past Medical History:   Diagnosis Date    Anemia     Arthritis     Bronchiolitis 11/2016    Cancer ([facility] [address]     SKIN CANCER    CKD (chronic kidney disease), stage III ([address]     Diverticula of colon 5/2014    tx with antibiotics per pt; NO surgery    Diverticulosis     DVT (deep venous thrombosis) (HCC)     s/p    Esophagitis 12/10/2014    Hiatal hernia     HTN (hypertension)     Hyperlipidemia     Kidney stones     Nephrolithiasis     Obesity     Prostate enlargement     benign    Renal insufficiency        Past Surgical History:   Procedure Laterality Date    BLEPHAROPLASTY Bilateral 03/2010    CARDIAC CATHETERIZATION  2000,2010    CATARACT REMOVAL WITH IMPLANT Bilateral 2006,2007    COLONOSCOPY  05/26/2017    HERNIA REPAIR Bilateral 1996    NASAL SINUS SURGERY      SHOULDER SURGERY      SKIN BIOPSY      SKIN CANCER EXCISION  Feb 2015    top of head    TONSILLECTOMY      TURP  3566,8286    UPPER GASTROINTESTINAL ENDOSCOPY         Social History     Tobacco Use    Smoking status: Never Smoker    Smokeless tobacco: Never Used    Tobacco comment: never smoked   Vaping Use    Vaping Use: Never used   Substance Use Topics    Alcohol use: Never    Drug use: No       Family History   Problem Relation Age of Onset    Diabetes Mother     High Blood Pressure Father     Heart Disease Father          I have reviewed the patient's past medical history, past surgical history, allergies, medications, social and family history and I have made updates where appropriate. Review of Systems        PHYSICAL EXAM:  BP (!) 132/56   Pulse 56   Temp 97.6 °F (36.4 °C) (Infrared)   Resp 14   Ht 5' 9\" (1.753 m)   Wt 206 lb (93.4 kg)   SpO2 96%   BMI 30.42 kg/m²     Physical Exam  Nursing note reviewed. Constitutional:       Appearance: He is well-developed. Pulmonary:      Effort: Pulmonary effort is normal. No respiratory distress. Neurological:      Mental Status: He is alert. Psychiatric:         Behavior: Behavior normal.         Thought Content: Thought content normal.         Judgment: Judgment normal.     White DC noted on the distal penis        ASSESSMENT & PLAN  Milton Ramirez was seen today for hypertension and diarrhea.     Diagnoses and all orders for this visit:    Hyperlipidemia, unspecified hyperlipidemia type  -     pravastatin (PRAVACHOL) 40 MG tablet; TAKE 1 TABLET BY MOUTH EVERY EVENING    Fungal skin infection  -     nystatin (MYCOSTATIN) 224627 UNIT/GM cream; Apply topically 2 times daily. Other orders  -     INFLUENZA, QUADV, ADJUVANTED, 65 YRS =, IM, PF, PREFILL SYR, 0.5ML (FLUAD)        Return in about 3 months (around 3/17/2022). The most recent results of the following tests were reviewed with the patient today: none     All copied or forwarded information in the progress note was verified by me to be accurate at the time of visit  Patient's past medical, surgical, social and family history were reviewed and updated     All patient questions answered. Patient voiced understanding.

## 2022-03-05 DIAGNOSIS — I10 ESSENTIAL HYPERTENSION: ICD-10-CM

## 2022-03-07 RX ORDER — AMLODIPINE BESYLATE 10 MG/1
10 TABLET ORAL DAILY
Qty: 90 TABLET | Refills: 3 | Status: SHIPPED | OUTPATIENT
Start: 2022-03-07

## 2022-03-15 ENCOUNTER — OFFICE VISIT (OUTPATIENT)
Dept: UROLOGY | Age: 82
End: 2022-03-15
Payer: MEDICARE

## 2022-03-15 VITALS
WEIGHT: 208.1 LBS | HEIGHT: 69 IN | BODY MASS INDEX: 30.82 KG/M2 | DIASTOLIC BLOOD PRESSURE: 68 MMHG | SYSTOLIC BLOOD PRESSURE: 124 MMHG

## 2022-03-15 DIAGNOSIS — N47.1 PHIMOSIS: ICD-10-CM

## 2022-03-15 DIAGNOSIS — N35.911 STRICTURE OF URETHRAL MEATUS IN MALE, UNSPECIFIED STRICTURE TYPE: ICD-10-CM

## 2022-03-15 DIAGNOSIS — Z12.5 PROSTATE CANCER SCREENING: ICD-10-CM

## 2022-03-15 DIAGNOSIS — N40.1 BPH WITH OBSTRUCTION/LOWER URINARY TRACT SYMPTOMS: Primary | ICD-10-CM

## 2022-03-15 DIAGNOSIS — N13.8 BPH WITH OBSTRUCTION/LOWER URINARY TRACT SYMPTOMS: Primary | ICD-10-CM

## 2022-03-15 LAB
BILIRUBIN URINE: NEGATIVE
BLOOD URINE, POC: NEGATIVE
CHARACTER, URINE: CLEAR
COLOR, URINE: YELLOW
GLUCOSE URINE: NEGATIVE MG/DL
KETONES, URINE: NEGATIVE
LEUKOCYTE CLUMPS, URINE: NEGATIVE
NITRITE, URINE: NEGATIVE
PH, URINE: 5.5 (ref 5–9)
POST VOID RESIDUAL (PVR): 235 ML
PROTEIN, URINE: ABNORMAL MG/DL
SPECIFIC GRAVITY, URINE: 1.02 (ref 1–1.03)
UROBILINOGEN, URINE: 0.2 EU/DL (ref 0–1)

## 2022-03-15 PROCEDURE — 81003 URINALYSIS AUTO W/O SCOPE: CPT | Performed by: UROLOGY

## 2022-03-15 PROCEDURE — 99214 OFFICE O/P EST MOD 30 MIN: CPT | Performed by: UROLOGY

## 2022-03-15 PROCEDURE — G8484 FLU IMMUNIZE NO ADMIN: HCPCS | Performed by: UROLOGY

## 2022-03-15 PROCEDURE — 1123F ACP DISCUSS/DSCN MKR DOCD: CPT | Performed by: UROLOGY

## 2022-03-15 PROCEDURE — 51798 US URINE CAPACITY MEASURE: CPT | Performed by: UROLOGY

## 2022-03-15 PROCEDURE — 1036F TOBACCO NON-USER: CPT | Performed by: UROLOGY

## 2022-03-15 PROCEDURE — G8417 CALC BMI ABV UP PARAM F/U: HCPCS | Performed by: UROLOGY

## 2022-03-15 PROCEDURE — 4040F PNEUMOC VAC/ADMIN/RCVD: CPT | Performed by: UROLOGY

## 2022-03-15 PROCEDURE — G8427 DOCREV CUR MEDS BY ELIG CLIN: HCPCS | Performed by: UROLOGY

## 2022-03-15 NOTE — PROGRESS NOTES
Dejan Patel MD        [address]  [address]  [facility] [place] [address]  Dept: [phone]  Dept Fax: [phone]  Loc: [address] [address] Urology Office Note -     Patient:  Emanuel Man  YOB: 1940    The patient is a 78 y.o. male who presents today for evaluation of the following problems:   Chief Complaint   Patient presents with    Benign Prostatic Hypertrophy    Follow-up        HISTORY OF PRESENT ILLNESS:     BPH  Urinary stream weakening-- worsening  Recent cystoscopy demonstrated narrow meatus with regrowth of adneoma  Can't take flomax  Taking oxybuytnin    Urethral stricture  Recent cystoscopy findings: meatal stricture dilated up to a 24 fr without difficulty, open bladder neck, regrowth of adenoma on the right lateral wall causing obstruction    Balanitis  Was circumcised as child but foreskin is causing issues  Dr Jessi Lugo placed on a cream recently    Prostate cancer screening  PSA acceptable for age      Summary of Previous Records:  Mr. Stephanie Riddle continues to do well with stable symptoms of prostatism. His PSA is 2.97. He has symptoms of nocturia 1-2 times nightly. He admits to occasional oxybutynin usage at night which helps his nocturia symptoms. He denies difficulty urinating. He mainly complains of sunken penis that causes difficulty. Has hx of TURP in the past, abnormal ROSE MARIE was felt to be consistent with scar from previous TURPs in the past.    There is a family history of prostate cancer with his brother. MRI Pelvis on 06/2017 was consistent with PI-RADS score 2 lesions.       [facility]    (Patient's old records have been requested, reviewed and pertinent findings summarized in today's note.)    Procedures Today: N/A    Last several PSA's:  Lab Results   Component Value Date    PSA 3.81 (H) 11/23/2021    PSA 2.82 (H) 11/20/2020    PSA 2.97 (H) 11/20/2019       Last total testosterone:  No results found for: TESTOSTERONE    Urinalysis today:  Results for POC orders placed in visit on 03/15/22   POCT Urinalysis No Micro (Auto)   Result Value Ref Range    Glucose, Ur Negative NEGATIVE mg/dl    Bilirubin Urine Negative     Ketones, Urine Negative NEGATIVE    Specific Gravity, Urine 1.020 1.002 - 1.030    Blood, UA POC Negative NEGATIVE    pH, Urine 5.50 5.0 - 9.0    Protein, Urine Trace (A) NEGATIVE mg/dl    Urobilinogen, Urine 0.20 0.0 - 1.0 eu/dl    Nitrite, Urine Negative NEGATIVE    Leukocyte Clumps, Urine Negative NEGATIVE    Color, Urine Yellow YELLOW-STRAW    Character, Urine Clear CLR-SL.CLOUD   poct post void residual   Result Value Ref Range    post void residual 235 ml       Last BUN and creatinine:  Lab Results   Component Value Date    BUN 32 (H) 11/23/2021     Lab Results   Component Value Date    CREATININE 1.8 (H) 11/23/2021       Imaging Reviewed during this Office Visit:   Susie Alcocer MD independently reviewed the images and verified the radiology reports from:    Xr Shoulder Left (min 2 Views)    Result Date: 3/6/2020  PROCEDURE: XR SHOULDER LEFT (MIN 2 VIEWS) CLINICAL INFORMATION: 78-year-old male with left shoulder pain. COMPARISON: No prior study. TECHNIQUE: 4 views of the left shoulder were obtained. FINDINGS: There is no fracture or dislocation. No suspicious osseous lesions are present. The joint spaces are preserved. The clavicle is normal.  The soft tissues are normal.  There are no suspicious findings in the visualized aspects of the upper lung. No acute fracture or dislocation involving the left shoulder. **This report has been created using voice recognition software. It may contain minor errors which are inherent in voice recognition technology. ** Final report electronically signed by Dr Sheryl Gage on 3/6/2020 3:35 PM      PAST MEDICAL, FAMILY AND SOCIAL HISTORY:  Past Medical History: Diagnosis Date    Anemia     Arthritis     Bronchiolitis 11/2016    Cancer [facility])     SKIN CANCER    CKD (chronic kidney disease), stage III ([facility] [address]     Diverticula of colon 5/2014    tx with antibiotics per pt; NO surgery    Diverticulosis     DVT [facility] thrombosis) ([facility])     s/p    Esophagitis 12/10/2014    Hiatal hernia     HTN (hypertension)     Hyperlipidemia     Kidney stones     Nephrolithiasis     Obesity     Prostate enlargement     benign    Renal insufficiency      Past Surgical History:   Procedure Laterality Date    BLEPHAROPLASTY Bilateral 03/2010    CARDIAC CATHETERIZATION  2000,2010    CATARACT REMOVAL WITH IMPLANT Bilateral 2006,2007    COLONOSCOPY  05/26/2017    HERNIA REPAIR Bilateral 1996    NASAL SINUS SURGERY      SHOULDER SURGERY      SKIN BIOPSY      SKIN CANCER EXCISION  Feb 2015    top of head    TONSILLECTOMY      TURP  9142,3316    UPPER GASTROINTESTINAL ENDOSCOPY       Family History   Problem Relation Age of Onset    Diabetes Mother     High Blood Pressure Father     Heart Disease Father      Outpatient Medications Marked as Taking for the 3/15/22 encounter (Office Visit) with Ann Temple MD   Medication Sig Dispense Refill    amLODIPine (NORVASC) 10 MG tablet TAKE 1 TABLET BY MOUTH DAILY 90 tablet 3    pravastatin (PRAVACHOL) 40 MG tablet TAKE 1 TABLET BY MOUTH EVERY EVENING 90 tablet 3    nystatin (MYCOSTATIN) 935986 UNIT/GM cream Apply topically 2 times daily.  1 each 1    triamterene-hydroCHLOROthiazide (MAXZIDE-25) 37.5-25 MG per tablet TAKE 1 AND 1/2 TABLETS BY MOUTH DAILY 506 tablet 3    folic acid (FOLVITE) 1 MG tablet TAKE 2 & 1/2 (TWO & ONE-HALF) TABLETS BY MOUTH ONCE DAILY 180 tablet 2    oxybutynin (DITROPAN-XL) 5 MG extended release tablet TAKE 1 TABLET BY MOUTH DAILY 90 tablet 2    citalopram (CELEXA) 20 MG tablet TAKE 1 TABLET BY MOUTH DAILY 90 tablet 3    primidone (MYSOLINE) 50 MG tablet TAKE 1 TABLET BY MOUTH TWICE DAILY 180 tablet 3    irbesartan (AVAPRO) 300 MG tablet Take 1 tablet by mouth daily 90 tablet 3    simethicone (MYLICON) 80 MG chewable tablet Take 1 tablet by mouth every 6 hours as needed for Flatulence 60 tablet 3    traMADol (ULTRAM) 50 MG tablet Take 50 mg by mouth every 8 hours as needed for Pain. Onelia Ojeda dicyclomine (BENTYL) 10 MG capsule Take 10 mg by mouth 4 times daily (before meals and nightly) PRN      pantoprazole (PROTONIX) 40 MG tablet Take 40 mg by mouth daily.  aspirin 325 MG tablet Take 325 mg by mouth nightly          Ace inhibitors, Carafate [sucralfate], Flomax [tamsulosin hcl], Iv dye [iodides], Lipitor [atorvastatin], Lopressor [metoprolol], and Motrin [ibuprofen]  Social History     Tobacco Use   Smoking Status Never Smoker   Smokeless Tobacco Never Used   Tobacco Comment    never smoked      (If patient a smoker, smoking cessation counseling offered)   Social History     Substance and Sexual Activity   Alcohol Use Never       REVIEW OF SYSTEMS:  Constitutional: negative  Eyes: negative  Respiratory: negative  Cardiovascular: negative  Gastrointestinal: negative  Genitourinary: see HPI  Musculoskeletal: negative  Skin: negative   Neurological: negative  Hematological/Lymphatic: negative  Psychological: negative      Physical Exam:    This a 78 y.o. male  Vitals:    03/15/22 1318   BP: 124/68     Body mass index is 30.73 kg/m². Constitutional: Patient in no acute distress;         Assessment and Plan        1. BPH with obstruction/lower urinary tract symptoms    2. Stricture of urethral meatus in male, unspecified stricture type    3. Prostate cancer screening    4. Phimosis               Plan:       Bph/urethral stricture- pinpoint urethral stricture. Worsening voiding. auass 20--severe symptoms with mixed bother. Cont ditropan. Prostate cancer screening- psa acceptable for his age    Phimosis- can retract foreskin on exam. Don't recommend circumcision.  mycolog PRN      Cystoscopy , greenlight and urethral dilation (30). This is a repeat outlet surgery    Prescriptions Ordered:  No orders of the defined types were placed in this encounter.      Orders Placed:  Orders Placed This Encounter   Procedures    POCT Urinalysis No Micro (Auto)    poct post void residual     Bladder scan            DEISY Morrow MD

## 2022-03-16 ENCOUNTER — TELEPHONE (OUTPATIENT)
Dept: UROLOGY | Age: 82
End: 2022-03-16

## 2022-03-16 DIAGNOSIS — N13.8 BPH WITH OBSTRUCTION/LOWER URINARY TRACT SYMPTOMS: Primary | ICD-10-CM

## 2022-03-16 DIAGNOSIS — N40.1 BPH WITH OBSTRUCTION/LOWER URINARY TRACT SYMPTOMS: Primary | ICD-10-CM

## 2022-03-16 DIAGNOSIS — N35.911 STRICTURE OF URETHRAL MEATUS IN MALE, UNSPECIFIED STRICTURE TYPE: ICD-10-CM

## 2022-03-16 NOTE — TELEPHONE ENCOUNTER
Patient scheduled for a  Cystoscopy, Greenlight Photovaporization of the Prostate with Urethral Dilation with Dr Yarely Toledo on 4/2/22.  We are asking for clearance

## 2022-03-16 NOTE — TELEPHONE ENCOUNTER
SURGERY [address]  [address] [place] [address] [address] [facility], [address]      Phone *[phone]   Surgical Scheduling Direct Line Phone *[phone] Fax *[phone]      Marleny Joselyn 1940 male    [address]  [facility]   Marital Status:           Home Phone: [phone]      Cell Phone:    Telephone Information:   Mobile [phone]          Surgeon: Dr. Guille Ansari Surgery Date: 4/2/22   Time: 10:30 am    Procedure: Cystoscopy, Greenlight Photovaporization of the Prostate with Urethral Dilation    Diagnosis: BPH     Important Medical History:  In [facility]    Special Inst/Equip: GIBSON Fowler # [id number]    CPT Codes:    [id number], 14770  Latex Allergy: No     Cardiac Device:  No    Anesthesia:  General          Admission Type:  Same Day                        Admit Prior to Day of Surgery: No    Case Location:  Main OR            Preadmission Testing:  Phone Call          PAT Date and Time:______________________________________________________    PAT Confirmation #: ______________________________________________________    Post Op Visit: ___________________________________________________________    Need Preop Cardiac Clearance: Yes    Does Patient have Cardiologist/physician?      Dr Emilie Duran Confirmation #: __________________________________________________    Mario Corns: ________________________   Date: __________________________     [employer] Name: Medicare

## 2022-03-16 NOTE — TELEPHONE ENCOUNTER
Patient scheduled for surgery with Dr Scott Perez on 4/2/22. Surgery consent on arrival. Patient to do pre op fasting labs and chest xray on 3/18/22. Dr Nora Ravi to clear. Surgery instructions mailed to the patient. For Malcolm Confirmation # [id number]      Patient to have an adult over the age of 25 with him at the procedure and at home after the procedure.

## 2022-03-16 NOTE — TELEPHONE ENCOUNTER
DO NOT TAKE ASPIRIN,  FISH OIL, COUMADIN, IBUPROFEN, MOTRIN-LIKE DRUGS AND ANY MULTIVITAMINS OR OVER THE COUNTER SUPPLEMENTS 14 DAYS PRIOR TO SURGERY. MUST HAVE AN ADULT OVER THE AGE OF 18 WITH YOU AT THE TIME OF THE PROCEDURE AND WITH YOU AT HOME AFTER THE PROCEDURE FOR 24 HOURS       Shayla Thomas 1940 Diagnosis:    Surgical Physician: Dr. Justin Grace have been scheduled for the procedure marked below:      Surgery: Cystoscopy, Greenlight Photovaporization of the Prostate and Urethral Dilation         Date: 4/2/22     Anesthesia: Anesthesiologist (General/Spinal)     Place of Service: [address] [place] [address] Second Floor Same Day Surgery         Arrive to same day surgery by:  8:30 am  (Surgery time is subject to change)      INSTRUCTIONS AS MARKED BELOW:    1.  DO NOT eat or drink anything after midnight before surgery. 2.  We prefer you shower or bathe with an antibacterial soap (Dial) the morning of surgery. 3.  Please ensure to have a  with you to transport you home. 4.  Please bring a current medication list, photo ID and insurance card(s) with you  5. Okay to take Tylenol  6. If you take Glucophage, Metformin or Janumet, hold 48-hours prior to surgery  7. Take blood pressure or heart medication as directed, if taken in the morning take with a small sip of water  8 . The office will call you in 1-2 days after your procedure to schedule a follow up. DATE SENSITIVE TESTING-DO ON THE DATE LISTED *WALK IN *NO APPOINTMENT    1. TO HAVE ALREADY DONE THE PRE OP FASTING LABS AND CHEST XRAY ON 3/18/22.  ORDERS IN [facility]        Date: 3/16/2022

## 2022-03-17 ENCOUNTER — PREP FOR PROCEDURE (OUTPATIENT)
Dept: UROLOGY | Age: 82
End: 2022-03-17

## 2022-03-17 ENCOUNTER — PATIENT MESSAGE (OUTPATIENT)
Dept: UROLOGY | Age: 82
End: 2022-03-17

## 2022-03-17 RX ORDER — SODIUM CHLORIDE 9 MG/ML
INJECTION, SOLUTION INTRAVENOUS CONTINUOUS
Status: CANCELLED | OUTPATIENT
Start: 2022-04-02

## 2022-03-17 NOTE — TELEPHONE ENCOUNTER
Una Yousif at [facility] took the verbal order for triamcinolone 0.1% 30 gm tube to use with the nystatin.

## 2022-03-17 NOTE — TELEPHONE ENCOUNTER
From: Emanuel Man  To: Dr. Christian Rota: 3/17/2022 12:53 PM EDT  Subject: New RX for fungus    The pharmacy will not fill the Mycolog ll as it is not covered by insurance as a combo med. The nystatin which he has , needs to have a separate order for the Triamcinolone to be ordered separately with an Rx for Triamcinolone only. We have been waiting to get an answer from [facility] and finally today we find out the problem on filling the script. Can you please send in an order for the Trimcinolone by itself? He needs to try and start using this.  Thanks Felipe Aiken

## 2022-03-21 ENCOUNTER — HOSPITAL ENCOUNTER (OUTPATIENT)
Age: 82
Discharge: HOME | End: 2022-03-21
Payer: MEDICARE

## 2022-03-21 ENCOUNTER — HOSPITAL ENCOUNTER (OUTPATIENT)
Dept: GENERAL RADIOLOGY | Age: 82
Discharge: HOME | End: 2022-03-21
Payer: MEDICARE

## 2022-03-21 DIAGNOSIS — N35.911 STRICTURE OF URETHRAL MEATUS IN MALE, UNSPECIFIED STRICTURE TYPE: ICD-10-CM

## 2022-03-21 DIAGNOSIS — N40.1 BPH WITH OBSTRUCTION/LOWER URINARY TRACT SYMPTOMS: ICD-10-CM

## 2022-03-21 DIAGNOSIS — N13.8 BPH WITH OBSTRUCTION/LOWER URINARY TRACT SYMPTOMS: ICD-10-CM

## 2022-03-21 LAB
ANION GAP SERPL CALCULATED.3IONS-SCNC: 12 MEQ/L (ref 8–16)
BASOPHILS # BLD: 0.9 %
BASOPHILS ABSOLUTE: 0 THOU/MM3 (ref 0–0.1)
BUN BLDV-MCNC: 34 MG/DL (ref 7–22)
CALCIUM SERPL-MCNC: 9.5 MG/DL (ref 8.5–10.5)
CHLORIDE BLD-SCNC: 104 MEQ/L (ref 98–111)
CO2: 24 MEQ/L (ref 23–33)
CREAT SERPL-MCNC: 1.8 MG/DL (ref 0.4–1.2)
EOSINOPHIL # BLD: 7.4 %
EOSINOPHILS ABSOLUTE: 0.3 THOU/MM3 (ref 0–0.4)
ERYTHROCYTE [DISTWIDTH] IN BLOOD BY AUTOMATED COUNT: 13.8 % (ref 11.5–14.5)
ERYTHROCYTE [DISTWIDTH] IN BLOOD BY AUTOMATED COUNT: 46.6 FL (ref 35–45)
GFR SERPL CREATININE-BSD FRML MDRD: 36 ML/MIN/1.73M2
GLUCOSE BLD-MCNC: 108 MG/DL (ref 70–108)
HCT VFR BLD CALC: 41.1 % (ref 42–52)
HEMOGLOBIN: 13.3 GM/DL (ref 14–18)
IMMATURE GRANS (ABS): 0.02 THOU/MM3 (ref 0–0.07)
IMMATURE GRANULOCYTES: 0.4 %
LYMPHOCYTES # BLD: 19.6 %
LYMPHOCYTES ABSOLUTE: 0.9 THOU/MM3 (ref 1–4.8)
MCH RBC QN AUTO: 29.8 PG (ref 26–33)
MCHC RBC AUTO-ENTMCNC: 32.4 GM/DL (ref 32.2–35.5)
MCV RBC AUTO: 92.2 FL (ref 80–94)
MONOCYTES # BLD: 8.1 %
MONOCYTES ABSOLUTE: 0.4 THOU/MM3 (ref 0.4–1.3)
NUCLEATED RED BLOOD CELLS: 0 /100 WBC
PLATELET # BLD: 145 THOU/MM3 (ref 130–400)
PMV BLD AUTO: 10.5 FL (ref 9.4–12.4)
POTASSIUM SERPL-SCNC: 4.3 MEQ/L (ref 3.5–5.2)
RBC # BLD: 4.46 MILL/MM3 (ref 4.7–6.1)
SEG NEUTROPHILS: 63.6 %
SEGMENTED NEUTROPHILS ABSOLUTE COUNT: 2.9 THOU/MM3 (ref 1.8–7.7)
SODIUM BLD-SCNC: 140 MEQ/L (ref 135–145)
WBC # BLD: 4.6 THOU/MM3 (ref 4.8–10.8)

## 2022-03-21 PROCEDURE — 71046 X-RAY EXAM CHEST 2 VIEWS: CPT

## 2022-03-21 PROCEDURE — 36415 COLL VENOUS BLD VENIPUNCTURE: CPT

## 2022-03-21 PROCEDURE — 80048 BASIC METABOLIC PNL TOTAL CA: CPT

## 2022-03-21 PROCEDURE — 85025 COMPLETE CBC W/AUTO DIFF WBC: CPT

## 2022-03-24 ENCOUNTER — OFFICE VISIT (OUTPATIENT)
Dept: FAMILY MEDICINE CLINIC | Age: 82
End: 2022-03-24
Payer: MEDICARE

## 2022-03-24 VITALS
WEIGHT: 208.2 LBS | RESPIRATION RATE: 16 BRPM | HEIGHT: 69 IN | BODY MASS INDEX: 30.84 KG/M2 | SYSTOLIC BLOOD PRESSURE: 134 MMHG | DIASTOLIC BLOOD PRESSURE: 61 MMHG | TEMPERATURE: 96.8 F | OXYGEN SATURATION: 97 % | HEART RATE: 80 BPM

## 2022-03-24 DIAGNOSIS — Z91.81 AT HIGH RISK FOR FALLS: Primary | ICD-10-CM

## 2022-03-24 DIAGNOSIS — R39.12 BENIGN PROSTATIC HYPERPLASIA WITH WEAK URINARY STREAM: ICD-10-CM

## 2022-03-24 DIAGNOSIS — N18.31 STAGE 3A CHRONIC KIDNEY DISEASE (HCC): ICD-10-CM

## 2022-03-24 DIAGNOSIS — N40.1 BENIGN PROSTATIC HYPERPLASIA WITH WEAK URINARY STREAM: ICD-10-CM

## 2022-03-24 DIAGNOSIS — E78.5 HYPERLIPIDEMIA, UNSPECIFIED HYPERLIPIDEMIA TYPE: ICD-10-CM

## 2022-03-24 DIAGNOSIS — I20.8 OTHER FORMS OF ANGINA PECTORIS (HCC): ICD-10-CM

## 2022-03-24 DIAGNOSIS — I10 ESSENTIAL HYPERTENSION: ICD-10-CM

## 2022-03-24 PROCEDURE — 1123F ACP DISCUSS/DSCN MKR DOCD: CPT | Performed by: FAMILY MEDICINE

## 2022-03-24 PROCEDURE — 1036F TOBACCO NON-USER: CPT | Performed by: FAMILY MEDICINE

## 2022-03-24 PROCEDURE — G8427 DOCREV CUR MEDS BY ELIG CLIN: HCPCS | Performed by: FAMILY MEDICINE

## 2022-03-24 PROCEDURE — G8417 CALC BMI ABV UP PARAM F/U: HCPCS | Performed by: FAMILY MEDICINE

## 2022-03-24 PROCEDURE — G8484 FLU IMMUNIZE NO ADMIN: HCPCS | Performed by: FAMILY MEDICINE

## 2022-03-24 PROCEDURE — 4040F PNEUMOC VAC/ADMIN/RCVD: CPT | Performed by: FAMILY MEDICINE

## 2022-03-24 PROCEDURE — 99214 OFFICE O/P EST MOD 30 MIN: CPT | Performed by: FAMILY MEDICINE

## 2022-03-24 NOTE — PROGRESS NOTES
Leo Greer is a 78 y.o. male that presents for     Chief Complaint   Patient presents with    Follow-up       /61   Pulse 80   Temp 96.8 °F (36 °C) (Infrared)   Resp 16   Ht 5' 9\" (1.753 m)   Wt 208 lb 3.2 oz (94.4 kg)   SpO2 97%   BMI 30.75 kg/m²       HPI    Notes decreased urinary stream recently. Follows with urology and has a planned procedure. HTN    Does patient check BP regularly at home? - Yes - has been controlled  Current Medication regimen - On norvasc, avapro, triamterene  Tolerating medications well? - yes    Shortness of breath or chest pain? No  Headache or visual complaints? Yes, intermittent  Neurologic changes like confusion? No  Extremity edema? No    BP Readings from Last 3 Encounters:   03/24/22 134/61   03/15/22 124/68   12/17/21 (!) 132/56     Dyslipidemia    Current Medication regimen - pravastatin  Tolerating medications well? No  Personal History of CAD? No   Hx of CAD in first degree relatives? No  Current smoker? No  History of HTN? Yes  History of DM? No      Shortness of breath or chest pain? No  Neurologic changes? No  Extremity edema?  No    Lab Results   Component Value Date    LDLCALC 103 07/20/2020     BP Readings from Last 3 Encounters:   03/24/22 134/61   03/15/22 124/68   12/17/21 (!) 132/56     Wt Readings from Last 3 Encounters:   03/24/22 208 lb 3.2 oz (94.4 kg)   03/15/22 208 lb 1.6 oz (94.4 kg)   12/17/21 206 lb (93.4 kg)         Health Maintenance   Topic Date Due    DTaP/Tdap/Td vaccine (1 - Tdap) Never done    Shingles Vaccine (1 of 2) Never done    Pneumococcal 65+ years Vaccine (2 of 2 - PPSV23) 01/20/2017    Lipid screen  07/20/2021    COVID-19 Vaccine (3 - Booster for Pfizer series) 08/25/2021    Depression Screen  09/13/2022    Annual Wellness Visit (AWV)  09/14/2022    Potassium monitoring  03/21/2023    Creatinine monitoring  03/21/2023    Flu vaccine  Completed    Hepatitis A vaccine  Aged Out    Hepatitis B vaccine  Aged Pulmonary effort is normal. No respiratory distress. Neurological:      Mental Status: He is alert. Psychiatric:         Behavior: Behavior normal.         Thought Content: Thought content normal.         Judgment: Judgment normal.             ASSESSMENT & PLAN  Sherron Moise was seen today for follow-up. Diagnoses and all orders for this visit:    At high risk for falls    Essential hypertension    Other forms of angina pectoris ([address]    Stage 3a chronic kidney disease ([facility] [address]    Benign prostatic hyperplasia with weak urinary stream    Hyperlipidemia, unspecified hyperlipidemia type  -     Comprehensive Metabolic Panel; Future  -     Lipid Panel; Future    -Continue pravastatin, has been tolerating well.  -Chronic issues stable, continue current medications  -Advised to call if any issues      Return in about 6 months (around 9/24/2022). The most recent results of the following tests were reviewed with the patient today: none     All copied or forwarded information in the progress note was verified by me to be accurate at the time of visit  Patient's past medical, surgical, social and family history were reviewed and updated     All patient questions answered. Patient voiced understanding. On the basis of positive falls risk screening, assessment and plan is as follows: home safety tips provided.

## 2022-03-24 NOTE — PROGRESS NOTES
Following instructions given to patient, who states understanding:    NPO after midnight  [employer] and 's license  Wear comfortable clean clothing  Do not bring jewelry   Shower night before and morning of surgery with a liquid antibacterial soap  Bring medications in original bottles  Follow all instructions given by your physician   needed at discharge  Call [phone] for any questions  Report to SDS on 2nd floor  If you would become ill prior to surgery, please call the surgeon  May have a visitor with you, we request that you limit to 2 visitors in pre-op area  Please bring and wear mask

## 2022-03-31 ENCOUNTER — OFFICE VISIT (OUTPATIENT)
Dept: CARDIOLOGY CLINIC | Age: 82
End: 2022-03-31
Payer: MEDICARE

## 2022-03-31 VITALS
WEIGHT: 209.6 LBS | HEART RATE: 48 BPM | DIASTOLIC BLOOD PRESSURE: 70 MMHG | SYSTOLIC BLOOD PRESSURE: 150 MMHG | HEIGHT: 69 IN | BODY MASS INDEX: 31.04 KG/M2

## 2022-03-31 DIAGNOSIS — I10 PRIMARY HYPERTENSION: ICD-10-CM

## 2022-03-31 DIAGNOSIS — I25.10 CORONARY ARTERY DISEASE INVOLVING NATIVE CORONARY ARTERY OF NATIVE HEART WITHOUT ANGINA PECTORIS: ICD-10-CM

## 2022-03-31 DIAGNOSIS — Z01.818 PRE-OP EXAM: Primary | ICD-10-CM

## 2022-03-31 PROCEDURE — 99214 OFFICE O/P EST MOD 30 MIN: CPT | Performed by: NUCLEAR MEDICINE

## 2022-03-31 PROCEDURE — G8417 CALC BMI ABV UP PARAM F/U: HCPCS | Performed by: NUCLEAR MEDICINE

## 2022-03-31 PROCEDURE — 1123F ACP DISCUSS/DSCN MKR DOCD: CPT | Performed by: NUCLEAR MEDICINE

## 2022-03-31 PROCEDURE — 4040F PNEUMOC VAC/ADMIN/RCVD: CPT | Performed by: NUCLEAR MEDICINE

## 2022-03-31 PROCEDURE — 1036F TOBACCO NON-USER: CPT | Performed by: NUCLEAR MEDICINE

## 2022-03-31 PROCEDURE — G8484 FLU IMMUNIZE NO ADMIN: HCPCS | Performed by: NUCLEAR MEDICINE

## 2022-03-31 PROCEDURE — 93000 ELECTROCARDIOGRAM COMPLETE: CPT | Performed by: NUCLEAR MEDICINE

## 2022-03-31 PROCEDURE — G8427 DOCREV CUR MEDS BY ELIG CLIN: HCPCS | Performed by: NUCLEAR MEDICINE

## 2022-03-31 NOTE — PROGRESS NOTES
[address]  [address]  [facility] [address]  Dept: [phone]  Dept Fax: [phone]  Loc: [phone]    Visit Date: 3/31/2022    Harrison Garcia is a 78 y.o. male who presents todayfor:  Chief Complaint   Patient presents with    Cardiac Clearance     cystoscopy, greenlight photovaporization of prostate with Dr. Rusty Carmona scheduled for 4-2-22    Coronary Artery Disease    Cardiac Valve Problem    Hypertension   know mild CAD  Know mild AI  Going for prostate green light laser  Bp is all over   Lower HR int he 40s  some dizziness  No syncope  Some dyspnea  No chest pain   No other issues   On statins for hyperlipidemia  No obvious side effects     HPI:  HPI  Past Medical History:   Diagnosis Date    Anemia     Arthritis     Bronchiolitis 11/2016    Cancer ([address]     SKIN CANCER    CKD (chronic kidney disease), stage III ([address]     Diverticula of colon 5/2014    tx with antibiotics per pt; NO surgery    Diverticulosis     DVT (deep venous thrombosis) (HCC)     s/p    Esophagitis 12/10/2014    Hiatal hernia     HTN (hypertension)     Hyperlipidemia     Kidney stones     Nephrolithiasis     Obesity     Prostate enlargement     benign    Renal insufficiency       Past Surgical History:   Procedure Laterality Date    BLEPHAROPLASTY Bilateral 03/2010    CARDIAC CATHETERIZATION  2000,2010    CATARACT REMOVAL WITH IMPLANT Bilateral 2006,2007    COLONOSCOPY  05/26/2017    ENDOSCOPY, COLON, DIAGNOSTIC      HERNIA REPAIR Bilateral 1996    NASAL SINUS SURGERY      SHOULDER SURGERY      SKIN BIOPSY      SKIN CANCER EXCISION  Feb 2015    top of head    TONSILLECTOMY      TURP  1402,0819    UPPER GASTROINTESTINAL ENDOSCOPY       Family History   Problem Relation Age of Onset    Diabetes Mother     High Blood Pressure Father     Heart Disease Father      Social History     Tobacco Use    Smoking status: Never Smoker    Smokeless tobacco: Never Used    Tobacco comment: never smoked   Substance Use Topics    Alcohol use: Never      Current Outpatient Medications   Medication Sig Dispense Refill    nystatin-triamcinolone (MYCOLOG II) 711175-0.1 UNIT/GM-% cream Apply up to 4 times a day to penile foreskin as needed 1 each 0    amLODIPine (NORVASC) 10 MG tablet TAKE 1 TABLET BY MOUTH DAILY 90 tablet 3    pravastatin (PRAVACHOL) 40 MG tablet TAKE 1 TABLET BY MOUTH EVERY EVENING 90 tablet 3    nystatin (MYCOSTATIN) 046347 UNIT/GM cream Apply topically 2 times daily. 1 each 1    triamterene-hydroCHLOROthiazide (MAXZIDE-25) 37.5-25 MG per tablet TAKE 1 AND 1/2 TABLETS BY MOUTH DAILY 834 tablet 3    folic acid (FOLVITE) 1 MG tablet TAKE 2 & 1/2 (TWO & ONE-HALF) TABLETS BY MOUTH ONCE DAILY 180 tablet 2    oxybutynin (DITROPAN-XL) 5 MG extended release tablet TAKE 1 TABLET BY MOUTH DAILY 90 tablet 2    citalopram (CELEXA) 20 MG tablet TAKE 1 TABLET BY MOUTH DAILY 90 tablet 3    primidone (MYSOLINE) 50 MG tablet TAKE 1 TABLET BY MOUTH TWICE DAILY 180 tablet 3    irbesartan (AVAPRO) 300 MG tablet Take 1 tablet by mouth daily 90 tablet 3    simethicone (MYLICON) 80 MG chewable tablet Take 1 tablet by mouth every 6 hours as needed for Flatulence 60 tablet 3    traMADol (ULTRAM) 50 MG tablet Take 50 mg by mouth every 8 hours as needed for Pain. Delicia Perfect dicyclomine (BENTYL) 10 MG capsule Take 10 mg by mouth 4 times daily (before meals and nightly) PRN      pantoprazole (PROTONIX) 40 MG tablet Take 40 mg by mouth daily.  aspirin 325 MG tablet Take 325 mg by mouth nightly        No current facility-administered medications for this visit.      Allergies   Allergen Reactions    Ace Inhibitors      Intolerant to      Carafate [Sucralfate] Other (See Comments)     Tongue swelling    Flomax [Tamsulosin Hcl]     Iv Dye [Iodides]     Lipitor [Atorvastatin] Other (See Comments)     Myalgias    Lopressor [Metoprolol]     Motrin [Ibuprofen]      Was told not to take due to kidneys     Health Maintenance   Topic Date Due    DTaP/Tdap/Td vaccine (1 - Tdap) Never done    Shingles Vaccine (1 of 2) Never done    Pneumococcal 65+ years Vaccine (2 of 2 - PPSV23) 01/20/2017    Lipid screen  07/20/2021    COVID-19 Vaccine (3 - Booster for Pfizer series) 08/25/2021    Depression Screen  09/13/2022    Annual Wellness Visit (AWV)  09/14/2022    Potassium monitoring  03/21/2023    Creatinine monitoring  03/21/2023    Flu vaccine  Completed    Hepatitis A vaccine  Aged Out    Hepatitis B vaccine  Aged Out    Hib vaccine  Aged Out    Meningococcal (ACWY) vaccine  Aged Out       Subjective:  Review of Systems  General:   No fever, no chills, No fatigue or weight loss  Pulmonary:    some dyspnea, no wheezing  Cardiac:    Denies recent chest pain,   GI:     No nausea or vomiting, no abdominal pain  Neuro:    No dizziness or light headedness,   Musculoskeletal:  No recent active issues  Extremities:   No edema, no obvious claudication       Objective:  Physical Exam  BP (!) 150/70   Pulse (!) 48   Ht 5' 9\" (1.753 m)   Wt 209 lb 9.6 oz (95.1 kg)   BMI 30.95 kg/m²   General:   Well developed, well nourished  Lungs:   Clear to auscultation  Heart:    Normal S1 S2, Slight murmur. no rubs, no gallops  Abdomen:   Soft, non tender, no organomegalies, positive bowel sounds  Extremities:   No edema, no cyanosis, good peripheral pulses  Neurological:   Awake, alert, oriented. No obvious focal deficits  Musculoskelatal:  No obvious deformities    Assessment:      Diagnosis Orders   1. Pre-op exam  EKG 12 Lead   2. Coronary artery disease involving native coronary artery of native heart without angina pectoris  EKG 12 Lead   3. Primary hypertension     as above  Lower HR   Some symptoms  Stable for prostate surgery   ECG in office was done today. I reviewed the ECG. No acute findings      Plan:  No follow-ups on file.   Clear for surgery   Will plan a holter 24   Monitor the HR  Monitor the BP  Keep caffeine down   Keep salt down   Continue risk factor modification and medical management  Thank you for allowing me to participate in the care of your patient. Please don't hesitate to contact me regarding any further issues related to the patient care    Orders Placed:  Orders Placed This Encounter   Procedures    EKG 12 Lead     Order Specific Question:   Reason for Exam?     Answer: Other       Medications Prescribed:  No orders of the defined types were placed in this encounter. Discussed use, benefit, and side effects of prescribed medications. All patient questions answered. Pt voicedunderstanding. Instructed to continue current medications, diet and exercise. Continue risk factor modification and medical management. Patient agreed with treatment plan. Follow up as directed.     Electronically signedby Radha El MD on 3/31/2022 at 12:37 PM

## 2022-04-02 ENCOUNTER — HOSPITAL ENCOUNTER (OUTPATIENT)
Age: 82
Setting detail: OUTPATIENT SURGERY
Discharge: HOME | End: 2022-04-02
Attending: UROLOGY | Admitting: UROLOGY
Payer: MEDICARE

## 2022-04-02 ENCOUNTER — ANESTHESIA (OUTPATIENT)
Dept: OPERATING ROOM | Age: 82
End: 2022-04-02
Payer: MEDICARE

## 2022-04-02 ENCOUNTER — ANESTHESIA EVENT (OUTPATIENT)
Dept: OPERATING ROOM | Age: 82
End: 2022-04-02
Payer: MEDICARE

## 2022-04-02 VITALS
SYSTOLIC BLOOD PRESSURE: 177 MMHG | HEART RATE: 55 BPM | RESPIRATION RATE: 18 BRPM | HEIGHT: 69 IN | OXYGEN SATURATION: 97 % | BODY MASS INDEX: 30.51 KG/M2 | DIASTOLIC BLOOD PRESSURE: 75 MMHG | WEIGHT: 206 LBS | TEMPERATURE: 97.3 F

## 2022-04-02 VITALS — OXYGEN SATURATION: 94 % | DIASTOLIC BLOOD PRESSURE: 67 MMHG | TEMPERATURE: 97.7 F | SYSTOLIC BLOOD PRESSURE: 150 MMHG

## 2022-04-02 DIAGNOSIS — G89.18 POSTOPERATIVE PAIN: Primary | ICD-10-CM

## 2022-04-02 PROCEDURE — 2580000003 HC RX 258: Performed by: UROLOGY

## 2022-04-02 PROCEDURE — 3700000001 HC ADD 15 MINUTES (ANESTHESIA): Performed by: UROLOGY

## 2022-04-02 PROCEDURE — 6370000000 HC RX 637 (ALT 250 FOR IP): Performed by: UROLOGY

## 2022-04-02 PROCEDURE — 2500000003 HC RX 250 WO HCPCS: Performed by: NURSE ANESTHETIST, CERTIFIED REGISTERED

## 2022-04-02 PROCEDURE — 6360000002 HC RX W HCPCS: Performed by: NURSE ANESTHETIST, CERTIFIED REGISTERED

## 2022-04-02 PROCEDURE — 7100000011 HC PHASE II RECOVERY - ADDTL 15 MIN: Performed by: UROLOGY

## 2022-04-02 PROCEDURE — 7100000001 HC PACU RECOVERY - ADDTL 15 MIN: Performed by: UROLOGY

## 2022-04-02 PROCEDURE — 3700000000 HC ANESTHESIA ATTENDED CARE: Performed by: UROLOGY

## 2022-04-02 PROCEDURE — 6360000002 HC RX W HCPCS: Performed by: UROLOGY

## 2022-04-02 PROCEDURE — 2720000010 HC SURG SUPPLY STERILE: Performed by: UROLOGY

## 2022-04-02 PROCEDURE — 3600000013 HC SURGERY LEVEL 3 ADDTL 15MIN: Performed by: UROLOGY

## 2022-04-02 PROCEDURE — 7100000000 HC PACU RECOVERY - FIRST 15 MIN: Performed by: UROLOGY

## 2022-04-02 PROCEDURE — 7100000010 HC PHASE II RECOVERY - FIRST 15 MIN: Performed by: UROLOGY

## 2022-04-02 PROCEDURE — 2709999900 HC NON-CHARGEABLE SUPPLY: Performed by: UROLOGY

## 2022-04-02 PROCEDURE — 3600000003 HC SURGERY LEVEL 3 BASE: Performed by: UROLOGY

## 2022-04-02 RX ORDER — LIDOCAINE HYDROCHLORIDE 20 MG/ML
INJECTION, SOLUTION INTRAVENOUS PRN
Status: DISCONTINUED | OUTPATIENT
Start: 2022-04-02 | End: 2022-04-02 | Stop reason: SDUPTHER

## 2022-04-02 RX ORDER — SUCCINYLCHOLINE/SOD CL,ISO/PF 200MG/10ML
SYRINGE (ML) INTRAVENOUS PRN
Status: DISCONTINUED | OUTPATIENT
Start: 2022-04-02 | End: 2022-04-02 | Stop reason: SDUPTHER

## 2022-04-02 RX ORDER — SODIUM CHLORIDE 0.9 % (FLUSH) 0.9 %
5-40 SYRINGE (ML) INJECTION EVERY 12 HOURS SCHEDULED
Status: DISCONTINUED | OUTPATIENT
Start: 2022-04-02 | End: 2022-04-02 | Stop reason: HOSPADM

## 2022-04-02 RX ORDER — FENTANYL CITRATE 50 UG/ML
25 INJECTION, SOLUTION INTRAMUSCULAR; INTRAVENOUS EVERY 5 MIN PRN
Status: DISCONTINUED | OUTPATIENT
Start: 2022-04-02 | End: 2022-04-02 | Stop reason: HOSPADM

## 2022-04-02 RX ORDER — ONDANSETRON 2 MG/ML
INJECTION INTRAMUSCULAR; INTRAVENOUS PRN
Status: DISCONTINUED | OUTPATIENT
Start: 2022-04-02 | End: 2022-04-02 | Stop reason: SDUPTHER

## 2022-04-02 RX ORDER — PROPOFOL 10 MG/ML
INJECTION, EMULSION INTRAVENOUS PRN
Status: DISCONTINUED | OUTPATIENT
Start: 2022-04-02 | End: 2022-04-02 | Stop reason: SDUPTHER

## 2022-04-02 RX ORDER — FENTANYL CITRATE 50 UG/ML
50 INJECTION, SOLUTION INTRAMUSCULAR; INTRAVENOUS EVERY 5 MIN PRN
Status: DISCONTINUED | OUTPATIENT
Start: 2022-04-02 | End: 2022-04-02 | Stop reason: HOSPADM

## 2022-04-02 RX ORDER — FENTANYL CITRATE 50 UG/ML
INJECTION, SOLUTION INTRAMUSCULAR; INTRAVENOUS PRN
Status: DISCONTINUED | OUTPATIENT
Start: 2022-04-02 | End: 2022-04-02 | Stop reason: SDUPTHER

## 2022-04-02 RX ORDER — HYDROCODONE BITARTRATE AND ACETAMINOPHEN 5; 325 MG/1; MG/1
1 TABLET ORAL EVERY 6 HOURS PRN
Qty: 12 TABLET | Refills: 0 | Status: SHIPPED | OUTPATIENT
Start: 2022-04-02 | End: 2022-04-07

## 2022-04-02 RX ORDER — DOCUSATE SODIUM 100 MG/1
100 CAPSULE, LIQUID FILLED ORAL 2 TIMES DAILY
Qty: 28 CAPSULE | Refills: 0 | Status: SHIPPED | OUTPATIENT
Start: 2022-04-02 | End: 2022-04-16

## 2022-04-02 RX ORDER — CIPROFLOXACIN 500 MG/1
500 TABLET, FILM COATED ORAL 2 TIMES DAILY
Qty: 10 TABLET | Refills: 0 | Status: SHIPPED | OUTPATIENT
Start: 2022-04-02 | End: 2022-04-07

## 2022-04-02 RX ORDER — SODIUM CHLORIDE 9 MG/ML
INJECTION, SOLUTION INTRAVENOUS PRN
Status: DISCONTINUED | OUTPATIENT
Start: 2022-04-02 | End: 2022-04-02 | Stop reason: HOSPADM

## 2022-04-02 RX ORDER — PHENAZOPYRIDINE HYDROCHLORIDE 200 MG/1
200 TABLET, FILM COATED ORAL 3 TIMES DAILY PRN
Qty: 15 TABLET | Refills: 1 | Status: SHIPPED | OUTPATIENT
Start: 2022-04-02 | End: 2022-04-07

## 2022-04-02 RX ORDER — HYDRALAZINE HYDROCHLORIDE 20 MG/ML
INJECTION INTRAMUSCULAR; INTRAVENOUS
Status: DISCONTINUED
Start: 2022-04-02 | End: 2022-04-02 | Stop reason: HOSPADM

## 2022-04-02 RX ORDER — HYDROCODONE BITARTRATE AND ACETAMINOPHEN 5; 325 MG/1; MG/1
1 TABLET ORAL ONCE
Status: COMPLETED | OUTPATIENT
Start: 2022-04-02 | End: 2022-04-02

## 2022-04-02 RX ORDER — SODIUM CHLORIDE 9 MG/ML
INJECTION, SOLUTION INTRAVENOUS CONTINUOUS
Status: DISCONTINUED | OUTPATIENT
Start: 2022-04-02 | End: 2022-04-02 | Stop reason: HOSPADM

## 2022-04-02 RX ORDER — SODIUM CHLORIDE 0.9 % (FLUSH) 0.9 %
5-40 SYRINGE (ML) INJECTION PRN
Status: DISCONTINUED | OUTPATIENT
Start: 2022-04-02 | End: 2022-04-02 | Stop reason: HOSPADM

## 2022-04-02 RX ADMIN — PROPOFOL 120 MG: 10 INJECTION, EMULSION INTRAVENOUS at 11:37

## 2022-04-02 RX ADMIN — Medication 140 MG: at 11:37

## 2022-04-02 RX ADMIN — FENTANYL CITRATE 50 MCG: 50 INJECTION, SOLUTION INTRAMUSCULAR; INTRAVENOUS at 11:48

## 2022-04-02 RX ADMIN — LIDOCAINE HYDROCHLORIDE 100 MG: 20 INJECTION, SOLUTION INTRAVENOUS at 11:37

## 2022-04-02 RX ADMIN — ONDANSETRON HYDROCHLORIDE 4 MG: 4 INJECTION, SOLUTION INTRAMUSCULAR; INTRAVENOUS at 11:41

## 2022-04-02 RX ADMIN — SODIUM CHLORIDE: 9 INJECTION, SOLUTION INTRAVENOUS at 09:27

## 2022-04-02 RX ADMIN — Medication 2000 MG: at 11:42

## 2022-04-02 RX ADMIN — HYDROCODONE BITARTRATE AND ACETAMINOPHEN 1 TABLET: 5; 325 TABLET ORAL at 12:54

## 2022-04-02 ASSESSMENT — PAIN SCALES - GENERAL
PAINLEVEL_OUTOF10: 4
PAINLEVEL_OUTOF10: 3
PAINLEVEL_OUTOF10: 4
PAINLEVEL_OUTOF10: 3
PAINLEVEL_OUTOF10: 4
PAINLEVEL_OUTOF10: 0

## 2022-04-02 ASSESSMENT — PULMONARY FUNCTION TESTS
PIF_VALUE: 0
PIF_VALUE: 16
PIF_VALUE: 15
PIF_VALUE: 1
PIF_VALUE: 11
PIF_VALUE: 2
PIF_VALUE: 7
PIF_VALUE: 17
PIF_VALUE: 1
PIF_VALUE: 8
PIF_VALUE: 19
PIF_VALUE: 1
PIF_VALUE: 19
PIF_VALUE: 15
PIF_VALUE: 17
PIF_VALUE: 1
PIF_VALUE: 18
PIF_VALUE: 15
PIF_VALUE: 18
PIF_VALUE: 28
PIF_VALUE: 0
PIF_VALUE: 17
PIF_VALUE: 1
PIF_VALUE: 18
PIF_VALUE: 13
PIF_VALUE: 7
PIF_VALUE: 13
PIF_VALUE: 15

## 2022-04-02 ASSESSMENT — PAIN - FUNCTIONAL ASSESSMENT: PAIN_FUNCTIONAL_ASSESSMENT: 0-10

## 2022-04-02 ASSESSMENT — PAIN DESCRIPTION - DESCRIPTORS: DESCRIPTORS: BURNING

## 2022-04-02 ASSESSMENT — PAIN DESCRIPTION - FREQUENCY: FREQUENCY: CONTINUOUS

## 2022-04-02 ASSESSMENT — PAIN DESCRIPTION - LOCATION: LOCATION: PENIS

## 2022-04-02 ASSESSMENT — PAIN DESCRIPTION - PAIN TYPE: TYPE: SURGICAL PAIN

## 2022-04-02 NOTE — PROGRESS NOTES
1325 pt up walking in the halls with no issues. Urine remains clear in calabrese in bag. Pt has met discharge criteria and states he is ready for discharge to home. IV removed, gauze and tape applied. Dressed in own clothes and personal belongings gathered. Discharge instructions (with opioid medication education information) given to pt and family; pt and family verbalized understanding of discharge instructions, prescriptions and follow up appointments. Pt home with calabrese, all questions answered. Pt transported to discharge lobby by [facility] staff.

## 2022-04-02 NOTE — H&P
Kiran Machuca MD  History and Physical    Patient:  Sandro Gomez  MRN: [id number]  YOB: 1940    HISTORY OF PRESENT ILLNESS:     The patient is a 78 y.o. male who presents with bph, urethral stricture. Here for procedure. Patient's old records, notes and chart reviewed and summarized above. Kiran Machuca MD independently reviewed the images and verified the radiology reports from:    XR CHEST (2 VW)    Result Date: 3/21/2022  PROCEDURE: XR CHEST (2 VW) CLINICAL INFORMATION: BPH with obstruction/lower urinary tract symptoms, BPH with obstruction/lower urinary tract symptoms, Stricture of urethral meatus in male, unspecified stricture type COMPARISON: The 14th 2018 TECHNIQUE: PA and lateral views of the chest were obtained. 1. Normal heart size. Prior surgery right shoulder. Small cervical rib left side. 2. No acute findings. No infiltrates or effusions are seen. **This report has been created using voice recognition software. It may contain minor errors which are inherent in voice recognition technology. ** Final report electronically signed by Dr. Nilda Cantrell on 3/21/2022 1:14 PM        Past Medical History:    Past Medical History:   Diagnosis Date    Anemia     Arthritis     Bronchiolitis 11/2016    Cancer [facility])     SKIN CANCER    CKD (chronic kidney disease), stage III ([facility] [address]     Diverticula of colon 5/2014    tx with antibiotics per pt; NO surgery    Diverticulosis     DVT (deep venous thrombosis) ([facility])     s/p    Esophagitis 12/10/2014    Hiatal hernia     HTN (hypertension)     Hyperlipidemia     Kidney stones     Nephrolithiasis     Obesity     Prostate enlargement     benign    Renal insufficiency        Past Surgical History:    Past Surgical History:   Procedure Laterality Date    BLEPHAROPLASTY Bilateral 03/2010    CARDIAC CATHETERIZATION  2000,2010    CATARACT REMOVAL WITH IMPLANT Bilateral 2006,2007    COLONOSCOPY  05/26/2017    ENDOSCOPY, COLON, Historical Provider, MD   aspirin 325 MG tablet Take 325 mg by mouth nightly     Historical Provider, MD       Allergies:  Ace inhibitors, Carafate [sucralfate], Flomax [tamsulosin hcl], Iv dye [iodides], Lipitor [atorvastatin], Lopressor [metoprolol], and Motrin [ibuprofen]    Social History:    Social History     Socioeconomic History    Marital status:      Spouse name: Not on file    Number of children: Not on file    Years of education: Not on file    Highest education level: Not on file   Occupational History    Not on file   Tobacco Use    Smoking status: Never Smoker    Smokeless tobacco: Never Used    Tobacco comment: never smoked   Vaping Use    Vaping Use: Never used   Substance and Sexual Activity    Alcohol use: Never    Drug use: No    Sexual activity: Not on file   Other Topics Concern    Not on file   Social History Narrative    Not on file     Social Determinants of Health     Financial Resource Strain: Low Risk     Difficulty of Paying Living Expenses: Not hard at all   Food Insecurity: No Food Insecurity    Worried About [email or web address] in the Last Year: Never true    [address] in the Last Year: Never true   Transportation Needs:     Lack of Transportation (Medical): Not on file    Lack of Transportation (Non-Medical):  Not on file   Physical Activity:     Days of Exercise per Week: Not on file    Minutes of Exercise per Session: Not on file   Stress:     Feeling of Stress : Not on file   Social Connections:     Frequency of Communication with Friends and Family: Not on file    Frequency of Social Gatherings with Friends and Family: Not on file    Attends Evangelical Services: Not on file    Active Member of Clubs or Organizations: Not on file    Attends Club or Organization Meetings: Not on file    Marital Status: Not on file   Intimate Partner Violence:     Fear of Current or Ex-Partner: Not on file    Emotionally Abused: Not on file    Physically Abused: Not on file    Sexually Abused: Not on file   Housing Stability:     Unable to Pay for Housing in the Last Year: Not on file    Number of Places Lived in the Last Year: Not on file    Unstable Housing in the Last Year: Not on file       Family History:    Family History   Problem Relation Age of Onset    Diabetes Mother     High Blood Pressure Father     Heart Disease Father        REVIEW OF SYSTEMS:  Constitutional: negative  Eyes: negative  Respiratory: negative  Cardiovascular: negative  Gastrointestinal: negative  Genitourinary: no acute issues  Musculoskeletal: negative  Skin: negative   Neurological: negative  Hematological/Lymphatic: negative  Psychological: negative    Physical Exam:      Patient Vitals for the past 24 hrs:   BP Temp Temp src Pulse Resp SpO2   04/02/22 0846 (!) 178/77 97.7 °F (36.5 °C) Temporal 56 16 98 %     Constitutional: Patient in no acute distress; Neuro: alert and oriented to person place and time. Psych: Mood and affect normal.  Skin: Normal  Lungs: Respiratory effort normal, CTA  Cardiovascular:  Normal peripheral pulses; no murmur. Normal rhythm  Abdomen: Soft, non-tender, non-distended with no CVA, flank pain, hepatosplenomegaly or hernia. Kidneys normal.  Bladder non-tender and not distended. LABS:   No results for input(s): WBC, HGB, HCT, MCV, PLT in the last 72 hours. No results for input(s): NA, K, CL, CO2, PHOS, BUN, CREATININE, CA in the last 72 hours. Lab Results   Component Value Date    PSA 3.81 (H) 11/23/2021    PSA 2.82 (H) 11/20/2020    PSA 2.97 (H) 11/20/2019         Urinalysis: No results for input(s): COLORU, PHUR, LABCAST, WBCUA, RBCUA, MUCUS, TRICHOMONAS, YEAST, BACTERIA, CLARITYU, SPECGRAV, LEUKOCYTESUR, UROBILINOGEN, BILIRUBINUR, BLOODU in the last 72 hours.     Invalid input(s): NITRATE, GLUCOSEUKETONESUAMORPHOUS     -----------------------------------------------------------------      Assessment and Plan     Impression:    Patient Active Problem List   Diagnosis    CKD (chronic kidney disease), stage III (HCC)    Hyperlipidemia    Prostate enlargement    Anemia    HTN (hypertension)    Hiatal hernia    Diverticulosis    Obesity    Diverticulitis of large intestine with perforation    Esophagitis    Generalized abdominal pain    Diverticulitis of colon    Angina effort    Abnormal stress test    Benign essential tremor       Plan:     Consent obtained; cysto greenlight urethral dilation in OR today    Anastasiia Combs MD  8:50 AM 4/2/2022

## 2022-04-02 NOTE — OP NOTE
[address] [place] [address]. [place]    DATE: 4/2/2022  Patient:  Joan Amin  MRN: [id number]  YOB: 1940    SURGEON: Tiff Heath MD.    ASSISTANT: none    PREOPERATIVE DIAGNOSIS: BPH with outlet obstruction    POSTOPERATIVE DIAGNOSIS: BPH with outlet obstruction    PROCEDURE PERFORMED:  Cystoscopy, Greenlight Photovaporization of Prostate, Urethral dilation      ANESTHESIA: General    COMPLICATIONS: none    OR BLOOD LOSS:  Minimal    FLUIDS: Cystalloids per Anesthesia    SPECIMENS:  * No specimens in log *  none    DRAINS: [address] urethral calabrese    INDICATIONS FOR PROCEDURE:  The patient is a 78 y.o. male who presents today with BPH here for CYSTOSCOPY GREENLIGHT PHOTOVAPORIZATION OF THE PROSTATE WITH URETHERAL DILATION. After risks, benefits and alternatives of the procedure were discussed with the patient, the patient elected to proceed. DETAILS OF PROCEDURE:  After informed consent was obtained in the preoperative area, the patient was taken back to the operating room and transferred to the operating table in supine position. EPC cuffs were placed. The machine was turned on. Anesthesia was induced and antibiotics were given. The patient was placed in modified dorsal lithotomy position and sterilely prepped and draped in a standard fashion. A timeout occurred. Two patient identifiers were used. The 25F rigid scope with the visual obturator was carefully advanced through the urethra. urethral dilation was needed to advance the scope. Once within the bladder the visual obturator was exchanged for the resection bridge. The ureteral orifices were located and noted to be remote from the bladder neck. The prostate was surveyed. the lateral lobes were moderately obstructing. There was no median lobe present. Enucleation of the median lobe was not performed. There was a previous TURP defect.  The vaporization was started proximal with a power setting of 80W. Both the left and right lateral lobes were vaporized in their entirety down to the level of the surgical capsule up to a power of 120 jernigan. With this complete, the apical dissection was carried out delicately. All of the obstructing adenoma was vaporized. Exquisite care was taken to ensure the integrity of the urinary sphincter. Once completed, the sphincter was evaluated and found to be clear of the resection bed, and completely intact. The anterior adenoma was the last tissue to be addressed. The scope was rotated for ease of resection. One last evaluation of the prostatic urethra demonstrated complete vaporization of the gland to the surgical capsule circumferentially. The scope was advanced into the bladder. The ureteral orifices were re-surveyed and noted to be in pristine condition, free of laser scatter. The cystoscope was then removed, and a 22F 3-way [place] catheter was placed into the bladder. When urine returned, the balloon was instilled with sterile water. The catheter was attached to gentle bladder irrigation using 0.9% normal saline. The catheter was fixed to the leg using a Gibran strap. The patient was then awakened and discharged back to the PACU in good and stable condition. Follow-Up: The bladder will be irrigated in the PACU.  As long as the urine remains clear, the irrigation port will be plugged and the patient will be discharged home with plans to follow for catheter removal.

## 2022-04-02 NOTE — ANESTHESIA PRE PROCEDURE
Department of Anesthesiology  Preprocedure Note       Name:  Gena Willard   Age:  78 y.o.  :  1940                                          MRN:  [id number]         Date:  2022      Surgeon: Shai Jacinto):  Yessenia Dunn MD    Procedure: Procedure(s):  CYSTOSCOPY GREENLIGHT PHOTOVAPORIZATION OF THE PROSTATE WITH URETHERAL DILATION    Medications prior to admission:   Prior to Admission medications    Medication Sig Start Date End Date Taking? Authorizing Provider   nystatin-triamcinolone [facility]) 318645-2.1 UNIT/GM-% cream Apply up to 4 times a day to penile foreskin as needed 3/15/22   Yessenia Dunn MD   amLODIPine (NORVASC) 10 MG tablet TAKE 1 TABLET BY MOUTH DAILY 3/7/22   Deepthi Brush, DO   pravastatin (PRAVACHOL) 40 MG tablet TAKE 1 TABLET BY MOUTH EVERY EVENING 21   Deepthi Brush, DO   nystatin (MYCOSTATIN) 290925 UNIT/GM cream Apply topically 2 times daily. 21   Deepthi Brush, DO   triamterene-hydroCHLOROthiazide (MAXZIDE-25) 37.5-25 MG per tablet TAKE 1 AND 1/2 TABLETS BY MOUTH DAILY 21   Deepthi Brush, DO   folic acid (FOLVITE) 1 MG tablet TAKE 2 & 1/2 (TWO & ONE-HALF) TABLETS BY MOUTH ONCE DAILY 21   Jeanine Gold, DO   oxybutynin (DITROPAN-XL) 5 MG extended release tablet TAKE 1 TABLET BY MOUTH DAILY 21  ALVINO Gonzalze CNP   citalopram (CELEXA) 20 MG tablet TAKE 1 TABLET BY MOUTH DAILY 21   Deepthi Brush, DO   primidone (MYSOLINE) 50 MG tablet TAKE 1 TABLET BY MOUTH TWICE DAILY 21   Deepthi Brush, DO   irbesartan (AVAPRO) 300 MG tablet Take 1 tablet by mouth daily 21   Maame Bernard MD   simethicone (MYLICON) 80 MG chewable tablet Take 1 tablet by mouth every 6 hours as needed for Flatulence 3/4/19   Deepthi Brush, DO   traMADol (ULTRAM) 50 MG tablet Take 50 mg by mouth every 8 hours as needed for Pain. Tyler Wallace     Historical Provider, MD   dicyclomine (BENTYL) 10 MG capsule Take 10 mg by mouth 4 times daily (before meals and nightly) PRN Historical Provider, MD   pantoprazole (PROTONIX) 40 MG tablet Take 40 mg by mouth daily. 12/10/14   Historical Provider, MD   aspirin 325 MG tablet Take 325 mg by mouth nightly     Historical Provider, MD       Current medications:    Current Facility-Administered Medications   Medication Dose Route Frequency Provider Last Rate Last Admin    0.9 % sodium chloride infusion   IntraVENous Continuous Chapo Desai  mL/hr at 04/02/22 0927 New Bag at 04/02/22 0927    ceFAZolin (ANCEF) 2000 mg in sterile water 20 mL IV syringe  2,000 mg IntraVENous [address], MD           Allergies:     Allergies   Allergen Reactions    Ace Inhibitors      Intolerant to      Carafate [Sucralfate] Other (See Comments)     Tongue swelling    Flomax [Tamsulosin Hcl]     Iv Dye [Iodides]     Lipitor [Atorvastatin] Other (See Comments)     Myalgias    Lopressor [Metoprolol]     Motrin [Ibuprofen]      Was told not to take due to kidneys       Problem List:    Patient Active Problem List   Diagnosis Code    CKD (chronic kidney disease), stage III ([facility]) N18.30    Hyperlipidemia E78.5    Prostate enlargement N40.0    Anemia D64.9    HTN (hypertension) I10    Hiatal hernia K44.9    Diverticulosis K57.90    Obesity E66.9    Diverticulitis of large intestine with perforation K57.20    Esophagitis K20.90    Generalized abdominal pain R10.84    Diverticulitis of colon K57.32    Angina effort I20.8    Abnormal stress test R94.39    Benign essential tremor G25.0       Past Medical History:        Diagnosis Date    Anemia     Arthritis     Bronchiolitis 11/2016    Cancer ([facility] [address]     SKIN CANCER    CKD (chronic kidney disease), stage III ([facility] [address]     Diverticula of colon 5/2014    tx with antibiotics per pt; NO surgery    Diverticulosis     DVT (deep venous thrombosis) ([facility])     s/p    Esophagitis 12/10/2014    Hiatal hernia     HTN (hypertension)     Hyperlipidemia     Kidney stones     Nephrolithiasis  Obesity     Prostate enlargement     benign    Renal insufficiency        Past Surgical History:        Procedure Laterality Date    BLEPHAROPLASTY Bilateral 03/2010    CARDIAC CATHETERIZATION  2000,2010    CATARACT REMOVAL WITH IMPLANT Bilateral 7927,8256    COLONOSCOPY  05/26/2017    ENDOSCOPY, COLON, DIAGNOSTIC      HERNIA REPAIR Bilateral 1996    NASAL SINUS SURGERY      SHOULDER SURGERY      SKIN BIOPSY      SKIN CANCER EXCISION  Feb 2015    top of head    TONSILLECTOMY      TURP  1458,8778    UPPER GASTROINTESTINAL ENDOSCOPY         Social History:    Social History     Tobacco Use    Smoking status: Never Smoker    Smokeless tobacco: Never Used    Tobacco comment: never smoked   Substance Use Topics    Alcohol use: Never                                Counseling given: Not Answered  Comment: never smoked      Vital Signs (Current):   Vitals:    03/24/22 1450 04/02/22 0846 04/02/22 0850   BP:  (!) 178/77    Pulse:  56    Resp:  16    Temp:  97.7 °F (36.5 °C)    TempSrc:  Temporal    SpO2:  98%    Weight: 206 lb (93.4 kg)  206 lb (93.4 kg)   Height:   5' 9\" (1.753 m)                                              BP Readings from Last 3 Encounters:   04/02/22 (!) 178/77   03/31/22 (!) 150/70   03/24/22 134/61       NPO Status: Time of last liquid consumption: 2200                        Time of last solid consumption: 2200                        Date of last liquid consumption: 04/01/22                        Date of last solid food consumption: 04/01/22    BMI:   Wt Readings from Last 3 Encounters:   04/02/22 206 lb (93.4 kg)   03/31/22 209 lb 9.6 oz (95.1 kg)   03/24/22 208 lb 3.2 oz (94.4 kg)     Body mass index is 30.42 kg/m².     CBC:   Lab Results   Component Value Date    WBC 4.6 03/21/2022    RBC 4.46 03/21/2022    HGB 13.3 03/21/2022    HCT 41.1 03/21/2022    MCV 92.2 03/21/2022    RDW 14.5 04/18/2018     03/21/2022       CMP:   Lab Results   Component Value Date     03/21/2022    K 4.3 03/21/2022    K 4.3 02/26/2018     03/21/2022    CO2 24 03/21/2022    BUN 34 03/21/2022    CREATININE 1.8 03/21/2022    LABGLOM 36 03/21/2022    GLUCOSE 108 03/21/2022    PROT 6.9 10/07/2021    CALCIUM 9.5 03/21/2022    BILITOT 0.4 10/07/2021    ALKPHOS 100 10/07/2021    AST 23 10/07/2021    ALT 16 10/07/2021       POC Tests: No results for input(s): POCGLU, POCNA, POCK, POCCL, POCBUN, POCHEMO, POCHCT in the last 72 hours. Coags:   Lab Results   Component Value Date    INR 1.03 04/18/2018    APTT 29.6 03/25/2015       HCG (If Applicable): No results found for: PREGTESTUR, PREGSERUM, HCG, HCGQUANT     ABGs: No results found for: PHART, PO2ART, QUC7APD, UUB9XIF, BEART, C1NXBPZJ     Type & Screen (If Applicable):  Lab Results   Component Value Date    LABRH NEG 02/26/2018       Drug/Infectious Status (If Applicable):  No results found for: HIV, HEPCAB    COVID-19 Screening (If Applicable): No results found for: COVID19        Anesthesia Evaluation  Patient summary reviewed  Airway: Mallampati: II  TM distance: >3 FB   Neck ROM: full  Mouth opening: > = 3 FB Dental:          Pulmonary:                              Cardiovascular:    (+) hypertension:,       ECG reviewed                        Neuro/Psych:               GI/Hepatic/Renal:   (+) hiatal hernia, GERD:,           Endo/Other:                     Abdominal:             Vascular: Other Findings:             Anesthesia Plan      general     ASA 2       Induction: intravenous. MIPS: Postoperative opioids intended and Prophylactic antiemetics administered. Anesthetic plan and risks discussed with patient and spouse. Plan discussed with DICKSON. Avelino Jensen.  DO Beba   4/2/2022

## 2022-04-02 NOTE — ANESTHESIA POSTPROCEDURE EVALUATION
Department of Anesthesiology  Postprocedure Note    Patient: Howard Cota  MRN: [id number]  YOB: 1940  Date of evaluation: 4/2/2022  Time:  12:26 PM     Procedure Summary     Date: 04/02/22 Room / Location: [address] [facility]    Anesthesia Start: 1131 Anesthesia Stop: 2658    Procedure: CYSTOSCOPY GREENLIGHT PHOTOVAPORIZATION OF THE PROSTATE WITH URETHERAL DILATION (N/A ) Diagnosis: (BPH)    Surgeons: Chapo Desai MD Responsible Provider: Meet Claros DO    Anesthesia Type: general ASA Status: 2          Anesthesia Type: general    Julia Phase I: Julia Score: 9    Julia Phase II:      Last vitals: Reviewed and per EMR flowsheets.        Anesthesia Post Evaluation    Patient location during evaluation: bedside  Patient participation: complete - patient participated  Level of consciousness: awake  Airway patency: patent  Nausea & Vomiting: no vomiting and no nausea  Cardiovascular status: hemodynamically stable  Respiratory status: acceptable  Hydration status: stable

## 2022-04-02 NOTE — PROGRESS NOTES
1200 Awake and oriented on arrival to PACU , HOB elevated , pt c/o slight burning to penis , pt able to drift back to sleep easily   87320 O2 off  1215 eyes closed resp easy   1225 Pt unchanged , denies any needs  1230 meets criteria for discharge , transported to [facility]

## 2022-04-02 NOTE — PROGRESS NOTES
Pt returned to [facility] room 11. Vitals and assessment as charted. 0.9 infusing, @550ml to count from PACU. Pt has ice cream and pop. Family at the bedside. Pt and family verbalized understanding of discharge criteria and call light use. Call light in reach.

## 2022-04-04 ENCOUNTER — NURSE ONLY (OUTPATIENT)
Dept: UROLOGY | Age: 82
End: 2022-04-04

## 2022-04-04 DIAGNOSIS — N40.0 PROSTATE ENLARGEMENT: ICD-10-CM

## 2022-04-04 PROCEDURE — 99999 PR OFFICE/OUTPT VISIT,PROCEDURE ONLY: CPT | Performed by: PHYSICIAN ASSISTANT

## 2022-04-04 NOTE — PROGRESS NOTES
Patient has given me verbal consent to perform calabrese removal  Yes    30 cc of water deflated from calabrese balloon. 22 Fr calabrese removed without difficulty. Foreskin reduced back down? Yes    Pt will drink fluids and call in 4-6 hours if patient has not urinated. F/u with provider will call with appointment.

## 2022-04-15 ENCOUNTER — HOSPITAL ENCOUNTER (OUTPATIENT)
Dept: NON INVASIVE DIAGNOSTICS | Age: 82
Discharge: HOME | End: 2022-04-15
Payer: MEDICARE

## 2022-04-15 DIAGNOSIS — I25.10 CORONARY ARTERY DISEASE INVOLVING NATIVE CORONARY ARTERY OF NATIVE HEART WITHOUT ANGINA PECTORIS: ICD-10-CM

## 2022-04-15 DIAGNOSIS — I10 PRIMARY HYPERTENSION: ICD-10-CM

## 2022-04-15 DIAGNOSIS — Z01.818 PRE-OP EXAM: ICD-10-CM

## 2022-04-15 PROCEDURE — 93226 XTRNL ECG REC<48 HR SCAN A/R: CPT

## 2022-04-15 PROCEDURE — 93225 XTRNL ECG REC<48 HRS REC: CPT

## 2022-04-18 LAB
ACQUISITION DURATION: NORMAL S
AVERAGE HEART RATE: 56 BPM
HOOKUP DATE: NORMAL
HOOKUP TIME: NORMAL
MAX HEART RATE TIME/DATE: NORMAL
MAX HEART RATE: 99 BPM
MIN HEART RATE TIME/DATE: NORMAL
MIN HEART RATE: 40 BPM
NUMBER OF QRS COMPLEXES: NORMAL
NUMBER OF SUPRAVENTRICULAR COUPLETS: 0
NUMBER OF SUPRAVENTRICULAR ECTOPICS: 3954
NUMBER OF SUPRAVENTRICULAR ISOLATED BEATS: 3278
NUMBER OF VENTRICULAR BIGEMINAL CYCLES: 0
NUMBER OF VENTRICULAR COUPLETS: 0
NUMBER OF VENTRICULAR ECTOPICS: 5

## 2022-04-19 ENCOUNTER — SCHEDULED TELEPHONE ENCOUNTER (OUTPATIENT)
Dept: UROLOGY | Age: 82
End: 2022-04-19

## 2022-04-19 PROCEDURE — 99024 POSTOP FOLLOW-UP VISIT: CPT | Performed by: UROLOGY

## 2022-04-19 NOTE — PROGRESS NOTES
Roberta Jones is a 78 y.o. male evaluated via telephone on 4/19/2022     Documentation:  I communicated with the patient and/or health care decision maker about bph. Details of this discussion including any medical advice provided:     S/p PVP  Postop check  Irritative symptoms   Reassess in six weeks    Total Time: minutes: 5-10 minutes    Roberta Jones was evaluated through a synchronous (real-time) audio encounter. Patient identification was verified at the start of the visit. He (or guardian if applicable) is aware that this is a billable service, which includes applicable co-pays. This visit was conducted with the patient's (and/or legal guardian's) verbal consent. He has not had a related appointment within my department in the past 7 days or scheduled within the next 24 hours. The patient was located in a state where the provider was licensed to provide care.     Note: not billable if this call serves to triage the patient into an appointment for the relevant concern    Kalina Isaacs MD

## 2022-04-25 RX ORDER — OXYBUTYNIN CHLORIDE 5 MG/1
5 TABLET, EXTENDED RELEASE ORAL DAILY
Qty: 90 TABLET | Refills: 2 | Status: SHIPPED | OUTPATIENT
Start: 2022-04-25 | End: 2022-06-08

## 2022-04-25 NOTE — TELEPHONE ENCOUNTER
Olya Spangler called requesting a refill on the following medications:  Requested Prescriptions     Pending Prescriptions Disp Refills    oxybutynin (DITROPAN-XL) 5 MG extended release tablet [Pharmacy Med Name: OXYBUTYNIN ER 5MG TABLETS] 90 tablet 2     Sig: TAKE 1 0626 Aaron Bain verified:  .[email or web address]      Date of last visit: 03/15/2022  Date of next visit (if applicable): 9/0/2716

## 2022-06-01 ENCOUNTER — NURSE ONLY (OUTPATIENT)
Dept: LAB | Age: 82
End: 2022-06-01

## 2022-06-01 DIAGNOSIS — D50.0 IRON DEFICIENCY ANEMIA DUE TO CHRONIC BLOOD LOSS: ICD-10-CM

## 2022-06-01 DIAGNOSIS — N18.31 STAGE 3A CHRONIC KIDNEY DISEASE (HCC): ICD-10-CM

## 2022-06-01 LAB
ANION GAP SERPL CALCULATED.3IONS-SCNC: 12 MEQ/L (ref 8–16)
BASOPHILS # BLD: 0.9 %
BASOPHILS ABSOLUTE: 0 THOU/MM3 (ref 0–0.1)
BUN BLDV-MCNC: 35 MG/DL (ref 7–22)
CALCIUM SERPL-MCNC: 9.1 MG/DL (ref 8.5–10.5)
CHLORIDE BLD-SCNC: 103 MEQ/L (ref 98–111)
CO2: 24 MEQ/L (ref 23–33)
CREAT SERPL-MCNC: 1.6 MG/DL (ref 0.4–1.2)
EOSINOPHIL # BLD: 6.9 %
EOSINOPHILS ABSOLUTE: 0.3 THOU/MM3 (ref 0–0.4)
ERYTHROCYTE [DISTWIDTH] IN BLOOD BY AUTOMATED COUNT: 13.9 % (ref 11.5–14.5)
ERYTHROCYTE [DISTWIDTH] IN BLOOD BY AUTOMATED COUNT: 47 FL (ref 35–45)
FOLATE: > 20 NG/ML (ref 4.8–24.2)
GFR SERPL CREATININE-BSD FRML MDRD: 41 ML/MIN/1.73M2
GLUCOSE BLD-MCNC: 108 MG/DL (ref 70–108)
HCT VFR BLD CALC: 40.1 % (ref 42–52)
HEMOGLOBIN: 12.7 GM/DL (ref 14–18)
IMMATURE GRANS (ABS): 0.03 THOU/MM3 (ref 0–0.07)
IMMATURE GRANULOCYTES: 0.7 %
IRON SATURATION: 46 % (ref 20–50)
IRON: 120 UG/DL (ref 65–195)
LYMPHOCYTES # BLD: 22 %
LYMPHOCYTES ABSOLUTE: 1 THOU/MM3 (ref 1–4.8)
MCH RBC QN AUTO: 29.1 PG (ref 26–33)
MCHC RBC AUTO-ENTMCNC: 31.7 GM/DL (ref 32.2–35.5)
MCV RBC AUTO: 92 FL (ref 80–94)
MONOCYTES # BLD: 8.3 %
MONOCYTES ABSOLUTE: 0.4 THOU/MM3 (ref 0.4–1.3)
NUCLEATED RED BLOOD CELLS: 0 /100 WBC
PLATELET # BLD: 141 THOU/MM3 (ref 130–400)
PMV BLD AUTO: 11.1 FL (ref 9.4–12.4)
POTASSIUM SERPL-SCNC: 4.5 MEQ/L (ref 3.5–5.2)
RBC # BLD: 4.36 MILL/MM3 (ref 4.7–6.1)
SEG NEUTROPHILS: 61.2 %
SEGMENTED NEUTROPHILS ABSOLUTE COUNT: 2.7 THOU/MM3 (ref 1.8–7.7)
SODIUM BLD-SCNC: 139 MEQ/L (ref 135–145)
TOTAL IRON BINDING CAPACITY: 262 UG/DL (ref 171–450)
VITAMIN B-12: 369 PG/ML (ref 211–911)
WBC # BLD: 4.4 THOU/MM3 (ref 4.8–10.8)

## 2022-06-03 RX ORDER — IRBESARTAN 300 MG/1
300 TABLET ORAL DAILY
Qty: 90 TABLET | Refills: 1 | Status: SHIPPED | OUTPATIENT
Start: 2022-06-03

## 2022-06-08 ENCOUNTER — OFFICE VISIT (OUTPATIENT)
Dept: UROLOGY | Age: 82
End: 2022-06-08
Payer: MEDICARE

## 2022-06-08 VITALS
BODY MASS INDEX: 30.51 KG/M2 | WEIGHT: 206 LBS | DIASTOLIC BLOOD PRESSURE: 74 MMHG | SYSTOLIC BLOOD PRESSURE: 146 MMHG | HEIGHT: 69 IN

## 2022-06-08 DIAGNOSIS — N13.8 BPH WITH OBSTRUCTION/LOWER URINARY TRACT SYMPTOMS: ICD-10-CM

## 2022-06-08 DIAGNOSIS — N40.1 BPH WITH OBSTRUCTION/LOWER URINARY TRACT SYMPTOMS: ICD-10-CM

## 2022-06-08 DIAGNOSIS — N40.0 PROSTATE ENLARGEMENT: Primary | ICD-10-CM

## 2022-06-08 LAB
BILIRUBIN URINE: NEGATIVE
BLOOD URINE, POC: ABNORMAL
CHARACTER, URINE: CLEAR
COLOR, URINE: YELLOW
GLUCOSE URINE: NEGATIVE MG/DL
KETONES, URINE: NEGATIVE
LEUKOCYTE CLUMPS, URINE: ABNORMAL
NITRITE, URINE: NEGATIVE
PH, URINE: 6 (ref 5–9)
POST VOID RESIDUAL (PVR): 49 ML
PROTEIN, URINE: 100 MG/DL
SPECIFIC GRAVITY, URINE: 1.02 (ref 1–1.03)
UROBILINOGEN, URINE: 0.2 EU/DL (ref 0–1)

## 2022-06-08 PROCEDURE — 99024 POSTOP FOLLOW-UP VISIT: CPT | Performed by: NURSE PRACTITIONER

## 2022-06-08 PROCEDURE — 51798 US URINE CAPACITY MEASURE: CPT | Performed by: NURSE PRACTITIONER

## 2022-06-08 PROCEDURE — 81003 URINALYSIS AUTO W/O SCOPE: CPT | Performed by: NURSE PRACTITIONER

## 2022-06-08 RX ORDER — OXYBUTYNIN CHLORIDE 10 MG/1
10 TABLET, EXTENDED RELEASE ORAL DAILY
Qty: 30 TABLET | Refills: 2 | Status: SHIPPED | OUTPATIENT
Start: 2022-06-08 | End: 2022-06-28

## 2022-06-08 ASSESSMENT — ENCOUNTER SYMPTOMS
NAUSEA: 0
ABDOMINAL PAIN: 0
VOMITING: 0
BACK PAIN: 0

## 2022-06-08 NOTE — PROGRESS NOTES
[address] [facility].  [address]  [facility] [address]  Dept: [phone]  Loc: [phone]    Visit Date: 6/8/2022        HPI:     Giovanna Alonzo is a 78 y.o. male who presents today for:  Chief Complaint   Patient presents with    Follow-up    Benign Prostatic Hypertrophy       HPI  Pt seen in follow up for BPH. Mr. Valeria Hackett has a hx of BPH s/p prior TURP and underwent cystoscopy, greenlight PVP and urethral dilation 4/2/22 by Dr. Venkata Dillon. He had some increased irritative voiding symptoms postoperatively for which he was started on oxybutynin 5 mg XL daily. Pt reports significant urgency and urge incontinence. Notes leaking with walking. Weak stream.  Occasional dribbling. Occasional spraying. Trouble with foreskin contributing to spraying and getting urine on self. Notes pushing and straining to urinate. PVR today 49 mls. Pt reports he thinks he may be obstructed again. Worried there needs to be removal of more tissue. Notes nocturia 3-6 x per night. Current Outpatient Medications   Medication Sig Dispense Refill    irbesartan (AVAPRO) 300 MG tablet TAKE 1 TABLET BY MOUTH DAILY 90 tablet 1    oxybutynin (DITROPAN-XL) 5 MG extended release tablet TAKE 1 TABLET BY MOUTH DAILY 90 tablet 2    nystatin-triamcinolone (MYCOLOG II) 626030-5.1 UNIT/GM-% cream Apply up to 4 times a day to penile foreskin as needed 1 each 0    amLODIPine (NORVASC) 10 MG tablet TAKE 1 TABLET BY MOUTH DAILY 90 tablet 3    pravastatin (PRAVACHOL) 40 MG tablet TAKE 1 TABLET BY MOUTH EVERY EVENING 90 tablet 3    nystatin (MYCOSTATIN) 655705 UNIT/GM cream Apply topically 2 times daily.  1 each 1    triamterene-hydroCHLOROthiazide (MAXZIDE-25) 37.5-25 MG per tablet TAKE 1 AND 1/2 TABLETS BY MOUTH DAILY 146 tablet 3    folic acid (FOLVITE) 1 MG tablet TAKE 2 & 1/2 (TWO & ONE-HALF) TABLETS BY MOUTH ONCE DAILY 180 tablet 2    citalopram (CELEXA) 20 MG tablet TAKE 1 TABLET BY MOUTH DAILY 90 tablet 3    primidone (MYSOLINE) 50 MG tablet TAKE 1 TABLET BY MOUTH TWICE DAILY 180 tablet 3    simethicone (MYLICON) 80 MG chewable tablet Take 1 tablet by mouth every 6 hours as needed for Flatulence 60 tablet 3    traMADol (ULTRAM) 50 MG tablet Take 50 mg by mouth every 8 hours as needed for Pain. Von Ambrosia dicyclomine (BENTYL) 10 MG capsule Take 10 mg by mouth 4 times daily (before meals and nightly) PRN      pantoprazole (PROTONIX) 40 MG tablet Take 40 mg by mouth daily. No current facility-administered medications for this visit. Past Medical History  Brionna Sheridan  has a past medical history of Anemia, Arthritis, Bronchiolitis, Cancer ([facility] [address] CKD (chronic kidney disease), stage III ([facility] [address] Diverticula of colon, Diverticulosis, DVT (deep venous thrombosis) ([facility] [address] Esophagitis, Hiatal hernia, HTN (hypertension), Hyperlipidemia, Kidney stones, Nephrolithiasis, Obesity, Prostate enlargement, and Renal insufficiency. Past Surgical History  The patient  has a past surgical history that includes skin biopsy; TURP (4818,4482); Nasal sinus surgery; Tonsillectomy; Skin cancer excision (Feb 2015); blepharoplasty (Bilateral, 03/2010); Cataract removal with implant (Bilateral, [id number]); hernia repair (Bilateral, 1996); Cardiac catheterization (5587,2305); Upper gastrointestinal endoscopy; shoulder surgery; Colonoscopy (05/26/2017); Endoscopy, colon, diagnostic; and TURP (N/A, 4/2/2022). Family History  This patient's family history includes Diabetes in his mother; Heart Disease in his father; High Blood Pressure in his father. Social History  Brionna Sheridan  reports that he has never smoked. He has never used smokeless tobacco. He reports that he does not drink alcohol and does not use drugs. Subjective:      Review of Systems   Constitutional: Negative for activity change, appetite change, chills, diaphoresis, fatigue, fever and unexpected weight change. balanitis      Plan:     Discussed it is not necessarily abnormal to have increased LUTs following PVP. Discussed trial of increasing oxybutynin to 10 mg XL daily. Pt would also like to \"take a look in there to make sure it isn't obstructed again\". We will schedule next appt with Dr. Philly Kamara. Consider possible cystoscopy if symptoms fail to improve.

## 2022-06-09 RX ORDER — OXYBUTYNIN CHLORIDE 10 MG/1
10 TABLET, EXTENDED RELEASE ORAL DAILY
Qty: 90 TABLET | Refills: 2 | OUTPATIENT
Start: 2022-06-09

## 2022-06-10 LAB — URINE CULTURE, ROUTINE: NORMAL

## 2022-06-13 ENCOUNTER — OFFICE VISIT (OUTPATIENT)
Dept: NEPHROLOGY | Age: 82
End: 2022-06-13
Payer: MEDICARE

## 2022-06-13 VITALS
HEART RATE: 48 BPM | OXYGEN SATURATION: 95 % | TEMPERATURE: 97.7 F | DIASTOLIC BLOOD PRESSURE: 63 MMHG | BODY MASS INDEX: 31.01 KG/M2 | SYSTOLIC BLOOD PRESSURE: 169 MMHG | WEIGHT: 210 LBS

## 2022-06-13 DIAGNOSIS — I12.9 HYPERTENSIVE RENAL DISEASE, STAGE 1 THROUGH STAGE 4 OR UNSPECIFIED CHRONIC KIDNEY DISEASE: ICD-10-CM

## 2022-06-13 DIAGNOSIS — N18.32 CHRONIC KIDNEY DISEASE, STAGE 3B (HCC): Primary | ICD-10-CM

## 2022-06-13 PROCEDURE — 1123F ACP DISCUSS/DSCN MKR DOCD: CPT | Performed by: INTERNAL MEDICINE

## 2022-06-13 PROCEDURE — 99213 OFFICE O/P EST LOW 20 MIN: CPT | Performed by: INTERNAL MEDICINE

## 2022-06-13 PROCEDURE — G8417 CALC BMI ABV UP PARAM F/U: HCPCS | Performed by: INTERNAL MEDICINE

## 2022-06-13 PROCEDURE — 1036F TOBACCO NON-USER: CPT | Performed by: INTERNAL MEDICINE

## 2022-06-13 PROCEDURE — G8427 DOCREV CUR MEDS BY ELIG CLIN: HCPCS | Performed by: INTERNAL MEDICINE

## 2022-06-13 RX ORDER — HYDRALAZINE HYDROCHLORIDE 10 MG/1
10 TABLET, FILM COATED ORAL 2 TIMES DAILY
Qty: 180 TABLET | OUTPATIENT
Start: 2022-06-13

## 2022-06-13 RX ORDER — HYDRALAZINE HYDROCHLORIDE 10 MG/1
10 TABLET, FILM COATED ORAL 2 TIMES DAILY
Qty: 60 TABLET | Refills: 5 | Status: ON HOLD | OUTPATIENT
Start: 2022-06-13 | End: 2022-07-11 | Stop reason: SDUPTHER

## 2022-06-13 RX ORDER — TRIAMTERENE AND HYDROCHLOROTHIAZIDE 37.5; 25 MG/1; MG/1
1 TABLET ORAL DAILY
Qty: 90 TABLET | Refills: 1
Start: 2022-06-13

## 2022-06-13 NOTE — PROGRESS NOTES
[address] [facility]  [address]  [facility] [address]  Dept: [phone]  Loc: [phone]  Office Progress Note  6/13/2022 2:09 PM      Pt Name:    Deb Nap:    1940  Primary Care Physician:  Marcia Maddox DO     Chief Complaint:   Chief Complaint   Patient presents with    Chronic Kidney Disease     Stage 3        Background Information/Interval History:   79 yo white male with hx HTN, CKD who is here for 6 months follow-up. He was seeing Dr. Neena Wiseman in the past and now seeing me. He says BP is usually high at home. He says he is seeing ophthalmologist. He has BPH s/p TURP and underwent green light and dilation by urology. No hx DM. He says BP varies at home. He says he has gone though several machines. So he does not check it as often. He reports \"leaky valves. \" He sees Dr. Adeola Ramos. No hx smoking. No DM or stents or PPM. No hx CVA. No hx cancer. No sig leg edema.       Past History:  Past Medical History:   Diagnosis Date    Anemia     Arthritis     Bronchiolitis 11/2016    Cancer [facility])     SKIN CANCER    CKD (chronic kidney disease), stage III ([facility] [address]     Diverticula of colon 5/2014    tx with antibiotics per pt; NO surgery    Diverticulosis     DVT [facility]deep venous thrombosis) ([facility])     s/p    Esophagitis 12/10/2014    Hiatal hernia     HTN (hypertension)     Hyperlipidemia     Kidney stones     Nephrolithiasis     Obesity     Prostate enlargement     benign    Renal insufficiency      Past Surgical History:   Procedure Laterality Date    BLEPHAROPLASTY Bilateral 03/2010    CARDIAC CATHETERIZATION  2000,2010    CATARACT REMOVAL WITH IMPLANT Bilateral 2006,2007    COLONOSCOPY  05/26/2017    ENDOSCOPY, COLON, DIAGNOSTIC      HERNIA REPAIR Bilateral 1996    NASAL SINUS SURGERY      SHOULDER SURGERY      SKIN BIOPSY      SKIN CANCER EXCISION  Feb 2015    top of head    TONSILLECTOMY      TURP  4488,7395    TURP N/A 4/2/2022    CYSTOSCOPY GREENLIGHT PHOTOVAPORIZATION OF THE PROSTATE WITH URETHERAL DILATION performed by Stephon Cantrell MD at [facility]:  BP (!) 169/63 (Site: Right Upper Arm, Position: Sitting, Cuff Size: Medium Adult)   Pulse (!) 48   Temp 97.7 °F (36.5 °C)   Wt 210 lb (95.3 kg)   SpO2 95%   BMI 31.01 kg/m²   Wt Readings from Last 3 Encounters:   06/13/22 210 lb (95.3 kg)   06/08/22 206 lb (93.4 kg)   04/02/22 206 lb (93.4 kg)     Body mass index is 31.01 kg/m². General Appearance: alert and cooperative with exam, appears comfortable, no distress  Oral: moist oral mucus membranes  Neck: No jugular venous distention  Lungs: Air entry B/L, no crackles or rales, no use of accessory muscles  Heart: S1, S2 heard  GI: soft, non-tender, no guarding  Extremities: No sig LE edema     Medications:  Current Outpatient Medications   Medication Sig Dispense Refill    oxybutynin (DITROPAN XL) 10 mg extended release tablet Take 1 tablet by mouth daily 30 tablet 2    irbesartan (AVAPRO) 300 MG tablet TAKE 1 TABLET BY MOUTH DAILY 90 tablet 1    nystatin-triamcinolone (MYCOLOG II) 191928-6.1 UNIT/GM-% cream Apply up to 4 times a day to penile foreskin as needed 1 each 0    amLODIPine (NORVASC) 10 MG tablet TAKE 1 TABLET BY MOUTH DAILY 90 tablet 3    pravastatin (PRAVACHOL) 40 MG tablet TAKE 1 TABLET BY MOUTH EVERY EVENING 90 tablet 3    nystatin (MYCOSTATIN) 185581 UNIT/GM cream Apply topically 2 times daily.  1 each 1    triamterene-hydroCHLOROthiazide (MAXZIDE-25) 37.5-25 MG per tablet TAKE 1 AND 1/2 TABLETS BY MOUTH DAILY 950 tablet 3    folic acid (FOLVITE) 1 MG tablet TAKE 2 & 1/2 (TWO & ONE-HALF) TABLETS BY MOUTH ONCE DAILY 180 tablet 2    citalopram (CELEXA) 20 MG tablet TAKE 1 TABLET BY MOUTH DAILY 90 tablet 3    primidone (MYSOLINE) 50 MG tablet TAKE 1 TABLET BY MOUTH TWICE DAILY 180 tablet 3    simethicone (MYLICON) 80 MG chewable tablet Take 1 tablet by mouth every 6 hours as needed for Flatulence 60 tablet 3    traMADol (ULTRAM) 50 MG tablet Take 50 mg by mouth every 8 hours as needed for Pain. Eric Castro dicyclomine (BENTYL) 10 MG capsule Take 10 mg by mouth 4 times daily (before meals and nightly) PRN      pantoprazole (PROTONIX) 40 MG tablet Take 40 mg by mouth daily. No current facility-administered medications for this visit. Laboratory & Diagnostics:  Old labs  June 2022: K 4.5, Creat 1.6, Hb 12.7  UA: + protein     Impression/Plan:   1. CKD III; stable at baseline. Advised low salt diet. BP is chronically sup-optimally controlled. Advised better BP control to minimize progression of CKD. Discussed with patient. Will obtain  US of kidneys to evaluate echogenicity and kidney size. Discussed with patient in detail. 2. HTN: on norvasc and triam-HCTZ. He is not sure about his medications or doses. Reduce triam-HCTZ  one tablet a day. Start hydralazine 10 mg BID. He is also on avapro. Advised low salt diet. Call me with some BP readings in 4-5 days. Pt voiced understanding. Increase water intake. K is okay  3. Hx BPH s/p green light  4. Mild hard of hearing. Reduce maxzide one tablet daily, add hydralazine 10 mg BID  Check labs prior to next visit. Orders Placed This Encounter   Procedures    US RENAL COMPLETE    Basic Metabolic Panel    Urinalysis    Protein / creatinine ratio, urine    Hemoglobin and Hematocrit     Return in about 6 months (around 12/13/2022).     Dawn Cobb MD  [facility]

## 2022-06-20 DIAGNOSIS — F41.9 ANXIETY: ICD-10-CM

## 2022-06-20 DIAGNOSIS — G25.0 BENIGN ESSENTIAL TREMOR: ICD-10-CM

## 2022-06-20 DIAGNOSIS — R06.02 SHORTNESS OF BREATH: ICD-10-CM

## 2022-06-20 RX ORDER — CITALOPRAM 20 MG/1
20 TABLET ORAL DAILY
Qty: 90 TABLET | Refills: 3 | Status: SHIPPED | OUTPATIENT
Start: 2022-06-20

## 2022-06-20 RX ORDER — PRIMIDONE 50 MG/1
TABLET ORAL
Qty: 180 TABLET | Refills: 3 | Status: SHIPPED | OUTPATIENT
Start: 2022-06-20

## 2022-06-28 ENCOUNTER — PROCEDURE VISIT (OUTPATIENT)
Dept: UROLOGY | Age: 82
End: 2022-06-28
Payer: MEDICARE

## 2022-06-28 VITALS
DIASTOLIC BLOOD PRESSURE: 78 MMHG | WEIGHT: 210 LBS | HEIGHT: 69 IN | SYSTOLIC BLOOD PRESSURE: 138 MMHG | BODY MASS INDEX: 31.1 KG/M2

## 2022-06-28 DIAGNOSIS — N13.8 BPH WITH OBSTRUCTION/LOWER URINARY TRACT SYMPTOMS: Primary | ICD-10-CM

## 2022-06-28 DIAGNOSIS — N40.1 BPH WITH OBSTRUCTION/LOWER URINARY TRACT SYMPTOMS: Primary | ICD-10-CM

## 2022-06-28 DIAGNOSIS — N35.911 STRICTURE OF URETHRAL MEATUS IN MALE, UNSPECIFIED STRICTURE TYPE: ICD-10-CM

## 2022-06-28 PROCEDURE — 52281 CYSTOSCOPY AND TREATMENT: CPT | Performed by: UROLOGY

## 2022-06-28 RX ORDER — CEPHALEXIN 500 MG/1
500 CAPSULE ORAL 2 TIMES DAILY
Qty: 14 CAPSULE | Refills: 0 | Status: SHIPPED | OUTPATIENT
Start: 2022-06-28 | End: 2022-07-05

## 2022-06-28 NOTE — PROGRESS NOTES
Cystoscopy with Urethral Dilation    Operative Note    Patient:  Sasha Sanabria  MRN: [id number]  YOB: 1940    Date: 06/28/22  Surgeon: Leticia Cortes MD  Anesthesia: Urojet Local  Indications: hx of pvp, urethral stricture  Position: Supine  EBL: 0 ml    Findings:   The patient was prepped and draped in the usual sterile fashion. The flexible cystoscope was advanced through the urethra and into the bladder. The bladder was thoroughly inspected and the following was noted:    Residual Urine: moderate. Urine clear, with no obvious infection  Urethra: meatal stricture noted. Also bulbar stricture. Urethral dilation was performed. serial dilation with Dorothe Scripture sounds was performed  Prostate: open prostatic urethra with no obvious obstruction, intravesical extension of prostate not present. There was a previous TURP defect. Bladder: no tumor noted some erythema. Moderate trabeculation noted. no bladder diverticulum. Ureters: Orifices with normal configuration and location. The cystoscope was removed. The patient tolerated the procedure well.   abx for a few days  Catheter for one week  Stop ditropan, start gemtesa for one month  Prostate wide open

## 2022-07-06 ENCOUNTER — NURSE ONLY (OUTPATIENT)
Dept: UROLOGY | Age: 82
End: 2022-07-06

## 2022-07-06 PROCEDURE — 99999 PR OFFICE/OUTPT VISIT,PROCEDURE ONLY: CPT | Performed by: UROLOGY

## 2022-07-06 NOTE — PROGRESS NOTES
Patient has given me verbal consent to perform calabrese removal  Yes    10 cc of water deflated from calabrese balloon. 16 Fr calabrese removed without difficulty. Foreskin reduced back down? Yes       Pt will drink fluids and call by 300/330p if patient has not urinated. F/u with provider as scheduled.

## 2022-07-07 ENCOUNTER — APPOINTMENT (OUTPATIENT)
Dept: GENERAL RADIOLOGY | Age: 82
DRG: 698 | End: 2022-07-07
Payer: MEDICARE

## 2022-07-07 ENCOUNTER — APPOINTMENT (OUTPATIENT)
Dept: INTERVENTIONAL RADIOLOGY/VASCULAR | Age: 82
DRG: 698 | End: 2022-07-07
Payer: MEDICARE

## 2022-07-07 ENCOUNTER — HOSPITAL ENCOUNTER (INPATIENT)
Age: 82
LOS: 4 days | Discharge: HOME | DRG: 698 | End: 2022-07-11
Attending: STUDENT IN AN ORGANIZED HEALTH CARE EDUCATION/TRAINING PROGRAM
Payer: MEDICARE

## 2022-07-07 DIAGNOSIS — N39.0 COMPLICATED UTI (URINARY TRACT INFECTION): Primary | ICD-10-CM

## 2022-07-07 LAB
ALBUMIN SERPL-MCNC: 4.5 G/DL (ref 3.5–5.1)
ALP BLD-CCNC: 112 U/L (ref 38–126)
ALT SERPL-CCNC: 12 U/L (ref 11–66)
ANION GAP SERPL CALCULATED.3IONS-SCNC: 11 MEQ/L (ref 8–16)
AST SERPL-CCNC: 19 U/L (ref 5–40)
BACTERIA: ABNORMAL /HPF
BASOPHILS # BLD: 0.2 %
BASOPHILS ABSOLUTE: 0 THOU/MM3 (ref 0–0.1)
BILIRUB SERPL-MCNC: 0.9 MG/DL (ref 0.3–1.2)
BILIRUBIN DIRECT: < 0.2 MG/DL (ref 0–0.3)
BILIRUBIN URINE: NEGATIVE
BLOOD, URINE: ABNORMAL
BUN BLDV-MCNC: 28 MG/DL (ref 7–22)
CALCIUM SERPL-MCNC: 9.3 MG/DL (ref 8.5–10.5)
CASTS 2: ABNORMAL /LPF
CASTS UA: ABNORMAL /LPF
CHARACTER, URINE: ABNORMAL
CHLORIDE BLD-SCNC: 99 MEQ/L (ref 98–111)
CO2: 22 MEQ/L (ref 23–33)
COLOR: YELLOW
CREAT SERPL-MCNC: 1.6 MG/DL (ref 0.4–1.2)
CRYSTALS, UA: ABNORMAL
EKG ATRIAL RATE: 80 BPM
EKG P AXIS: 52 DEGREES
EKG P-R INTERVAL: 216 MS
EKG Q-T INTERVAL: 378 MS
EKG QRS DURATION: 86 MS
EKG QTC CALCULATION (BAZETT): 435 MS
EKG R AXIS: -40 DEGREES
EKG T AXIS: 91 DEGREES
EKG VENTRICULAR RATE: 80 BPM
EOSINOPHIL # BLD: 0.5 %
EOSINOPHILS ABSOLUTE: 0.1 THOU/MM3 (ref 0–0.4)
EPITHELIAL CELLS, UA: ABNORMAL /HPF
ERYTHROCYTE [DISTWIDTH] IN BLOOD BY AUTOMATED COUNT: 13.7 % (ref 11.5–14.5)
ERYTHROCYTE [DISTWIDTH] IN BLOOD BY AUTOMATED COUNT: 46.1 FL (ref 35–45)
FLU A ANTIGEN: NEGATIVE
FLU B ANTIGEN: NEGATIVE
GFR SERPL CREATININE-BSD FRML MDRD: 41 ML/MIN/1.73M2
GLUCOSE BLD-MCNC: 134 MG/DL (ref 70–108)
GLUCOSE URINE: NEGATIVE MG/DL
HCT VFR BLD CALC: 40.3 % (ref 42–52)
HEMOGLOBIN: 13.2 GM/DL (ref 14–18)
IMMATURE GRANS (ABS): 0.09 THOU/MM3 (ref 0–0.07)
IMMATURE GRANULOCYTES: 0.6 %
KETONES, URINE: NEGATIVE
LACTIC ACID, SEPSIS: 1.5 MMOL/L (ref 0.5–1.9)
LACTIC ACID, SEPSIS: 1.8 MMOL/L (ref 0.5–1.9)
LEUKOCYTE ESTERASE, URINE: ABNORMAL
LYMPHOCYTES # BLD: 3.8 %
LYMPHOCYTES ABSOLUTE: 0.6 THOU/MM3 (ref 1–4.8)
MCH RBC QN AUTO: 29.7 PG (ref 26–33)
MCHC RBC AUTO-ENTMCNC: 32.8 GM/DL (ref 32.2–35.5)
MCV RBC AUTO: 90.8 FL (ref 80–94)
MISCELLANEOUS 2: ABNORMAL
MONOCYTES # BLD: 5.6 %
MONOCYTES ABSOLUTE: 0.9 THOU/MM3 (ref 0.4–1.3)
NITRITE, URINE: NEGATIVE
NUCLEATED RED BLOOD CELLS: 0 /100 WBC
OSMOLALITY CALCULATION: 272 MOSMOL/KG (ref 275–300)
PH UA: 5.5 (ref 5–9)
PLATELET # BLD: 135 THOU/MM3 (ref 130–400)
PMV BLD AUTO: 10.5 FL (ref 9.4–12.4)
POTASSIUM REFLEX MAGNESIUM: 4.4 MEQ/L (ref 3.5–5.2)
PRO-BNP: 629.7 PG/ML (ref 0–1800)
PROTEIN UA: 300
RBC # BLD: 4.44 MILL/MM3 (ref 4.7–6.1)
RBC URINE: ABNORMAL /HPF
RENAL EPITHELIAL, UA: ABNORMAL
SARS-COV-2, NAAT: NOT  DETECTED
SEG NEUTROPHILS: 89.3 %
SEGMENTED NEUTROPHILS ABSOLUTE COUNT: 13.7 THOU/MM3 (ref 1.8–7.7)
SODIUM BLD-SCNC: 132 MEQ/L (ref 135–145)
SPECIFIC GRAVITY, URINE: 1.02 (ref 1–1.03)
TOTAL PROTEIN: 6.7 G/DL (ref 6.1–8)
TROPONIN T: < 0.01 NG/ML
UROBILINOGEN, URINE: 0.2 EU/DL (ref 0–1)
WBC # BLD: 15.3 THOU/MM3 (ref 4.8–10.8)
WBC UA: > 200 /HPF
YEAST: ABNORMAL

## 2022-07-07 PROCEDURE — 87801 DETECT AGNT MULT DNA AMPLI: CPT

## 2022-07-07 PROCEDURE — 99285 EMERGENCY DEPT VISIT HI MDM: CPT

## 2022-07-07 PROCEDURE — 87635 SARS-COV-2 COVID-19 AMP PRB: CPT

## 2022-07-07 PROCEDURE — 84484 ASSAY OF TROPONIN QUANT: CPT

## 2022-07-07 PROCEDURE — 80048 BASIC METABOLIC PNL TOTAL CA: CPT

## 2022-07-07 PROCEDURE — 93005 ELECTROCARDIOGRAM TRACING: CPT | Performed by: EMERGENCY MEDICINE

## 2022-07-07 PROCEDURE — 51798 US URINE CAPACITY MEASURE: CPT

## 2022-07-07 PROCEDURE — 85025 COMPLETE CBC W/AUTO DIFF WBC: CPT

## 2022-07-07 PROCEDURE — 6360000002 HC RX W HCPCS: Performed by: STUDENT IN AN ORGANIZED HEALTH CARE EDUCATION/TRAINING PROGRAM

## 2022-07-07 PROCEDURE — 96365 THER/PROPH/DIAG IV INF INIT: CPT

## 2022-07-07 PROCEDURE — 36415 COLL VENOUS BLD VENIPUNCTURE: CPT

## 2022-07-07 PROCEDURE — 87804 INFLUENZA ASSAY W/OPTIC: CPT

## 2022-07-07 PROCEDURE — 87040 BLOOD CULTURE FOR BACTERIA: CPT

## 2022-07-07 PROCEDURE — 87086 URINE CULTURE/COLONY COUNT: CPT

## 2022-07-07 PROCEDURE — 83880 ASSAY OF NATRIURETIC PEPTIDE: CPT

## 2022-07-07 PROCEDURE — 93010 ELECTROCARDIOGRAM REPORT: CPT | Performed by: INTERNAL MEDICINE

## 2022-07-07 PROCEDURE — 80076 HEPATIC FUNCTION PANEL: CPT

## 2022-07-07 PROCEDURE — 71046 X-RAY EXAM CHEST 2 VIEWS: CPT

## 2022-07-07 PROCEDURE — 87186 SC STD MICRODIL/AGAR DIL: CPT

## 2022-07-07 PROCEDURE — 81001 URINALYSIS AUTO W/SCOPE: CPT

## 2022-07-07 PROCEDURE — 2580000003 HC RX 258: Performed by: STUDENT IN AN ORGANIZED HEALTH CARE EDUCATION/TRAINING PROGRAM

## 2022-07-07 PROCEDURE — 1200000003 HC TELEMETRY R&B

## 2022-07-07 PROCEDURE — 83605 ASSAY OF LACTIC ACID: CPT

## 2022-07-07 PROCEDURE — 93971 EXTREMITY STUDY: CPT

## 2022-07-07 PROCEDURE — 87077 CULTURE AEROBIC IDENTIFY: CPT

## 2022-07-07 RX ORDER — ACETAMINOPHEN 325 MG/1
650 TABLET ORAL EVERY 6 HOURS PRN
Status: DISCONTINUED | OUTPATIENT
Start: 2022-07-07 | End: 2022-07-11 | Stop reason: HOSPADM

## 2022-07-07 RX ORDER — ONDANSETRON 2 MG/ML
4 INJECTION INTRAMUSCULAR; INTRAVENOUS EVERY 6 HOURS PRN
Status: DISCONTINUED | OUTPATIENT
Start: 2022-07-07 | End: 2022-07-11 | Stop reason: HOSPADM

## 2022-07-07 RX ORDER — LOSARTAN POTASSIUM 100 MG/1
100 TABLET ORAL DAILY
Status: DISCONTINUED | OUTPATIENT
Start: 2022-07-08 | End: 2022-07-11 | Stop reason: HOSPADM

## 2022-07-07 RX ORDER — PANTOPRAZOLE SODIUM 40 MG/1
40 TABLET, DELAYED RELEASE ORAL DAILY
Status: DISCONTINUED | OUTPATIENT
Start: 2022-07-08 | End: 2022-07-11 | Stop reason: HOSPADM

## 2022-07-07 RX ORDER — ACETAMINOPHEN 650 MG/1
650 SUPPOSITORY RECTAL EVERY 6 HOURS PRN
Status: DISCONTINUED | OUTPATIENT
Start: 2022-07-07 | End: 2022-07-11 | Stop reason: HOSPADM

## 2022-07-07 RX ORDER — ENOXAPARIN SODIUM 100 MG/ML
40 INJECTION SUBCUTANEOUS EVERY 24 HOURS
Status: DISCONTINUED | OUTPATIENT
Start: 2022-07-07 | End: 2022-07-11 | Stop reason: HOSPADM

## 2022-07-07 RX ORDER — AMLODIPINE BESYLATE 10 MG/1
10 TABLET ORAL DAILY
Status: DISCONTINUED | OUTPATIENT
Start: 2022-07-08 | End: 2022-07-11 | Stop reason: HOSPADM

## 2022-07-07 RX ORDER — TRIAMTERENE AND HYDROCHLOROTHIAZIDE 37.5; 25 MG/1; MG/1
1 TABLET ORAL DAILY
Status: DISCONTINUED | OUTPATIENT
Start: 2022-07-07 | End: 2022-07-11 | Stop reason: HOSPADM

## 2022-07-07 RX ORDER — SODIUM CHLORIDE 0.9 % (FLUSH) 0.9 %
5-40 SYRINGE (ML) INJECTION EVERY 12 HOURS SCHEDULED
Status: DISCONTINUED | OUTPATIENT
Start: 2022-07-07 | End: 2022-07-11 | Stop reason: HOSPADM

## 2022-07-07 RX ORDER — PRIMIDONE 50 MG/1
50 TABLET ORAL 2 TIMES DAILY
Status: DISCONTINUED | OUTPATIENT
Start: 2022-07-07 | End: 2022-07-11 | Stop reason: HOSPADM

## 2022-07-07 RX ORDER — SIMETHICONE 80 MG
80 TABLET,CHEWABLE ORAL EVERY 6 HOURS PRN
Status: DISCONTINUED | OUTPATIENT
Start: 2022-07-07 | End: 2022-07-11 | Stop reason: HOSPADM

## 2022-07-07 RX ORDER — TRAMADOL HYDROCHLORIDE 50 MG/1
50 TABLET ORAL EVERY 8 HOURS PRN
Status: DISCONTINUED | OUTPATIENT
Start: 2022-07-07 | End: 2022-07-11 | Stop reason: HOSPADM

## 2022-07-07 RX ORDER — SODIUM CHLORIDE, SODIUM LACTATE, POTASSIUM CHLORIDE, AND CALCIUM CHLORIDE .6; .31; .03; .02 G/100ML; G/100ML; G/100ML; G/100ML
1000 INJECTION, SOLUTION INTRAVENOUS ONCE
Status: COMPLETED | OUTPATIENT
Start: 2022-07-07 | End: 2022-07-07

## 2022-07-07 RX ORDER — SODIUM CHLORIDE 9 MG/ML
INJECTION, SOLUTION INTRAVENOUS PRN
Status: DISCONTINUED | OUTPATIENT
Start: 2022-07-07 | End: 2022-07-11 | Stop reason: HOSPADM

## 2022-07-07 RX ORDER — HYDRALAZINE HYDROCHLORIDE 10 MG/1
10 TABLET, FILM COATED ORAL 2 TIMES DAILY
Status: DISCONTINUED | OUTPATIENT
Start: 2022-07-07 | End: 2022-07-09

## 2022-07-07 RX ORDER — PRAVASTATIN SODIUM 40 MG
40 TABLET ORAL NIGHTLY
Status: DISCONTINUED | OUTPATIENT
Start: 2022-07-07 | End: 2022-07-11 | Stop reason: HOSPADM

## 2022-07-07 RX ORDER — DICYCLOMINE HYDROCHLORIDE 10 MG/1
10 CAPSULE ORAL 4 TIMES DAILY PRN
Status: DISCONTINUED | OUTPATIENT
Start: 2022-07-07 | End: 2022-07-11 | Stop reason: HOSPADM

## 2022-07-07 RX ORDER — CITALOPRAM 20 MG/1
20 TABLET ORAL DAILY
Status: DISCONTINUED | OUTPATIENT
Start: 2022-07-07 | End: 2022-07-11 | Stop reason: HOSPADM

## 2022-07-07 RX ORDER — OXYBUTYNIN CHLORIDE 10 MG/1
10 TABLET, EXTENDED RELEASE ORAL DAILY
Qty: 90 TABLET | OUTPATIENT
Start: 2022-07-07

## 2022-07-07 RX ORDER — SODIUM CHLORIDE 0.9 % (FLUSH) 0.9 %
5-40 SYRINGE (ML) INJECTION PRN
Status: DISCONTINUED | OUTPATIENT
Start: 2022-07-07 | End: 2022-07-11 | Stop reason: HOSPADM

## 2022-07-07 RX ORDER — ONDANSETRON 4 MG/1
4 TABLET, ORALLY DISINTEGRATING ORAL EVERY 8 HOURS PRN
Status: DISCONTINUED | OUTPATIENT
Start: 2022-07-07 | End: 2022-07-11 | Stop reason: HOSPADM

## 2022-07-07 RX ORDER — POLYETHYLENE GLYCOL 3350 17 G/17G
17 POWDER, FOR SOLUTION ORAL DAILY PRN
Status: DISCONTINUED | OUTPATIENT
Start: 2022-07-07 | End: 2022-07-11 | Stop reason: HOSPADM

## 2022-07-07 RX ORDER — SODIUM CHLORIDE 9 MG/ML
INJECTION, SOLUTION INTRAVENOUS CONTINUOUS
Status: DISCONTINUED | OUTPATIENT
Start: 2022-07-07 | End: 2022-07-09

## 2022-07-07 RX ADMIN — CEFTRIAXONE SODIUM 2000 MG: 2 INJECTION, POWDER, FOR SOLUTION INTRAMUSCULAR; INTRAVENOUS at 16:58

## 2022-07-07 RX ADMIN — SODIUM CHLORIDE, POTASSIUM CHLORIDE, SODIUM LACTATE AND CALCIUM CHLORIDE 1000 ML: 600; 310; 30; 20 INJECTION, SOLUTION INTRAVENOUS at 15:13

## 2022-07-07 ASSESSMENT — ENCOUNTER SYMPTOMS
DIARRHEA: 0
NAUSEA: 1
STRIDOR: 0
CHEST TIGHTNESS: 0
SORE THROAT: 0
CHOKING: 0
CONSTIPATION: 0
COLOR CHANGE: 0
BACK PAIN: 0
WHEEZING: 0
SHORTNESS OF BREATH: 1
VOMITING: 0
ABDOMINAL PAIN: 0
COUGH: 0

## 2022-07-07 ASSESSMENT — PATIENT HEALTH QUESTIONNAIRE - PHQ9
SUM OF ALL RESPONSES TO PHQ9 QUESTIONS 1 & 2: 0
1. LITTLE INTEREST OR PLEASURE IN DOING THINGS: NOT AT ALL
DEPRESSION UNABLE TO ASSESS: YES
2. FEELING DOWN, DEPRESSED OR HOPELESS: NOT AT ALL

## 2022-07-07 ASSESSMENT — PAIN - FUNCTIONAL ASSESSMENT
PAIN_FUNCTIONAL_ASSESSMENT: NONE - DENIES PAIN

## 2022-07-07 ASSESSMENT — LIFESTYLE VARIABLES: HOW OFTEN DO YOU HAVE A DRINK CONTAINING ALCOHOL: NEVER

## 2022-07-07 NOTE — ED NOTES
Pt resting in bed at this time, no concerns voiced. Call light in reach. Family at bedside.       Pearl Gould RN  07/07/22 7655

## 2022-07-07 NOTE — H&P
History & Physical       Patient: Zhang Jiménez  YOB: 1940    MRN: [id number]     Acct: [de-identified]    PCP: Brit Caro DO    Date of Admission: 7/7/2022    Date of Service: Patient seen / examined on 07/07/22 and admitted to Inpatient with expected LOS greater than two midnights due to medical therapy. ASSESSMENT / PLAN:  1. Urinary tract infection: Supported by UA (+WBC and bacteria). Urine Cx. Blood Cx x2. IV fluids. Slight suspicion for bacteremia. Will start with Rocephin 2g and deescalate pending clinical course. 2. Resistant hypertension: Amlodipine, hydralazine, losartan, Maxzide resumed with hold parameters. 3. CKD Stage 3: Cr at baseline. Avoid nephrotoxic agents. Renally dose medications. Daily BMP. 4. BPH: S/p prior TURP. Underwent cystoscopy, greenlight PVP and urethral dilation 04/02/2022 by Dr. Jigna Proctor. 5. Hyperlipidemia: Lipitor resumed. 6. Essential Tremor: Primidone resumed. 7. GERD: PPI resumed. 8. Anxiety: Celexa resumed. Chief Complaint:  Fever and Chills    History of Present Illness:  78 y.o. male with a hx of HTN, CKD 3, BPH, HLD, GERD, anxiety and essential tremor who presented to [facility] for evaluation of fever and chills. Patient underwent greenlight PVP and urethral dilation on 04/02/2022 by Dr. Jigna Proctor. He had some increasing irritative voiding symptoms postoperatively. On 06/28/2022, patient underwent flexible cystoscopy. 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 Patient underwent urethral dilation during cystoscopy. [place] catheter was placed and patient was given Keflex as a prophylaxis. Patient reports that [place] catheter was removed yesterday. This morning he had awoken to fever and chills. Fever of 101 °F.  Patient felt very weak and fatigued. Patient was brought into the ED by wife. Denies chest pain, shortness of breath, palpitations, syncope or LOC.       Past Medical History:    Past Medical History: Diagnosis Date    Anemia     Arthritis     Bronchiolitis 11/2016    Cancer [facility])     SKIN CANCER    CKD (chronic kidney disease), stage III ([facility] [address]     Diverticula of colon 5/2014    tx with antibiotics per pt; NO surgery    Diverticulosis     DVT (deep venous thrombosis) ([facility])     s/p    Esophagitis 12/10/2014    Hiatal hernia     HTN (hypertension)     Hyperlipidemia     Kidney stones     Nephrolithiasis     Obesity     Prostate enlargement     benign    Renal insufficiency      Past Surgical History:    Past Surgical History:   Procedure Laterality Date    BLEPHAROPLASTY Bilateral 03/2010    CARDIAC CATHETERIZATION  2000,2010    CATARACT REMOVAL WITH IMPLANT Bilateral 2006,2007    COLONOSCOPY  05/26/2017    ENDOSCOPY, COLON, DIAGNOSTIC      HERNIA REPAIR Bilateral 1996    NASAL SINUS SURGERY      SHOULDER SURGERY      SKIN BIOPSY      SKIN CANCER EXCISION  Feb 2015    top of head    TONSILLECTOMY      TURP  4557,0797    TURP N/A 4/2/2022    CYSTOSCOPY GREENLIGHT PHOTOVAPORIZATION OF THE PROSTATE WITH URETHERAL DILATION performed by Grace Hurst MD at [facility]. 106        Medications Prior to Admission:   No current facility-administered medications on file prior to encounter.      Current Outpatient Medications on File Prior to Encounter   Medication Sig Dispense Refill    citalopram (CELEXA) 20 MG tablet TAKE 1 TABLET BY MOUTH DAILY 90 tablet 3    primidone (MYSOLINE) 50 MG tablet TAKE 1 TABLET BY MOUTH TWICE DAILY 180 tablet 3    triamterene-hydroCHLOROthiazide (MAXZIDE-25) 37.5-25 MG per tablet Take 1 tablet by mouth daily 90 tablet 1    hydrALAZINE (APRESOLINE) 10 MG tablet Take 1 tablet by mouth in the morning and at bedtime 60 tablet 5    irbesartan (AVAPRO) 300 MG tablet TAKE 1 TABLET BY MOUTH DAILY 90 tablet 1    nystatin-triamcinolone (MYCOLOG II) 020268-7.1 UNIT/GM-% cream Apply up to 4 times a day to penile foreskin as needed 1 each 0    amLODIPine (NORVASC) 10 MG tablet TAKE 1 TABLET BY MOUTH DAILY 90 tablet 3    pravastatin (PRAVACHOL) 40 MG tablet TAKE 1 TABLET BY MOUTH EVERY EVENING 90 tablet 3    nystatin (MYCOSTATIN) 283446 UNIT/GM cream Apply topically 2 times daily. 1 each 1    folic acid (FOLVITE) 1 MG tablet TAKE 2 & 1/2 (TWO & ONE-HALF) TABLETS BY MOUTH ONCE DAILY 180 tablet 2    simethicone (MYLICON) 80 MG chewable tablet Take 1 tablet by mouth every 6 hours as needed for Flatulence 60 tablet 3    traMADol (ULTRAM) 50 MG tablet Take 50 mg by mouth every 8 hours as needed for Pain. Leann Cosme dicyclomine (BENTYL) 10 MG capsule Take 10 mg by mouth 4 times daily (before meals and nightly) PRN      pantoprazole (PROTONIX) 40 MG tablet Take 40 mg by mouth daily. Allergies:   Ace inhibitors, Carafate [sucralfate], Flomax [tamsulosin hcl], Iv dye [iodides], Lipitor [atorvastatin], Lopressor [metoprolol], and Motrin [ibuprofen]    Social History:   Social History     Socioeconomic History    Marital status:      Spouse name: Not on file    Number of children: Not on file    Years of education: Not on file    Highest education level: Not on file   Occupational History    Not on file   Tobacco Use    Smoking status: Never Smoker    Smokeless tobacco: Never Used    Tobacco comment: never smoked   Vaping Use    Vaping Use: Never used   Substance and Sexual Activity    Alcohol use: Never    Drug use: No    Sexual activity: Not on file   Other Topics Concern    Not on file   Social History Narrative    Not on file     Social Determinants of Health     Financial Resource Strain: Low Risk     Difficulty of Paying Living Expenses: Not hard at all   Food Insecurity: No Food Insecurity    Worried About [address] in the Last Year: Never true    [address] in the Last Year: Never true   Transportation Needs:     Lack of Transportation (Medical): Not on file    Lack of Transportation (Non-Medical):  Not on file Physical Activity:     Days of Exercise per Week: Not on file    Minutes of Exercise per Session: Not on file   Stress:     Feeling of Stress : Not on file   Social Connections:     Frequency of Communication with Friends and Family: Not on file    Frequency of Social Gatherings with Friends and Family: Not on file    Attends Latter-day Services: Not on file    Active Member of [address] [place] [address] [employer] or Organizations: Not on file    Attends Club or Organization Meetings: Not on file    Marital Status: Not on file   Intimate Partner Violence:     Fear of Current or Ex-Partner: Not on file    Emotionally Abused: Not on file    Physically Abused: Not on file    Sexually Abused: Not on file   Housing Stability:     Unable to Pay for Housing in the Last Year: Not on file    Number of [place] in the Last Year: Not on file    Unstable Housing in the Last Year: Not on file     Family History:    Family History   Problem Relation Age of Onset    Diabetes Mother     High Blood Pressure Father     Heart Disease Father      REVIEW OF SYSTEMS:  A 14-point ROS was obtained and negative, with the exception of pertinent positives as listed below:    Positive for fever and chills. PHYSICAL EXAM:  Vitals:    07/07/22 1522 07/07/22 1621 07/07/22 1735 07/07/22 1835   BP: (!) 165/75 (!) 162/59 (!) 150/58 (!) 149/63   Pulse: 88 78 78 77   Resp: 20 22 21 22   Temp:   99.1 °F (37.3 °C) 100.1 °F (37.8 °C)   TempSrc:   Oral Oral   SpO2: 99% 94% 96% 96%   Weight:         General appearance: Alert / ill-appearing. Cooperative. NAD. HEENT:  Normocephalic / atraumatic. PERRL. EOM intact. Conjunctivae appear normal.  Neck: Supple. No JVD. Respiratory: Normal respiratory effort on RA. CTAB. No wheezes / rales / rhonchi. Cardiovascular: RRR. Normal S1/S2. No murmurs / rubs / gallops. Abdomen: Soft / non-tender / non-distended. BS present. Musculoskeletal: No cyanosis or edema. Skin: Warm / dry. Normal turgor. Neurologic: A/O x 3. Speech normal. Answers questions appropriately. CN intact. No obvious focal neurologic deficits. Psychiatric: Thought content / judgment / insight appear appropriate. Capillary refill: Brisk bilaterally. Peripheral pulses: +2 bilaterally.     Labs:   Results for orders placed or performed during the hospital encounter of 07/07/22   COVID-19, Rapid    Specimen: Nasopharyngeal Swab   Result Value Ref Range    SARS-CoV-2, NAAT NOT  DETECTED NOT DETECTED   Rapid influenza A/B antigens    Specimen: Nasopharyngeal   Result Value Ref Range    Flu A Antigen Negative NEGATIVE    Flu B Antigen Negative NEGATIVE   Basic Metabolic Panel w/ Reflex to MG   Result Value Ref Range    Sodium 132 (L) 135 - 145 meq/L    Potassium reflex Magnesium 4.4 3.5 - 5.2 meq/L    Chloride 99 98 - 111 meq/L    CO2 22 (L) 23 - 33 meq/L    Glucose 134 (H) 70 - 108 mg/dL    BUN 28 (H) 7 - 22 mg/dL    CREATININE 1.6 (H) 0.4 - 1.2 mg/dL    Calcium 9.3 8.5 - 10.5 mg/dL   Brain Natriuretic Peptide   Result Value Ref Range    Pro-.7 0.0 - 1800.0 pg/mL   CBC with Auto Differential   Result Value Ref Range    WBC 15.3 (H) 4.8 - 10.8 thou/mm3    RBC 4.44 (L) 4.70 - 6.10 mill/mm3    Hemoglobin 13.2 (L) 14.0 - 18.0 gm/dl    Hematocrit 40.3 (L) 42.0 - 52.0 %    MCV 90.8 80.0 - 94.0 fL    MCH 29.7 26.0 - 33.0 pg    MCHC 32.8 32.2 - 35.5 gm/dl    RDW-CV 13.7 11.5 - 14.5 %    RDW-SD 46.1 (H) 35.0 - 45.0 fL    Platelets 911 861 - 194 thou/mm3    MPV 10.5 9.4 - 12.4 fL    Seg Neutrophils 89.3 %    Lymphocytes 3.8 %    Monocytes 5.6 %    Eosinophils 0.5 %    Basophils 0.2 %    Immature Granulocytes 0.6 %    Segs Absolute 13.7 (H) 1.8 - 7.7 thou/mm3    Lymphocytes Absolute 0.6 (L) 1.0 - 4.8 thou/mm3    Monocytes Absolute 0.9 0.4 - 1.3 thou/mm3    Eosinophils Absolute 0.1 0.0 - 0.4 thou/mm3    Basophils Absolute 0.0 0.0 - 0.1 thou/mm3    Immature Grans (Abs) 0.09 (H) 0.00 - 0.07 thou/mm3    nRBC 0 /100 wbc   Troponin   Result Value Ref Range    Troponin T < 0.010 ng/ml   Hepatic Function Panel   Result Value Ref Range    Albumin 4.5 3.5 - 5.1 g/dL    Total Bilirubin 0.9 0.3 - 1.2 mg/dL    Bilirubin, Direct <0.2 0.0 - 0.3 mg/dL    Alkaline Phosphatase 112 38 - 126 U/L    AST 19 5 - 40 U/L    ALT 12 11 - 66 U/L    Total Protein 6.7 6.1 - 8.0 g/dL   Lactate, Sepsis   Result Value Ref Range    Lactic Acid, Sepsis 1.8 0.5 - 1.9 mmol/L   Lactate, Sepsis   Result Value Ref Range    Lactic Acid, Sepsis 1.5 0.5 - 1.9 mmol/L   Urine with Reflexed Micro   Result Value Ref Range    Glucose, Ur NEGATIVE NEGATIVE mg/dl    Bilirubin Urine NEGATIVE NEGATIVE    Ketones, Urine NEGATIVE NEGATIVE    Specific Gravity, Urine 1.018 1.002 - 1.030    Blood, Urine LARGE (A) NEGATIVE    pH, UA 5.5 5.0 - 9.0    Protein,  (A) NEGATIVE    Urobilinogen, Urine 0.2 0.0 - 1.0 eu/dl    Nitrite, Urine NEGATIVE NEGATIVE    Leukocyte Esterase, Urine LARGE (A) NEGATIVE    Color, UA YELLOW STRAW-YELLOW    Character, Urine TURBID (A) CLEAR-SL CLOUD    RBC, UA 50-75 0-2/hpf /hpf    WBC, UA > 200 0-4/hpf /hpf    Epithelial Cells, UA NONE SEEN 3-5/hpf /hpf    Bacteria, UA MODERATE FEW/NONE SEEN /hpf    Casts UA NONE SEEN NONE SEEN /lpf    Crystals, UA NONE SEEN NONE SEEN    Renal Epithelial, UA NONE SEEN NONE SEEN    Yeast, UA NONE SEEN NONE SEEN    CASTS 2 NONE SEEN NONE SEEN /lpf    MISCELLANEOUS 2 NONE SEEN    Anion Gap   Result Value Ref Range    Anion Gap 11.0 8.0 - 16.0 meq/L   Glomerular Filtration Rate, Estimated   Result Value Ref Range    Est, Glom Filt Rate 41 (A) ml/min/1.73m2   Osmolality   Result Value Ref Range    Osmolality Calc 272.0 (L) 275.0 - 300.0 mOsmol/kg   EKG 12 Lead   Result Value Ref Range    Ventricular Rate 80 BPM    Atrial Rate 80 BPM    P-R Interval 216 ms    QRS Duration 86 ms    Q-T Interval 378 ms    QTc Calculation (Bazett) 435 ms    P Axis 52 degrees    R Axis -40 degrees    T Axis 91 degrees       Radiology:     XR CHEST (2 VW)    Result Date: 7/7/2022  PROCEDURE: XR CHEST (2 VW) CLINICAL INFORMATION: sob. COMPARISON: Chest x-ray dated 21 March 2022. TECHNIQUE: PA and lateral views the chest. FINDINGS: The heart size is normal.  There is mild dilatation of the ascending aorta. .  There are no pulmonary infiltrates or effusions. The pulmonary vascularity is normal. There is thoracic spondylosis. There are postoperative changes involving the right shoulder. .      1. No interval change since previous study dated 21 March 2022, no acute cardiopulmonary disease. **This report has been created using voice recognition software. It may contain minor errors which are inherent in voice recognition technology. ** Final report electronically signed by DR Selma Acevedo on 7/7/2022 3:48 PM    FEN/GI/DVT:  IVF: NS @ 50 mL/hr  Electrolytes: Monitor and replace per protocols  Diet: General  GI PPX: On PPI for GERD  DVT Prophylaxis: Lovenox    CODE STATUS:  Full    Thank you Alok Huff DO for the opportunity to be involved in this patient's care.     Electronically signed by Bree Perkins MD on 7/7/2022 at 7:22 PM

## 2022-07-07 NOTE — TELEPHONE ENCOUNTER
Wife called in to let us know that patient was running a fever/chills and having difficulty urinating, taking patient to the emergency room.

## 2022-07-07 NOTE — ED NOTES
Patient presents to the ED with complaints of SOB and a fever since this morning. Patient had a urinary catheter removed yesterday following a cystoscopy. This morning around 0930 patient took tylenol. Around 1230 this afternoon he had a temperature of a 101. He has not taken anything since 0930. VSS. Denies any  Pain at this time.      Barry Garcia RN  07/07/22 3509

## 2022-07-08 LAB
ACINETOBACTER BAUMANNII FILM ARRAY: NOT DETECTED
ANION GAP SERPL CALCULATED.3IONS-SCNC: 12 MEQ/L (ref 8–16)
BASOPHILS # BLD: 0.2 %
BASOPHILS ABSOLUTE: 0 THOU/MM3 (ref 0–0.1)
BOTTLE TYPE: ABNORMAL
BUN BLDV-MCNC: 27 MG/DL (ref 7–22)
CALCIUM SERPL-MCNC: 8.7 MG/DL (ref 8.5–10.5)
CANDIDA ALBICANS FILM ARRAY: NOT DETECTED
CANDIDA GLABRATA FILM ARRAY: NOT DETECTED
CANDIDA KRUSEI FILM ARRAY: NOT DETECTED
CANDIDA PARAPSILOSIS FILM ARRAY: NOT DETECTED
CANDIDA TROPICALIS FILM ARRAY: NOT DETECTED
CARBAPENEM RESITANT FILM ARRAY: NOT DETECTED
CHLORIDE BLD-SCNC: 100 MEQ/L (ref 98–111)
CO2: 24 MEQ/L (ref 23–33)
CREAT SERPL-MCNC: 1.6 MG/DL (ref 0.4–1.2)
ENTERBACTER CLOACAE FILM ARRAY: NOT DETECTED
ENTERBACTERIACEAE FILM ARRAY: NOT DETECTED
ENTEROCOCCUS FILM ARRAY: NOT DETECTED
EOSINOPHIL # BLD: 0.2 %
EOSINOPHILS ABSOLUTE: 0 THOU/MM3 (ref 0–0.4)
ERYTHROCYTE [DISTWIDTH] IN BLOOD BY AUTOMATED COUNT: 14.1 % (ref 11.5–14.5)
ERYTHROCYTE [DISTWIDTH] IN BLOOD BY AUTOMATED COUNT: 46.4 FL (ref 35–45)
ESCHERICHIA COLI FILM ARRAY: NOT DETECTED
GFR SERPL CREATININE-BSD FRML MDRD: 41 ML/MIN/1.73M2
GLUCOSE BLD-MCNC: 129 MG/DL (ref 70–108)
HAEMOPHILUS INFLUENZA FILM ARRAY: NOT DETECTED
HCT VFR BLD CALC: 36.6 % (ref 42–52)
HEMOGLOBIN: 12 GM/DL (ref 14–18)
IMMATURE GRANS (ABS): 0.1 THOU/MM3 (ref 0–0.07)
IMMATURE GRANULOCYTES: 0.6 %
KLEBSIELLA OXYTOCA FILM ARRAY: NOT DETECTED
KLEBSIELLA PNEUMONIAE FILM ARRAY: NOT DETECTED
LISTERIA MONOCYTOGENES FILM ARRAY: NOT DETECTED
LYMPHOCYTES # BLD: 6.4 %
LYMPHOCYTES ABSOLUTE: 1.1 THOU/MM3 (ref 1–4.8)
MCH RBC QN AUTO: 29.6 PG (ref 26–33)
MCHC RBC AUTO-ENTMCNC: 32.8 GM/DL (ref 32.2–35.5)
MCV RBC AUTO: 90.4 FL (ref 80–94)
METHICILLIN RESISTANT FILM ARRAY: ABNORMAL
MONOCYTES # BLD: 6.3 %
MONOCYTES ABSOLUTE: 1.1 THOU/MM3 (ref 0.4–1.3)
NEISSERIA MENIGITIDIS FILM ARRAY: NOT DETECTED
NUCLEATED RED BLOOD CELLS: 0 /100 WBC
OSMOLALITY CALCULATION: 278.8 MOSMOL/KG (ref 275–300)
PLATELET # BLD: 134 THOU/MM3 (ref 130–400)
PMV BLD AUTO: 10.8 FL (ref 9.4–12.4)
POTASSIUM REFLEX MAGNESIUM: 4 MEQ/L (ref 3.5–5.2)
PROTEUS FILM ARRAY: NOT DETECTED
PSEUDOMONAS AERUGINOSA FILM ARRAY: DETECTED
RBC # BLD: 4.05 MILL/MM3 (ref 4.7–6.1)
SEG NEUTROPHILS: 86.3 %
SEGMENTED NEUTROPHILS ABSOLUTE COUNT: 14.6 THOU/MM3 (ref 1.8–7.7)
SERRATIA MARCESCENS FILM ARRAY: NOT DETECTED
SODIUM BLD-SCNC: 136 MEQ/L (ref 135–145)
SOURCE OF BLOOD CULTURE: ABNORMAL
STAPH AUREUS FILM ARRAY: NOT DETECTED
STAPHYLOCOCCUS FILM ARRAY: NOT DETECTED
STREP AGALACTIAE FILM ARRAY: NOT DETECTED
STREP PNEUMONIAE FILM ARRAY: NOT DETECTED
STREP PYOCGENES FILM ARRAY: NOT DETECTED
STREPTOCOCCUS FILM ARRAY: NOT DETECTED
VANCOMYCIN RESISTANT FILM ARRAY: ABNORMAL
WBC # BLD: 16.9 THOU/MM3 (ref 4.8–10.8)

## 2022-07-08 PROCEDURE — 80048 BASIC METABOLIC PNL TOTAL CA: CPT

## 2022-07-08 PROCEDURE — 99233 SBSQ HOSP IP/OBS HIGH 50: CPT | Performed by: INTERNAL MEDICINE

## 2022-07-08 PROCEDURE — 1200000003 HC TELEMETRY R&B

## 2022-07-08 PROCEDURE — 2580000003 HC RX 258: Performed by: STUDENT IN AN ORGANIZED HEALTH CARE EDUCATION/TRAINING PROGRAM

## 2022-07-08 PROCEDURE — 6370000000 HC RX 637 (ALT 250 FOR IP): Performed by: STUDENT IN AN ORGANIZED HEALTH CARE EDUCATION/TRAINING PROGRAM

## 2022-07-08 PROCEDURE — 1200000000 HC SEMI PRIVATE

## 2022-07-08 PROCEDURE — 36415 COLL VENOUS BLD VENIPUNCTURE: CPT

## 2022-07-08 PROCEDURE — 2580000003 HC RX 258: Performed by: INTERNAL MEDICINE

## 2022-07-08 PROCEDURE — 85025 COMPLETE CBC W/AUTO DIFF WBC: CPT

## 2022-07-08 PROCEDURE — 6360000002 HC RX W HCPCS: Performed by: INTERNAL MEDICINE

## 2022-07-08 PROCEDURE — 6360000002 HC RX W HCPCS: Performed by: STUDENT IN AN ORGANIZED HEALTH CARE EDUCATION/TRAINING PROGRAM

## 2022-07-08 RX ADMIN — HYDRALAZINE HYDROCHLORIDE 10 MG: 10 TABLET, FILM COATED ORAL at 21:38

## 2022-07-08 RX ADMIN — PRIMIDONE 50 MG: 50 TABLET ORAL at 09:03

## 2022-07-08 RX ADMIN — AMLODIPINE BESYLATE 10 MG: 10 TABLET ORAL at 09:03

## 2022-07-08 RX ADMIN — CITALOPRAM HYDROBROMIDE 20 MG: 20 TABLET ORAL at 09:03

## 2022-07-08 RX ADMIN — SODIUM CHLORIDE, PRESERVATIVE FREE 10 ML: 5 INJECTION INTRAVENOUS at 00:32

## 2022-07-08 RX ADMIN — PRAVASTATIN SODIUM 40 MG: 40 TABLET ORAL at 00:21

## 2022-07-08 RX ADMIN — PRAVASTATIN SODIUM 40 MG: 40 TABLET ORAL at 21:38

## 2022-07-08 RX ADMIN — ENOXAPARIN SODIUM 40 MG: 100 INJECTION SUBCUTANEOUS at 00:31

## 2022-07-08 RX ADMIN — CITALOPRAM HYDROBROMIDE 20 MG: 20 TABLET ORAL at 00:21

## 2022-07-08 RX ADMIN — LOSARTAN POTASSIUM 100 MG: 100 TABLET, FILM COATED ORAL at 09:03

## 2022-07-08 RX ADMIN — PRIMIDONE 50 MG: 50 TABLET ORAL at 21:39

## 2022-07-08 RX ADMIN — ENOXAPARIN SODIUM 40 MG: 100 INJECTION SUBCUTANEOUS at 21:38

## 2022-07-08 RX ADMIN — HYDRALAZINE HYDROCHLORIDE 10 MG: 10 TABLET, FILM COATED ORAL at 09:03

## 2022-07-08 RX ADMIN — SODIUM CHLORIDE: 9 INJECTION, SOLUTION INTRAVENOUS at 00:30

## 2022-07-08 RX ADMIN — PIPERACILLIN AND TAZOBACTAM 3375 MG: 3; .375 INJECTION, POWDER, LYOPHILIZED, FOR SOLUTION INTRAVENOUS at 14:47

## 2022-07-08 RX ADMIN — PRIMIDONE 50 MG: 50 TABLET ORAL at 00:21

## 2022-07-08 RX ADMIN — ACETAMINOPHEN 325MG 650 MG: 325 TABLET ORAL at 00:31

## 2022-07-08 RX ADMIN — TRIAMTERENE AND HYDROCHLOROTHIAZIDE 1 TABLET: 37.5; 25 TABLET ORAL at 00:21

## 2022-07-08 RX ADMIN — PIPERACILLIN AND TAZOBACTAM 3375 MG: 3; .375 INJECTION, POWDER, LYOPHILIZED, FOR SOLUTION INTRAVENOUS at 22:27

## 2022-07-08 RX ADMIN — HYDRALAZINE HYDROCHLORIDE 10 MG: 10 TABLET, FILM COATED ORAL at 00:21

## 2022-07-08 RX ADMIN — TRIAMTERENE AND HYDROCHLOROTHIAZIDE 1 TABLET: 37.5; 25 TABLET ORAL at 09:03

## 2022-07-08 RX ADMIN — PANTOPRAZOLE SODIUM 40 MG: 40 TABLET, DELAYED RELEASE ORAL at 06:05

## 2022-07-08 NOTE — PROGRESS NOTES
Hospitalist Progress Note    Patient:  Briseyda Larsen      Unit/Bed:6K-25/025-A    YOB: 1940    MRN: [id number]       Acct: [de-identified]     PCP: Cidny Silverio DO    Date of Admission: 7/7/2022    Active Hospital Problems    Diagnosis Date Noted    UTI (urinary tract infection) [N39.0] 07/07/2022     Priority: Medium        ASSESSMENT / PLAN:    1. Sepsis (POA) 2/2 Urinary Tract Infection: Had his calabrese catheter removed on 7/6 and shortly after started to develop high fevers and chills. Supported by UA (+WBC and bacteria). Urine Cx growing Pseudomonas. Blood Cx NGTD. IV fluids. Continues to have fevers, will stop Rocephin and start IV Zosyn. 2. Resistant hypertension: Amlodipine, hydralazine, losartan, Maxzide resumed with hold parameters. 3. CKD Stage 3: Cr at baseline. Avoid nephrotoxic agents. Renally dose medications. Daily BMP. 4. BPH: S/p prior TURP. Underwent cystoscopy, greenlight PVP and urethral dilation 04/02/2022 by Dr. Christiano Quezada. 5. Hyperlipidemia: Lipitor resumed. 6. Essential Tremor: Primidone resumed. 7. GERD: PPI resumed. 8. Anxiety: Celexa resumed. Chief Complaint: fevers and chills     Hospital Course: 78 y.o. male with a hx of HTN, CKD 3, BPH, HLD, GERD, anxiety and essential tremor who presented to [address] [place] [address] for evaluation of fever and chills. Patient underwent greenlight PVP and urethral dilation on 04/02/2022 by Dr. Christiano Quezada. He had some increasing irritative voiding symptoms postoperatively. On 06/28/2022, patient underwent flexible cystoscopy. 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 Patient underwent urethral dilation during cystoscopy. Calabrese catheter was placed and patient was given Keflex as a prophylaxis. Patient reports that Calab[place] catheter was removed yesterday. This morning he had awoken to fever and chills. Fever of 101 °F.  Patient felt very weak and fatigued. Patient was brought into the ED by wife.   Denies chest pain, shortness of breath, palpitations, syncope or LOC. Subjective: no acute events overnight. Reports starting to feel slightly better however still having some chills, fatigue and lethargy. Continues to have hematuria, no burning with urination or abdominal pain. Medications:  Reviewed    Infusion Medications    sodium chloride      sodium chloride 50 mL/hr at 07/08/22 0030     Scheduled Medications    piperacillin-tazobactam  3,375 mg IntraVENous Q8H    sodium chloride flush  5-40 mL IntraVENous 2 times per day    enoxaparin  40 mg SubCUTAneous Q24H    amLODIPine  10 mg Oral Daily    citalopram  20 mg Oral Daily    hydrALAZINE  10 mg Oral BID    losartan  100 mg Oral Daily    pantoprazole  40 mg Oral Daily    pravastatin  40 mg Oral Nightly    primidone  50 mg Oral BID    triamterene-hydroCHLOROthiazide  1 tablet Oral Daily     PRN Meds: sodium chloride flush, sodium chloride, ondansetron **OR** ondansetron, polyethylene glycol, acetaminophen **OR** acetaminophen, dicyclomine, simethicone, traMADol      Intake/Output Summary (Last 24 hours) at 7/8/2022 1320  Last data filed at 7/8/2022 1303  Gross per 24 hour   Intake 530 ml   Output --   Net 530 ml       Diet:  ADULT DIET; Regular    Exam:  BP (!) 161/73   Pulse 64   Temp 97.6 °F (36.4 °C) (Oral)   Resp 19   Ht 5' 9\" (1.753 m)   Wt 208 lb 0.1 oz (94.4 kg)   SpO2 94%   BMI 30.72 kg/m²     General appearance: Alert / ill-appearing. Cooperative. NAD. HEENT:  Normocephalic / atraumatic. PERRL. EOM intact. Conjunctivae appear normal.  Neck: Supple. No JVD. Respiratory: Normal respiratory effort on RA. CTAB. No wheezes / rales / rhonchi. Cardiovascular: RRR. Normal S1/S2. No murmurs / rubs / gallops. Abdomen: Soft / non-tender / non-distended. BS present. Musculoskeletal: No cyanosis or edema. Skin: Warm / dry. Normal turgor. Neurologic: A/O x 3. Speech normal. Answers questions appropriately. CN intact.  No obvious focal neurologic deficits. Psychiatric: Thought content / judgment / insight appear appropriate. Capillary refill: Brisk bilaterally. Peripheral pulses: +2 bilaterally. Labs:   Recent Labs     07/08/22  0616   WBC 16.9*   HGB 12.0*   HCT 36.6*        Recent Labs     07/08/22  0616      K 4.0      CO2 24   BUN 27*   CREATININE 1.6*   CALCIUM 8.7     Recent Labs     07/07/22  1502   AST 19   ALT 12   BILIDIR <0.2   BILITOT 0.9   ALKPHOS 112     No results for input(s): INR in the last 72 hours. No results for input(s): James Begin in the last 72 hours. Urinalysis:      Lab Results   Component Value Date/Time    NITRU NEGATIVE 07/07/2022 03:13 PM    WBCUA > 200 07/07/2022 03:13 PM    BACTERIA MODERATE 07/07/2022 03:13 PM    RBCUA 50-75 07/07/2022 03:13 PM    BLOODU LARGE 07/07/2022 03:13 PM    SPECGRAV 1.016 05/26/2014 08:30 PM    GLUCOSEU NEGATIVE 07/07/2022 03:13 PM       Radiology: All imaging reviewed     Diet: ADULT DIET;  Regular      Code Status: Full Code            Electronically signed by Graham Etienne MD on 7/8/2022 at 1:20 PM

## 2022-07-08 NOTE — ED NOTES
Patient transferred to [facility] room 025 nurse informed.      Lakia Alvarez  07/07/22 7689

## 2022-07-08 NOTE — FLOWSHEET NOTE
Pt was in bed at the time of the visit. He was dealing with urinary tract infection. He was hopeful and blessed. 07/08/22 1438   Encounter Summary   Service Provided For: Patient and family together   Referral/Consult From: [facility]   Support System Spouse   Last Encounter  07/08/22   Complexity of Encounter Low   Begin Time 1038   End Time  1045   Total Time Calculated 7 min   Spiritual/Emotional needs   Type Spiritual Support   Assessment/Intervention/Outcome   Assessment Calm   Intervention Empowerment; Active listening

## 2022-07-08 NOTE — PLAN OF CARE
Problem: Discharge Planning  Goal: Discharge to home or other facility with appropriate resources  Outcome: Progressing  Flowsheets (Taken 7/8/2022 0439)  Discharge to home or other facility with appropriate resources:   Identify barriers to discharge with patient and caregiver   Arrange for needed discharge resources and transportation as appropriate   Identify discharge learning needs (meds, wound care, etc)   Refer to discharge planning if patient needs post-hospital services based on physician order or complex needs related to functional status, cognitive ability or social support system     Problem: ABCDS Injury Assessment  Goal: Absence of physical injury  Outcome: Progressing  Flowsheets (Taken 7/8/2022 0439)  Absence of Physical Injury: Implement safety measures based on patient assessment

## 2022-07-08 NOTE — CARE COORDINATION
7/8/22, 7:20 AM EDT  DISCHARGE PLANNING EVALUATION:    Alix Haynes       Admitted: 7/7/2022/ 1201 [facility] day: 1   Location: [facility] Reason for admit: UTI (urinary tract infection) [N39.0]   PMH:  has a past medical history of Anemia, Arthritis, Bronchiolitis, Cancer ([address] CKD (chronic kidney disease), stage III ([address] Diverticula of colon, Diverticulosis, DVT (deep venous thrombosis) ([facility] [address] Esophagitis, Hiatal hernia, HTN (hypertension), Hyperlipidemia, Kidney stones, Nephrolithiasis, Obesity, Prostate enlargement, and Renal insufficiency. Barriers to Discharge:  UA+, WBC 16.9. Tmax 100.4. IVF, IV rocephin. PCP: Suha Giles DO  Readmission Risk Score: 11.4 ( )%  Patient's Healthcare Decision Maker: Legal Next of Kin    Patient Goals/Plan/Treatment Preferences: Met with Keerthi Rachel, he plans to return home with his wife at discharge. [facility], and does not feel they are needed at this time. He confirms PCP and insurance. Transportation/Food Security/Housekeeping Addressed:  No issues identified. 7/8/22, 11:58 AM EDT    Possible weekend discharge. Patient goals/plan/ treatment preferences discussed by  and . Patient goals/plan/ treatment preferences reviewed with patient/ family. Patient/ family verbalize understanding of discharge plan and are in agreement with goal/plan/treatment preferences. Understanding was demonstrated using the teach back method. AVS provided by RN at time of discharge, which includes all necessary medical information pertaining to the patients current course of illness, treatment, post-discharge goals of care, and treatment preferences.      Services At/After Discharge: None       IMM Letter  IMM Letter given to Patient/Family/Significant other/Guardian/POA/by[de-identified] Leonel Darby CM  IMM Letter date given[de-identified] 07/08/22  IMM Letter time given[de-identified] 2910

## 2022-07-08 NOTE — ED PROVIDER NOTES
[facility]  EMERGENCY MEDICINE ATTENDING ATTESTATION      Evaluation of Alize Goetz. Case discussed and care plan developed with resident physician. I agree with the resident physician documentation and plan as documented by him, except if my documentation differs. Patient seen, interviewed and examined by me. I reviewed the medical, surgical, family and social history, medications and allergies. I have reviewed the nursing documentation. I have reviewed the patient's vital signs and are normal per my interpretation. Body mass index is 30.72 kg/m². Pulsoxymetry is normal per my interpretation. Brief H&P   Patient c/o chills, fever, shortness of breath, recent urologic procedure and [place] out yesterday, had been on prophylactic antibiotics outpatient that recently were discontinued, rigors    Physical exam is notable for ill-appearing, suprapubic tenderness      Medical Decision Making   MDM:   Patient is an 12-year-old male with a history of CKD, hypertension, BPH with recent urethral stricture dilatation who presents with fever, shortness of breath, suprapubic abdominal distention and rigors. Work-up significant for leukocytosis, UA positive for infection, and patient reporting documented fever. Patient started on IV antibiotics for sepsis secondary to UTI. Plan:    Admit to hospitalist team   IV antibiotics    Please see the resident physician completed note for final disposition except as documented on this attestation. I have reviewed and interpreted all available lab, radiology and ekg results available at the moment. Diagnosis, treatment and disposition plans were discussed and agreed upon by patient. This transcription was electronically signed. It was dictated by use of voice recognition software and electronically transcribed. The transcription may contain errors not detected in proofreading.      I performed direct supervision and was present for the critical portion following procedures: None  Critical care time on this case: None    Electronically signed by Sheridan Shaver MD on 7/7/22 at 11:41 PM EDT        Sheridan Shaver MD  07/07/22 [id number]

## 2022-07-08 NOTE — PROGRESS NOTES
Physician Progress Note      PATIENT:               Shira Hilliard  CSN #:                  [id number]  :                       1940  ADMIT DATE:       2022 2:13 PM  DISCH DATE:  RESPONDING  PROVIDER #:        Reta Kelly MD          QUERY TEXT:    Pt admitted with UTI. Pt noted to have recent cystoscopy and urinary catheter   in place. If possible, please document in the progress notes and discharge   summary if you are evaluating and/or treating any of the following: The medical record reflects the following:    Risk Factors: recent cystoscopy with recent urinary catheter removed   Clinical Indicators: UA moderate bacteria, large leukocytes, negative nitrite,   urine culture pending  Treatment: IV Rocephin, IVF bolus and continuous IVF    Thank you! Veronia Reddish, Delle Phoenix, CRCR RN Clinical   P: [phone]  Options provided:  -- UTI due to previous urinary catheter removed   -- UTI as a complication of  recent cystoscopy  -- UTI not a complication of recent cystoscopy or r/t recent urinary catheter  -- Other - I will add my own diagnosis  -- Disagree - Not applicable / Not valid  -- Disagree - Clinically unable to determine / Unknown  -- Refer to Clinical Documentation Reviewer    PROVIDER RESPONSE TEXT:    UTI is due to the previous indwelling urinary catheter removed . Query created by: Geetha Angeles on 2022 11:05 AM      QUERY TEXT:    Pt admitted with UTI. Noted documentation of sepsis by ED physician. If   possible, please document in progress notes and discharge summary:    The medical record reflects the following:    Risk Factors: UTI  Clinical Indicators: WBC 15.3, 16.9, tmax 100.4, no tachycardia, no tachypnea,   lactic WNL  Treatment: IV Rocephin, IVF bolus and continuous IVF    Thank you!     Veronia Reddish, Delle Phoenix, CRCR RN Clinical   P: [phone]  Options provided:  -- Sepsis confirmed present on admission  -- Sepsis ruled out  -- Other - I will add my own diagnosis  -- Disagree - Not applicable / Not valid  -- Disagree - Clinically unable to determine / Unknown  -- Refer to Clinical Documentation Reviewer    PROVIDER RESPONSE TEXT:    The diagnosis of sepsis was confirmed as present on admission.     Query created by: Parvin Willingham on 7/8/2022 11:05 AM      Electronically signed by:  Elvis Murrieta MD 7/8/2022 1:24 PM

## 2022-07-08 NOTE — FLOWSHEET NOTE
[facility] PROGRESS NOTE      Patient: Lynsey Cervantes  Room #: BAO /BAO            YOB: 1940  Age: 78 y.o. Gender: male            Admit Date & Time: 7/7/2022  2:13 PM    Assessment:    Interventions:    Outcomes:       rounding anticipated previous patient; found patient just brought to this room. Patient and wife were just settling in room; nurse came in to get things settled. After brief introductions,  offered prayer; Nurse said she'd stay. Prayer was given and gratitude expressed. Plan:    1.   Watch as stay develops and follow as appropriate    Electronically signed by Obdulio Henry on 7/7/2022 at 10:04 PM.  [facility]  [phone]             07/07/22 2201   Encounter Summary   Encounter Overview/Reason  Initial Encounter   Service Provided For: Patient and family together   Referral/Consult From: [facility] [place]   Support System Spouse   Last Encounter  07/07/22   Complexity of Encounter Low   Begin Time 2144   End Time  2150   Total Time Calculated 6 min   Encounter    Type Initial Screen/Assessment   Assessment/Intervention/Outcome   Assessment Calm;Coping;Peaceful   Intervention Active listening;Explored/Affirmed feelings, thoughts, concerns;Nurtured Hope;Prayer (assurance of)/[place]   Outcome Acceptance;Expressed Gratitude

## 2022-07-09 LAB
ANION GAP SERPL CALCULATED.3IONS-SCNC: 10 MEQ/L (ref 8–16)
BASOPHILS # BLD: 0.4 %
BASOPHILS ABSOLUTE: 0 THOU/MM3 (ref 0–0.1)
BUN BLDV-MCNC: 23 MG/DL (ref 7–22)
CALCIUM SERPL-MCNC: 8.5 MG/DL (ref 8.5–10.5)
CHLORIDE BLD-SCNC: 102 MEQ/L (ref 98–111)
CO2: 24 MEQ/L (ref 23–33)
CREAT SERPL-MCNC: 1.7 MG/DL (ref 0.4–1.2)
EOSINOPHIL # BLD: 1.2 %
EOSINOPHILS ABSOLUTE: 0.1 THOU/MM3 (ref 0–0.4)
ERYTHROCYTE [DISTWIDTH] IN BLOOD BY AUTOMATED COUNT: 14 % (ref 11.5–14.5)
ERYTHROCYTE [DISTWIDTH] IN BLOOD BY AUTOMATED COUNT: 46.8 FL (ref 35–45)
GFR SERPL CREATININE-BSD FRML MDRD: 39 ML/MIN/1.73M2
GLUCOSE BLD-MCNC: 126 MG/DL (ref 70–108)
HCT VFR BLD CALC: 33.8 % (ref 42–52)
HEMOGLOBIN: 11 GM/DL (ref 14–18)
IMMATURE GRANS (ABS): 0.06 THOU/MM3 (ref 0–0.07)
IMMATURE GRANULOCYTES: 0.6 %
LYMPHOCYTES # BLD: 7.8 %
LYMPHOCYTES ABSOLUTE: 0.8 THOU/MM3 (ref 1–4.8)
MAGNESIUM: 1.5 MG/DL (ref 1.6–2.4)
MCH RBC QN AUTO: 29.6 PG (ref 26–33)
MCHC RBC AUTO-ENTMCNC: 32.5 GM/DL (ref 32.2–35.5)
MCV RBC AUTO: 90.9 FL (ref 80–94)
MONOCYTES # BLD: 6.5 %
MONOCYTES ABSOLUTE: 0.7 THOU/MM3 (ref 0.4–1.3)
NUCLEATED RED BLOOD CELLS: 0 /100 WBC
ORGANISM: ABNORMAL
PLATELET # BLD: 121 THOU/MM3 (ref 130–400)
PMV BLD AUTO: 10.8 FL (ref 9.4–12.4)
POTASSIUM SERPL-SCNC: 3.8 MEQ/L (ref 3.5–5.2)
RBC # BLD: 3.72 MILL/MM3 (ref 4.7–6.1)
SEG NEUTROPHILS: 83.5 %
SEGMENTED NEUTROPHILS ABSOLUTE COUNT: 9 THOU/MM3 (ref 1.8–7.7)
SODIUM BLD-SCNC: 136 MEQ/L (ref 135–145)
URINE CULTURE REFLEX: ABNORMAL
WBC # BLD: 10.8 THOU/MM3 (ref 4.8–10.8)

## 2022-07-09 PROCEDURE — 6370000000 HC RX 637 (ALT 250 FOR IP)

## 2022-07-09 PROCEDURE — 6360000002 HC RX W HCPCS: Performed by: INTERNAL MEDICINE

## 2022-07-09 PROCEDURE — 80048 BASIC METABOLIC PNL TOTAL CA: CPT

## 2022-07-09 PROCEDURE — 6370000000 HC RX 637 (ALT 250 FOR IP): Performed by: STUDENT IN AN ORGANIZED HEALTH CARE EDUCATION/TRAINING PROGRAM

## 2022-07-09 PROCEDURE — 6360000002 HC RX W HCPCS: Performed by: STUDENT IN AN ORGANIZED HEALTH CARE EDUCATION/TRAINING PROGRAM

## 2022-07-09 PROCEDURE — 99232 SBSQ HOSP IP/OBS MODERATE 35: CPT | Performed by: INTERNAL MEDICINE

## 2022-07-09 PROCEDURE — 85025 COMPLETE CBC W/AUTO DIFF WBC: CPT

## 2022-07-09 PROCEDURE — 1200000000 HC SEMI PRIVATE

## 2022-07-09 PROCEDURE — 2580000003 HC RX 258: Performed by: STUDENT IN AN ORGANIZED HEALTH CARE EDUCATION/TRAINING PROGRAM

## 2022-07-09 PROCEDURE — 6370000000 HC RX 637 (ALT 250 FOR IP): Performed by: INTERNAL MEDICINE

## 2022-07-09 PROCEDURE — 36415 COLL VENOUS BLD VENIPUNCTURE: CPT

## 2022-07-09 PROCEDURE — 83735 ASSAY OF MAGNESIUM: CPT

## 2022-07-09 PROCEDURE — 2580000003 HC RX 258: Performed by: INTERNAL MEDICINE

## 2022-07-09 RX ORDER — DOCUSATE SODIUM 100 MG/1
100 CAPSULE, LIQUID FILLED ORAL 2 TIMES DAILY
Status: DISCONTINUED | OUTPATIENT
Start: 2022-07-09 | End: 2022-07-11 | Stop reason: HOSPADM

## 2022-07-09 RX ORDER — SENNA PLUS 8.6 MG/1
1 TABLET ORAL NIGHTLY
Status: DISCONTINUED | OUTPATIENT
Start: 2022-07-09 | End: 2022-07-11 | Stop reason: HOSPADM

## 2022-07-09 RX ORDER — HYDRALAZINE HYDROCHLORIDE 25 MG/1
25 TABLET, FILM COATED ORAL EVERY 8 HOURS SCHEDULED
Status: DISCONTINUED | OUTPATIENT
Start: 2022-07-09 | End: 2022-07-11 | Stop reason: HOSPADM

## 2022-07-09 RX ADMIN — PIPERACILLIN AND TAZOBACTAM 3375 MG: 3; .375 INJECTION, POWDER, LYOPHILIZED, FOR SOLUTION INTRAVENOUS at 06:15

## 2022-07-09 RX ADMIN — LOSARTAN POTASSIUM 100 MG: 100 TABLET, FILM COATED ORAL at 08:21

## 2022-07-09 RX ADMIN — CITALOPRAM HYDROBROMIDE 20 MG: 20 TABLET ORAL at 08:21

## 2022-07-09 RX ADMIN — SODIUM CHLORIDE, PRESERVATIVE FREE 10 ML: 5 INJECTION INTRAVENOUS at 08:20

## 2022-07-09 RX ADMIN — PRAVASTATIN SODIUM 40 MG: 40 TABLET ORAL at 21:15

## 2022-07-09 RX ADMIN — ACETAMINOPHEN 325MG 650 MG: 325 TABLET ORAL at 04:42

## 2022-07-09 RX ADMIN — PRIMIDONE 50 MG: 50 TABLET ORAL at 21:15

## 2022-07-09 RX ADMIN — PIPERACILLIN AND TAZOBACTAM 3375 MG: 3; .375 INJECTION, POWDER, LYOPHILIZED, FOR SOLUTION INTRAVENOUS at 15:00

## 2022-07-09 RX ADMIN — DOCUSATE SODIUM 100 MG: 100 CAPSULE, LIQUID FILLED ORAL at 22:25

## 2022-07-09 RX ADMIN — PIPERACILLIN AND TAZOBACTAM 3375 MG: 3; .375 INJECTION, POWDER, LYOPHILIZED, FOR SOLUTION INTRAVENOUS at 22:27

## 2022-07-09 RX ADMIN — AMLODIPINE BESYLATE 10 MG: 10 TABLET ORAL at 08:21

## 2022-07-09 RX ADMIN — HYDRALAZINE HYDROCHLORIDE 25 MG: 25 TABLET, FILM COATED ORAL at 14:54

## 2022-07-09 RX ADMIN — SENNOSIDES 8.6 MG: 8.6 TABLET, FILM COATED ORAL at 22:25

## 2022-07-09 RX ADMIN — PANTOPRAZOLE SODIUM 40 MG: 40 TABLET, DELAYED RELEASE ORAL at 06:16

## 2022-07-09 RX ADMIN — PRIMIDONE 50 MG: 50 TABLET ORAL at 08:21

## 2022-07-09 RX ADMIN — ENOXAPARIN SODIUM 40 MG: 100 INJECTION SUBCUTANEOUS at 21:15

## 2022-07-09 RX ADMIN — HYDRALAZINE HYDROCHLORIDE 10 MG: 10 TABLET, FILM COATED ORAL at 08:21

## 2022-07-09 RX ADMIN — TRIAMTERENE AND HYDROCHLOROTHIAZIDE 1 TABLET: 37.5; 25 TABLET ORAL at 08:21

## 2022-07-09 RX ADMIN — HYDRALAZINE HYDROCHLORIDE 25 MG: 25 TABLET, FILM COATED ORAL at 21:15

## 2022-07-09 NOTE — PROGRESS NOTES
Hospitalist Progress Note    Patient:  Wero Jones      Unit/Bed:6K-25/025-A    YOB: 1940    MRN: [id number]       Acct: [de-identified]     PCP: Hannah Gary DO    Date of Admission: 7/7/2022    Active Hospital Problems    Diagnosis Date Noted    UTI (urinary tract infection) [N39.0] 07/07/2022     Priority: Medium        ASSESSMENT / PLAN:    1. Sepsis (POA) 2/2 Urinary Tract Infection with Bacteremia: Had his calabrese catheter removed on 7/6 and shortly after started to develop high fevers and chills. Supported by UA (+WBC and bacteria). Urine Cx and Bx growing Pseudomonas. IV fluids. Improving on IV Zosyn (Day 2), cont. 2. Gram Negative Bacilli Bacteremia: 2/2 to #1. Cont IV Zosyn. 3. Resistant hypertension: Amlodipine, hydralazine, losartan, Maxzide resumed with hold parameters. 4. CKD Stage 3: Cr at baseline. Avoid nephrotoxic agents. Renally dose medications. Daily BMP. 5. BPH: S/p prior TURP. Underwent cystoscopy, greenlight PVP and urethral dilation 04/02/2022 by Dr. Cristo Mensah. 6. Hyperlipidemia: Lipitor resumed. 7. Essential Tremor: Primidone resumed. 8. GERD: PPI resumed. 9. Anxiety: Celexa resumed. Chief Complaint: fevers and chills     Hospital Course: 78 y.o. male with a hx of HTN, CKD 3, BPH, HLD, GERD, anxiety and essential tremor who presented to [address] [place] [address] for evaluation of fever and chills. Patient underwent greenlight PVP and urethral dilation on 04/02/2022 by Dr. Cristo Mensah. He had some increasing irritative voiding symptoms postoperatively. On 06/28/2022, patient underwent flexible cystoscopy. 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 Patient underwent urethral dilation during cystoscopy. Calabrese catheter was placed and patient was given Keflex as a prophylaxis. Patient reports that [place] catheter was removed yesterday. This morning he had awoken to fever and chills. Fever of 101 °F.  Patient felt very weak and fatigued.   Patient was brought into the ED by wife. Denies chest pain, shortness of breath, palpitations, syncope or LOC. Subjective: no acute events overnight. Reports starting to feel better, eating more. Not as fatigue. Having mild fevers and chills. Continues to have hematuria, no burning with urination or abdominal pain. Medications:  Reviewed    Infusion Medications    sodium chloride       Scheduled Medications    piperacillin-tazobactam  3,375 mg IntraVENous Q8H    sodium chloride flush  5-40 mL IntraVENous 2 times per day    enoxaparin  40 mg SubCUTAneous Q24H    amLODIPine  10 mg Oral Daily    citalopram  20 mg Oral Daily    hydrALAZINE  10 mg Oral BID    losartan  100 mg Oral Daily    pantoprazole  40 mg Oral Daily    pravastatin  40 mg Oral Nightly    primidone  50 mg Oral BID    triamterene-hydroCHLOROthiazide  1 tablet Oral Daily     PRN Meds: sodium chloride flush, sodium chloride, ondansetron **OR** ondansetron, polyethylene glycol, acetaminophen **OR** acetaminophen, dicyclomine, simethicone, traMADol      Intake/Output Summary (Last 24 hours) at 7/9/2022 1246  Last data filed at 7/9/2022 0620  Gross per 24 hour   Intake 470 ml   Output --   Net 470 ml       Diet:  ADULT DIET; Regular    Exam:  BP (!) 198/86   Pulse 68   Temp 97.6 °F (36.4 °C) (Oral)   Resp 18   Ht 5' 9\" (1.753 m)   Wt 208 lb (94.3 kg)   SpO2 98%   BMI 30.72 kg/m²     General appearance: Alert / ill-appearing. Cooperative. NAD. HEENT:  Normocephalic / atraumatic. PERRL. EOM intact. Conjunctivae appear normal.  Neck: Supple. No JVD. Respiratory: Normal respiratory effort on RA. CTAB. No wheezes / rales / rhonchi. Cardiovascular: RRR. Normal S1/S2. No murmurs / rubs / gallops. Abdomen: Soft / non-tender / non-distended. BS present. Musculoskeletal: No cyanosis or edema. Skin: Warm / dry. Normal turgor. Neurologic: A/O x 3. Speech normal. Answers questions appropriately. CN intact.  No obvious focal neurologic deficits. Psychiatric: Thought content / judgment / insight appear appropriate. Capillary refill: Brisk bilaterally. Peripheral pulses: +2 bilaterally. Labs:   Recent Labs     07/09/22  0607   WBC 10.8   HGB 11.0*   HCT 33.8*   *     Recent Labs     07/09/22  0607      K 3.8      CO2 24   BUN 23*   CREATININE 1.7*   CALCIUM 8.5     Recent Labs     07/07/22  1502   AST 19   ALT 12   BILIDIR <0.2   BILITOT 0.9   ALKPHOS 112     No results for input(s): INR in the last 72 hours. No results for input(s): Prentis Revels in the last 72 hours. Urinalysis:      Lab Results   Component Value Date/Time    NITRU NEGATIVE 07/07/2022 03:13 PM    WBCUA > 200 07/07/2022 03:13 PM    BACTERIA MODERATE 07/07/2022 03:13 PM    RBCUA 50-75 07/07/2022 03:13 PM    BLOODU LARGE 07/07/2022 03:13 PM    SPECGRAV 1.016 05/26/2014 08:30 PM    GLUCOSEU NEGATIVE 07/07/2022 03:13 PM       Radiology: All imaging reviewed     Diet: ADULT DIET;  Regular      Code Status: Full Code            Electronically signed by Milan Espino MD on 7/9/2022 at 12:46 PM

## 2022-07-10 LAB
ANION GAP SERPL CALCULATED.3IONS-SCNC: 14 MEQ/L (ref 8–16)
BASOPHILS # BLD: 0.4 %
BASOPHILS ABSOLUTE: 0 THOU/MM3 (ref 0–0.1)
BLOOD CULTURE, ROUTINE: ABNORMAL
BUN BLDV-MCNC: 21 MG/DL (ref 7–22)
CALCIUM SERPL-MCNC: 8.8 MG/DL (ref 8.5–10.5)
CHLORIDE BLD-SCNC: 100 MEQ/L (ref 98–111)
CO2: 23 MEQ/L (ref 23–33)
CREAT SERPL-MCNC: 1.8 MG/DL (ref 0.4–1.2)
EOSINOPHIL # BLD: 4.8 %
EOSINOPHILS ABSOLUTE: 0.3 THOU/MM3 (ref 0–0.4)
ERYTHROCYTE [DISTWIDTH] IN BLOOD BY AUTOMATED COUNT: 14 % (ref 11.5–14.5)
ERYTHROCYTE [DISTWIDTH] IN BLOOD BY AUTOMATED COUNT: 46.5 FL (ref 35–45)
GFR SERPL CREATININE-BSD FRML MDRD: 36 ML/MIN/1.73M2
GLUCOSE BLD-MCNC: 120 MG/DL (ref 70–108)
HCT VFR BLD CALC: 35 % (ref 42–52)
HEMOGLOBIN: 11.5 GM/DL (ref 14–18)
IMMATURE GRANS (ABS): 0.03 THOU/MM3 (ref 0–0.07)
IMMATURE GRANULOCYTES: 0.4 %
LYMPHOCYTES # BLD: 15.5 %
LYMPHOCYTES ABSOLUTE: 1.1 THOU/MM3 (ref 1–4.8)
MAGNESIUM: 1.5 MG/DL (ref 1.6–2.4)
MCH RBC QN AUTO: 29.7 PG (ref 26–33)
MCHC RBC AUTO-ENTMCNC: 32.9 GM/DL (ref 32.2–35.5)
MCV RBC AUTO: 90.4 FL (ref 80–94)
MONOCYTES # BLD: 10 %
MONOCYTES ABSOLUTE: 0.7 THOU/MM3 (ref 0.4–1.3)
NUCLEATED RED BLOOD CELLS: 0 /100 WBC
ORGANISM: ABNORMAL
PLATELET # BLD: 137 THOU/MM3 (ref 130–400)
PMV BLD AUTO: 10.6 FL (ref 9.4–12.4)
POTASSIUM SERPL-SCNC: 4 MEQ/L (ref 3.5–5.2)
RBC # BLD: 3.87 MILL/MM3 (ref 4.7–6.1)
SEG NEUTROPHILS: 68.9 %
SEGMENTED NEUTROPHILS ABSOLUTE COUNT: 4.8 THOU/MM3 (ref 1.8–7.7)
SODIUM BLD-SCNC: 137 MEQ/L (ref 135–145)
WBC # BLD: 6.9 THOU/MM3 (ref 4.8–10.8)

## 2022-07-10 PROCEDURE — 83735 ASSAY OF MAGNESIUM: CPT

## 2022-07-10 PROCEDURE — 85025 COMPLETE CBC W/AUTO DIFF WBC: CPT

## 2022-07-10 PROCEDURE — 6360000002 HC RX W HCPCS: Performed by: STUDENT IN AN ORGANIZED HEALTH CARE EDUCATION/TRAINING PROGRAM

## 2022-07-10 PROCEDURE — 2580000003 HC RX 258: Performed by: INTERNAL MEDICINE

## 2022-07-10 PROCEDURE — 6370000000 HC RX 637 (ALT 250 FOR IP)

## 2022-07-10 PROCEDURE — 1200000000 HC SEMI PRIVATE

## 2022-07-10 PROCEDURE — 99232 SBSQ HOSP IP/OBS MODERATE 35: CPT | Performed by: INTERNAL MEDICINE

## 2022-07-10 PROCEDURE — 80048 BASIC METABOLIC PNL TOTAL CA: CPT

## 2022-07-10 PROCEDURE — 2580000003 HC RX 258: Performed by: STUDENT IN AN ORGANIZED HEALTH CARE EDUCATION/TRAINING PROGRAM

## 2022-07-10 PROCEDURE — 36415 COLL VENOUS BLD VENIPUNCTURE: CPT

## 2022-07-10 PROCEDURE — 6360000002 HC RX W HCPCS: Performed by: INTERNAL MEDICINE

## 2022-07-10 PROCEDURE — 6370000000 HC RX 637 (ALT 250 FOR IP): Performed by: INTERNAL MEDICINE

## 2022-07-10 PROCEDURE — 6370000000 HC RX 637 (ALT 250 FOR IP): Performed by: STUDENT IN AN ORGANIZED HEALTH CARE EDUCATION/TRAINING PROGRAM

## 2022-07-10 RX ORDER — CIPROFLOXACIN 500 MG/1
500 TABLET, FILM COATED ORAL EVERY 12 HOURS SCHEDULED
Status: DISCONTINUED | OUTPATIENT
Start: 2022-07-10 | End: 2022-07-11 | Stop reason: HOSPADM

## 2022-07-10 RX ADMIN — AMLODIPINE BESYLATE 10 MG: 10 TABLET ORAL at 09:38

## 2022-07-10 RX ADMIN — CITALOPRAM HYDROBROMIDE 20 MG: 20 TABLET ORAL at 09:40

## 2022-07-10 RX ADMIN — HYDRALAZINE HYDROCHLORIDE 25 MG: 25 TABLET, FILM COATED ORAL at 21:40

## 2022-07-10 RX ADMIN — SODIUM CHLORIDE, PRESERVATIVE FREE 10 ML: 5 INJECTION INTRAVENOUS at 21:41

## 2022-07-10 RX ADMIN — PIPERACILLIN AND TAZOBACTAM 3375 MG: 3; .375 INJECTION, POWDER, LYOPHILIZED, FOR SOLUTION INTRAVENOUS at 05:48

## 2022-07-10 RX ADMIN — PRIMIDONE 50 MG: 50 TABLET ORAL at 09:41

## 2022-07-10 RX ADMIN — PANTOPRAZOLE SODIUM 40 MG: 40 TABLET, DELAYED RELEASE ORAL at 05:49

## 2022-07-10 RX ADMIN — PRIMIDONE 50 MG: 50 TABLET ORAL at 21:40

## 2022-07-10 RX ADMIN — LOSARTAN POTASSIUM 100 MG: 100 TABLET, FILM COATED ORAL at 09:38

## 2022-07-10 RX ADMIN — HYDRALAZINE HYDROCHLORIDE 25 MG: 25 TABLET, FILM COATED ORAL at 05:49

## 2022-07-10 RX ADMIN — HYDRALAZINE HYDROCHLORIDE 25 MG: 25 TABLET, FILM COATED ORAL at 16:49

## 2022-07-10 RX ADMIN — CIPROFLOXACIN HYDROCHLORIDE 500 MG: 500 TABLET, FILM COATED ORAL at 21:40

## 2022-07-10 RX ADMIN — ENOXAPARIN SODIUM 40 MG: 100 INJECTION SUBCUTANEOUS at 21:39

## 2022-07-10 RX ADMIN — SODIUM CHLORIDE, PRESERVATIVE FREE 10 ML: 5 INJECTION INTRAVENOUS at 09:41

## 2022-07-10 RX ADMIN — TRIAMTERENE AND HYDROCHLOROTHIAZIDE 1 TABLET: 37.5; 25 TABLET ORAL at 09:38

## 2022-07-10 RX ADMIN — PRAVASTATIN SODIUM 40 MG: 40 TABLET ORAL at 21:40

## 2022-07-10 RX ADMIN — DOCUSATE SODIUM 100 MG: 100 CAPSULE, LIQUID FILLED ORAL at 09:38

## 2022-07-10 NOTE — PLAN OF CARE
Problem: Discharge Planning  Goal: Discharge to home or other facility with appropriate resources  7/9/2022 2125 by Ene Moncada RN  Outcome: Progressing  7/9/2022 1613 by Bishop Shawanda RN  Outcome: Progressing     Problem: Safety - Adult  Goal: Free from fall injury  7/9/2022 2125 by Ene Moncada RN  Outcome: Progressing  7/9/2022 1613 by Bishop Shawanda RN  Outcome: Progressing     Problem: ABCDS Injury Assessment  Goal: Absence of physical injury  7/9/2022 2125 by Ene Moncada RN  Outcome: Progressing  7/9/2022 1613 by Bishop Shawanda RN  Outcome: Progressing

## 2022-07-10 NOTE — PROGRESS NOTES
Hospitalist Progress Note    Patient:  Palak Cain      Unit/Bed:6K-25/025-A    YOB: 1940    MRN: [id number]       Acct: [de-identified]     PCP: Susanne Young DO    Date of Admission: 7/7/2022    Active Hospital Problems    Diagnosis Date Noted    UTI (urinary tract infection) [N39.0] 07/07/2022     Priority: Medium        ASSESSMENT / PLAN:    1. Sepsis (POA) 2/2 Urinary Tract Infection with Pseudomonas Bacteremia: Had his calabrese catheter removed on 7/6 and shortly after started to develop high fevers and chills. Supported by UA (+WBC and bacteria). Urine Cx and Bx growing Pseudomonas. IV fluids. Completed 3 days of IV Zosyn, transitioned to PO Cipro today. 2. Pseudomonas Bacteremia: 2/2 to #1. Completed 3 days of IV Zosyn, transitioned to PO Cipro today. 3. Resistant hypertension: Amlodipine, hydralazine, losartan, Maxzide resumed with hold parameters. 4. CKD Stage 3: Cr at baseline. Avoid nephrotoxic agents. Renally dose medications. Daily BMP. 5. BPH: S/p prior TURP. Underwent cystoscopy, greenlight PVP and urethral dilation 04/02/2022 by Dr. Anette Maldonado. 6. Hyperlipidemia: Lipitor resumed. 7. Essential Tremor: Primidone resumed. 8. GERD: PPI resumed. 9. Anxiety: Celexa resumed. Dispo: transitioned to PO Cipro today, if no fevers by tomorrow plan discharge home tomorrow on oral Abx. Chief Complaint: fevers and chills     Hospital Course: 78 y.o. male with a hx of HTN, CKD 3, BPH, HLD, GERD, anxiety and essential tremor who presented to [facility] for evaluation of fever and chills. Patient underwent greenlight PVP and urethral dilation on 04/02/2022 by Dr. Anette Maldonado. He had some increasing irritative voiding symptoms postoperatively. On 06/28/2022, patient underwent flexible cystoscopy. 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 Patient underwent urethral dilation during cystoscopy.   Calabrese catheter was placed and patient was given Keflex as a prophylaxis. Patient reports that [place] catheter was removed yesterday. This morning he had awoken to fever and chills. Fever of 101 °F.  Patient felt very weak and fatigued. Patient was brought into the ED by wife. Denies chest pain, shortness of breath, palpitations, syncope or LOC. Subjective: no acute events overnight. Reports feeling much better today, eating more. Not as fatigue. Continues to have hematuria, no burning with urination or abdominal pain. Medications:  Reviewed    Infusion Medications    sodium chloride       Scheduled Medications    ciprofloxacin  500 mg Oral 2 times per day    hydrALAZINE  25 mg Oral 3 times per day    docusate sodium  100 mg Oral BID    senna  1 tablet Oral Nightly    sodium chloride flush  5-40 mL IntraVENous 2 times per day    enoxaparin  40 mg SubCUTAneous Q24H    amLODIPine  10 mg Oral Daily    citalopram  20 mg Oral Daily    losartan  100 mg Oral Daily    pantoprazole  40 mg Oral Daily    pravastatin  40 mg Oral Nightly    primidone  50 mg Oral BID    triamterene-hydroCHLOROthiazide  1 tablet Oral Daily     PRN Meds: sodium chloride flush, sodium chloride, ondansetron **OR** ondansetron, polyethylene glycol, acetaminophen **OR** acetaminophen, dicyclomine, simethicone, traMADol      Intake/Output Summary (Last 24 hours) at 7/10/2022 1546  Last data filed at 7/10/2022 1320  Gross per 24 hour   Intake 740 ml   Output 0 ml   Net 740 ml       Diet:  ADULT DIET; Regular    Exam:  /61   Pulse 64   Temp 98.8 °F (37.1 °C) (Oral)   Resp 18   Ht 5' 9\" (1.753 m)   Wt 212 lb (96.2 kg)   SpO2 94%   BMI 31.31 kg/m²     General appearance: Alert / ill-appearing. Cooperative. NAD. HEENT:  Normocephalic / atraumatic. PERRL. EOM intact. Conjunctivae appear normal.  Neck: Supple. No JVD. Respiratory: Normal respiratory effort on RA. CTAB. No wheezes / rales / rhonchi. Cardiovascular: RRR. Normal S1/S2. No murmurs / rubs / gallops.   Abdomen: Soft / non-tender / non-distended. BS present. Musculoskeletal: No cyanosis or edema. Skin: Warm / dry. Normal turgor. Neurologic: A/O x 3. Speech normal. Answers questions appropriately. CN intact. No obvious focal neurologic deficits. Psychiatric: Thought content / judgment / insight appear appropriate. Capillary refill: Brisk bilaterally. Peripheral pulses: +2 bilaterally. Labs:   Recent Labs     07/10/22  0608   WBC 6.9   HGB 11.5*   HCT 35.0*        Recent Labs     07/10/22  0608      K 4.0      CO2 23   BUN 21   CREATININE 1.8*   CALCIUM 8.8     No results for input(s): AST, ALT, BILIDIR, BILITOT, ALKPHOS in the last 72 hours. No results for input(s): INR in the last 72 hours. No results for input(s): Kaleen Hope in the last 72 hours. Urinalysis:      Lab Results   Component Value Date/Time    NITRU NEGATIVE 07/07/2022 03:13 PM    WBCUA > 200 07/07/2022 03:13 PM    BACTERIA MODERATE 07/07/2022 03:13 PM    RBCUA 50-75 07/07/2022 03:13 PM    BLOODU LARGE 07/07/2022 03:13 PM    SPECGRAV 1.016 05/26/2014 08:30 PM    GLUCOSEU NEGATIVE 07/07/2022 03:13 PM       Radiology: All imaging reviewed     Diet: ADULT DIET;  Regular      Code Status: Full Code            Electronically signed by Gale Moody MD on 7/10/2022 at 3:46 PM

## 2022-07-11 ENCOUNTER — PATIENT MESSAGE (OUTPATIENT)
Dept: NEPHROLOGY | Age: 82
End: 2022-07-11

## 2022-07-11 VITALS
OXYGEN SATURATION: 97 % | TEMPERATURE: 97.7 F | WEIGHT: 213 LBS | RESPIRATION RATE: 18 BRPM | HEART RATE: 68 BPM | DIASTOLIC BLOOD PRESSURE: 59 MMHG | SYSTOLIC BLOOD PRESSURE: 144 MMHG | BODY MASS INDEX: 31.55 KG/M2 | HEIGHT: 69 IN

## 2022-07-11 DIAGNOSIS — N18.32 CHRONIC KIDNEY DISEASE, STAGE 3B (HCC): Primary | ICD-10-CM

## 2022-07-11 PROCEDURE — 2580000003 HC RX 258: Performed by: STUDENT IN AN ORGANIZED HEALTH CARE EDUCATION/TRAINING PROGRAM

## 2022-07-11 PROCEDURE — 6370000000 HC RX 637 (ALT 250 FOR IP): Performed by: INTERNAL MEDICINE

## 2022-07-11 PROCEDURE — 99239 HOSP IP/OBS DSCHRG MGMT >30: CPT | Performed by: INTERNAL MEDICINE

## 2022-07-11 PROCEDURE — 6370000000 HC RX 637 (ALT 250 FOR IP): Performed by: STUDENT IN AN ORGANIZED HEALTH CARE EDUCATION/TRAINING PROGRAM

## 2022-07-11 RX ORDER — CIPROFLOXACIN 500 MG/1
500 TABLET, FILM COATED ORAL EVERY 12 HOURS SCHEDULED
Qty: 18 TABLET | Refills: 0 | Status: SHIPPED | OUTPATIENT
Start: 2022-07-11 | End: 2022-07-20

## 2022-07-11 RX ORDER — HYDRALAZINE HYDROCHLORIDE 25 MG/1
25 TABLET, FILM COATED ORAL 3 TIMES DAILY
Qty: 90 TABLET | Refills: 3 | Status: SHIPPED | OUTPATIENT
Start: 2022-07-11

## 2022-07-11 RX ADMIN — PRIMIDONE 50 MG: 50 TABLET ORAL at 10:10

## 2022-07-11 RX ADMIN — LOSARTAN POTASSIUM 100 MG: 100 TABLET, FILM COATED ORAL at 10:05

## 2022-07-11 RX ADMIN — CITALOPRAM HYDROBROMIDE 20 MG: 20 TABLET ORAL at 10:05

## 2022-07-11 RX ADMIN — HYDRALAZINE HYDROCHLORIDE 25 MG: 25 TABLET, FILM COATED ORAL at 14:05

## 2022-07-11 RX ADMIN — HYDRALAZINE HYDROCHLORIDE 25 MG: 25 TABLET, FILM COATED ORAL at 05:50

## 2022-07-11 RX ADMIN — AMLODIPINE BESYLATE 10 MG: 10 TABLET ORAL at 10:05

## 2022-07-11 RX ADMIN — CIPROFLOXACIN HYDROCHLORIDE 500 MG: 500 TABLET, FILM COATED ORAL at 10:12

## 2022-07-11 RX ADMIN — PANTOPRAZOLE SODIUM 40 MG: 40 TABLET, DELAYED RELEASE ORAL at 05:50

## 2022-07-11 RX ADMIN — SODIUM CHLORIDE, PRESERVATIVE FREE 10 ML: 5 INJECTION INTRAVENOUS at 10:10

## 2022-07-11 RX ADMIN — TRIAMTERENE AND HYDROCHLOROTHIAZIDE 1 TABLET: 37.5; 25 TABLET ORAL at 10:04

## 2022-07-11 NOTE — CARE COORDINATION
7/11/22, 1:14 PM EDT    Home with wife. Denies needs. Patient goals/plan/ treatment preferences discussed by  and . Patient goals/plan/ treatment preferences reviewed with patient/ family. Patient/ family verbalize understanding of discharge plan and are in agreement with goal/plan/treatment preferences. Understanding was demonstrated using the teach back method. AVS provided by RN at time of discharge, which includes all necessary medical information pertaining to the patients current course of illness, treatment, post-discharge goals of care, and treatment preferences.      Services At/After Discharge: None       IMM Letter  IMM Letter given to Patient/Family/Significant other/Guardian/POA/by[de-identified] Leonel Darby CM  IMM Letter date given[de-identified] 07/11/22  IMM Letter time given[de-identified] 9667

## 2022-07-11 NOTE — DISCHARGE SUMMARY
Hospital Medicine Discharge Summary      Patient Identification:   Armida Fournier   : 1940  MRN: [id number]   Account: [de-identified]      Patient's PCP: Angus Loyd DO    Admit Date: 2022     Discharge Date:   22    Admitting Physician: No admitting provider for patient encounter. Discharge Physician: Timothy Leiva MD     Discharge Diagnoses: Sepsis (POA) 2/2 Urinary Tract Infection with Pseudomonas Bacteremia      Active Hospital Problems    Diagnosis Date Noted    UTI (urinary tract infection) [N39.0] 2022     Priority: Medium       The patient was seen and examined on day of discharge and this discharge summary is in conjunction with any daily progress note from day of discharge. Hospital Course:   78 y. o. male with a hx of HTN, CKD 3, BPH, HLD, GERD, anxiety and essential tremor who presented to [facility] evaluation of fever and chills.  Patient underwent greenlight PVP and urethral dilation on 2022 by Dr. Ene Cui had some increasing irritative voiding symptoms postoperatively.  On 2022, patient underwent flexible cystoscopy.  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  Patient underwent urethral dilation during cystoscopy.  Calabrese catheter was placed and patient was given Keflex as a prophylaxis.  Patient reports that Calabrese catheter was removed on , the next day started having fevers and chills, fatigue and lethargy. Found to have bacteremia with Pseudomonas, treated with IV Zosyn and transitioned to PO Cipro. Doing great, fevers subsided. Ambulating with no difficulty. Tolerating PO intake. Will discharge with close PCP follow-up. ASSESSMENT / PLAN:     1. Sepsis (POA) 2/2 Urinary Tract Infection with Pseudomonas Bacteremia: Had his calabrese catheter removed on  and shortly after started to develop high fevers and chills. Supported by UA (+WBC and bacteria). Urine Cx and Bx growing Pseudomonas. IV fluids.  Completed 3 days of IV Zosyn, transitioned to PO Cipro today. 2. Pseudomonas Bacteremia: 2/2 to #1. Completed 3 days of IV Zosyn, transitioned to PO Cipro today. 3. Resistant hypertension: Amlodipine, hydralazine, losartan, Maxzide resumed with hold parameters. 4. CKD Stage 3: Cr at baseline.  Avoid nephrotoxic agents.  Renally dose medications.  Daily BMP. 5. BPH: S/p prior TURP.  Underwent cystoscopy, greenlight PVP and urethral dilation 04/02/2022 by Dr. Cici Sheridan. 6. Hyperlipidemia: Lipitor resumed. 7. Essential Tremor: Primidone resumed. 8. GERD: PPI resumed. 9. Anxiety: Celexa resumed. Exam:     Vitals:  Vitals:    07/10/22 1649 07/10/22 1930 07/10/22 2330 07/11/22 0307   BP: (!) 155/70 129/60 (!) 170/62 (!) 145/69   Pulse:  64 71 71   Resp:  18 18 18   Temp:  98.5 °F (36.9 °C) 98.3 °F (36.8 °C) 98.3 °F (36.8 °C)   TempSrc:  Oral Oral Oral   SpO2:  96% 97% 96%   Weight:    213 lb (96.6 kg)   Height:         Weight: Weight: 213 lb (96.6 kg)     24 hour intake/output:    Intake/Output Summary (Last 24 hours) at 7/11/2022 1000  Last data filed at 7/10/2022 1320  Gross per 24 hour   Intake 360 ml   Output --   Net 360 ml       Labs: For convenience and continuity at follow-up the following most recent labs are provided:      CBC:    Lab Results   Component Value Date/Time    WBC 6.9 07/10/2022 06:08 AM    HGB 11.5 07/10/2022 06:08 AM    HCT 35.0 07/10/2022 06:08 AM     07/10/2022 06:08 AM       Renal:    Lab Results   Component Value Date/Time     07/10/2022 06:08 AM    K 4.0 07/10/2022 06:08 AM    K 4.0 07/08/2022 06:16 AM     07/10/2022 06:08 AM    CO2 23 07/10/2022 06:08 AM    BUN 21 07/10/2022 06:08 AM    CREATININE 1.8 07/10/2022 06:08 AM    CALCIUM 8.8 07/10/2022 06:08 AM    PHOS 2.3 05/28/2014 05:17 AM         Significant Diagnostic Studies    Radiology:   VL DUP LOWER EXTREMITY VENOUS RIGHT   Final Result   Impression:   1. Negative for deep venous thrombosis in the right lower extremity. 2. Acute occlusive superficial thrombophlebitis in the calf greater    saphenous vein. This document has been electronically signed by: Beena Tony MD on    07/07/2022 09:07 PM      Technique Used: Duplex examination performed utilizing grayscale, color    and spectral analysis. XR CHEST (2 VW)   Final Result   1. No interval change since previous study dated 21 March 2022, no acute cardiopulmonary disease. **This report has been created using voice recognition software. It may contain minor errors which are inherent in voice recognition technology. **      Final report electronically signed by DR Dain Wiley on 7/7/2022 3:48 PM          Disposition:    [x] Home       [] TCU       [] Rehab       [] Psych       [] SNF       [] [place]       [] Other-    Condition at Discharge: Stable    Code Status:  Full Code     Patient Instructions: Activity: activity as tolerated  Diet: ADULT DIET; Regular      Follow-up visits:   No follow-up provider specified.        Discharge Medications:        Medication List      START taking these medications    ciprofloxacin 500 MG tablet  Commonly known as: CIPRO  Take 1 tablet by mouth every 12 hours for 9 days        CHANGE how you take these medications    hydrALAZINE 25 MG tablet  Commonly known as: APRESOLINE  Take 1 tablet by mouth 3 times daily  What changed:   · medication strength  · how much to take  · when to take this        CONTINUE taking these medications    amLODIPine 10 MG tablet  Commonly known as: NORVASC  TAKE 1 TABLET BY MOUTH DAILY     citalopram 20 MG tablet  Commonly known as: CELEXA  TAKE 1 TABLET BY MOUTH DAILY     dicyclomine 10 MG capsule  Commonly known as: BENTYL     folic acid 1 MG tablet  Commonly known as: FOLVITE  TAKE 2 & 1/2 (TWO & ONE-HALF) TABLETS BY MOUTH ONCE DAILY     irbesartan 300 MG tablet  Commonly known as: AVAPRO  TAKE 1 TABLET BY MOUTH DAILY     nystatin 836612 UNIT/GM cream  Commonly known as: MYCOSTATIN  Apply topically 2 times daily. nystatin-triamcinolone 370926-2.1 UNIT/GM-% cream  Commonly known as: MYCOLOG II  Apply up to 4 times a day to penile foreskin as needed     pantoprazole 40 MG tablet  Commonly known as: PROTONIX     pravastatin 40 MG tablet  Commonly known as: PRAVACHOL  TAKE 1 TABLET BY MOUTH EVERY EVENING     primidone 50 MG tablet  Commonly known as: MYSOLINE  TAKE 1 TABLET BY MOUTH TWICE DAILY     simethicone 80 MG chewable tablet  Commonly known as: MYLICON  Take 1 tablet by mouth every 6 hours as needed for Flatulence     traMADol 50 MG tablet  Commonly known as: ULTRAM     triamterene-hydroCHLOROthiazide 37.5-25 MG per tablet  Commonly known as: MAXZIDE-25  Take 1 tablet by mouth daily           Where to Get Your Medications      These medications were sent to [address] [place] [address] , [address], [address] [address]    Phone: [phone]   · ciprofloxacin 500 MG tablet  · hydrALAZINE 25 MG tablet         Time Spent on discharge is more than 30 minutes in the examination, evaluation, counseling and review of medications and discharge plan. Signed: Thank you Miles Amor DO for the opportunity to be involved in this patient's care.     Electronically signed by Modesto Yeung MD on 7/11/2022 at 10:00 AM

## 2022-07-12 ENCOUNTER — CARE COORDINATION (OUTPATIENT)
Dept: CASE MANAGEMENT | Age: 82
End: 2022-07-12

## 2022-07-12 NOTE — CARE COORDINATION
Care Transitions Outreach Attempt    Call within 2 business days of discharge: Yes     1st attempt to reach for Care Transition discharge call unsuccessful. HIPAA compliant message left requesting call back. Patient: Briseyda Larsen Patient : 1940 MRN: <[id number]>     Last Discharge [facility]       Complaint Diagnosis Description Type Department Provider    22 Shortness of Breath; Fever Complicated UTI (urinary tract infection) ED to Hosp-Admission (Discharged) (ADMITTED) STRZ 6K Tyson Andres MD; Elida Dawn, ...          Discharge Facility: [address]    Noted following upcoming appointments from discharge chart review:   [facility] follow up appointment(s):   Future Appointments   Date Time Provider Marianna Mary   2022 11:00 AM Poppy Gay MD [address]   2022  2:00 PM Cindy Silverio DO [facility]   10/6/2022  1:30 PM Kal Martinez MD N SRPX Heart [facility] [address]   2022 12:30 PM STR ULTRASOUND RM 2 STRZ US STR Radiolog   2022  1:20 PM Baudilio Arana MD N [facility], [facility]. [facility] [address]     Non-[facility] follow up appointment(s): EILEEN Mayberry RN [phone]

## 2022-07-12 NOTE — PROGRESS NOTES
CLINICAL PHARMACY NOTE: MEDS TO BEDS    Total # of Prescriptions Filled: 2   The following medications were delivered to the patient:  Hydralazine 25mg  Cipro 500mg    Additional Documentation:

## 2022-07-13 ENCOUNTER — CARE COORDINATION (OUTPATIENT)
Dept: CASE MANAGEMENT | Age: 82
End: 2022-07-13

## 2022-07-13 LAB — BLOOD CULTURE, ROUTINE: NORMAL

## 2022-07-13 NOTE — CARE COORDINATION
Care Transitions Outreach Attempt    Call within 2 business days of discharge: Yes     2nd unsuccessful attempt to reach for Care Transition discharge call. HIPAA compliant message left requesting call back. Episode closed per protocol, no further outreach scheduled. Patient: Armida Fournier Patient : 1940 MRN: <[id number]>    Last Discharge [facility]       Complaint Diagnosis Description Type Department Provider    22 Shortness of Breath; Fever Complicated UTI (urinary tract infection) ED to Hosp-Admission (Discharged) (ADMITTED) STRZ 6K Timothy Leiva MD; Argelia Cantrell, ...           Discharge Facility: [address]    Noted following upcoming appointments from discharge chart review:   [facility] follow up appointment(s):   Future Appointments   Date Time Provider Marianna Hernandez   2022 11:00 AM Benji Martines MD [address]   2022  2:00 PM DO NOLA Whipple [facility]   10/6/2022  1:30 PM Kal Butler MD N SRPX Heart [address]   2022 12:30 PM STR ULTRASOUND RM 2 STRZ US STR Radiolog   2022  1:20 PM Silvestre Tejada MD N [facility], [facility]. [address]     Non-[facility] follow up appointment(s): EILEEN Diaz RN [phone]

## 2022-07-18 ENCOUNTER — TELEPHONE (OUTPATIENT)
Dept: NEPHROLOGY | Age: 82
End: 2022-07-18

## 2022-07-18 ENCOUNTER — HOSPITAL ENCOUNTER (OUTPATIENT)
Age: 82
Discharge: HOME | End: 2022-07-18
Payer: MEDICARE

## 2022-07-18 DIAGNOSIS — N18.32 CHRONIC KIDNEY DISEASE, STAGE 3B (HCC): ICD-10-CM

## 2022-07-18 LAB
ANION GAP SERPL CALCULATED.3IONS-SCNC: 12 MEQ/L (ref 8–16)
BUN BLDV-MCNC: 37 MG/DL (ref 7–22)
CALCIUM SERPL-MCNC: 9.5 MG/DL (ref 8.5–10.5)
CHLORIDE BLD-SCNC: 103 MEQ/L (ref 98–111)
CO2: 24 MEQ/L (ref 23–33)
CREAT SERPL-MCNC: 1.7 MG/DL (ref 0.4–1.2)
GFR SERPL CREATININE-BSD FRML MDRD: 39 ML/MIN/1.73M2
GLUCOSE BLD-MCNC: 112 MG/DL (ref 70–108)
POTASSIUM SERPL-SCNC: 4.4 MEQ/L (ref 3.5–5.2)
SODIUM BLD-SCNC: 139 MEQ/L (ref 135–145)

## 2022-07-18 PROCEDURE — 80048 BASIC METABOLIC PNL TOTAL CA: CPT

## 2022-07-18 PROCEDURE — 36415 COLL VENOUS BLD VENIPUNCTURE: CPT

## 2022-07-18 RX ORDER — VIBEGRON 75 MG/1
75 TABLET, FILM COATED ORAL DAILY
COMMUNITY
End: 2022-07-29 | Stop reason: SDUPTHER

## 2022-07-18 NOTE — TELEPHONE ENCOUNTER
----- Message from Meredith Gowers, MD sent at 7/18/2022 12:45 PM EDT -----  Pls let pt know kidney function is stable. What are his BP readings?  And also pls update his medication list.

## 2022-07-18 NOTE — RESULT ENCOUNTER NOTE
Pls let pt know kidney function is stable. What are his BP readings?  And also pls update his medication list.

## 2022-07-22 DIAGNOSIS — N18.32 CHRONIC KIDNEY DISEASE, STAGE 3B (HCC): Primary | ICD-10-CM

## 2022-07-25 NOTE — TELEPHONE ENCOUNTER
His last level early June was >20, he does not need folic acid. Pls stop and repeat level in 2 weeks.

## 2022-07-25 NOTE — TELEPHONE ENCOUNTER
I never prescribed folic acid to him  Pls check with patient.    Can he check a folate level asap please and will decide based on new levels

## 2022-07-26 RX ORDER — FOLIC ACID 1 MG/1
TABLET ORAL
Qty: 180 TABLET | Refills: 2 | Status: CANCELLED | OUTPATIENT
Start: 2022-07-26

## 2022-07-26 NOTE — TELEPHONE ENCOUNTER
Spoke to pt's wife, she understood that pt does not need to take folic acid and repeat labs in 2 weeks. Lab order pending. Medication cancelled and MAR updated.

## 2022-07-29 ENCOUNTER — OFFICE VISIT (OUTPATIENT)
Dept: UROLOGY | Age: 82
End: 2022-07-29
Payer: MEDICARE

## 2022-07-29 VITALS
BODY MASS INDEX: 31.25 KG/M2 | SYSTOLIC BLOOD PRESSURE: 136 MMHG | WEIGHT: 211 LBS | DIASTOLIC BLOOD PRESSURE: 62 MMHG | HEIGHT: 69 IN

## 2022-07-29 DIAGNOSIS — N39.0 COMPLICATED UTI (URINARY TRACT INFECTION): Primary | ICD-10-CM

## 2022-07-29 DIAGNOSIS — N13.8 BPH WITH OBSTRUCTION/LOWER URINARY TRACT SYMPTOMS: ICD-10-CM

## 2022-07-29 DIAGNOSIS — N40.1 BPH WITH OBSTRUCTION/LOWER URINARY TRACT SYMPTOMS: ICD-10-CM

## 2022-07-29 DIAGNOSIS — N35.911 STRICTURE OF URETHRAL MEATUS IN MALE, UNSPECIFIED STRICTURE TYPE: ICD-10-CM

## 2022-07-29 LAB
BILIRUBIN URINE: NEGATIVE
BLOOD URINE, POC: NEGATIVE
CHARACTER, URINE: CLEAR
COLOR, URINE: YELLOW
GLUCOSE URINE: NEGATIVE MG/DL
KETONES, URINE: NEGATIVE
LEUKOCYTE CLUMPS, URINE: ABNORMAL
NITRITE, URINE: NEGATIVE
PH, URINE: 5.5 (ref 5–9)
PROTEIN, URINE: NEGATIVE MG/DL
SPECIFIC GRAVITY, URINE: 1.02 (ref 1–1.03)
UROBILINOGEN, URINE: 0.2 EU/DL (ref 0–1)

## 2022-07-29 PROCEDURE — 1123F ACP DISCUSS/DSCN MKR DOCD: CPT | Performed by: UROLOGY

## 2022-07-29 PROCEDURE — 1111F DSCHRG MED/CURRENT MED MERGE: CPT | Performed by: UROLOGY

## 2022-07-29 PROCEDURE — 99214 OFFICE O/P EST MOD 30 MIN: CPT | Performed by: UROLOGY

## 2022-07-29 PROCEDURE — 1036F TOBACCO NON-USER: CPT | Performed by: UROLOGY

## 2022-07-29 PROCEDURE — G8427 DOCREV CUR MEDS BY ELIG CLIN: HCPCS | Performed by: UROLOGY

## 2022-07-29 PROCEDURE — 81003 URINALYSIS AUTO W/O SCOPE: CPT | Performed by: UROLOGY

## 2022-07-29 PROCEDURE — G8417 CALC BMI ABV UP PARAM F/U: HCPCS | Performed by: UROLOGY

## 2022-07-29 RX ORDER — VIBEGRON 75 MG/1
75 TABLET, FILM COATED ORAL DAILY
Qty: 30 TABLET | Refills: 3 | Status: SHIPPED | OUTPATIENT
Start: 2022-07-29 | End: 2022-08-01

## 2022-07-29 NOTE — PROGRESS NOTES
Ene Reeder MD        [address] [facility] [id number]  Dept: [phone]  Dept Fax: [phone]: [address] [address] Urology Office Note -     Patient:  Karol Bojorquez  YOB: 1940    The patient is a 78 y.o. male who presents today for evaluation of the following problems:   Chief Complaint   Patient presents with    Benign Prostatic Hypertrophy    Urinary Tract Infection        HISTORY OF PRESENT ILLNESS:     BPH  Hx of PVP- tough postop course  Cant take flomax  Ditropan not helpful  Gemtesa helpful     Urethral stricture  Recent cystoscopy findings: meatal stricture dilated up to a 24 fr without difficulty, open bladder neck, open prostatic fossa    Balanitis  Was circumcised as child but foreskin is causing issues  Dr Jayashree Alvarez placed on a cream recently    Prostate cancer screening  PSA acceptable for age      Summary of Previous Records:  Mr. Shavon Rivera continues to do well with stable symptoms of prostatism. His PSA is 2.97. He has symptoms of nocturia 1-2 times nightly. He admits to occasional oxybutynin usage at night which helps his nocturia symptoms. He denies difficulty urinating. He mainly complains of sunken penis that causes difficulty. Has hx of TURP in the past, abnormal ROSE MARIE was felt to be consistent with scar from previous TURPs in the past.    There is a family history of prostate cancer with his brother. MRI Pelvis on 06/2017 was consistent with PI-RADS score 2 lesions.       Requested/reviewed records from Armourayla Cortes DO office and/or outside physician/EMR    (Patient's old records have been requested, reviewed and pertinent findings summarized in today's note.)    Procedures Today: N/A    Last several PSA's:  Lab Results   Component Value Date    PSA 3.81 (H) 11/23/2021    PSA 2.82 (H) 11/20/2020    PSA 2.97 (H) 11/20/2019       Last total testosterone:  No results found for: TESTOSTERONE    Urinalysis today:  Results for POC orders placed in visit on 07/29/22   POCT Urinalysis No Micro (Auto)   Result Value Ref Range    Glucose, Ur Negative NEGATIVE mg/dl    Bilirubin Urine Negative     Ketones, Urine Negative NEGATIVE    Specific Gravity, Urine 1.020 1.002 - 1.030    Blood, UA POC Negative NEGATIVE    pH, Urine 5.50 5.0 - 9.0    Protein, Urine Negative NEGATIVE mg/dl    Urobilinogen, Urine 0.20 0.0 - 1.0 eu/dl    Nitrite, Urine Negative NEGATIVE    Leukocyte Clumps, Urine Trace (A) NEGATIVE    Color, Urine Yellow YELLOW-STRAW    Character, Urine Clear [id number].[id number]       Last BUN and creatinine:  Lab Results   Component Value Date    BUN 37 (H) 07/18/2022     Lab Results   Component Value Date    CREATININE 1.7 (H) 07/18/2022       Imaging Reviewed during this Office Visit:   Gagandeep Vargas MD independently reviewed the images and verified the radiology reports from:    Xr Shoulder Left (min 2 Views)    Result Date: 3/6/2020  PROCEDURE: XR SHOULDER LEFT (MIN 2 VIEWS) CLINICAL INFORMATION: 17-year-old male with left shoulder pain. COMPARISON: No prior study. TECHNIQUE: 4 views of the left shoulder were obtained. FINDINGS: There is no fracture or dislocation. No suspicious osseous lesions are present. The joint spaces are preserved. The clavicle is normal.  The soft tissues are normal.  There are no suspicious findings in the visualized aspects of the upper lung. No acute fracture or dislocation involving the left shoulder. **This report has been created using voice recognition software. It may contain minor errors which are inherent in voice recognition technology. ** Final report electronically signed by Dr Melissa Candelario on 3/6/2020 3:35 PM      PAST MEDICAL, FAMILY AND SOCIAL HISTORY:  Past Medical History:   Diagnosis Date    Anemia     Arthritis     Bronchiolitis 11/2016    Cancer ([facility] [address]     SKIN CANCER    CKD (chronic kidney disease), stage III (HCC)     Diverticula of colon 5/2014    tx with antibiotics per pt; NO surgery    Diverticulosis     DVT (deep venous thrombosis) ([facility])     s/p    Esophagitis 12/10/2014    Hiatal hernia     HTN (hypertension)     Hyperlipidemia     Kidney stones     Nephrolithiasis     Obesity     Prostate enlargement     benign    Renal insufficiency      Past Surgical History:   Procedure Laterality Date    BLEPHAROPLASTY Bilateral 03/2010    CARDIAC CATHETERIZATION  2000,2010    CATARACT REMOVAL WITH IMPLANT Bilateral 2006,2007    COLONOSCOPY  05/26/2017    ENDOSCOPY, COLON, DIAGNOSTIC      HERNIA REPAIR Bilateral 1996    NASAL SINUS SURGERY      SHOULDER SURGERY      SKIN BIOPSY      SKIN CANCER EXCISION  Feb 2015    top of head    TONSILLECTOMY      TURP  2008,2008    TURP N/A 4/2/2022    CYSTOSCOPY GREENLIGHT PHOTOVAPORIZATION OF THE PROSTATE WITH URETHERAL DILATION performed by Nilda Hernandez MD at [address]       Family History   Problem Relation Age of Onset    Diabetes Mother     High Blood Pressure Father     Heart Disease Father      Outpatient Medications Marked as Taking for the 7/29/22 encounter (Office Visit) with Nilda Hernandez MD   Medication Sig Dispense Refill    Vibegron (GEMTESA) 75 MG TABS Take 75 mg by mouth daily 30 tablet 3       Ace inhibitors, Carafate [sucralfate], Flomax [tamsulosin hcl], Iv dye [iodides], Lipitor [atorvastatin], Lopressor [metoprolol], and Motrin [ibuprofen]  Social History     Tobacco Use   Smoking Status Never   Smokeless Tobacco Never   Tobacco Comments    never smoked      (If patient a smoker, smoking cessation counseling offered)   Social History     Substance and Sexual Activity   Alcohol Use Never       REVIEW OF SYSTEMS:  Constitutional: negative  Eyes: negative  Respiratory: negative  Cardiovascular: negative  Gastrointestinal: negative  Genitourinary: see HPI  Musculoskeletal: negative  Skin: negative   Neurological: negative  Hematological/Lymphatic: negative  Psychological: negative      Physical Exam:    This a 78 y.o. male  Vitals:    07/29/22 1103   BP: 136/62     Body mass index is 31.16 kg/m². Constitutional: Patient in no acute distress;         Assessment and Plan        1. Complicated UTI (urinary tract infection)    2. BPH with obstruction/lower urinary tract symptoms    3. Stricture of urethral meatus in male, unspecified stricture type               Plan:       Bph/urethral stricture- open prostate on last cystoscopy. Improved with gemtesa. Gemtesa 75 mg. Prostate cancer screening- psa acceptable for his age    Phimosis- can retract foreskin on exam. Don't recommend circumcision.  mycolog PRN        Reassess in three months w pvr        Prescriptions Ordered:  Orders Placed This Encounter   Medications    Vibegron (GEMTESA) 75 MG TABS     Sig: Take 75 mg by mouth daily     Dispense:  30 tablet     Refill:  3        Orders Placed:  Orders Placed This Encounter   Procedures    POCT Urinalysis No Micro (Auto)            DEIYS Valenzuela MD

## 2022-08-01 ENCOUNTER — TELEPHONE (OUTPATIENT)
Dept: UROLOGY | Age: 82
End: 2022-08-01

## 2022-08-01 NOTE — TELEPHONE ENCOUNTER
Prior Auth initiated for [employer] . PA sent to [employer]. Should receive response in 3-5 days. Denied per [employer]    Spoke with dr. Gui Centeno. He stated to cancel gemtesa and start myrbetriq 50mg daily. Phoned into [employer]      Informed brittney of new script.

## 2022-08-06 PROBLEM — N39.0 UTI (URINARY TRACT INFECTION): Status: RESOLVED | Noted: 2022-07-07 | Resolved: 2022-08-06

## 2022-08-09 ENCOUNTER — NURSE ONLY (OUTPATIENT)
Dept: LAB | Age: 82
End: 2022-08-09

## 2022-08-09 DIAGNOSIS — N18.32 CHRONIC KIDNEY DISEASE, STAGE 3B (HCC): ICD-10-CM

## 2022-08-09 DIAGNOSIS — I12.9 HYPERTENSIVE RENAL DISEASE, STAGE 1 THROUGH STAGE 4 OR UNSPECIFIED CHRONIC KIDNEY DISEASE: ICD-10-CM

## 2022-08-09 LAB
FOLATE: > 20 NG/ML (ref 4.8–24.2)
POTASSIUM SERPL-SCNC: 4.1 MEQ/L (ref 3.5–5.2)

## 2022-08-10 ENCOUNTER — TELEPHONE (OUTPATIENT)
Dept: NEPHROLOGY | Age: 82
End: 2022-08-10

## 2022-08-10 NOTE — TELEPHONE ENCOUNTER
----- Message from Joselito Acosta MD sent at 8/9/2022  9:50 PM EDT -----  K is okay but folate level is high. Is he taking any folic acid or MVI like centrum which contains folic acid?

## 2022-08-10 NOTE — RESULT ENCOUNTER NOTE
K is okay but folate level is high. Is he taking any folic acid or MVI like centrum which contains folic acid?

## 2022-09-19 ENCOUNTER — NURSE ONLY (OUTPATIENT)
Dept: LAB | Age: 82
End: 2022-09-19

## 2022-09-19 DIAGNOSIS — E78.5 HYPERLIPIDEMIA, UNSPECIFIED HYPERLIPIDEMIA TYPE: ICD-10-CM

## 2022-09-19 LAB
ALBUMIN SERPL-MCNC: 4.2 G/DL (ref 3.5–5.1)
ALP BLD-CCNC: 95 U/L (ref 38–126)
ALT SERPL-CCNC: 9 U/L (ref 11–66)
ANION GAP SERPL CALCULATED.3IONS-SCNC: 10 MEQ/L (ref 8–16)
AST SERPL-CCNC: 17 U/L (ref 5–40)
BILIRUB SERPL-MCNC: 0.5 MG/DL (ref 0.3–1.2)
BUN BLDV-MCNC: 36 MG/DL (ref 7–22)
CALCIUM SERPL-MCNC: 9 MG/DL (ref 8.5–10.5)
CHLORIDE BLD-SCNC: 102 MEQ/L (ref 98–111)
CHOLESTEROL, TOTAL: 187 MG/DL (ref 100–199)
CO2: 27 MEQ/L (ref 23–33)
CREAT SERPL-MCNC: 1.5 MG/DL (ref 0.4–1.2)
GFR SERPL CREATININE-BSD FRML MDRD: 45 ML/MIN/1.73M2
GLUCOSE BLD-MCNC: 114 MG/DL (ref 70–108)
HDLC SERPL-MCNC: 48 MG/DL
LDL CHOLESTEROL CALCULATED: 111 MG/DL
POTASSIUM SERPL-SCNC: 4.3 MEQ/L (ref 3.5–5.2)
SODIUM BLD-SCNC: 139 MEQ/L (ref 135–145)
TOTAL PROTEIN: 6.2 G/DL (ref 6.1–8)
TRIGL SERPL-MCNC: 141 MG/DL (ref 0–199)

## 2022-09-20 ENCOUNTER — TELEPHONE (OUTPATIENT)
Dept: FAMILY MEDICINE CLINIC | Age: 82
End: 2022-09-20

## 2022-09-26 ENCOUNTER — OFFICE VISIT (OUTPATIENT)
Dept: FAMILY MEDICINE CLINIC | Age: 82
End: 2022-09-26
Payer: MEDICARE

## 2022-09-26 VITALS
WEIGHT: 214.6 LBS | SYSTOLIC BLOOD PRESSURE: 126 MMHG | HEIGHT: 69 IN | RESPIRATION RATE: 14 BRPM | TEMPERATURE: 97.5 F | OXYGEN SATURATION: 97 % | HEART RATE: 67 BPM | DIASTOLIC BLOOD PRESSURE: 62 MMHG | BODY MASS INDEX: 31.78 KG/M2

## 2022-09-26 DIAGNOSIS — G25.0 BENIGN ESSENTIAL TREMOR: ICD-10-CM

## 2022-09-26 DIAGNOSIS — Z00.00 MEDICARE ANNUAL WELLNESS VISIT, SUBSEQUENT: Primary | ICD-10-CM

## 2022-09-26 DIAGNOSIS — R53.83 FATIGUE, UNSPECIFIED TYPE: ICD-10-CM

## 2022-09-26 PROCEDURE — G8427 DOCREV CUR MEDS BY ELIG CLIN: HCPCS | Performed by: FAMILY MEDICINE

## 2022-09-26 PROCEDURE — 1036F TOBACCO NON-USER: CPT | Performed by: FAMILY MEDICINE

## 2022-09-26 PROCEDURE — 1123F ACP DISCUSS/DSCN MKR DOCD: CPT | Performed by: FAMILY MEDICINE

## 2022-09-26 PROCEDURE — G8417 CALC BMI ABV UP PARAM F/U: HCPCS | Performed by: FAMILY MEDICINE

## 2022-09-26 PROCEDURE — 99213 OFFICE O/P EST LOW 20 MIN: CPT | Performed by: FAMILY MEDICINE

## 2022-09-26 PROCEDURE — G0439 PPPS, SUBSEQ VISIT: HCPCS | Performed by: FAMILY MEDICINE

## 2022-09-26 SDOH — ECONOMIC STABILITY: FOOD INSECURITY: WITHIN THE PAST 12 MONTHS, YOU WORRIED THAT YOUR FOOD WOULD RUN OUT BEFORE YOU GOT MONEY TO BUY MORE.: NEVER TRUE

## 2022-09-26 SDOH — ECONOMIC STABILITY: FOOD INSECURITY: WITHIN THE PAST 12 MONTHS, THE FOOD YOU BOUGHT JUST DIDN'T LAST AND YOU DIDN'T HAVE MONEY TO GET MORE.: NEVER TRUE

## 2022-09-26 ASSESSMENT — PATIENT HEALTH QUESTIONNAIRE - PHQ9
2. FEELING DOWN, DEPRESSED OR HOPELESS: 0
SUM OF ALL RESPONSES TO PHQ QUESTIONS 1-9: 2
1. LITTLE INTEREST OR PLEASURE IN DOING THINGS: 2
SUM OF ALL RESPONSES TO PHQ QUESTIONS 1-9: 2
SUM OF ALL RESPONSES TO PHQ9 QUESTIONS 1 & 2: 2

## 2022-09-26 ASSESSMENT — LIFESTYLE VARIABLES
HOW MANY STANDARD DRINKS CONTAINING ALCOHOL DO YOU HAVE ON A TYPICAL DAY: PATIENT DOES NOT DRINK
HOW OFTEN DO YOU HAVE A DRINK CONTAINING ALCOHOL: NEVER

## 2022-09-26 ASSESSMENT — SOCIAL DETERMINANTS OF HEALTH (SDOH): HOW HARD IS IT FOR YOU TO PAY FOR THE VERY BASICS LIKE FOOD, HOUSING, MEDICAL CARE, AND HEATING?: NOT VERY HARD

## 2022-09-26 NOTE — PROGRESS NOTES
doing any of your daily activities because of your eyesight?: No  Have you had an eye exam within the past year?: (!) No  No results found. Hearing/Vision Interventions:  Vision concerns:  patient encouraged to make appointment with his/her eye specialist    Safety:  Do you have working smoke detectors?: Yes  Do you have any tripping hazards - loose or unsecured carpets or rugs?: (!) Yes  Do you have any tripping hazards - clutter in doorways, halls, or stairs?: No  Do you have either shower bars, grab bars, non-slip mats or non-slip surfaces in your shower or bathtub?: (!) No  Do all of your stairways have a railing or banister?: Yes  Do you always fasten your seatbelt when you are in a car?: Yes  Safety Interventions:  Home safety tips provided    ADLs:  In the past 7 days, did you need help from others to perform any of the following everyday activities: Eating, dressing, grooming, bathing, toileting, or walking/balance?: No  In the past 7 days, did you need help from others to take care of any of the following: Laundry, housekeeping, banking/finances, shopping, telephone use, food preparation, transportation, or taking medications?: (!) Yes  Select all that apply: [employer], Housekeeping, Shopping, Banking/Finances, Food Preparation, Taking Medications (wife has always taken care of all of this)  ADL Interventions:  Patient declines any further evaluation/treatment for this issue     Objective   Vitals:    09/26/22 1418   BP: 126/62   Pulse: 67   Resp: 14   Temp: 97.5 °F (36.4 °C)   TempSrc: Temporal   SpO2: 97%   Weight: 214 lb 9.6 oz (97.3 kg)   Height: 5' 9\" (1.753 m)      Body mass index is 31.69 kg/m².              Allergies   Allergen Reactions    Ace Inhibitors      Intolerant to      Carafate [Sucralfate] Other (See Comments)     Tongue swelling    Flomax [Tamsulosin Hcl]     Iv Dye [Iodides]     Lipitor [Atorvastatin] Other (See Comments)     Myalgias    Lopressor [Metoprolol]     Motrin [Ibuprofen] Was told not to take due to kidneys     Prior to Visit Medications    Medication Sig Taking? Authorizing Provider   mirabegron (MYRBETRIQ) 50 MG TB24 Take 50 mg by mouth in the morning. Jem Nicholas MD   hydrALAZINE (APRESOLINE) 25 MG tablet Take 1 tablet by mouth 3 times daily  Modesto Yeung MD   citalopram (CELEXA) 20 MG tablet TAKE 1 TABLET BY MOUTH DAILY  Early Dapper ALVINO Jade - CNP   primidone (MYSOLINE) 50 MG tablet TAKE 1 TABLET BY MOUTH TWICE DAILY  ALVINO Tan - NOEMI   triamterene-hydroCHLOROthiazide (MAXZIDE-25) 37.5-25 MG per tablet Take 1 tablet by mouth daily  Simon St MD   irbesartan (AVAPRO) 300 MG tablet TAKE 1 TABLET BY MOUTH DAILY  Kal Dickinson MD   nystatin-triamcinolone (MYCOLOG II) 256925-3.0 UNIT/GM-% cream Apply up to 4 times a day to penile foreskin as needed  Patient not taking: Reported on 7/18/2022  Jem Nicholas MD   amLODIPine (NORVASC) 10 MG tablet TAKE 1 TABLET BY MOUTH DAILY  Dylon Shaikh DO   pravastatin (PRAVACHOL) 40 MG tablet TAKE 1 TABLET BY MOUTH EVERY EVENING  Dylon Shaikh DO   nystatin (MYCOSTATIN) 155428 UNIT/GM cream Apply topically 2 times daily. Patient not taking: No sig reported  Miles Amor DO   folic acid (FOLVITE) 1 MG tablet TAKE 2 & 1/2 (TWO & ONE-HALF) TABLETS BY MOUTH ONCE DAILY  Patient not taking: Reported on 7/26/2022  Manuel Mojica DO   simethicone (MYLICON) 80 MG chewable tablet Take 1 tablet by mouth every 6 hours as needed for Flatulence  Patient not taking: Reported on 7/18/2022  Miles Amor DO   traMADol (ULTRAM) 50 MG tablet Take 50 mg by mouth every 8 hours as needed for Pain. .  Patient not taking: Reported on 7/18/2022  Historical Provider, MD   dicyclomine (BENTYL) 10 MG capsule Take 10 mg by mouth 4 times daily (before meals and nightly) PRN  Patient not taking: Reported on 7/18/2022  Historical Provider, MD   pantoprazole (PROTONIX) 40 MG tablet Take 40 mg by mouth daily.   Historical Provider, MD Terry Christianson (Including outside providers/suppliers regularly involved in providing care):   Patient Care Team:  Jojo Sanchez DO as PCP - [address] [place], [place] [address]DO as PCP - [facility] Empaneled Provider  [address], MD as Cardiologist (Cardiology)     Reviewed and updated this visit:  Tobacco  Allergies  Meds  Med Hx  Surg Hx  Soc Hx  Fam Hx

## 2022-09-26 NOTE — PROGRESS NOTES
Lise Frankel is a 78 y.o. male that presents for     Chief Complaint   Patient presents with    Medicare AWV    Urinary Frequency     Pt c/o of urinary frequency at night, pt states 2-7 times a night. /62   Pulse 67   Temp 97.5 °F (36.4 °C) (Temporal)   Resp 14   Ht 5' 9\" (1.753 m)   Wt 214 lb 9.6 oz (97.3 kg)   SpO2 97%   BMI 31.69 kg/m²       HPI    Notes more fatigue recently. Feels like he tires out easily and wants to take naps. Denies CP, SOB. Feels like this is medication related. We discussed backing down on his primidone as he does not feel like this helps anyways so we did this today. He is also going to try to stop his celexa as he feels like his stress level is low right now.      Health Maintenance   Topic Date Due    DTaP/Tdap/Td vaccine (1 - Tdap) Never done    Shingles vaccine (1 of 2) Never done    Pneumococcal 65+ years Vaccine (2 - PPSV23 or PCV20) 01/20/2017    COVID-19 Vaccine (3 - Booster for Pfizer series) 08/25/2021    Flu vaccine (1) 08/01/2022    Depression Screen  09/13/2022    Lipids  09/19/2023    Annual Wellness Visit (AWV)  09/27/2023    Hepatitis A vaccine  Aged Out    Hepatitis B vaccine  Aged Out    Hib vaccine  Aged Out    Meningococcal (ACWY) vaccine  Aged Out       Past Medical History:   Diagnosis Date    Anemia     Arthritis     Bronchiolitis 11/2016    Cancer ([facility] [address]     SKIN CANCER    CKD (chronic kidney disease), stage III ([facility] [address]     Diverticula of colon 5/2014    tx with antibiotics per pt; NO surgery    Diverticulosis     DVT (deep venous thrombosis) (HCC)     s/p    Esophagitis 12/10/2014    Hiatal hernia     HTN (hypertension)     Hyperlipidemia     Kidney stones     Nephrolithiasis     Obesity     Prostate enlargement     benign    Renal insufficiency        Past Surgical History:   Procedure Laterality Date    BLEPHAROPLASTY Bilateral 03/2010    CARDIAC CATHETERIZATION  2000,2010    CATARACT REMOVAL WITH IMPLANT Bilateral 4051,9649 COLONOSCOPY  05/26/2017    ENDOSCOPY, COLON, DIAGNOSTIC      HERNIA REPAIR Bilateral 1996    NASAL SINUS SURGERY      SHOULDER SURGERY      SKIN BIOPSY      SKIN CANCER EXCISION  Feb 2015    top of head    TONSILLECTOMY      TURP  2008,2008    TURP N/A 4/2/2022    CYSTOSCOPY GREENLIGHT PHOTOVAPORIZATION OF THE PROSTATE WITH URETHERAL DILATION performed by Seun Mcgovern MD at [address] [facility] History     Tobacco Use    Smoking status: Never    Smokeless tobacco: Never    Tobacco comments:     never smoked   Vaping Use    Vaping Use: Never used   Substance Use Topics    Alcohol use: Never    Drug use: No       Family History   Problem Relation Age of Onset    Diabetes Mother     High Blood Pressure Father     Heart Disease Father          I have reviewed the patient's past medical history, past surgical history, allergies, medications, social and family history and I have made updates where appropriate. Review of Systems        PHYSICAL EXAM:  /62   Pulse 67   Temp 97.5 °F (36.4 °C) (Temporal)   Resp 14   Ht 5' 9\" (1.753 m)   Wt 214 lb 9.6 oz (97.3 kg)   SpO2 97%   BMI 31.69 kg/m²     Physical Exam  Constitutional:       General: He is not in acute distress. Appearance: He is well-developed. He is not diaphoretic. HENT:      Head: Normocephalic and atraumatic. Pulmonary:      Effort: Pulmonary effort is normal. No respiratory distress. Skin:     General: Skin is warm and dry. Neurological:      Mental Status: He is alert. Motor: No weakness. Psychiatric:         Behavior: Behavior normal.         Thought Content: Thought content normal.           ASSESSMENT & PLAN  Mili Guevara was seen today for medicare awv and urinary frequency.     Diagnoses and all orders for this visit:    Medicare annual wellness visit, subsequent    Fatigue, unspecified type    Benign essential tremor    -Med changes as above, let me know if sx persist    Return in about 6 months (around 3/26/2023). The most recent results of the following tests were reviewed with the patient today: none     All copied or forwarded information in the progress note was verified by me to be accurate at the time of visit  Patient's past medical, surgical, social and family history were reviewed and updated     All patient questions answered. Patient voiced understanding.

## 2022-09-26 NOTE — PATIENT INSTRUCTIONS
Try stopping the Celexa and see if this helps with fatigue. You can also try taking just one of the primidone per day. Personalized Preventive Plan for Dominick Hwang - 9/26/2022  Medicare offers a range of preventive health benefits. Some of the tests and screenings are paid in full while other may be subject to a deductible, co-insurance, and/or copay. Some of these benefits include a comprehensive review of your medical history including lifestyle, illnesses that may run in your family, and various assessments and screenings as appropriate. After reviewing your medical record and screening and assessments performed today your provider may have ordered immunizations, labs, imaging, and/or referrals for you. A list of these orders (if applicable) as well as your Preventive Care list are included within your After Visit Summary for your review. Other Preventive Recommendations:    A preventive eye exam performed by an eye specialist is recommended every 1-2 years to screen for glaucoma; cataracts, macular degeneration, and other eye disorders. A preventive dental visit is recommended every 6 months. Try to get at least 150 minutes of exercise per week or 10,000 steps per day on a pedometer . Order or download the FREE \"Exercise & Physical Activity: Your Everyday Guide\" from [employer]. Call 7-791.132.1964 or search [employer] online. You need 0133-0364 mg of calcium and 1703-5089 IU of vitamin D per day. It is possible to meet your calcium requirement with diet alone, but a vitamin D supplement is usually necessary to meet this goal.  When exposed to the sun, use a sunscreen that protects against both UVA and UVB radiation with an SPF of 30 or greater. Reapply every 2 to 3 hours or after sweating, drying off with a towel, or swimming. Always wear a seat belt when traveling in a car. Always wear a helmet when riding a bicycle or motorcycle.

## 2022-10-05 ENCOUNTER — OFFICE VISIT (OUTPATIENT)
Dept: UROLOGY | Age: 82
End: 2022-10-05
Payer: MEDICARE

## 2022-10-05 VITALS — WEIGHT: 214.7 LBS | HEIGHT: 69 IN | BODY MASS INDEX: 31.8 KG/M2

## 2022-10-05 DIAGNOSIS — N32.81 OVERACTIVE BLADDER: ICD-10-CM

## 2022-10-05 DIAGNOSIS — R33.9 INCOMPLETE BLADDER EMPTYING: Primary | ICD-10-CM

## 2022-10-05 DIAGNOSIS — N35.919 STRICTURE OF MALE URETHRA, UNSPECIFIED STRICTURE TYPE: ICD-10-CM

## 2022-10-05 DIAGNOSIS — N13.8 BPH WITH OBSTRUCTION/LOWER URINARY TRACT SYMPTOMS: ICD-10-CM

## 2022-10-05 DIAGNOSIS — R35.1 NOCTURIA: ICD-10-CM

## 2022-10-05 DIAGNOSIS — N40.1 BPH WITH OBSTRUCTION/LOWER URINARY TRACT SYMPTOMS: ICD-10-CM

## 2022-10-05 LAB — POST VOID RESIDUAL (PVR): 100 ML

## 2022-10-05 PROCEDURE — 1123F ACP DISCUSS/DSCN MKR DOCD: CPT

## 2022-10-05 PROCEDURE — 99214 OFFICE O/P EST MOD 30 MIN: CPT

## 2022-10-05 PROCEDURE — 51798 US URINE CAPACITY MEASURE: CPT

## 2022-10-05 NOTE — PROGRESS NOTES
RICARDO Luke is a 78 y.o. male that presents to the urology clinic in follow-up for BPH and urethral stricture. Urethral Stricture/BPH  Patient was last seen by Dr. Darius Mora on 7/29/22. Cystoscopy with urethral dilation on 6/28/22. Following dilation he developed a complicated UTI, resolved. Following clearance of his UTI he did have some mild relief in symptoms but over the past 2 months he returned to having low void volumes, frequency, straining, and spraying. Most bothersome to him is the unpredictability of his stream. Denies pain with voiding. Denies being unable to void. Is interested in improvement of symptoms including possibility of surgical intervention. OAB  Admits to urinary frequency: 4-5 times during the day and 6-7 times at nighttime. He is able to get back to sleep easily, but wakes up feeling tired. Taking Myrbetriq as prescribed. Does notice some mild improvement in nighttime symptoms vs when he is not taking anything.  Previously on Gemtesa, but had an issue regarding cost.     PVR: 100 mL    Urologic History    -History of hematuria: No  -Personal use or history of blood thinner use: No  -Personal or family history of prostate cancer or bladder cancer: Yes  Prostate CA- Brother.  -History of urinary retention: Yes  -History of kidney stones: No  - Surgical history: Yes  PVP  -Previous cystoscopy: Yes  With Urethral Dilation.   -Emptying bladder: Yes   Stricture    Last BUN and creatinine:  Lab Results   Component Value Date    BUN 36 (H) 09/19/2022     Lab Results   Component Value Date    CREATININE 1.5 (H) 09/19/2022       PAST MEDICAL, FAMILY AND SOCIAL HISTORY UPDATE:  Past Medical History:   Diagnosis Date    Anemia     Arthritis     Bronchiolitis 11/2016    Cancer ([address]     SKIN CANCER    CKD (chronic kidney disease), stage III ([address]     Diverticula of colon 5/2014    tx with antibiotics per pt; NO surgery    Diverticulosis     DVT (deep venous thrombosis) ([address]     s/p Esophagitis 12/10/2014    Hiatal hernia     HTN (hypertension)     Hyperlipidemia     Kidney stones     Nephrolithiasis     Obesity     Prostate enlargement     benign    Renal insufficiency      Past Surgical History:   Procedure Laterality Date    BLEPHAROPLASTY Bilateral 03/2010    CARDIAC CATHETERIZATION  2000,2010    CATARACT REMOVAL WITH IMPLANT Bilateral 2006,2007    COLONOSCOPY  05/26/2017    ENDOSCOPY, COLON, DIAGNOSTIC      HERNIA REPAIR Bilateral 1996    NASAL SINUS SURGERY      SHOULDER SURGERY      SKIN BIOPSY      SKIN CANCER EXCISION  Feb 2015    top of head    TONSILLECTOMY      TURP  2008,2008    TURP N/A 4/2/2022    CYSTOSCOPY GREENLIGHT PHOTOVAPORIZATION OF THE PROSTATE WITH URETHERAL DILATION performed by Niru Jason MD at [address]       Family History   Problem Relation Age of Onset    Diabetes Mother     High Blood Pressure Father     Heart Disease Father      No outpatient medications have been marked as taking for the 10/5/22 encounter (Appointment) with Raymond Jose PA-C. Ace inhibitors, Carafate [sucralfate], Flomax [tamsulosin hcl], Iv dye [iodides], Lipitor [atorvastatin], Lopressor [metoprolol], and Motrin [ibuprofen]  Social History     Tobacco Use   Smoking Status Never   Smokeless Tobacco Never   Tobacco Comments    never smoked       Social History     Substance and Sexual Activity   Alcohol Use Never       REVIEW OF SYSTEMS:  Constitutional: negative  Eyes: negative  Respiratory: negative  Cardiovascular: negative  Gastrointestinal: negative  Musculoskeletal: negative  Genitourinary: negative except for what is in HPI  Skin: negative   Neurological: negative  Hematological/Lymphatic: negative  Psychological: negative    Physical Exam:    There were no vitals filed for this visit. Patient is a 78 y.o. male in no acute distress and alert and oriented to person, place and time.     Pulmonary- Non labored respiration  Cardiovascular- Normal rate,

## 2022-10-06 ENCOUNTER — OFFICE VISIT (OUTPATIENT)
Dept: CARDIOLOGY CLINIC | Age: 82
End: 2022-10-06
Payer: MEDICARE

## 2022-10-06 VITALS
HEART RATE: 72 BPM | DIASTOLIC BLOOD PRESSURE: 44 MMHG | BODY MASS INDEX: 31.16 KG/M2 | WEIGHT: 211 LBS | SYSTOLIC BLOOD PRESSURE: 122 MMHG

## 2022-10-06 DIAGNOSIS — I25.10 CORONARY ARTERY DISEASE INVOLVING NATIVE CORONARY ARTERY OF NATIVE HEART WITHOUT ANGINA PECTORIS: Primary | ICD-10-CM

## 2022-10-06 DIAGNOSIS — I10 PRIMARY HYPERTENSION: ICD-10-CM

## 2022-10-06 DIAGNOSIS — E78.01 FAMILIAL HYPERCHOLESTEROLEMIA: ICD-10-CM

## 2022-10-06 PROCEDURE — 1036F TOBACCO NON-USER: CPT | Performed by: NUCLEAR MEDICINE

## 2022-10-06 PROCEDURE — G8484 FLU IMMUNIZE NO ADMIN: HCPCS | Performed by: NUCLEAR MEDICINE

## 2022-10-06 PROCEDURE — 99213 OFFICE O/P EST LOW 20 MIN: CPT | Performed by: NUCLEAR MEDICINE

## 2022-10-06 PROCEDURE — 1123F ACP DISCUSS/DSCN MKR DOCD: CPT | Performed by: NUCLEAR MEDICINE

## 2022-10-06 PROCEDURE — G8427 DOCREV CUR MEDS BY ELIG CLIN: HCPCS | Performed by: NUCLEAR MEDICINE

## 2022-10-06 PROCEDURE — G8417 CALC BMI ABV UP PARAM F/U: HCPCS | Performed by: NUCLEAR MEDICINE

## 2022-10-06 NOTE — PROGRESS NOTES
[address]  [facility] [address]  Dept: [phone]  Dept Fax: [phone]  Loc: [phone]    Visit Date: 10/6/2022    Tato Inman is a 78 y.o. male who presents todayfor:  Chief Complaint   Patient presents with    Check-Up    Coronary Artery Disease    Hypertension    Palpitations    Valvular Heart Disease   Know moderate CAD and AI  Labile BP  HR is better  Frequent PVCs  No chest pain   Some baseline dyspnea  BP is stable   No dizziness  No syncope        HPI:  HPI  Past Medical History:   Diagnosis Date    Anemia     Arthritis     Bronchiolitis 11/2016    Cancer ([facility] [address]     SKIN CANCER    CKD (chronic kidney disease), stage III ([facility] [address]     Diverticula of colon 5/2014    tx with antibiotics per pt; NO surgery    Diverticulosis     DVT (deep venous thrombosis) (HCC)     s/p    Esophagitis 12/10/2014    Hiatal hernia     HTN (hypertension)     Hyperlipidemia     Kidney stones     Nephrolithiasis     Obesity     Prostate enlargement     benign    Renal insufficiency       Past Surgical History:   Procedure Laterality Date    BLEPHAROPLASTY Bilateral 03/2010    CARDIAC CATHETERIZATION  2000,2010    CATARACT REMOVAL WITH IMPLANT Bilateral 2006,2007    COLONOSCOPY  05/26/2017    ENDOSCOPY, COLON, DIAGNOSTIC      HERNIA REPAIR Bilateral 1996    NASAL SINUS SURGERY      SHOULDER SURGERY      SKIN BIOPSY      SKIN CANCER EXCISION  Feb 2015    top of head    TONSILLECTOMY      TURP  2008,2008    TURP N/A 4/2/2022    CYSTOSCOPY GREENLIGHT PHOTOVAPORIZATION OF THE PROSTATE WITH URETHERAL DILATION performed by Kaley Daugherty MD at [address]       Family History   Problem Relation Age of Onset    Diabetes Mother     High Blood Pressure Father     Heart Disease Father      Social History     Tobacco Use    Smoking status: Never    Smokeless tobacco: Never    Tobacco comments:     never smoked   Substance Use Topics    Alcohol use: Never      Current Outpatient Medications   Medication Sig Dispense Refill    mirabegron (MYRBETRIQ) 50 MG TB24 Take 50 mg by mouth in the morning. 30 tablet 3    hydrALAZINE (APRESOLINE) 25 MG tablet Take 1 tablet by mouth 3 times daily 90 tablet 3    citalopram (CELEXA) 20 MG tablet TAKE 1 TABLET BY MOUTH DAILY 90 tablet 3    primidone (MYSOLINE) 50 MG tablet TAKE 1 TABLET BY MOUTH TWICE DAILY 180 tablet 3    triamterene-hydroCHLOROthiazide (MAXZIDE-25) 37.5-25 MG per tablet Take 1 tablet by mouth daily 90 tablet 1    irbesartan (AVAPRO) 300 MG tablet TAKE 1 TABLET BY MOUTH DAILY 90 tablet 1    amLODIPine (NORVASC) 10 MG tablet TAKE 1 TABLET BY MOUTH DAILY 90 tablet 3    pravastatin (PRAVACHOL) 40 MG tablet TAKE 1 TABLET BY MOUTH EVERY EVENING 90 tablet 3    pantoprazole (PROTONIX) 40 MG tablet Take 40 mg by mouth daily. No current facility-administered medications for this visit.      Allergies   Allergen Reactions    Ace Inhibitors      Intolerant to      Carafate [Sucralfate] Other (See Comments)     Tongue swelling    Flomax [Tamsulosin Hcl]     Iv Dye [Iodides]     Lipitor [Atorvastatin] Other (See Comments)     Myalgias    Lopressor [Metoprolol]     Motrin [Ibuprofen]      Was told not to take due to kidneys     Health Maintenance   Topic Date Due    DTaP/Tdap/Td vaccine (1 - Tdap) Never done    Shingles vaccine (1 of 2) Never done    Pneumococcal 65+ years Vaccine (2 - PPSV23 or PCV20) 01/20/2017    COVID-19 Vaccine (3 - Booster for Pfizer series) 08/25/2021    Flu vaccine (1) 08/01/2022    Lipids  09/19/2023    Depression Screen  09/26/2023    Annual Wellness Visit (AWV)  09/27/2023    Hepatitis A vaccine  Aged Out    Hepatitis B vaccine  Aged Out    Hib vaccine  Aged Out    Meningococcal (ACWY) vaccine  Aged Out       Subjective:  Review of Systems  General:   No fever, no chills, No fatigue or weight loss  Pulmonary:    No dyspnea, no wheezing  Cardiac:    Denies recent chest pain,   GI:     No nausea or vomiting, no abdominal pain  Neuro:    No dizziness or light headedness,   Musculoskeletal:  No recent active issues  Extremities:   No edema, no obvious claudication     Objective:  Physical Exam  BP (!) 122/44   Pulse 72   Wt 211 lb (95.7 kg)   BMI 31.16 kg/m²   General:   Well developed, well nourished  Lungs:   Clear to auscultation  Heart:    Normal S1 S2, Slight murmur. no rubs, no gallops  Abdomen:   Soft, non tender, no organomegalies, positive bowel sounds  Extremities:   No edema, no cyanosis, good peripheral pulses  Neurological:   Awake, alert, oriented. No obvious focal deficits  Musculoskelatal:  No obvious deformities    Assessment:      Diagnosis Orders   1. Coronary artery disease involving native coronary artery of native heart without angina pectoris        2. Primary hypertension        3. Familial hypercholesterolemia        As above  Cardiac seems stable for now     Plan:  No follow-ups on file. As above  Continue risk factor modification and medical management  Thank you for allowing me to participate in the care of your patient. Please don't hesitate to contact me regarding any further issues related to the patient care    Orders Placed:  No orders of the defined types were placed in this encounter. Medications Prescribed:  No orders of the defined types were placed in this encounter. Discussed use, benefit, and side effects of prescribed medications. All patient questions answered. Pt voicedunderstanding. Instructed to continue current medications, diet and exercise. Continue risk factor modification and medical management. Patient agreed with treatment plan. Follow up as directed.     Electronically signedby Lisbet Armendariz MD on 10/6/2022 at 1:42 PM

## 2022-10-10 ENCOUNTER — TELEPHONE (OUTPATIENT)
Dept: UROLOGY | Age: 82
End: 2022-10-10

## 2022-10-10 DIAGNOSIS — N35.919 STRICTURE OF MALE URETHRA, UNSPECIFIED STRICTURE TYPE: Primary | ICD-10-CM

## 2022-10-10 DIAGNOSIS — N40.1 BPH WITH OBSTRUCTION/LOWER URINARY TRACT SYMPTOMS: ICD-10-CM

## 2022-10-10 DIAGNOSIS — Z01.818 PRE-OP TESTING: ICD-10-CM

## 2022-10-10 DIAGNOSIS — N13.8 BPH WITH OBSTRUCTION/LOWER URINARY TRACT SYMPTOMS: ICD-10-CM

## 2022-10-10 RX ORDER — ASPIRIN 325 MG
325 TABLET ORAL DAILY
COMMUNITY

## 2022-10-10 NOTE — TELEPHONE ENCOUNTER
Patient is scheduled for a Cystoscopy, Urethral Dilation with Dr. Huey Sage on 10/27/2022. We are requesting clearance and direction on holding 325mg aspirin from Dr. Heather Etienne. Thank you.

## 2022-10-11 ENCOUNTER — PREP FOR PROCEDURE (OUTPATIENT)
Dept: UROLOGY | Age: 82
End: 2022-10-11

## 2022-10-11 NOTE — TELEPHONE ENCOUNTER
Patient scheduled with Dr. Marco Bain on 10/27/2022. Surgery consent to be done upon arrival.  Patient cleared by Dr. Lee Sosa. Patient to do urine culture and BMP on 10/13/2022. Surgery instructions mailed to patient. Patient will have an adult over the age of 25 with them at discharge and 24 hours after procedure.

## 2022-10-11 NOTE — TELEPHONE ENCOUNTER
SURGERY [address]  [facility] [address] [facility], [address]      Phone *[phone]   Surgical Scheduling Direct Line Phone *[phone] Fax *[phone]      Damien Miranda 1940 male    [address]  [place]   Marital Status:          Home Phone: [phone]      Cell Phone:    Telephone Information:   Mobile [phone]          Surgeon: Dr. Marcela Costello Surgery Date: 10/27/2022   Time: 8:30am    Procedure: Cystoscopy, Urethral dilation    Diagnosis: urethral stricture     Important Medical History:  In Epic    Special Inst/Equip:     CPT Codes:    87515  Latex Allergy: No     Cardiac Device:  No    Anesthesia:  General          Admission Type:  Same Day                        Admit Prior to Day of Surgery: No    Case Location:  Main OR            Preadmission Testing:  Phone Call          PAT Date and Time:______________________________________________________    PAT Confirmation #: ______________________________________________________    Post Op Visit: ___________________________________________________________    Need Preop Cardiac Clearance: Yes    Does Patient have Cardiologist/physician?      Dr. Mitch Fischer    Surgery Confirmation #: __________________________________________________    Margreta Massy: ________________________   Date: __________________________     [employer] Name: Medicare

## 2022-10-11 NOTE — TELEPHONE ENCOUNTER
DO NOT TAKE ASPIRIN,  FISH OIL, IBUPROFEN, MOTRIN-LIKE DRUGS AND ANY MULTIVITAMINS OR OVER THE COUNTER SUPPLEMENTS 14 DAYS PRIOR TO SURGERY. MUST HAVE AN ADULT OVER THE AGE OF 18 WITH YOU AT THE TIME OF THE PROCEDURE AND WITH YOU AT HOME AFTER THE PROCEDURE FOR 24 HOURS       Steven Juarez 1940 Diagnosis:     Surgical Physician: Dr. Valdovinos Shown have been scheduled for the procedure marked below:      Surgery: Cystoscopy, Urethral Dilation         Date: 10/27/2022     Anesthesia: Anesthesiologist (General/Spinal)     Place of Service: [address] [place] [address] Second Floor Same Day Surgery         Arrive to same day surgery by:  6:30am  (Surgery time is subject to change)      INSTRUCTIONS AS MARKED BELOW:    1.  DO NOT eat or drink anything after midnight before surgery. 2.  We prefer you shower or bathe with an antibacterial soap (Dial) the morning of surgery. 3  Please bring a current medication list, photo ID and insurance card(s) with you  4. Okay to take Tylenol  5. If you take Glucophage, Metformin or Janumet, hold 48-hours prior to surgery  6. Take blood pressure or heart medication as directed, if taken in the morning take with a small sip of water  7. The office will call you in 1-2 days after your procedure to schedule a follow up.               Date: 10/11/2022

## 2022-10-12 RX ORDER — SODIUM CHLORIDE 9 MG/ML
INJECTION, SOLUTION INTRAVENOUS CONTINUOUS
Status: CANCELLED | OUTPATIENT
Start: 2022-10-12

## 2022-10-13 ENCOUNTER — TELEPHONE (OUTPATIENT)
Dept: UROLOGY | Age: 82
End: 2022-10-13

## 2022-10-13 ENCOUNTER — HOSPITAL ENCOUNTER (OUTPATIENT)
Age: 82
Discharge: HOME | End: 2022-10-13
Payer: MEDICARE

## 2022-10-13 DIAGNOSIS — N35.919 STRICTURE OF MALE URETHRA, UNSPECIFIED STRICTURE TYPE: ICD-10-CM

## 2022-10-13 DIAGNOSIS — N40.1 BPH WITH OBSTRUCTION/LOWER URINARY TRACT SYMPTOMS: ICD-10-CM

## 2022-10-13 DIAGNOSIS — Z01.818 PRE-OP TESTING: ICD-10-CM

## 2022-10-13 DIAGNOSIS — N13.8 BPH WITH OBSTRUCTION/LOWER URINARY TRACT SYMPTOMS: ICD-10-CM

## 2022-10-13 LAB
ANION GAP SERPL CALCULATED.3IONS-SCNC: 10 MEQ/L (ref 8–16)
BUN BLDV-MCNC: 34 MG/DL (ref 7–22)
CALCIUM SERPL-MCNC: 9.4 MG/DL (ref 8.5–10.5)
CHLORIDE BLD-SCNC: 102 MEQ/L (ref 98–111)
CO2: 26 MEQ/L (ref 23–33)
CREAT SERPL-MCNC: 1.9 MG/DL (ref 0.4–1.2)
GFR SERPL CREATININE-BSD FRML MDRD: 34 ML/MIN/1.73M2
GLUCOSE BLD-MCNC: 118 MG/DL (ref 70–108)
POTASSIUM SERPL-SCNC: 4.4 MEQ/L (ref 3.5–5.2)
SODIUM BLD-SCNC: 138 MEQ/L (ref 135–145)

## 2022-10-13 PROCEDURE — 87086 URINE CULTURE/COLONY COUNT: CPT

## 2022-10-13 PROCEDURE — 80048 BASIC METABOLIC PNL TOTAL CA: CPT

## 2022-10-13 PROCEDURE — 87186 SC STD MICRODIL/AGAR DIL: CPT

## 2022-10-13 PROCEDURE — 36415 COLL VENOUS BLD VENIPUNCTURE: CPT

## 2022-10-13 NOTE — TELEPHONE ENCOUNTER
Pt has procedure scheduled for oct 27. Pt is having to go every 10-15 minutes. Is currently on myrbetriq. Is wanting to know if there is anything else he can do to stop urinating so frequently d/t urethral stricture. [phone]  to return call. Please advise.

## 2022-10-13 NOTE — TELEPHONE ENCOUNTER
Recommend patient makes Lab/MA appointment to assess UA and PVR. I am happy to see patient if he would like, otherwise. I am aware that the patient would like to avoid catheter placement because he was hospitalized in the past for a UTI after calabrese insertion.     The patient should go to the ED if he develops fever, chills, nausea, vomiting, chest pain, SOB, calf pain, feelings of incomplete emptying, or should they otherwise feel they need evaluated

## 2022-10-14 ENCOUNTER — NURSE ONLY (OUTPATIENT)
Dept: UROLOGY | Age: 82
End: 2022-10-14
Payer: MEDICARE

## 2022-10-14 ENCOUNTER — PATIENT MESSAGE (OUTPATIENT)
Dept: NEPHROLOGY | Age: 82
End: 2022-10-14

## 2022-10-14 DIAGNOSIS — N18.32 CHRONIC KIDNEY DISEASE, STAGE 3B (HCC): Primary | ICD-10-CM

## 2022-10-14 DIAGNOSIS — N35.919 STRICTURE OF MALE URETHRA, UNSPECIFIED STRICTURE TYPE: Primary | ICD-10-CM

## 2022-10-14 DIAGNOSIS — R35.0 URINARY FREQUENCY: ICD-10-CM

## 2022-10-14 LAB
BILIRUBIN URINE: NEGATIVE
BLOOD URINE, POC: ABNORMAL
CHARACTER, URINE: CLEAR
COLOR, URINE: YELLOW
GLUCOSE URINE: NEGATIVE MG/DL
KETONES, URINE: NEGATIVE
LEUKOCYTE CLUMPS, URINE: ABNORMAL
NITRITE, URINE: NEGATIVE
PH, URINE: 6.5 (ref 5–9)
POST VOID RESIDUAL (PVR): 91 ML
PROTEIN, URINE: 100 MG/DL
SPECIFIC GRAVITY, URINE: 1.02 (ref 1–1.03)
UROBILINOGEN, URINE: 0.2 EU/DL (ref 0–1)

## 2022-10-14 PROCEDURE — 81003 URINALYSIS AUTO W/O SCOPE: CPT

## 2022-10-14 PROCEDURE — 51798 US URINE CAPACITY MEASURE: CPT

## 2022-10-14 RX ORDER — CIPROFLOXACIN 500 MG/1
500 TABLET, FILM COATED ORAL 2 TIMES DAILY
Qty: 20 TABLET | Refills: 0 | Status: SHIPPED | OUTPATIENT
Start: 2022-10-14 | End: 2022-10-24

## 2022-10-14 NOTE — PROGRESS NOTES
-PVR within normal limits  -UA with leukocytes. Will send for culture. -Due to history of complicated UTI and hospitalization, will treat empirically for UTI  -UA on 10/14/2022 remarkable for small blood, large leukocytes, negative nitrites, protein   -Preliminary culture from 10/13/2022: Pseudomonas aeruginosa.  Will treat with 10 days of Cipro   -patient advised to go to the ED with fever, chill, nausea, vomiting  -advised to call the office with questions, comments, or concerns

## 2022-10-14 NOTE — TELEPHONE ENCOUNTER
From: Sunny Camarillo  To: Dr. Sarath Cho  Sent: 10/14/2022 1:43 PM EDT  Subject: HB from 10/13/2022    Hi Opal Pulido is having issues with urethral stricture again. He saw Dr. Junior Graff and he scheduked him for another Cystoscopy with dilation and maybe opening the meatus a little on October 27. I am concerned because he is constantly having to void every 1/2 hour or so now. He saw Dr Junior Graff on 10/14 and he placed him on Cipro as his urine showed WBC and blood. You remember he ended up in [facility] with UTI and sepsis a short time ago and now he is back to having the same issues that lead to all those issues again. What I want you to know is to look at the 10/13 labs and especially the HB and how much lower it is now from 9/19 , lower than ever! Is this entire process affecting his kidneys and we seem to be lacking urology with nephrology communication! Also a good thing is that Jayden's BP is doing much better overall with the TID Hydralizine. Potassium seems stable. This urinary tract is NOT. Need your input.  Thanks Abdiaziz Hawkins

## 2022-10-15 LAB
ORGANISM: ABNORMAL
URINE CULTURE, ROUTINE: ABNORMAL

## 2022-10-17 NOTE — TELEPHONE ENCOUNTER
Repeat BMP in one week, creat will always fluctuate a bit, but will check in one week to ensure stability.

## 2022-10-17 NOTE — TELEPHONE ENCOUNTER
Spoke to pt's wife Jose Patterson, she stated that she was talking about the kidney function (GFR). She stated that it has dropped since 9/19/22. Labs are in epic.

## 2022-10-20 ENCOUNTER — TELEPHONE (OUTPATIENT)
Dept: UROLOGY | Age: 82
End: 2022-10-20

## 2022-10-20 DIAGNOSIS — N35.919 STRICTURE OF MALE URETHRA, UNSPECIFIED STRICTURE TYPE: Primary | ICD-10-CM

## 2022-10-20 DIAGNOSIS — Z87.440 HX: UTI (URINARY TRACT INFECTION): Primary | ICD-10-CM

## 2022-10-20 DIAGNOSIS — R35.0 URINARY FREQUENCY: ICD-10-CM

## 2022-10-20 DIAGNOSIS — Z01.818 PRE-OP TESTING: ICD-10-CM

## 2022-10-20 NOTE — PROGRESS NOTES
PAT Call Date: 10/20   Surgery Date: 10/27    Surgeon: Kulwinder Washington   Surgery: cysto urethral dilation    Is patient from a nursing home? No   Any Isolation Precautions? No   Any Pacemaker or ICD? No If YES, has it been checked recently and where? Has the rep been notified? No     On Snapboard?  No  4/2 cysto w/greenlight   Hard Copy on Chart  In EPIC Pending/Notes   Consent -   Within 30 days; signed, dated & timed by patient and physician     [x] On Arrival     [] Blood    Additional Consent Needs:     H&P - Within 30 days    [] Physician To Do     [] H&P Update - If H&P is older then 24 hours    Clearance -  Medical, Cardiac, Pulmonary, etc.   10/11 Baki-mod(hold asa 5d)   Orders - Signed and Dated    Copy Sent to Pharm []  10/11  [] Physician To Do    Labs - Within 3 months  To have bmp per mancini week of 10/14 7/10  10/13        10/13  [x] CBC -ok   [x] BMP BUN 34 CR 1.9   [x] GFR 34   [] INR    [] PTT    [x] Urine +pseudo on Cipro   [] Liver Enzymes    [] Kidney Function    [] MRSA Nasal   [] MSSA      Others:    Radiology Studies-   Within 1 year  7/7  [x] Chest X-Ray-ok   [] MRI    [] CT    EKG -   Within 1 year, unless hx of HTN  7/7 Abn but cleared   Cardiac Workup -   Stress Test, Echo, Cath within 18 months      6/18/21  4/15  [] Cath                                [] Stress Test                      [x] Echo 50%   [x] Holter Monitor    [] BRIDGETTE     Unga-SPEAK ON LEFT

## 2022-10-20 NOTE — PROGRESS NOTES
Spoke with Sara Patterson in the Urology office regarding adding cbc order to pt blood work he is planning to have done at [facility] on 10/25. Also wife requesting call to discuss antibiotic tx and/or retesting of urine prior to procedure due to Cipro to finish on 10/23.

## 2022-10-20 NOTE — TELEPHONE ENCOUNTER
Wife spoke to PAT with concerns patient will not be on antibiotics up to his surgery. Cipro 10 day course was ordered on 10/14/2022. He will finish them on Sunday. Should it be extended? Culture from 10/13/2022 showed pseudomonas aeruginosa. Surgery scheduled on 10/27/2022. He has a history of sepsis with last cystoscopy. She also is questioning if another urine should be checked prior to surgery.

## 2022-10-20 NOTE — PROGRESS NOTES
Follow all instructions given by your physician    NPO after midnight   Sips of water am of surgery with allowed medications  Bring insurance info and 's license  Wear comfortable clean, loose fitting clothing  No jewelry or contact lenses to be worn day of surgery  No glue on dentures morning of surgery;you will be asked to remove them for surgery. Case for glasses. Shower night before and morning of surgery with a liquid antibacterial soap, dry with fresh clean towel; no lotions, creams or powder. Clean sheets and pillow case on bed night before surgery  Bring medications in original bottles  Bring CPAP/BIPAP machine if you have one ( you may be charged if one is needed in recovery room )   needed at discharge and someone over 18 to stay with you for 24 hours overnight (surgery may be cancelled if you don't have this)    Report to [facility] on 2nd floor  If you would become ill prior to surgery, please call the surgeon  May have a visitor with you, we request that you limit to 2 visitors in pre-op area    Call [phone] for any questions  Covid questionnaire Complete; Patient negative for symptoms or exposure. See documentation.

## 2022-10-21 NOTE — TELEPHONE ENCOUNTER
Patient wife, on HIPPA , advised to give a urine sample to the lab after completing the antibiotics. She voiced understanding.

## 2022-10-25 ENCOUNTER — NURSE ONLY (OUTPATIENT)
Dept: LAB | Age: 82
End: 2022-10-25

## 2022-10-25 DIAGNOSIS — N35.919 STRICTURE OF MALE URETHRA, UNSPECIFIED STRICTURE TYPE: ICD-10-CM

## 2022-10-25 DIAGNOSIS — I12.9 HYPERTENSIVE RENAL DISEASE, STAGE 1 THROUGH STAGE 4 OR UNSPECIFIED CHRONIC KIDNEY DISEASE: ICD-10-CM

## 2022-10-25 DIAGNOSIS — N18.32 CHRONIC KIDNEY DISEASE, STAGE 3B (HCC): ICD-10-CM

## 2022-10-25 DIAGNOSIS — R35.0 URINARY FREQUENCY: ICD-10-CM

## 2022-10-25 DIAGNOSIS — Z01.818 PRE-OP TESTING: ICD-10-CM

## 2022-10-25 LAB
ANION GAP SERPL CALCULATED.3IONS-SCNC: 13 MEQ/L (ref 8–16)
BACTERIA: ABNORMAL
BASOPHILS # BLD: 1 %
BASOPHILS ABSOLUTE: 0 THOU/MM3 (ref 0–0.1)
BILIRUBIN URINE: NEGATIVE
BLOOD, URINE: NEGATIVE
BUN BLDV-MCNC: 31 MG/DL (ref 7–22)
CALCIUM SERPL-MCNC: 9.6 MG/DL (ref 8.5–10.5)
CASTS: ABNORMAL /LPF
CASTS: ABNORMAL /LPF
CHARACTER, URINE: CLEAR
CHLORIDE BLD-SCNC: 102 MEQ/L (ref 98–111)
CO2: 25 MEQ/L (ref 23–33)
COLOR: YELLOW
CREAT SERPL-MCNC: 2 MG/DL (ref 0.4–1.2)
CREATININE URINE: 93.3 MG/DL
CRYSTALS: ABNORMAL
EOSINOPHIL # BLD: 14.4 %
EOSINOPHILS ABSOLUTE: 0.7 THOU/MM3 (ref 0–0.4)
EPITHELIAL CELLS, UA: ABNORMAL /HPF
ERYTHROCYTE [DISTWIDTH] IN BLOOD BY AUTOMATED COUNT: 13.7 % (ref 11.5–14.5)
ERYTHROCYTE [DISTWIDTH] IN BLOOD BY AUTOMATED COUNT: 45.9 FL (ref 35–45)
GFR SERPL CREATININE-BSD FRML MDRD: 32 ML/MIN/1.73M2
GLUCOSE BLD-MCNC: 121 MG/DL (ref 70–108)
GLUCOSE, URINE: NEGATIVE MG/DL
HCT VFR BLD CALC: 39.6 % (ref 42–52)
HEMOGLOBIN: 12.8 GM/DL (ref 14–18)
IMMATURE GRANS (ABS): 0.01 THOU/MM3 (ref 0–0.07)
IMMATURE GRANULOCYTES: 0.2 %
KETONES, URINE: NEGATIVE
LEUKOCYTE EST, POC: NEGATIVE
LYMPHOCYTES # BLD: 22.9 %
LYMPHOCYTES ABSOLUTE: 1.1 THOU/MM3 (ref 1–4.8)
MCH RBC QN AUTO: 29.8 PG (ref 26–33)
MCHC RBC AUTO-ENTMCNC: 32.3 GM/DL (ref 32.2–35.5)
MCV RBC AUTO: 92.1 FL (ref 80–94)
MISCELLANEOUS LAB TEST RESULT: ABNORMAL
MONOCYTES # BLD: 7.1 %
MONOCYTES ABSOLUTE: 0.3 THOU/MM3 (ref 0.4–1.3)
NITRITE, URINE: NEGATIVE
NUCLEATED RED BLOOD CELLS: 0 /100 WBC
PH UA: 6.5 (ref 5–9)
PLATELET # BLD: 199 THOU/MM3 (ref 130–400)
PMV BLD AUTO: 11 FL (ref 9.4–12.4)
POTASSIUM SERPL-SCNC: 4.7 MEQ/L (ref 3.5–5.2)
PROT/CREAT RATIO, UR: 0.29
PROTEIN UA: 30 MG/DL
PROTEIN, URINE: 26.6 MG/DL
RBC # BLD: 4.3 MILL/MM3 (ref 4.7–6.1)
RBC URINE: ABNORMAL /HPF
RENAL EPITHELIAL, UA: ABNORMAL
SEG NEUTROPHILS: 54.4 %
SEGMENTED NEUTROPHILS ABSOLUTE COUNT: 2.7 THOU/MM3 (ref 1.8–7.7)
SODIUM BLD-SCNC: 140 MEQ/L (ref 135–145)
SPECIFIC GRAVITY UA: 1.01 (ref 1–1.03)
UROBILINOGEN, URINE: 0.2 EU/DL (ref 0–1)
WBC # BLD: 4.9 THOU/MM3 (ref 4.8–10.8)
WBC UA: ABNORMAL /HPF
YEAST: ABNORMAL

## 2022-10-27 ENCOUNTER — ANESTHESIA EVENT (OUTPATIENT)
Dept: OPERATING ROOM | Age: 82
End: 2022-10-27
Payer: MEDICARE

## 2022-10-27 ENCOUNTER — ANESTHESIA (OUTPATIENT)
Dept: OPERATING ROOM | Age: 82
End: 2022-10-27
Payer: MEDICARE

## 2022-10-27 ENCOUNTER — HOSPITAL ENCOUNTER (OUTPATIENT)
Age: 82
Setting detail: OUTPATIENT SURGERY
Discharge: HOME | End: 2022-10-27
Attending: UROLOGY | Admitting: UROLOGY
Payer: MEDICARE

## 2022-10-27 VITALS
DIASTOLIC BLOOD PRESSURE: 73 MMHG | TEMPERATURE: 97.5 F | OXYGEN SATURATION: 96 % | WEIGHT: 207.4 LBS | HEART RATE: 65 BPM | HEIGHT: 70 IN | SYSTOLIC BLOOD PRESSURE: 164 MMHG | BODY MASS INDEX: 29.69 KG/M2 | RESPIRATION RATE: 16 BRPM

## 2022-10-27 DIAGNOSIS — G89.18 POSTOPERATIVE PAIN: Primary | ICD-10-CM

## 2022-10-27 PROCEDURE — 2709999900 HC NON-CHARGEABLE SUPPLY: Performed by: UROLOGY

## 2022-10-27 PROCEDURE — 3600000002 HC SURGERY LEVEL 2 BASE: Performed by: UROLOGY

## 2022-10-27 PROCEDURE — 3700000001 HC ADD 15 MINUTES (ANESTHESIA): Performed by: UROLOGY

## 2022-10-27 PROCEDURE — 3600000012 HC SURGERY LEVEL 2 ADDTL 15MIN: Performed by: UROLOGY

## 2022-10-27 PROCEDURE — 6360000002 HC RX W HCPCS: Performed by: UROLOGY

## 2022-10-27 PROCEDURE — 2720000010 HC SURG SUPPLY STERILE: Performed by: UROLOGY

## 2022-10-27 PROCEDURE — 3700000000 HC ANESTHESIA ATTENDED CARE: Performed by: UROLOGY

## 2022-10-27 PROCEDURE — 7100000010 HC PHASE II RECOVERY - FIRST 15 MIN: Performed by: UROLOGY

## 2022-10-27 PROCEDURE — 2580000003 HC RX 258: Performed by: UROLOGY

## 2022-10-27 PROCEDURE — 2500000003 HC RX 250 WO HCPCS: Performed by: NURSE ANESTHETIST, CERTIFIED REGISTERED

## 2022-10-27 PROCEDURE — 6360000002 HC RX W HCPCS: Performed by: NURSE ANESTHETIST, CERTIFIED REGISTERED

## 2022-10-27 PROCEDURE — C1769 GUIDE WIRE: HCPCS | Performed by: UROLOGY

## 2022-10-27 PROCEDURE — 7100000011 HC PHASE II RECOVERY - ADDTL 15 MIN: Performed by: UROLOGY

## 2022-10-27 RX ORDER — HYDROCODONE BITARTRATE AND ACETAMINOPHEN 5; 325 MG/1; MG/1
1 TABLET ORAL EVERY 6 HOURS PRN
Qty: 12 TABLET | Refills: 0 | Status: SHIPPED | OUTPATIENT
Start: 2022-10-27 | End: 2022-11-01

## 2022-10-27 RX ORDER — DOCUSATE SODIUM 100 MG/1
100 CAPSULE, LIQUID FILLED ORAL 2 TIMES DAILY
Qty: 28 CAPSULE | Refills: 0 | Status: SHIPPED | OUTPATIENT
Start: 2022-10-27 | End: 2022-11-10

## 2022-10-27 RX ORDER — PROPOFOL 10 MG/ML
INJECTION, EMULSION INTRAVENOUS PRN
Status: DISCONTINUED | OUTPATIENT
Start: 2022-10-27 | End: 2022-10-27 | Stop reason: SDUPTHER

## 2022-10-27 RX ORDER — FENTANYL CITRATE 50 UG/ML
INJECTION, SOLUTION INTRAMUSCULAR; INTRAVENOUS PRN
Status: DISCONTINUED | OUTPATIENT
Start: 2022-10-27 | End: 2022-10-27 | Stop reason: SDUPTHER

## 2022-10-27 RX ORDER — CIPROFLOXACIN 500 MG/1
250 TABLET, FILM COATED ORAL 2 TIMES DAILY
Qty: 5 TABLET | Refills: 0 | Status: SHIPPED | OUTPATIENT
Start: 2022-10-27 | End: 2022-11-01

## 2022-10-27 RX ORDER — PROPOFOL 10 MG/ML
INJECTION, EMULSION INTRAVENOUS CONTINUOUS PRN
Status: DISCONTINUED | OUTPATIENT
Start: 2022-10-27 | End: 2022-10-27 | Stop reason: SDUPTHER

## 2022-10-27 RX ORDER — LIDOCAINE HYDROCHLORIDE 20 MG/ML
INJECTION, SOLUTION EPIDURAL; INFILTRATION; INTRACAUDAL; PERINEURAL PRN
Status: DISCONTINUED | OUTPATIENT
Start: 2022-10-27 | End: 2022-10-27 | Stop reason: SDUPTHER

## 2022-10-27 RX ORDER — SODIUM CHLORIDE 9 MG/ML
INJECTION, SOLUTION INTRAVENOUS CONTINUOUS
Status: DISCONTINUED | OUTPATIENT
Start: 2022-10-27 | End: 2022-10-27 | Stop reason: HOSPADM

## 2022-10-27 RX ADMIN — SODIUM CHLORIDE: 9 INJECTION, SOLUTION INTRAVENOUS at 09:03

## 2022-10-27 RX ADMIN — FENTANYL CITRATE 50 MCG: 50 INJECTION, SOLUTION INTRAMUSCULAR; INTRAVENOUS at 10:18

## 2022-10-27 RX ADMIN — SODIUM CHLORIDE: 9 INJECTION, SOLUTION INTRAVENOUS at 09:02

## 2022-10-27 RX ADMIN — CEFAZOLIN 2000 MG: 10 INJECTION, POWDER, FOR SOLUTION INTRAVENOUS at 10:24

## 2022-10-27 RX ADMIN — FENTANYL CITRATE 25 MCG: 50 INJECTION, SOLUTION INTRAMUSCULAR; INTRAVENOUS at 10:36

## 2022-10-27 RX ADMIN — PROPOFOL 80 MCG/KG/MIN: 10 INJECTION, EMULSION INTRAVENOUS at 10:30

## 2022-10-27 RX ADMIN — Medication 80 MG: at 10:29

## 2022-10-27 RX ADMIN — PROPOFOL 30 MG: 10 INJECTION, EMULSION INTRAVENOUS at 10:30

## 2022-10-27 RX ADMIN — FENTANYL CITRATE 25 MCG: 50 INJECTION, SOLUTION INTRAMUSCULAR; INTRAVENOUS at 10:30

## 2022-10-27 ASSESSMENT — PAIN SCALES - GENERAL
PAINLEVEL_OUTOF10: 2
PAINLEVEL_OUTOF10: 0

## 2022-10-27 ASSESSMENT — PAIN - FUNCTIONAL ASSESSMENT: PAIN_FUNCTIONAL_ASSESSMENT: 0-10

## 2022-10-27 NOTE — H&P
Prashanth Talbot MD  History and Physical    Patient:  Jb Rainey  MRN: [id number]  YOB: 1940    HISTORY OF PRESENT ILLNESS:     The patient is a 78 y.o. male who presents with urethral dilation. Here for procedure. Patient's old records, notes and chart reviewed and summarized above. Prashanth Talbot MD independently reviewed the images and verified the radiology reports from:    No results found. 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 of rt ear    HTN (hypertension)     Hyperlipidemia     Kidney stones     Nephrolithiasis     Obesity     Prostate enlargement     benign    Renal insufficiency        Past Surgical History:    Past Surgical History:   Procedure Laterality Date    BLEPHAROPLASTY Bilateral 03/2010    CARDIAC CATHETERIZATION  2000,2010    CATARACT REMOVAL WITH IMPLANT Bilateral 2006,2007    COLONOSCOPY  05/26/2017    ENDOSCOPY, COLON, DIAGNOSTIC      HERNIA REPAIR Bilateral 1996    NASAL SINUS SURGERY      SHOULDER SURGERY      SKIN BIOPSY      SKIN CANCER EXCISION  Feb 2015    top of head    TONSILLECTOMY      TURP  2008,2008    TURP N/A 4/2/2022    CYSTOSCOPY GREENLIGHT PHOTOVAPORIZATION OF THE PROSTATE WITH URETHERAL DILATION performed by Dennise Browning MD at [address]         Medications Prior to Admission:    Prior to Admission medications    Medication Sig Start Date End Date Taking? Authorizing Provider   aspirin 325 MG tablet Take 325 mg by mouth daily    Historical Provider, MD   mirabegron (MYRBETRIQ) 50 MG TB24 Take 50 mg by mouth in the morning. 8/1/22   Leonides Malagon MD   hydrALAZINE (APRESOLINE) 25 MG tablet Take 1 tablet by mouth 3 times daily 7/11/22   Afshin Chris MD   citalopram (CELEXA) 20 MG tablet TAKE 1 TABLET BY MOUTH DAILY 6/20/22   ALVINO Milligan - CNP   primidone (MYSOLINE) 50 MG tablet TAKE 1 TABLET BY MOUTH TWICE DAILY 6/20/22   Eusatinder Luciano, APRN - NOEMI   triamterene-hydroCHLOROthiazide (MAXZIDE-25) 37.5-25 MG per tablet Take 1 tablet by mouth daily 6/13/22   Lucila Adams MD   irbesartan (AVAPRO) 300 MG tablet TAKE 1 TABLET BY MOUTH DAILY 6/3/22   Kal Lipscomb MD   amLODIPine (NORVASC) 10 MG tablet TAKE 1 TABLET BY MOUTH DAILY 3/7/22   Bigg Henley DO   pravastatin (PRAVACHOL) 40 MG tablet TAKE 1 TABLET BY MOUTH EVERY EVENING 12/17/21   Bigg Henley DO   pantoprazole (PROTONIX) 40 MG tablet Take 40 mg by mouth daily.  12/10/14   Historical Provider, MD       Allergies:  Carafate [sucralfate], Iv dye [iodides], Lopressor [metoprolol], Motrin [ibuprofen], Ace inhibitors, Flomax [tamsulosin hcl], and Lipitor [atorvastatin]    Social History:    Social History     Socioeconomic History    Marital status:      Spouse name: Not on file    Number of children: Not on file    Years of education: Not on file    Highest education level: Not on file   Occupational History    Not on file   Tobacco Use    Smoking status: Never    Smokeless tobacco: Never    Tobacco comments:     never smoked   Vaping Use    Vaping Use: Never used   Substance and Sexual Activity    Alcohol use: Never    Drug use: No    Sexual activity: Not on file   Other Topics Concern    Not on file   Social History Narrative    Not on file     Social Determinants of Health     Financial Resource Strain: Low Risk     Difficulty of Paying Living Expenses: Not very hard   Food Insecurity: No Food Insecurity    Worried About Running Out of Food in the Last Year: Never true    Ran Out of Food in the Last Year: Never true   Transportation Needs: Not on file Physical Activity: Inactive    Days of Exercise per Week: 0 days    Minutes of Exercise per Session: 0 min   Stress: No Stress Concern Present    Feeling of Stress : Not at all   Social Connections: Not on file   Intimate Partner Violence: Not At Risk    Fear of Current or Ex-Partner: No    Emotionally Abused: No    Physically Abused: No    Sexually Abused: No   Housing Stability: Not on file       Family History:    Family History   Problem Relation Age of Onset    Diabetes Mother     High Blood Pressure Father     Heart Disease Father        REVIEW OF SYSTEMS:  Constitutional: negative  Eyes: negative  Respiratory: negative  Cardiovascular: negative  Gastrointestinal: negative  Genitourinary: no acute issues  Musculoskeletal: negative  Skin: negative   Neurological: negative  Hematological/Lymphatic: negative  Psychological: negative    Physical Exam:      No data found. Constitutional: Patient in no acute distress; Neuro: alert and oriented to person place and time. Psych: Mood and affect normal.  Skin: Normal  Lungs: Respiratory effort normal, CTA  Cardiovascular:  Normal peripheral pulses; no murmur. Normal rhythm  Abdomen: Soft, non-tender, non-distended with no CVA, flank pain, hepatosplenomegaly or hernia. Kidneys normal.  Bladder non-tender and not distended.       LABS:   Recent Labs     10/25/22  1247   WBC 4.9   HGB 12.8*   HCT 39.6*   MCV 92.1        Recent Labs     10/25/22  1300      K 4.7      CO2 25   BUN 31*   CREATININE 2.0*     Lab Results   Component Value Date    PSA 3.81 (H) 11/23/2021    PSA 2.82 (H) 11/20/2020    PSA 2.97 (H) 11/20/2019         Urinalysis:   Recent Labs     10/25/22  1301   COLORU YELLOW   PHUR 6.5   LABCAST NONE SEEN  NONE SEEN   WBCUA 0-2   RBCUA NONE SEEN   YEAST NONE SEEN   BACTERIA NONE SEEN   SPECGRAV 1.013   LEUKOCYTESUR NEGATIVE   UROBILINOGEN 0.2   BILIRUBINUR NEGATIVE   BLOODU NEGATIVE -----------------------------------------------------------------      Assessment and Plan     Impression:    Patient Active Problem List   Diagnosis    CKD (chronic kidney disease), stage III (HCC)    Hyperlipidemia    Prostate enlargement    Anemia    HTN (hypertension)    Hiatal hernia    Diverticulosis    Obesity    Diverticulitis of large intestine with perforation    Esophagitis    Generalized abdominal pain    Diverticulitis of colon    Angina effort    Abnormal stress test    Benign essential tremor       Plan:     Consent obtained; urethral dilation in OR today    Kylah Andres MD  6:41 AM 10/27/2022

## 2022-10-27 NOTE — DISCHARGE INSTRUCTIONS
Pt ok to discharge home in good condition  No heavy lifting, >10 lbs for today  Pt should avoid strenuous activity for today  Pt should walk moderately at home  Pt ok to shower   Pt may resume diet as tolerated  Pt should take Rx as directed  No driving while on narcotics  Please call attending physician or hospital  with questions  Call or Present to ED if fever (> 101F), intractable nausea vomiting or pain.   Rx in chart    Pt should follow up with Douglas Balbuena MD, in 1 weeks, call to confirm appointment      Pull catheter monday

## 2022-10-27 NOTE — PROGRESS NOTES
Pt admitted to [facility] room 1 and oriented to unit. SCD sleeves applied. Nares swabbed. Pt verbalized permission for first name, last initial and physicians name on white board. SDS board and discharge criteria explained, pt and family verbalized understanding. Pt denies thoughts of harming self or others. Call light in reach. Family at the bedside.

## 2022-10-27 NOTE — ANESTHESIA PRE PROCEDURE
Department of Anesthesiology  Preprocedure Note       Name:  Ju Mendieta   Age:  78 y.o.  :  1940                                          MRN:  [id number]         Date:  10/27/2022      Surgeon: Wilda Grace):  Nayla White MD    Procedure: Procedure(s):  Cystoscopy Urethral Dilation    Medications prior to admission:   Prior to Admission medications    Medication Sig Start Date End Date Taking? Authorizing Provider   aspirin 325 MG tablet Take 325 mg by mouth daily    Historical Provider, MD   mirabegron (MYRBETRIQ) 50 MG TB24 Take 50 mg by mouth in the morning. 22   Nayla White MD   hydrALAZINE (APRESOLINE) 25 MG tablet Take 1 tablet by mouth 3 times daily 22   Chantell De Los Santos MD   citalopram (CELEXA) 20 MG tablet TAKE 1 TABLET BY MOUTH DAILY 22   ALVINO Webster CNP   primidone (MYSOLINE) 50 MG tablet TAKE 1 TABLET BY MOUTH TWICE DAILY 22   ALVINO Webster CNP   triamterene-hydroCHLOROthiazide (MAXZIDE-25) 37.5-25 MG per tablet Take 1 tablet by mouth daily 22   Shirley Marin MD   irbesartan (AVAPRO) 300 MG tablet TAKE 1 TABLET BY MOUTH DAILY 6/3/22   Kal Hylton MD   amLODIPine (NORVASC) 10 MG tablet TAKE 1 TABLET BY MOUTH DAILY 3/7/22   Pretty Higgins DO   pravastatin (PRAVACHOL) 40 MG tablet TAKE 1 TABLET BY MOUTH EVERY EVENING 21   Pretty Higgins DO   pantoprazole (PROTONIX) 40 MG tablet Take 40 mg by mouth daily. 12/10/14   Historical Provider, MD       Current medications:    No current facility-administered medications for this encounter. Allergies:     Allergies   Allergen Reactions    Carafate [Sucralfate] Other (See Comments)     Tongue swelling    Iv Dye [Iodides] Shortness Of Breath    Lopressor [Metoprolol]      Bradycardia    Motrin [Ibuprofen]      Was told not to take due to kidneys    Ace Inhibitors      Intolerant to causes muscle aches      Flomax [Tamsulosin Hcl] Nausea Only    Lipitor [Atorvastatin] Other (See Comments)     Myalgias       Problem List:    Patient Active Problem List   Diagnosis Code    CKD (chronic kidney disease), stage III (HCC) N18.30    Hyperlipidemia E78.5    Prostate enlargement N40.0    Anemia D64.9    HTN (hypertension) I10    Hiatal hernia K44.9    Diverticulosis K57.90    Obesity E66.9    Diverticulitis of large intestine with perforation K57.20    Esophagitis K20.90    Generalized abdominal pain R10.84    Diverticulitis of colon K57.32    Angina effort I20.8    Abnormal stress test R94.39    Benign essential tremor G25.0       Past Medical History:        Diagnosis Date    Anemia     Arthritis     Bronchiolitis 11/2016    Cancer ([facility] [address]     SKIN CANCER    CKD (chronic kidney disease), stage III ([facility] [address]     Diverticula of colon 05/2014    tx with antibiotics per pt; NO surgery    Diverticulosis     DVT (deep venous thrombosis) ([facility] [address]     s/p    Esophagitis 12/10/2014    Hiatal hernia     [place] (hard of hearing)     not able to hear out of rt ear    HTN (hypertension)     Hyperlipidemia     Kidney stones     Nephrolithiasis     Obesity     Prostate enlargement     benign    Renal insufficiency        Past Surgical History:        Procedure Laterality Date    BLEPHAROPLASTY Bilateral 03/2010    CARDIAC CATHETERIZATION  2000,2010    CATARACT REMOVAL WITH IMPLANT Bilateral 2006,2007    COLONOSCOPY  05/26/2017    ENDOSCOPY, COLON, DIAGNOSTIC      HERNIA REPAIR Bilateral 1996    NASAL SINUS SURGERY      SHOULDER SURGERY      SKIN BIOPSY      SKIN CANCER EXCISION  Feb 2015    top of head   Velna Gosselin TURP  8606,3513    TURP N/A 4/2/2022    CYSTOSCOPY GREENLIGHT PHOTOVAPORIZATION OF THE PROSTATE WITH URETHERAL DILATION performed by Silviano Heller MD at [address] [place] [address]         Social History:    Social History     Tobacco Use    Smoking status: Never    Smokeless tobacco: Never    Tobacco comments:     never smoked   Substance Use Topics    Alcohol use: Never                                Counseling given: Not Answered  Tobacco comments: never smoked      Vital Signs (Current):   Vitals:    10/20/22 0950 10/27/22 0827   BP:  (!) 146/65   Pulse:  63   Resp:  11   Temp:  (!) 96.5 °F (35.8 °C)   SpO2:  98%   Weight: 210 lb (95.3 kg)    Height: 5' 9.5\" (1.765 m)                                               BP Readings from Last 3 Encounters:   10/27/22 (!) 146/65   10/06/22 (!) 122/44   09/26/22 126/62       NPO Status:                                                                                 BMI:   Wt Readings from Last 3 Encounters:   10/20/22 210 lb (95.3 kg)   10/06/22 211 lb (95.7 kg)   10/05/22 214 lb 11.2 oz (97.4 kg)     Body mass index is 30.57 kg/m². CBC:   Lab Results   Component Value Date/Time    WBC 4.9 10/25/2022 12:47 PM    RBC 4.30 10/25/2022 12:47 PM    HGB 12.8 10/25/2022 12:47 PM    HCT 39.6 10/25/2022 12:47 PM    MCV 92.1 10/25/2022 12:47 PM    RDW 14.5 04/18/2018 03:00 PM     10/25/2022 12:47 PM       CMP:   Lab Results   Component Value Date/Time     10/25/2022 01:00 PM    K 4.7 10/25/2022 01:00 PM    K 4.0 07/08/2022 06:16 AM     10/25/2022 01:00 PM    CO2 25 10/25/2022 01:00 PM    BUN 31 10/25/2022 01:00 PM    CREATININE 2.0 10/25/2022 01:00 PM    LABGLOM 32 10/25/2022 12:50 PM    GLUCOSE 121 10/25/2022 01:00 PM    PROT 6.2 09/19/2022 08:44 AM    CALCIUM 9.6 10/25/2022 01:00 PM    BILITOT 0.5 09/19/2022 08:44 AM    ALKPHOS 95 09/19/2022 08:44 AM    AST 17 09/19/2022 08:44 AM    ALT 9 09/19/2022 08:44 AM       POC Tests: No results for input(s): POCGLU, POCNA, POCK, POCCL, POCBUN, POCHEMO, POCHCT in the last 72 hours.     Coags:   Lab Results   Component Value Date/Time    INR 1.03 04/18/2018 03:00 PM    APTT 29.6 03/25/2015 05:55 PM       HCG (If Applicable): No results found for: PREGTESTUR, PREGSERUM, HCG, HCGQUANT     ABGs: No results found for: PHART, PO2ART, YMV4LDY, JBU9NRU, BEART, D7CKGKZG     Type & Screen (If Applicable):  Lab Results   Component Value Date    LABRH NEG 02/26/2018       Drug/Infectious Status (If Applicable):  No results found for: HIV, HEPCAB    COVID-19 Screening (If Applicable):   Lab Results   Component Value Date/Time    COVID19 NOT  DETECTED 07/07/2022 03:06 PM           Anesthesia Evaluation  Patient summary reviewed and Nursing notes reviewed  Airway: Mallampati: II          Dental:          Pulmonary: breath sounds clear to auscultation                             Cardiovascular:    (+) hypertension:, angina:,       ECG reviewed  Rhythm: regular  Rate: normal                    Neuro/Psych:               GI/Hepatic/Renal:   (+) hiatal hernia,           Endo/Other:                     Abdominal:       Abdomen: soft. Vascular: Other Findings:           Anesthesia Plan      MAC     ASA 3       Induction: intravenous. MIPS: Postoperative opioids intended and Prophylactic antiemetics administered. Anesthetic plan and risks discussed with patient. Plan discussed with CRNA.                     Mike Yu DO   10/27/2022

## 2022-10-27 NOTE — PROGRESS NOTES
Pt has met discharge criteria and states he is ready for discharge to home. IV removed, gauze and tape applied. Dressed in own clothes and personal belongings gathered. Discharge instructions (with opioid medication education information) given to pt and family; pt and family verbalized understanding of discharge instructions, prescriptions and follow up appointments. Pt transported to discharge lobby by [facility] staff.

## 2022-10-27 NOTE — ANESTHESIA POSTPROCEDURE EVALUATION
Department of Anesthesiology  Postprocedure Note    Patient: Vic Montiel  MRN: [id number]  YOB: 1940  Date of evaluation: 10/27/2022      Procedure Summary     Date: 10/27/22 Room / Location: [address] [place] [address]    Anesthesia Start: 9655 Anesthesia Stop: 8522    Procedure: Cystoscopy Urethral Dilation Diagnosis:       Stricture of male urethra, unspecified stricture type      (Stricture of male urethra, unspecified stricture type [N35.919])    Surgeons: Laureano Andres MD Responsible Provider: Charissa Cabrera DO    Anesthesia Type: MAC ASA Status: 3          Anesthesia Type: No value filed.     Julia Phase I: Julia Score: 10    Julia Phase II:        Anesthesia Post Evaluation    Patient location during evaluation: bedside  Patient participation: complete - patient participated  Level of consciousness: awake  Airway patency: patent  Nausea & Vomiting: no nausea  Complications: no  Cardiovascular status: hemodynamically stable  Respiratory status: acceptable  Hydration status: stable

## 2022-10-27 NOTE — BRIEF OP NOTE
Brief Postoperative Note      Patient: Helen Collazo  YOB: 1940  MRN: [id number]    Date of Procedure: 10/27/2022    Pre-Op Diagnosis: Stricture of male urethra, unspecified stricture type [N35.919]    Post-Op Diagnosis: Same       Procedure(s):  Cystoscopy Urethral Dilation    Surgeon(s):  Lizbeth Nuñez MD    Assistant:  * No surgical staff found *    Anesthesia: General    Estimated Blood Loss (mL): Minimal    Complications: None    Specimens:   * No specimens in log *    Implants:  * No implants in log *      Drains:   Urinary Catheter 10/27/22 [place] (Active)       Findings: bnc dilated. Will keep catheter until Monday next week.  Cysto 3 weeks after with richard    Electronically signed by Augustin Ludwig MD on 10/27/2022 at 11:07 AM

## 2022-10-28 ENCOUNTER — PATIENT MESSAGE (OUTPATIENT)
Dept: NEPHROLOGY | Age: 82
End: 2022-10-28

## 2022-10-28 DIAGNOSIS — N18.32 CHRONIC KIDNEY DISEASE, STAGE 3B (HCC): Primary | ICD-10-CM

## 2022-10-29 ENCOUNTER — HOSPITAL ENCOUNTER (EMERGENCY)
Age: 82
Discharge: HOME | End: 2022-10-30
Attending: STUDENT IN AN ORGANIZED HEALTH CARE EDUCATION/TRAINING PROGRAM
Payer: MEDICARE

## 2022-10-29 DIAGNOSIS — T83.9XXA PROBLEM WITH FOLEY CATHETER, INITIAL ENCOUNTER (HCC): Primary | ICD-10-CM

## 2022-10-29 PROCEDURE — 99283 EMERGENCY DEPT VISIT LOW MDM: CPT

## 2022-10-29 PROCEDURE — 51798 US URINE CAPACITY MEASURE: CPT

## 2022-10-29 ASSESSMENT — PAIN - FUNCTIONAL ASSESSMENT: PAIN_FUNCTIONAL_ASSESSMENT: NONE - DENIES PAIN

## 2022-10-30 VITALS
SYSTOLIC BLOOD PRESSURE: 155 MMHG | OXYGEN SATURATION: 97 % | HEART RATE: 51 BPM | RESPIRATION RATE: 18 BRPM | TEMPERATURE: 98.2 F | DIASTOLIC BLOOD PRESSURE: 48 MMHG

## 2022-10-30 LAB
BACTERIA: ABNORMAL /HPF
BILIRUBIN URINE: NEGATIVE
BLOOD, URINE: ABNORMAL
CASTS 2: ABNORMAL /LPF
CASTS UA: ABNORMAL /LPF
CHARACTER, URINE: CLEAR
COLOR: YELLOW
CRYSTALS, UA: ABNORMAL
EPITHELIAL CELLS, UA: ABNORMAL /HPF
GLUCOSE URINE: NEGATIVE MG/DL
KETONES, URINE: NEGATIVE
LEUKOCYTE ESTERASE, URINE: ABNORMAL
MISCELLANEOUS 2: ABNORMAL
NITRITE, URINE: NEGATIVE
PH UA: 5.5 (ref 5–9)
PROTEIN UA: 100
RBC URINE: > 100 /HPF
RENAL EPITHELIAL, UA: ABNORMAL
SPECIFIC GRAVITY, URINE: 1.02 (ref 1–1.03)
UROBILINOGEN, URINE: 0.2 EU/DL (ref 0–1)
WBC UA: ABNORMAL /HPF
YEAST: ABNORMAL

## 2022-10-30 PROCEDURE — 81001 URINALYSIS AUTO W/SCOPE: CPT

## 2022-10-30 ASSESSMENT — ENCOUNTER SYMPTOMS
RHINORRHEA: 0
EYE DISCHARGE: 0
ABDOMINAL PAIN: 0
NAUSEA: 0
DIARRHEA: 0
WHEEZING: 0
SORE THROAT: 0
EYE PAIN: 0
ABDOMINAL DISTENTION: 0
SHORTNESS OF BREATH: 0
COUGH: 0
VOMITING: 0

## 2022-10-30 ASSESSMENT — PAIN - FUNCTIONAL ASSESSMENT: PAIN_FUNCTIONAL_ASSESSMENT: NONE - DENIES PAIN

## 2022-10-30 NOTE — OP NOTE
[address] [place] [address]. [place]    DATE: 10/27/2022  Patient:  Jeanie Lesch  MRN: [id number]  YOB: 1940    SURGEON: Radha Antony MD.    ASSISTANT: none    PREOPERATIVE DIAGNOSIS: fossa navicularis stricture bladder neck contracture    POSTOPERATIVE DIAGNOSIS: fossa navicularis stricture bladder neck contracture    PROCEDURE PERFORMED: cystoscopy, urethral dilation, difficult calabrese catheter placement    ANESTHESIA: General    COMPLICATIONS: none    OR BLOOD LOSS:  Minimal    FLUIDS: Cystalloids per Anesthesia    SPECIMENS:  * No specimens in log *  none    DRAINS: 20 [place] calabrese    INDICATIONS FOR PROCEDURE:  The patient is a 78 y.o. male who presents today with Stricture of male urethra, unspecified stricture type [N35.919] here for Cystoscopy Urethral Dilation. After risks, benefits and alternatives of the procedure were discussed with the patient, the patient elected to proceed. DETAILS OF THE PROCEDURE:  After informed consent was obtained in the preoperative area, the patient was taken back to the operating room and remained on the [facility], where local anesthetic was used. Mac was also used. The patient was sterilely prepped and draped in a standard fashion. A timeout occurred. Two patient identifiers were used. Once an appropriate time out had been performed, with all parties consenting,  a flexible cystoscope was placed in the urethraand we encountered a urethral stricture that was difficult to bypass with the scope. The urethral stricture was noted in the fossa navicularis. We dilated this with urethral sounds. We then advanced our scope and encountered a bladder neck contracture. We elected to dilate with  Cook-S dilators from 8 fr to 22 fr. We then advanced the cystoscope into the bladder. A pan cystoscopy was performed and the bladder was unremarkable. No discrete tumors were identified. No bladder stones noted.  We then placed a difficult calabrese 20 Wallisian catheter was placed over the glide wire. The patient tolerated the procedure well.

## 2022-10-30 NOTE — ED NOTES
Pt bladder irrigated. 60ml put in 100ml came out. Pt tolerated well. A small clot was noted to come out with the first insertion of sterile water.      Juan Hwang RN  10/30/22 1293 equal bilaterally

## 2022-10-30 NOTE — ED PROVIDER NOTES
[place] ENCOUNTER          Pt Name: Keith Ayala  MRN: [id number]  [place] 1940  Date of evaluation: 10/29/2022  Treating Resident Physician: Lyubov Renee MD  Supervising Physician: Andres Garcia MD     History obtained from the patient. CHIEF COMPLAINT       Chief Complaint   Patient presents with    Urinary Catheter     Problem           HISTORY OF PRESENT ILLNESS    HPI  Keith Ayala is a 78 y.o. male who presents to the emergency department for evaluation of leaking around his catheter tonight at approximately 8 PM.  Patient states that he had cystoscopy 3 days ago and catheter placed. Patient denies any abdomen pain. Patient did state he felt he saw all small amount of blood in urine. The patient has no other acute complaints at this time. REVIEW OF SYSTEMS   Review of Systems   Constitutional:  Negative for fatigue and fever. HENT:  Negative for congestion, ear pain, rhinorrhea and sore throat. Eyes:  Negative for pain and discharge. Respiratory:  Negative for cough, shortness of breath and wheezing. Cardiovascular:  Negative for chest pain, palpitations and leg swelling. Gastrointestinal:  Negative for abdominal distention, abdominal pain, diarrhea, nausea and vomiting. Genitourinary:  Negative for difficulty urinating, flank pain and frequency. Leaking around [place] catheter   Musculoskeletal:  Negative for arthralgias. Skin:  Negative for rash. Neurological:  Negative for dizziness, tremors, syncope, weakness and numbness.        PAST MEDICAL AND SURGICAL HISTORY     Past Medical History:   Diagnosis Date    Anemia     Arthritis     Bronchiolitis 11/2016    Cancer [facility])     SKIN CANCER    CKD (chronic kidney disease), stage III ([facility] [address]     Diverticula of colon 05/2014    tx with antibiotics per pt; NO surgery    Diverticulosis     DVT (deep venous thrombosis) (HCC)     s/p    Esophagitis 12/10/2014 Hiatal hernia     [place] (hard of hearing)     not able to hear out of rt ear    HTN (hypertension)     Hyperlipidemia     Kidney stones     Nephrolithiasis     Obesity     Prostate enlargement     benign    Renal insufficiency      Past Surgical History:   Procedure Laterality Date    BLEPHAROPLASTY Bilateral 03/2010    CARDIAC CATHETERIZATION  2000,2010    CATARACT REMOVAL WITH IMPLANT Bilateral 2006,2007    COLONOSCOPY  05/26/2017    CYSTOSCOPY N/A 10/27/2022    Cystoscopy Urethral Dilation performed by Silviano Heller MD at [facility], [address], [address]ilateral [address]      SKIN BIOPSY      SKIN CANCER EXCISION  Feb 2015    top of head    TONSILLECTOMY      TURP  2008,2008    TURP N/A 4/2/2022    CYSTOSCOPY GREENLIGHT PHOTOVAPORIZATION OF THE PROSTATE WITH URETHERAL DILATION performed by Silviano Heller MD at [facility]   No current facility-administered medications for this encounter. Current Outpatient Medications:     ciprofloxacin (CIPRO) 500 MG tablet, Take 0.5 tablets by mouth 2 times daily for 5 days, Disp: 5 tablet, Rfl: 0    docusate sodium (COLACE) 100 MG capsule, Take 1 capsule by mouth 2 times daily for 14 days, Disp: 28 capsule, Rfl: 0    HYDROcodone-acetaminophen (NORCO) 5-325 MG per tablet, Take 1 tablet by mouth every 6 hours as needed for Pain for up to 5 days. Intended supply: 5 days.  Take lowest dose possible to manage pain, Disp: 12 tablet, Rfl: 0    aspirin 325 MG tablet, Take 325 mg by mouth daily, Disp: , Rfl:     mirabegron (MYRBETRIQ) 50 MG TB24, Take 50 mg by mouth in the morning., Disp: 30 tablet, Rfl: 3    hydrALAZINE (APRESOLINE) 25 MG tablet, Take 1 tablet by mouth 3 times daily, Disp: 90 tablet, Rfl: 3    citalopram (CELEXA) 20 MG tablet, TAKE 1 TABLET BY MOUTH DAILY, Disp: 90 tablet, Rfl: 3    primidone (MYSOLINE) 50 MG tablet, TAKE 1 TABLET BY MOUTH TWICE DAILY, Disp: 180 tablet, Rfl: 3    triamterene-hydroCHLOROthiazide (MAXZIDE-25) 37.5-25 MG per tablet, Take 1 tablet by mouth daily, Disp: 90 tablet, Rfl: 1    irbesartan (AVAPRO) 300 MG tablet, TAKE 1 TABLET BY MOUTH DAILY, Disp: 90 tablet, Rfl: 1    amLODIPine (NORVASC) 10 MG tablet, TAKE 1 TABLET BY MOUTH DAILY, Disp: 90 tablet, Rfl: 3    pravastatin (PRAVACHOL) 40 MG tablet, TAKE 1 TABLET BY MOUTH EVERY EVENING, Disp: 90 tablet, Rfl: 3    pantoprazole (PROTONIX) 40 MG tablet, Take 40 mg by mouth daily. , Disp: , Rfl:       SOCIAL HISTORY     Social History     Social History Narrative    Not on file     Social History     Tobacco Use    Smoking status: Never    Smokeless tobacco: Never    Tobacco comments:     never smoked   Vaping Use    Vaping Use: Never used   Substance Use Topics    Alcohol use: Never    Drug use: No         ALLERGIES     Allergies   Allergen Reactions    Carafate [Sucralfate] Other (See Comments)     Tongue swelling    Iv Dye [Iodides] Shortness Of Breath    Lopressor [Metoprolol]      Bradycardia    Motrin [Ibuprofen]      Was told not to take due to kidneys    Ace Inhibitors      Intolerant to causes muscle aches      Flomax [Tamsulosin Hcl] Nausea Only    Lipitor [Atorvastatin] Other (See Comments)     Myalgias         FAMILY HISTORY     Family History   Problem Relation Age of Onset    Diabetes Mother     High Blood Pressure Father     Heart Disease Father          PREVIOUS RECORDS   Previous records reviewed:  10/27/22 thru current  . PHYSICAL EXAM     ED Triage Vitals   BP Temp Temp Source Heart Rate Resp SpO2 Height Weight   10/29/22 2301 10/29/22 2302 10/29/22 2302 10/29/22 2301 10/29/22 2301 10/29/22 2301 -- --   (!) 190/64 98.2 °F (36.8 °C) Oral 58 17 99 %       Initial vital signs and nursing assessment reviewed and abnormal from hypertension  . There is no height or weight on file to calculate BMI. Pulsoximetry is normal per my interpretation.     Additional Vital Signs:  Vitals:    10/30/22 0339   BP: (!) 155/48   Pulse: 51   Resp: 18   Temp:    SpO2: 97%       Physical Exam  Constitutional:       Appearance: Normal appearance. HENT:      Head: Normocephalic. Right Ear: External ear normal.      Left Ear: External ear normal.      Nose: Nose normal.      Mouth/Throat:      Mouth: Mucous membranes are moist.      Pharynx: Oropharynx is clear. Eyes:      Conjunctiva/sclera: Conjunctivae normal.      Pupils: Pupils are equal, round, and reactive to light. Cardiovascular:      Rate and Rhythm: Normal rate and regular rhythm. Pulses: Normal pulses. Heart sounds: Normal heart sounds. Pulmonary:      Effort: Pulmonary effort is normal.      Breath sounds: Normal breath sounds. Abdominal:      General: Bowel sounds are normal.      Palpations: Abdomen is soft. Genitourinary:     Comments: Calabrese draining well with clear yellow urine   Musculoskeletal:         General: Normal range of motion. Cervical back: Normal range of motion and neck supple. Skin:     General: Skin is warm and dry. Capillary Refill: Capillary refill takes less than 2 seconds. Neurological:      General: No focal deficit present. Mental Status: He is alert. MEDICAL DECISION MAKING   Initial Assessment:     Pt is a 79 yo male with PMHx CKD and hypertension who presents to the ED with complaint of urine coming around [place] catheter. Patient had recent cystoscopy 3 days ago and was started on Cipro. Patient differential diagnosis includes but is not limited to Cal[place] catheter malfunction, UTI, bladder spasm, and urinary retention. Plan:   Bladder scan   Flush calabrese with NS  Urinalysis      Pt noted with only 26 ml urine in bladder so no urinary retention noted. Pt likely is having either bladder spasm or potential obstruction from blood clot.  Pt has not visualized blood clots in urine at this time but noted there had been very small clots early prior to arrival per patient. Pt calabrese draining well. Pt will be discharged home with precautions and instructed to follow up with urologist next business day. ED RESULTS   Laboratory results:  Labs Reviewed   URINE WITH REFLEXED MICRO - Abnormal; Notable for the following components:       Result Value    Blood, Urine LARGE (*)     Protein,  (*)     Leukocyte Esterase, Urine SMALL (*)     All other components within normal limits       Radiologic studies results:  No orders to display       ED Medications administered this visit: Medications - No data to display      ED COURSE         Strict return precautions and follow up instructions were discussed with the patient prior to discharge, with which the patient agrees. MEDICATION CHANGES     Discharge Medication List as of 10/30/2022  3:30 AM            FINAL DISPOSITION     Final diagnoses:   Problem with [place] catheter, initial encounter ([facility] [address]     Condition: condition: good  Dispo: Discharge to home      This transcription was electronically signed. Parts of this transcriptions may have been dictated by use of voice recognition software and electronically transcribed, and parts may have been transcribed with the assistance of an ED scribe. The transcription may contain errors not detected in proofreading. Please refer to my supervising physician's documentation if my documentation differs.     Electronically Signed: Guero Uriostegui MD, 10/30/22, 5:04 AM        Guero Uriostegui MD  Resident  10/31/22 5386

## 2022-10-31 ENCOUNTER — NURSE ONLY (OUTPATIENT)
Dept: UROLOGY | Age: 82
End: 2022-10-31

## 2022-10-31 DIAGNOSIS — R35.0 URINARY FREQUENCY: Primary | ICD-10-CM

## 2022-10-31 PROCEDURE — 99999 PR OFFICE/OUTPT VISIT,PROCEDURE ONLY: CPT

## 2022-10-31 NOTE — PROGRESS NOTES
I have personally verified, reviewed, and approved these actions.       Patient to return for lab/MA visit for UA on Friday  Per Ruth Sender, Patient is taking Cipro at this time

## 2022-10-31 NOTE — TELEPHONE ENCOUNTER
From: Rhona Howard  To: Dr. Justus Sheridan: 10/28/2022 1:48 PM EDT  Subject: Jayden's cystoscopy    My message too long to send as one, so modifying. It was a bad week and Dr Hua King said considering the severe decrease in urinary output, he was surprised Teofilolucas Nieves could wait until yesterday. Hua King did no surgeries last week. Den Nieves is much better now and output very good during night. I question could the episodes in hypotention be caused by his decrease in urinary output. Maybe he needs to take TID hydalazine again, or just watch BP? Thanks for your help in ordering labs last week. What F/U does Teofilolucas Nieves need with you that you have not already scheduled in December? Let us know what you want Den Nieves to do next. I do wish he would listen to me and drink water, so he could keep the kidneys flushed well.  Thanks Camila Obrien

## 2022-11-04 ENCOUNTER — NURSE ONLY (OUTPATIENT)
Dept: UROLOGY | Age: 82
End: 2022-11-04

## 2022-11-04 DIAGNOSIS — N39.0 RECURRENT UTI: Primary | ICD-10-CM

## 2022-11-04 PROCEDURE — 99999 PR OFFICE/OUTPT VISIT,PROCEDURE ONLY: CPT | Performed by: NURSE PRACTITIONER

## 2022-11-04 NOTE — PROGRESS NOTES
Patient in office for urine culture. Urine collected midstream. Sent culture to lab, will call patient with results.

## 2022-11-05 LAB
ORGANISM: ABNORMAL
URINE CULTURE, ROUTINE: ABNORMAL

## 2022-11-07 ENCOUNTER — TELEPHONE (OUTPATIENT)
Dept: UROLOGY | Age: 82
End: 2022-11-07

## 2022-11-15 DIAGNOSIS — N18.32 CHRONIC KIDNEY DISEASE, STAGE 3B (HCC): ICD-10-CM

## 2022-11-15 LAB
ANION GAP SERPL CALCULATED.3IONS-SCNC: 11 MEQ/L (ref 8–16)
BUN BLDV-MCNC: 29 MG/DL (ref 7–22)
CALCIUM SERPL-MCNC: 9.4 MG/DL (ref 8.5–10.5)
CHLORIDE BLD-SCNC: 102 MEQ/L (ref 98–111)
CO2: 27 MEQ/L (ref 23–33)
CREAT SERPL-MCNC: 1.9 MG/DL (ref 0.4–1.2)
GFR SERPL CREATININE-BSD FRML MDRD: 34 ML/MIN/1.73M2
GLUCOSE BLD-MCNC: 131 MG/DL (ref 70–108)
POTASSIUM SERPL-SCNC: 4.4 MEQ/L (ref 3.5–5.2)
SODIUM BLD-SCNC: 140 MEQ/L (ref 135–145)

## 2022-11-22 ENCOUNTER — TELEPHONE (OUTPATIENT)
Dept: UROLOGY | Age: 82
End: 2022-11-22

## 2022-11-22 ENCOUNTER — PROCEDURE VISIT (OUTPATIENT)
Dept: UROLOGY | Age: 82
End: 2022-11-22
Payer: MEDICARE

## 2022-11-22 VITALS — HEIGHT: 69 IN | WEIGHT: 211 LBS | RESPIRATION RATE: 18 BRPM | BODY MASS INDEX: 31.25 KG/M2

## 2022-11-22 DIAGNOSIS — R35.1 NOCTURIA: Primary | ICD-10-CM

## 2022-11-22 DIAGNOSIS — N35.919 STRICTURE OF MALE URETHRA, UNSPECIFIED STRICTURE TYPE: ICD-10-CM

## 2022-11-22 DIAGNOSIS — Z01.818 PRE-OP TESTING: ICD-10-CM

## 2022-11-22 DIAGNOSIS — N39.0 RECURRENT UTI: ICD-10-CM

## 2022-11-22 PROCEDURE — 52000 CYSTOURETHROSCOPY: CPT | Performed by: UROLOGY

## 2022-11-22 NOTE — TELEPHONE ENCOUNTER
Patient scheduled with Dr. Marcela Costello on 12/1/2022. Surgery consent to be done upon arrival.  Dr. Mitch Fischer cleared 10/12/22. Surgery instructions gone over verbally in office with patient. Patient will have an adult over the age of 25 with them at discharge and 24 hours after procedure. Nothing to eat or drink after midnight the night before. Patient voiced understanding.

## 2022-11-22 NOTE — PROGRESS NOTES
Cystoscopy    Operative Note    Patient:  Jeet Summers  MRN: [id number]  YOB: 1940    Date: 11/22/22  Surgeon: Estee Lipscomb MD  Anesthesia: Urojet Local  Indications: urethral stricture  Position: Supine  EBL: 0 ml    Findings:   The patient was prepped and draped in the usual sterile fashion. The flexible cystoscope was advanced through the urethra and into the bladder. The bladder was thoroughly inspected and the following was noted:    Residual Urine: moderate. Urine clear, with no obvious infection  Urethra:  bladder neck contracture . I could see past it but it is still there and narrowing the caliber of the urethra to where I cannot pass my scope. Urethral dilation was not performed. Prostate: open prostatic urethra with no obvious obstruction, intravesical extension of prostate not present. There was a previous TURP defect. The cystoscope was removed. The patient tolerated the procedure well.   Cysto transurethral incision of bladder neck contracture (30)

## 2022-11-22 NOTE — TELEPHONE ENCOUNTER
DO NOT TAKE ASPIRIN,  FISH OIL, IBUPROFEN, MOTRIN-LIKE DRUGS AND ANY MULTIVITAMINS OR OVER THE COUNTER SUPPLEMENTS 14 DAYS PRIOR TO SURGERY. MUST HAVE AN ADULT OVER THE AGE OF 18 WITH YOU AT THE TIME OF THE PROCEDURE AND WITH YOU AT HOME AFTER THE PROCEDURE FOR 24 HOURS       Nahum Sifuentes 1940 Diagnosis:     Surgical Physician: Dr. Radha Haywood have been scheduled for the procedure marked below:      Surgery: Cystoscopy, Transurethral incision of Bladder neck contracture         Date: 12/1/2022     Anesthesia: Anesthesiologist (General/Spinal)     Place of Service: [address] [place] [address] Second Floor Same Day Surgery         Arrive to same day surgery by:  7:30am  (Surgery time is subject to change)      INSTRUCTIONS AS MARKED BELOW:    1.  DO NOT eat or drink anything after midnight before surgery. 2.  We prefer you shower or bathe with an antibacterial soap (Dial) the morning of surgery. 3  Please bring a current medication list, photo ID and insurance card(s) with you  4. Okay to take Tylenol  5. If you take Glucophage, Metformin or Janumet, hold 48-hours prior to surgery  6. Take blood pressure or heart medication as directed, if taken in the morning take with a small sip of water  7. The office will call you in 1-2 days after your procedure to schedule a follow up.             Date: 11/22/2022

## 2022-11-22 NOTE — TELEPHONE ENCOUNTER
SURGERY [address]  [address] [place] [address] [address] [facility] [address] [place], [address]      Phone *[phone] *[phone]   Surgical Scheduling Direct Line Phone *[phone] Fax *[phone]      Sal Sahu 1940 male    [address]  [address] [facility]   Marital Status:          Home Phone: [phone]      Cell Phone:    Telephone Information:   Mobile [phone]          Surgeon: Dr. Kris Low Surgery Date: 12/1/2022   Time: 9:30am    Procedure: Cystoscopy, Transurethral Incision of Bladder Neck Contracture    Diagnosis: nocturia,  stricture of male urethra    Important Medical History:  In Epic    Special Inst/Equip:     CPT Codes:    57524  Latex Allergy: No     Cardiac Device:  No    Anesthesia:  General          Admission Type:  Same Day                        Admit Prior to Day of Surgery: No    Case Location:  Main OR            Preadmission Testing:  Phone Call          PAT Date and Time:______________________________________________________    PAT Confirmation #: ______________________________________________________    Post Op Visit: ___________________________________________________________    Need Preop Cardiac Clearance: Yes    Does Patient have Cardiologist/physician?      Dr. Graciela Weiss    Surgery Confirmation #: __________________________________________________    [address]: ________________________   Date: __________________________     [employer] Name: Medicare

## 2022-11-23 RX ORDER — MIRABEGRON 50 MG/1
TABLET, FILM COATED, EXTENDED RELEASE ORAL
Qty: 30 TABLET | Refills: 3 | Status: SHIPPED | OUTPATIENT
Start: 2022-11-23

## 2022-11-23 NOTE — PROGRESS NOTES
PAT call attempted, patient unavailable, left message to please call us back at your earliest convenience; [phone]

## 2022-11-23 NOTE — TELEPHONE ENCOUNTER
Jamar Trimble called requesting a refill on the following medications:  Requested Prescriptions     Pending Prescriptions Disp Refills    MYRBETRIQ 50 MG TB24 [Pharmacy Med Name: MYRBETRIQ 50MG TABLETS] 30 tablet 3     Sig: TAKE 1 TABLET BY [address] verified:  .[email or web address]      Date of last visit: 11/22/2023  Date of next visit (if applicable): surgery 18/56/7495

## 2022-11-23 NOTE — PROGRESS NOTES
Follow all instructions given by your physician    NPO after midnight   Sips of water am of surgery with allowed medications  Bring insurance info and 's license  Wear comfortable clean, loose fitting clothing  No jewelry or contact lenses to be worn day of surgery  No glue on dentures morning of surgery;you will be asked to remove them for surgery. Case for glasses. Shower night before and morning of surgery with a liquid antibacterial soap, dry with fresh clean towel; no lotions, creams or powder. Clean sheets and pillow case on bed night before surgery  Bring medications in original bottles   needed at discharge and someone over 18 to stay with you for 24 hours overnight (surgery may be cancelled if you don't have this)  Report to [facility] on 2nd floor  If you would become ill prior to surgery, please call the surgeon  May have a visitor with you, we request that you limit to 2 visitors in pre-op area  Masks recommended but not required  Call [phone] for any questions  Covid questionnaire Complete; Patient negative for symptoms or exposure. See documentation.  Follow all instructions given by your physician

## 2022-11-26 LAB
ORGANISM: ABNORMAL
ORGANISM: ABNORMAL
URINE CULTURE, ROUTINE: ABNORMAL

## 2022-11-28 RX ORDER — SODIUM CHLORIDE 0.9 % (FLUSH) 0.9 %
5-40 SYRINGE (ML) INJECTION PRN
Status: CANCELLED | OUTPATIENT
Start: 2022-11-28

## 2022-11-28 RX ORDER — SODIUM CHLORIDE 9 MG/ML
INJECTION, SOLUTION INTRAVENOUS PRN
Status: CANCELLED | OUTPATIENT
Start: 2022-11-28

## 2022-11-28 RX ORDER — SODIUM CHLORIDE 0.9 % (FLUSH) 0.9 %
5-40 SYRINGE (ML) INJECTION EVERY 12 HOURS SCHEDULED
Status: CANCELLED | OUTPATIENT
Start: 2022-11-28

## 2022-11-28 NOTE — TELEPHONE ENCOUNTER
Patient notified of new surgery arrival time. Patient is to be at [address] [place] [address] Same day surgery by  12:45pm   on  12/1/22. Patient reminded to have nothing to eat or drink after midnight. Patient voiced understanding.

## 2022-11-30 RX ORDER — IRBESARTAN 300 MG/1
300 TABLET ORAL DAILY
Qty: 90 TABLET | Refills: 3 | Status: SHIPPED | OUTPATIENT
Start: 2022-11-30

## 2022-12-01 ENCOUNTER — TELEPHONE (OUTPATIENT)
Dept: UROLOGY | Age: 82
End: 2022-12-01

## 2022-12-01 ENCOUNTER — ANESTHESIA (OUTPATIENT)
Dept: OPERATING ROOM | Age: 82
End: 2022-12-01
Payer: MEDICARE

## 2022-12-01 ENCOUNTER — ANESTHESIA EVENT (OUTPATIENT)
Dept: OPERATING ROOM | Age: 82
End: 2022-12-01
Payer: MEDICARE

## 2022-12-01 ENCOUNTER — HOSPITAL ENCOUNTER (OUTPATIENT)
Age: 82
Setting detail: OUTPATIENT SURGERY
Discharge: HOME | End: 2022-12-01
Attending: UROLOGY | Admitting: UROLOGY
Payer: MEDICARE

## 2022-12-01 VITALS
BODY MASS INDEX: 30.57 KG/M2 | TEMPERATURE: 97 F | RESPIRATION RATE: 16 BRPM | DIASTOLIC BLOOD PRESSURE: 69 MMHG | WEIGHT: 206.4 LBS | OXYGEN SATURATION: 98 % | HEART RATE: 74 BPM | SYSTOLIC BLOOD PRESSURE: 167 MMHG | HEIGHT: 69 IN

## 2022-12-01 DIAGNOSIS — G89.18 POSTOPERATIVE PAIN: Primary | ICD-10-CM

## 2022-12-01 PROCEDURE — 3700000001 HC ADD 15 MINUTES (ANESTHESIA): Performed by: UROLOGY

## 2022-12-01 PROCEDURE — 7100000010 HC PHASE II RECOVERY - FIRST 15 MIN: Performed by: UROLOGY

## 2022-12-01 PROCEDURE — 6360000002 HC RX W HCPCS: Performed by: UROLOGY

## 2022-12-01 PROCEDURE — 7100000001 HC PACU RECOVERY - ADDTL 15 MIN: Performed by: UROLOGY

## 2022-12-01 PROCEDURE — 7100000011 HC PHASE II RECOVERY - ADDTL 15 MIN: Performed by: UROLOGY

## 2022-12-01 PROCEDURE — 7100000000 HC PACU RECOVERY - FIRST 15 MIN: Performed by: UROLOGY

## 2022-12-01 PROCEDURE — 6360000002 HC RX W HCPCS: Performed by: NURSE ANESTHETIST, CERTIFIED REGISTERED

## 2022-12-01 PROCEDURE — 2580000003 HC RX 258: Performed by: UROLOGY

## 2022-12-01 PROCEDURE — 3600000012 HC SURGERY LEVEL 2 ADDTL 15MIN: Performed by: UROLOGY

## 2022-12-01 PROCEDURE — 3600000002 HC SURGERY LEVEL 2 BASE: Performed by: UROLOGY

## 2022-12-01 PROCEDURE — 2500000003 HC RX 250 WO HCPCS: Performed by: NURSE ANESTHETIST, CERTIFIED REGISTERED

## 2022-12-01 PROCEDURE — 3700000000 HC ANESTHESIA ATTENDED CARE: Performed by: UROLOGY

## 2022-12-01 PROCEDURE — 2709999900 HC NON-CHARGEABLE SUPPLY: Performed by: UROLOGY

## 2022-12-01 RX ORDER — FENTANYL CITRATE 50 UG/ML
INJECTION, SOLUTION INTRAMUSCULAR; INTRAVENOUS PRN
Status: DISCONTINUED | OUTPATIENT
Start: 2022-12-01 | End: 2022-12-01 | Stop reason: SDUPTHER

## 2022-12-01 RX ORDER — SODIUM CHLORIDE 9 MG/ML
INJECTION, SOLUTION INTRAVENOUS PRN
Status: DISCONTINUED | OUTPATIENT
Start: 2022-12-01 | End: 2022-12-01 | Stop reason: HOSPADM

## 2022-12-01 RX ORDER — DOCUSATE SODIUM 100 MG/1
100 CAPSULE, LIQUID FILLED ORAL 2 TIMES DAILY
Qty: 28 CAPSULE | Refills: 0 | Status: SHIPPED | OUTPATIENT
Start: 2022-12-01 | End: 2022-12-15

## 2022-12-01 RX ORDER — SODIUM CHLORIDE 0.9 % (FLUSH) 0.9 %
5-40 SYRINGE (ML) INJECTION EVERY 12 HOURS SCHEDULED
Status: DISCONTINUED | OUTPATIENT
Start: 2022-12-01 | End: 2022-12-01 | Stop reason: HOSPADM

## 2022-12-01 RX ORDER — PROPOFOL 10 MG/ML
INJECTION, EMULSION INTRAVENOUS PRN
Status: DISCONTINUED | OUTPATIENT
Start: 2022-12-01 | End: 2022-12-01 | Stop reason: SDUPTHER

## 2022-12-01 RX ORDER — DEXAMETHASONE SODIUM PHOSPHATE 10 MG/ML
INJECTION, EMULSION INTRAMUSCULAR; INTRAVENOUS PRN
Status: DISCONTINUED | OUTPATIENT
Start: 2022-12-01 | End: 2022-12-01 | Stop reason: SDUPTHER

## 2022-12-01 RX ORDER — ONDANSETRON 2 MG/ML
INJECTION INTRAMUSCULAR; INTRAVENOUS PRN
Status: DISCONTINUED | OUTPATIENT
Start: 2022-12-01 | End: 2022-12-01 | Stop reason: SDUPTHER

## 2022-12-01 RX ORDER — SODIUM CHLORIDE 9 MG/ML
INJECTION, SOLUTION INTRAVENOUS PRN
Status: CANCELLED | OUTPATIENT
Start: 2022-12-01

## 2022-12-01 RX ORDER — HYDROCODONE BITARTRATE AND ACETAMINOPHEN 5; 325 MG/1; MG/1
1 TABLET ORAL EVERY 6 HOURS PRN
Qty: 12 TABLET | Refills: 0 | Status: SHIPPED | OUTPATIENT
Start: 2022-12-01 | End: 2022-12-06

## 2022-12-01 RX ORDER — CIPROFLOXACIN 500 MG/1
500 TABLET, FILM COATED ORAL 2 TIMES DAILY
Qty: 10 TABLET | Refills: 0 | Status: SHIPPED | OUTPATIENT
Start: 2022-12-01 | End: 2022-12-06

## 2022-12-01 RX ORDER — LIDOCAINE HYDROCHLORIDE 20 MG/ML
INJECTION, SOLUTION EPIDURAL; INFILTRATION; INTRACAUDAL; PERINEURAL PRN
Status: DISCONTINUED | OUTPATIENT
Start: 2022-12-01 | End: 2022-12-01 | Stop reason: SDUPTHER

## 2022-12-01 RX ORDER — SODIUM CHLORIDE 0.9 % (FLUSH) 0.9 %
5-40 SYRINGE (ML) INJECTION EVERY 12 HOURS SCHEDULED
Status: CANCELLED | OUTPATIENT
Start: 2022-12-01

## 2022-12-01 RX ORDER — FENTANYL CITRATE 50 UG/ML
50 INJECTION, SOLUTION INTRAMUSCULAR; INTRAVENOUS EVERY 5 MIN PRN
Status: CANCELLED | OUTPATIENT
Start: 2022-12-01

## 2022-12-01 RX ORDER — SODIUM CHLORIDE 0.9 % (FLUSH) 0.9 %
5-40 SYRINGE (ML) INJECTION PRN
Status: CANCELLED | OUTPATIENT
Start: 2022-12-01

## 2022-12-01 RX ORDER — SODIUM CHLORIDE 0.9 % (FLUSH) 0.9 %
5-40 SYRINGE (ML) INJECTION PRN
Status: DISCONTINUED | OUTPATIENT
Start: 2022-12-01 | End: 2022-12-01 | Stop reason: HOSPADM

## 2022-12-01 RX ORDER — FENTANYL CITRATE 50 UG/ML
25 INJECTION, SOLUTION INTRAMUSCULAR; INTRAVENOUS EVERY 5 MIN PRN
Status: CANCELLED | OUTPATIENT
Start: 2022-12-01

## 2022-12-01 RX ADMIN — SODIUM CHLORIDE: 9 INJECTION, SOLUTION INTRAVENOUS at 10:23

## 2022-12-01 RX ADMIN — ONDANSETRON 4 MG: 2 INJECTION INTRAMUSCULAR; INTRAVENOUS at 11:37

## 2022-12-01 RX ADMIN — SODIUM CHLORIDE: 9 INJECTION, SOLUTION INTRAVENOUS at 10:22

## 2022-12-01 RX ADMIN — DEXAMETHASONE SODIUM PHOSPHATE 10 MG: 10 INJECTION, EMULSION INTRAMUSCULAR; INTRAVENOUS at 11:35

## 2022-12-01 RX ADMIN — FENTANYL CITRATE 50 MCG: 50 INJECTION, SOLUTION INTRAMUSCULAR; INTRAVENOUS at 11:44

## 2022-12-01 RX ADMIN — CEFAZOLIN 2000 MG: 10 INJECTION, POWDER, FOR SOLUTION INTRAVENOUS at 11:30

## 2022-12-01 RX ADMIN — PROPOFOL 30 MG: 10 INJECTION, EMULSION INTRAVENOUS at 11:41

## 2022-12-01 RX ADMIN — PROPOFOL 140 MG: 10 INJECTION, EMULSION INTRAVENOUS at 11:27

## 2022-12-01 RX ADMIN — PROPOFOL 30 MG: 10 INJECTION, EMULSION INTRAVENOUS at 11:47

## 2022-12-01 RX ADMIN — LIDOCAINE HYDROCHLORIDE 80 MG: 20 INJECTION, SOLUTION EPIDURAL; INFILTRATION; INTRACAUDAL; PERINEURAL at 11:26

## 2022-12-01 RX ADMIN — FENTANYL CITRATE 50 MCG: 50 INJECTION, SOLUTION INTRAMUSCULAR; INTRAVENOUS at 11:26

## 2022-12-01 ASSESSMENT — PAIN SCALES - GENERAL
PAINLEVEL_OUTOF10: 0
PAINLEVEL_OUTOF10: 0

## 2022-12-01 ASSESSMENT — PAIN - FUNCTIONAL ASSESSMENT: PAIN_FUNCTIONAL_ASSESSMENT: 0-10

## 2022-12-01 NOTE — TELEPHONE ENCOUNTER
Patient stated the urine is dark red since coming home from the hospital. Assured patient the iv fluids during surgery helped keep him hydrated and kept the urine clear and the longer the urine sits in the collection bag the darker it gets. Patient denies clots and the catheter is patent. He voiced understanding to present to the ER if the catheter becomes occluded or if his urine gets thick like ketchup. He will also increase his fluid intake. He denies fluid restrictions. He underwent CYSTOSCOPY TRANSURETHRAL INCISION BLADDER NECK CONTRACTURE 12/01/2022. When should he restart the aspirin 325 mg daily?

## 2022-12-01 NOTE — H&P
Edwige Dumont MD  History and Physical    Patient:  Rj Saavedra  MRN: [id number]  YOB: 1940    HISTORY OF PRESENT ILLNESS:     The patient is a 78 y.o. male who presents with [facility]. Here for procedure. Patient's old records, notes and chart reviewed and summarized above. Edwige Dumont MD independently reviewed the images and verified the radiology reports from:    No results found. Past Medical History:    Past Medical History:   Diagnosis Date    Anemia     Arthritis     Bronchiolitis 11/2016    Cancer [facility])     SKIN CANCER    CKD (chronic kidney disease), stage III ([facility]     Diverticula of colon 05/2014    tx with antibiotics per pt; NO surgery    Diverticulosis     DVT (deep venous thrombosis) ([facility])     s/p    Esophagitis 12/10/2014    Hiatal hernia     [place] (hard of hearing)     not able to hear out of rt ear    HTN (hypertension)     Hyperlipidemia     Kidney stones     Nephrolithiasis     Obesity     Prostate enlargement     benign    Renal insufficiency        Past Surgical History:    Past Surgical History:   Procedure Laterality Date    BLEPHAROPLASTY Bilateral 03/2010    CARDIAC CATHETERIZATION  2000,2010    CATARACT REMOVAL WITH IMPLANT Bilateral 2006,2007    COLONOSCOPY  05/26/2017    CYSTOSCOPY N/A 10/27/2022    Cystoscopy Urethral Dilation performed by Yovani Shankar MD at [address] [facility], [address] [facility], [address] [facility] Bilateral [address]       SKIN CANCER EXCISION  Feb 2015    top of head    TONSILLECTOMY      TURP  2008,2008    TURP N/A 4/2/2022    CYSTOSCOPY GREENLIGHT PHOTOVAPORIZATION OF THE PROSTATE WITH URETHERAL DILATION performed by Yovani Shankar MD at [address]         Medications Prior to Admission:    Prior to Admission medications    Medication Sig Start Date End Date Taking?  Authorizing Provider   irbesartan (AVAPRO) 300 MG tablet TAKE 1 TABLET BY MOUTH DAILY 11/30/22   Bard Antonia MD   MYRBETRIQ 50 MG TB24 TAKE 1 TABLET BY MOUTH EVERY MORNING 11/23/22   Yudith Strauss PA-C   aspirin 325 MG tablet Take 325 mg by mouth daily    Historical Provider, MD   hydrALAZINE (APRESOLINE) 25 MG tablet Take 1 tablet by mouth 3 times daily 7/11/22   Emmanuel Segura MD   citalopram (CELEXA) 20 MG tablet TAKE 1 TABLET BY MOUTH DAILY 6/20/22   Ethelyn Spillnirali, APRN - CNP   primidone (MYSOLINE) 50 MG tablet TAKE 1 TABLET BY MOUTH TWICE DAILY 6/20/22   Ethelyn Spillers, APRN - CNP   triamterene-hydroCHLOROthiazide (MAXZIDE-25) 37.5-25 MG per tablet Take 1 tablet by mouth daily 6/13/22   Monica Townsend MD   amLODIPine (NORVASC) 10 MG tablet TAKE 1 TABLET BY MOUTH DAILY 3/7/22   Carroll Aguila DO   pravastatin (PRAVACHOL) 40 MG tablet TAKE 1 TABLET BY MOUTH EVERY EVENING 12/17/21   Carroll Aguila DO   pantoprazole (PROTONIX) 40 MG tablet Take 40 mg by mouth daily.  12/10/14   Historical Provider, MD       Allergies:  Carafate [sucralfate], Iv dye [iodides], Lopressor [metoprolol], Motrin [ibuprofen], Ace inhibitors, Flomax [tamsulosin hcl], and Lipitor [atorvastatin]    Social History:    Social History     Socioeconomic History    Marital status:      Spouse name: Not on file    Number of children: Not on file    Years of education: Not on file    Highest education level: Not on file   Occupational History    Not on file   Tobacco Use    Smoking status: Never    Smokeless tobacco: Never    Tobacco comments:     never smoked   Vaping Use    Vaping Use: Never used   Substance and Sexual Activity    Alcohol use: Never    Drug use: No    Sexual activity: Not on file   Other Topics Concern    Not on file   Social History Narrative    Not on file     Social Determinants of Health     Financial Resource Strain: Low Risk     Difficulty of Paying Living Expenses: Not very hard   Food Insecurity: No Food Insecurity    Worried About Running Out of Food in the Last Year: Never true    Ran Out of Food in the Last Year: Never true   Transportation Needs: Not on file   Physical Activity: Inactive    Days of Exercise per Week: 0 days    Minutes of Exercise per Session: 0 min   Stress: No Stress Concern Present    Feeling of Stress : Not at all   Social Connections: Not on file   Intimate Partner Violence: Not At Risk    Fear of Current or Ex-Partner: No    Emotionally Abused: No    Physically Abused: No    Sexually Abused: No   Housing Stability: Not on file       Family History:    Family History   Problem Relation Age of Onset    Diabetes Mother     High Blood Pressure Father     Heart Disease Father        REVIEW OF SYSTEMS:  Constitutional: negative  Eyes: negative  Respiratory: negative  Cardiovascular: negative  Gastrointestinal: negative  Genitourinary: no acute issues  Musculoskeletal: negative  Skin: negative   Neurological: negative  Hematological/Lymphatic: negative  Psychological: negative    Physical Exam:      No data found. Constitutional: Patient in no acute distress; Neuro: alert and oriented to person place and time. Psych: Mood and affect normal.  Skin: Normal  Lungs: Respiratory effort normal, CTA  Cardiovascular:  Normal peripheral pulses; no murmur. Normal rhythm  Abdomen: Soft, non-tender, non-distended with no CVA, flank pain, hepatosplenomegaly or hernia. Kidneys normal.  Bladder non-tender and not distended. LABS:   No results for input(s): WBC, HGB, HCT, MCV, PLT in the last 72 hours. No results for input(s): NA, K, CL, CO2, PHOS, BUN, CREATININE, CA in the last 72 hours. Lab Results   Component Value Date    PSA 3.81 (H) 11/23/2021    PSA 2.82 (H) 11/20/2020    PSA 2.97 (H) 11/20/2019         Urinalysis: No results for input(s): COLORU, PHUR, LABCAST, WBCUA, RBCUA, MUCUS, TRICHOMONAS, YEAST, BACTERIA, CLARITYU, SPECGRAV, LEUKOCYTESUR, UROBILINOGEN, BILIRUBINUR, BLOODU in the last 72 hours.     Invalid input(s): Kwesi Lee -----------------------------------------------------------------      Assessment and Plan     Impression:    Patient Active Problem List   Diagnosis    CKD (chronic kidney disease), stage III (HCC)    Hyperlipidemia    Prostate enlargement    Anemia    HTN (hypertension)    Hiatal hernia    Diverticulosis    Obesity    Diverticulitis of large intestine with perforation    Esophagitis    Generalized abdominal pain    Diverticulitis of colon    Angina effort    Abnormal stress test    Benign essential tremor       Plan:     Consent obtained; cysto tuibnc in OR today    Chelsey Williamson MD  6:32 AM 12/1/2022

## 2022-12-01 NOTE — TELEPHONE ENCOUNTER
Patient is scheduled for cath removal on 12/05 at 0930 and a renal ultrasound at 1230 that same day. He is wanting to know if there was anyway to scheduled these two appts closer together.  Please advise

## 2022-12-01 NOTE — PROGRESS NOTES
1203  Awake and oriented on arrival to PACU , O2 3L NC applied HOB elevated , denies any pain and drifts off to sleep   1225 resting resp easy   1230 awakens easily to name , denies and pain or nausea   1235 meets criteria for discharge , transported to [facility]

## 2022-12-01 NOTE — BRIEF OP NOTE
Brief Postoperative Note      Patient: Keith Ayala  YOB: 1940  MRN: [id number]    Date of Procedure: 12/1/2022    Pre-Op Diagnosis: Nocturia [R35.1]  Stricture of male urethra, unspecified stricture type [N35.919]    Post-Op Diagnosis: Same       Procedure(s):  CYSTOSCOPY TRANSURETHRAL INCISION BLADDER NECK CONTRACTURE    Surgeon(s):  Anny Nunes MD    Assistant:  * No surgical staff found *    Anesthesia: General    Estimated Blood Loss (mL): Minimal    Complications: None    Specimens:   * No specimens in log *    Implants:  * No surgical log found *      Drains: * No LDAs found *    Findings: calabrese out Monday.  F/u three months with pvr socrates ramos    Electronically signed by Momo Jacobo MD on 12/1/2022 at 6:40 AM

## 2022-12-01 NOTE — PROGRESS NOTES
Pt has met discharge criteria and states he is ready for discharge to home. IV removed, gauze and tape applied. Dressed in own clothes and personal belongings gathered. Discharge instructions (with opioid medication and calabrese catheter education information) given to pt and family; pt and family verbalized understanding of discharge instructions, prescriptions and follow up appointments. Pt transported to discharge lobby by [place] staff.

## 2022-12-01 NOTE — DISCHARGE INSTRUCTIONS
Pt ok to discharge home in good condition  No heavy lifting, >10 lbs for today  Pt should avoid strenuous activity for today  Pt should walk moderately at home  Pt ok to shower   Pt may resume diet as tolerated  Pt should take Rx as directed  No driving while on narcotics  Please call attending physician or hospital  with questions  Call or Present to ED if fever (> 101F), intractable nausea vomiting or pain.   Rx in chart    Pt should follow up with Marcella Schaumann, MD, in 6 weeks, call to confirm appointment

## 2022-12-01 NOTE — ANESTHESIA PRE PROCEDURE
Department of Anesthesiology  Preprocedure Note       Name:  Helen Collazo   Age:  78 y.o.  :  1940                                          MRN:  [id number]         Date:  2022      Surgeon: Vineet Khanna):  Lizbeth Nuñez MD    Procedure: Procedure(s):  CYSTOSCOPY TRANSURETHRAL INCISION BLADDER NECK CONTRACTURE    Medications prior to admission:   Prior to Admission medications    Medication Sig Start Date End Date Taking? Authorizing Provider   irbesartan (AVAPRO) 300 MG tablet TAKE 1 TABLET BY MOUTH DAILY 22   Kal Lyon MD   MYRBETRIQ 50 MG TB24 TAKE 1 TABLET BY MOUTH EVERY MORNING 22   Daryl Strauss PA-C   aspirin 325 MG tablet Take 325 mg by mouth daily    Historical Provider, MD   hydrALAZINE (APRESOLINE) 25 MG tablet Take 1 tablet by mouth 3 times daily 22   Ismael Barajas MD   citalopram (CELEXA) 20 MG tablet TAKE 1 TABLET BY MOUTH DAILY 22   ALVINO Harris - CNP   primidone (MYSOLINE) 50 MG tablet TAKE 1 TABLET BY MOUTH TWICE DAILY 22   Karol Benítez APRN - NOEMI   triamterene-hydroCHLOROthiazide (MAXZIDE-25) 37.5-25 MG per tablet Take 1 tablet by mouth daily 22   Rayshawn Wall MD   amLODIPine (NORVASC) 10 MG tablet TAKE 1 TABLET BY MOUTH DAILY 3/7/22   Tanna Sicard, DO   pravastatin (PRAVACHOL) 40 MG tablet TAKE 1 TABLET BY MOUTH EVERY EVENING 21   Tanna Sicard, DO   pantoprazole (PROTONIX) 40 MG tablet Take 40 mg by mouth daily.  12/10/14   Historical Provider, MD       Current medications:    Current Facility-Administered Medications   Medication Dose Route Frequency Provider Last Rate Last Admin    sodium chloride flush 0.9 % injection 5-40 mL  5-40 mL IntraVENous 2 times per day Lizbeth Nuñez MD        sodium chloride flush 0.9 % injection 5-40 mL  5-40 mL IntraVENous PRN Lizbeth Nuñez MD        0.9 % sodium chloride infusion   IntraVENous PRN Lizbeth Nuñez  mL/hr at 22 1022 New Bag at 22 1022    ceFAZolin (ANCEF) 2000 mg in dextrose 5 % 50 mL IVPB  2,000 mg IntraVENous On Call to [address] Woodruff, MD           Allergies:     Allergies   Allergen Reactions    Carafate [Sucralfate] Other (See Comments)     Tongue swelling    Iv Dye [Iodides] Shortness Of Breath    Lopressor [Metoprolol]      Bradycardia    Motrin [Ibuprofen]      Was told not to take due to kidneys    Ace Inhibitors      Intolerant to causes muscle aches      Flomax [Tamsulosin Hcl] Nausea Only    Lipitor [Atorvastatin] Other (See Comments)     Myalgias       Problem List:    Patient Active Problem List   Diagnosis Code    CKD (chronic kidney disease), stage III (HCC) N18.30    Hyperlipidemia E78.5    Prostate enlargement N40.0    Anemia D64.9    HTN (hypertension) I10    Hiatal hernia K44.9    Diverticulosis K57.90    Obesity E66.9    Diverticulitis of large intestine with perforation K57.20    Esophagitis K20.90    Generalized abdominal pain R10.84    Diverticulitis of colon K57.32    Angina effort I20.8    Abnormal stress test R94.39    Benign essential tremor G25.0       Past Medical History:        Diagnosis Date    Anemia     Arthritis     Bronchiolitis 11/2016    Cancer ([facility] [address]     SKIN CANCER    CKD (chronic kidney disease), stage III (HCC)     Diverticula of colon 05/2014    tx with antibiotics per pt; NO surgery    Diverticulosis     DVT [facility]deep venous thrombosis) ([facility])     s/p    Esophagitis 12/10/2014    Hiatal hernia     [place] (hard of hearing)     not able to hear out of rt ear    HTN (hypertension)     Hyperlipidemia     Kidney stones     Nephrolithiasis     Obesity     Prostate enlargement     benign    Renal insufficiency        Past Surgical History:        Procedure Laterality Date    BLEPHAROPLASTY Bilateral 03/2010    CARDIAC CATHETERIZATION  2000,2010    CATARACT REMOVAL WITH IMPLANT Bilateral 2006,2007    COLONOSCOPY  05/26/2017    CYSTOSCOPY N/A 10/27/2022    Cystoscopy Urethral Dilation performed by Yovani Shankar MD at [address] [facility], COLON, DIAGNOSTIC      HERNIA REPAIR Bilateral 1996    NASAL SINUS SURGERY      SHOULDER SURGERY      SKIN BIOPSY      SKIN CANCER EXCISION  Feb 2015    top of head    TONSILLECTOMY      TURP  2616,0940    TURP N/A 4/2/2022    CYSTOSCOPY GREENLIGHT PHOTOVAPORIZATION OF THE PROSTATE WITH URETHERAL DILATION performed by Dennise Browning MD at [address]         Social History:    Social History     Tobacco Use    Smoking status: Never    Smokeless tobacco: Never    Tobacco comments:     never smoked   Substance Use Topics    Alcohol use: Never                                Counseling given: Not Answered  Tobacco comments: never smoked      Vital Signs (Current):   Vitals:    12/01/22 1000   BP: (!) 119/56   Pulse: 73   Resp: 18   Temp: 97 °F (36.1 °C)   TempSrc: Temporal   SpO2: 97%   Weight: 206 lb 6.4 oz (93.6 kg)   Height: 5' 9\" (1.753 m)                                              BP Readings from Last 3 Encounters:   12/01/22 (!) 119/56   10/30/22 (!) 155/48   10/27/22 (!) 164/73       NPO Status: Time of last liquid consumption: 0830 (sip with meds)                        Time of last solid consumption: 2130                        Date of last liquid consumption: 12/01/22                        Date of last solid food consumption: 11/30/22    BMI:   Wt Readings from Last 3 Encounters:   12/01/22 206 lb 6.4 oz (93.6 kg)   11/22/22 211 lb (95.7 kg)   10/27/22 207 lb 6.4 oz (94.1 kg)     Body mass index is 30.48 kg/m².     CBC:   Lab Results   Component Value Date/Time    WBC 4.9 10/25/2022 12:47 PM    RBC 4.30 10/25/2022 12:47 PM    HGB 12.8 10/25/2022 12:47 PM    HCT 39.6 10/25/2022 12:47 PM    MCV 92.1 10/25/2022 12:47 PM    RDW 14.5 04/18/2018 03:00 PM     10/25/2022 12:47 PM       CMP:   Lab Results   Component Value Date/Time     11/15/2022 11:42 AM    K 4.4 11/15/2022 11:42 AM    K 4.0 07/08/2022 06:16 AM     11/15/2022 11:42 AM    CO2 27 11/15/2022 11:42 AM    BUN 29 11/15/2022 11:42 AM    CREATININE 1.9 11/15/2022 11:42 AM    LABGLOM 34 11/15/2022 11:41 AM    GLUCOSE 131 11/15/2022 11:42 AM    PROT 6.2 09/19/2022 08:44 AM    CALCIUM 9.4 11/15/2022 11:42 AM    BILITOT 0.5 09/19/2022 08:44 AM    ALKPHOS 95 09/19/2022 08:44 AM    AST 17 09/19/2022 08:44 AM    ALT 9 09/19/2022 08:44 AM       POC Tests: No results for input(s): POCGLU, POCNA, POCK, POCCL, POCBUN, POCHEMO, POCHCT in the last 72 hours. Coags:   Lab Results   Component Value Date/Time    INR 1.03 04/18/2018 03:00 PM    APTT 29.6 03/25/2015 05:55 PM       HCG (If Applicable): No results found for: PREGTESTUR, PREGSERUM, HCG, HCGQUANT     ABGs: No results found for: PHART, PO2ART, VFK9YSM, HKZ0PHM, BEART, T4RYWAFD     Type & Screen (If Applicable):  Lab Results   Component Value Date    LABRH NEG 02/26/2018       Drug/Infectious Status (If Applicable):  No results found for: HIV, HEPCAB    COVID-19 Screening (If Applicable):   Lab Results   Component Value Date/Time    COVID19 NOT  DETECTED 07/07/2022 03:06 PM           Anesthesia Evaluation  Patient summary reviewed  Airway: Mallampati: II  TM distance: >3 FB   Neck ROM: full  Mouth opening: > = 3 FB   Dental:          Pulmonary:                              Cardiovascular:    (+) hypertension:, angina:,       ECG reviewed                        Neuro/Psych:               GI/Hepatic/Renal:   (+) renal disease: CRI,           Endo/Other:                     Abdominal:             Vascular: Other Findings:           Anesthesia Plan      general     ASA 3       Induction: intravenous. MIPS: Postoperative opioids intended and Prophylactic antiemetics administered. Anesthetic plan and risks discussed with spouse and patient. Plan discussed with DICKSON. Cee Miller.  DO Beba   12/1/2022

## 2022-12-01 NOTE — ANESTHESIA POSTPROCEDURE EVALUATION
Department of Anesthesiology  Postprocedure Note    Patient: Jian Salcido  MRN: [id number]  YOB: 1940  Date of evaluation: 12/1/2022      Procedure Summary     Date: 12/01/22 Room / Location: [address] [place] [address] STEPHANI Hayden    Anesthesia Start: 3145 Anesthesia Stop: 9148    Procedure: CYSTOSCOPY TRANSURETHRAL INCISION BLADDER NECK CONTRACTURE Diagnosis:       Nocturia      Stricture of male urethra, unspecified stricture type      (Nocturia [R35.1])      (Stricture of male urethra, unspecified stricture type [N35.919])    Surgeons: Hugo Ibrahim MD Responsible Provider: Son Mc DO    Anesthesia Type: general ASA Status: 3          Anesthesia Type: No value filed. Julia Phase I: Julia Score: 9    Julia Phase II:        Anesthesia Post Evaluation    Comments: Ricardo Roe 60  POST-ANESTHESIA NOTE       Name:  Jian Salcido                                         Age:  78 y.o.   MRN:  [id number]      Last Vitals:  BP (!) 140/87   Pulse 66   Temp 97 °F (36.1 °C) (Temporal)   Resp 16   Ht 5' 9\" (1.753 m)   Wt 206 lb 6.4 oz (93.6 kg)   SpO2 100%   BMI 30.48 kg/m²   Patient Vitals in the past 4 hrs:  12/01/22 1252, BP:(!) 140/87, Temp:97 °F (36.1 °C), Temp src:Temporal, Pulse:66, Resp:16, SpO2:100 %  12/01/22 1230, BP:137/65, Pulse:61, Resp:10, SpO2:100 %  12/01/22 1225, BP:136/65, Pulse:64, Resp:13, SpO2:99 %  12/01/22 1220, BP:132/62, Pulse:64, Resp:15, SpO2:99 %  12/01/22 1215, BP:139/60, Pulse:65, Resp:11, SpO2:98 %  12/01/22 1210, BP:(!) 129/59, Pulse:70, Resp:13, SpO2:97 %  12/01/22 1205, BP:138/62, Pulse:71, SpO2:93 %  12/01/22 1203, BP:(!) 129/59, Temp:97.8 °F (36.6 °C), Temp src:Temporal, Pulse:70, Resp:17, SpO2:98 %  12/01/22 1000, BP:(!) 119/56, Temp:97 °F (36.1 °C), Temp src:Temporal, Pulse:73, Resp:18, SpO2:97 %, Height:5' 9\" (1.753 m), Weight:206 lb 6.4 oz (93.6 kg)    Level of Consciousness:  Awake    Respiratory:  Stable    Oxygen Saturation:  Stable    Cardiovascular: Stable    Hydration:  Adequate    PONV:  Stable    Post-op Pain:  Adequate analgesia    Post-op Assessment:  No apparent anesthetic complications    Additional Follow-Up / Treatment / Comment:  None    Niko Edouard Yoder DO  December 1, 2022   1:10 PM

## 2022-12-02 NOTE — TELEPHONE ENCOUNTER
Received message from Dr Zenaida Bello stating 72 hours. Patient aware. He voiced understanding. He stated the urine is getting clearer.

## 2022-12-05 ENCOUNTER — NURSE ONLY (OUTPATIENT)
Dept: UROLOGY | Age: 82
End: 2022-12-05

## 2022-12-05 ENCOUNTER — HOSPITAL ENCOUNTER (OUTPATIENT)
Dept: ULTRASOUND IMAGING | Age: 82
Discharge: HOME | End: 2022-12-05
Payer: MEDICARE

## 2022-12-05 DIAGNOSIS — I12.9 HYPERTENSIVE RENAL DISEASE, STAGE 1 THROUGH STAGE 4 OR UNSPECIFIED CHRONIC KIDNEY DISEASE: ICD-10-CM

## 2022-12-05 DIAGNOSIS — N18.32 CHRONIC KIDNEY DISEASE, STAGE 3B (HCC): ICD-10-CM

## 2022-12-05 PROCEDURE — 99999 PR OFFICE/OUTPT VISIT,PROCEDURE ONLY: CPT

## 2022-12-05 PROCEDURE — 76770 US EXAM ABDO BACK WALL COMP: CPT

## 2022-12-05 NOTE — PROGRESS NOTES
Patient has given me verbal consent to perform calabrese removal  Yes    25 cc of water deflated from calabrese balloon. 22 Fr calabrese removed without difficulty. Foreskin reduced back down? Yes      Pt will drink fluids and ER in 6-8 hours if patient unable to urinate. F/u with provider Dr. Lino Lea. Patient was a post op urethroplasty cath removal today, patient was informed if he is unable to urinate and has to report to ER to have them notify [facility] prior to re-placing the calabrese due to recent urethroplasty.

## 2022-12-05 NOTE — PROGRESS NOTES
I have personally verified, reviewed, and approved of these actions and the plan of care as documented.

## 2022-12-12 DIAGNOSIS — E78.5 HYPERLIPIDEMIA, UNSPECIFIED HYPERLIPIDEMIA TYPE: ICD-10-CM

## 2022-12-12 RX ORDER — PRAVASTATIN SODIUM 40 MG
TABLET ORAL
Qty: 90 TABLET | Refills: 3 | Status: SHIPPED | OUTPATIENT
Start: 2022-12-12

## 2022-12-18 NOTE — OP NOTE
[address] [place] [address]. [place]    DATE: 12/1/2022  Patient:  Rhona Howard  MRN: [id number]  YOB: 1940    SURGEON: Wally Chan MD.    ASSISTANT: none    PREOPERATIVE DIAGNOSIS: Bladder neck contracture    POSTOPERATIVE DIAGNOSIS: Bladder neck contracture    PROCEDURE PERFORMED: cystoscopy, urethral dilation, transurethral incision of bladder neck contracture    ANESTHESIA: General    COMPLICATIONS: none    OR BLOOD LOSS:  Minimal    FLUIDS: Cystalloids per Anesthesia    SPECIMENS:  * No specimens in log *  none    DRAINS: 22 [place] calabrese    INDICATIONS FOR PROCEDURE:  The patient is a 78 y.o. male who presents today with Nocturia [R35.1]  Stricture of male urethra, unspecified stricture type [N35.919] here for CYSTOSCOPY TRANSURETHRAL INCISION BLADDER NECK CONTRACTURE. After risks, benefits and alternatives of the procedure were discussed with the patient, the patient elected to proceed. DETAILS OF PROCEDURE:  After informed consent was obtained, the patient was taken to the operating room. He was transferred to the operating table in the supine position. Anesthesia was induced and antibiotics were given. He was placed in a modified dorsal lithotomy position. He was sterilely prepped and draped in a standard fashion. A timeout occurred in which 2 patient identifiers were used. We entered his urethra with a 25F scope with the visual obturator in place. Malcolm Serra Urethral dilation was required to achieve this. .     Once within the prostatic urethra, we changed out into our working channel and inserted a 1000 micron laser fiber. We located both the ureteral orifices and they were remote from our incision bed. using a holmium laser, we incised from the bladder neck to the verumontanum. When completed, the prostatic urethra demonstrated excellent patency without any evidence of obstruction.  The sphincter was evaluated and was noted to be intact and distant from

## 2022-12-19 ENCOUNTER — OFFICE VISIT (OUTPATIENT)
Dept: NEPHROLOGY | Age: 82
End: 2022-12-19
Payer: MEDICARE

## 2022-12-19 ENCOUNTER — NURSE ONLY (OUTPATIENT)
Dept: FAMILY MEDICINE CLINIC | Age: 82
End: 2022-12-19
Payer: MEDICARE

## 2022-12-19 VITALS
BODY MASS INDEX: 31.16 KG/M2 | HEART RATE: 56 BPM | WEIGHT: 211 LBS | OXYGEN SATURATION: 98 % | DIASTOLIC BLOOD PRESSURE: 67 MMHG | SYSTOLIC BLOOD PRESSURE: 143 MMHG

## 2022-12-19 DIAGNOSIS — I12.9 HYPERTENSIVE RENAL DISEASE, STAGE 1 THROUGH STAGE 4 OR UNSPECIFIED CHRONIC KIDNEY DISEASE: ICD-10-CM

## 2022-12-19 DIAGNOSIS — N28.1 RENAL CYST: ICD-10-CM

## 2022-12-19 DIAGNOSIS — N18.32 CHRONIC KIDNEY DISEASE, STAGE 3B (HCC): Primary | ICD-10-CM

## 2022-12-19 PROCEDURE — 99213 OFFICE O/P EST LOW 20 MIN: CPT | Performed by: INTERNAL MEDICINE

## 2022-12-19 PROCEDURE — 3074F SYST BP LT 130 MM HG: CPT | Performed by: INTERNAL MEDICINE

## 2022-12-19 PROCEDURE — 1123F ACP DISCUSS/DSCN MKR DOCD: CPT | Performed by: INTERNAL MEDICINE

## 2022-12-19 PROCEDURE — 90694 VACC AIIV4 NO PRSRV 0.5ML IM: CPT | Performed by: FAMILY MEDICINE

## 2022-12-19 PROCEDURE — 1036F TOBACCO NON-USER: CPT | Performed by: INTERNAL MEDICINE

## 2022-12-19 PROCEDURE — G8427 DOCREV CUR MEDS BY ELIG CLIN: HCPCS | Performed by: INTERNAL MEDICINE

## 2022-12-19 PROCEDURE — G0008 ADMIN INFLUENZA VIRUS VAC: HCPCS | Performed by: FAMILY MEDICINE

## 2022-12-19 PROCEDURE — G8417 CALC BMI ABV UP PARAM F/U: HCPCS | Performed by: INTERNAL MEDICINE

## 2022-12-19 PROCEDURE — G8484 FLU IMMUNIZE NO ADMIN: HCPCS | Performed by: INTERNAL MEDICINE

## 2022-12-19 PROCEDURE — 3078F DIAST BP <80 MM HG: CPT | Performed by: INTERNAL MEDICINE

## 2022-12-19 RX ORDER — HYDRALAZINE HYDROCHLORIDE 25 MG/1
25 TABLET, FILM COATED ORAL 2 TIMES DAILY
Qty: 60 TABLET | Refills: 3 | Status: SHIPPED
Start: 2022-12-19

## 2022-12-19 NOTE — PROGRESS NOTES
[address] KIDNEY AND HYPERTENSION  [address]  [facility] [address]  Dept: [phone]  Loc: [phone]  Office Progress Note  12/19/2022 1:40 PM      Pt Name:    Juan Velazquez:    1940  Primary Care Physician:  Ebenezer Davis DO     Chief Complaint:   Chief Complaint   Patient presents with    Chronic Kidney Disease     Stage 3        Background Information/Interval History:   77 yo white male with hx HTN, CKD who is here for 6 months follow-up. He was seeing Dr. Dione Mena in the past and now seeing me. He says BP is usually high at home. He has BPH s/p TURP and underwent green light and dilation by urology. He says BP varies at home. He says he has gone though several machines. He reports \"leaky valves. \"     Brought a log of Bp readings- varies in 140-170 range. He has stopped taking folate supplements. No urinary complaints. Here with his wife.       Past History:  Past Medical History:   Diagnosis Date    Anemia     Arthritis     Bronchiolitis 11/2016    Cancer [facility])     SKIN CANCER    CKD (chronic kidney disease), stage III ([facility] [address]     Diverticula of colon 05/2014    tx with antibiotics per pt; NO surgery    Diverticulosis     DVT (deep venous thrombosis) ([facility])     s/p    Esophagitis 12/10/2014    Hiatal hernia     [place] (hard of hearing)     not able to hear out of rt ear    HTN (hypertension)     Hyperlipidemia     Kidney stones     Nephrolithiasis     Obesity     Prostate enlargement     benign    Renal insufficiency      Past Surgical History:   Procedure Laterality Date    BLEPHAROPLASTY Bilateral 03/2010    CARDIAC CATHETERIZATION  2000,2010    CATARACT REMOVAL WITH IMPLANT Bilateral 2006,2007    COLONOSCOPY  05/26/2017    CYSTOSCOPY N/A 10/27/2022    Cystoscopy Urethral Dilation performed by Nahomy Moore MD at [facility] 12/1/2022    CYSTOSCOPY [address] performed by Gris Beasley Carlyn Garcia MD at [address] [facility], COLON, DIAGNOSTIC      HERNIA REPAIR Bilateral 1996    NASAL SINUS SURGERY      SHOULDER SURGERY      SKIN BIOPSY      SKIN CANCER EXCISION  Feb 2015    top of head    TONSILLECTOMY      TURP  2008,2008    TURP N/A 4/2/2022    CYSTOSCOPY GREENLIGHT PHOTOVAPORIZATION OF THE PROSTATE WITH URETHERAL DILATION performed by Lizbeth Nuñez MD at [address]:  BP (!) 143/67 (Site: Left Upper Arm, Position: Sitting, Cuff Size: Medium Adult)   Pulse 56   Wt 211 lb (95.7 kg)   SpO2 98%   BMI 31.16 kg/m²   Wt Readings from Last 3 Encounters:   12/19/22 211 lb (95.7 kg)   12/01/22 206 lb 6.4 oz (93.6 kg)   11/22/22 211 lb (95.7 kg)     Body mass index is 31.16 kg/m².      General Appearance: alert and cooperative with exam, appears comfortable, no distress  Oral: moist oral mucus membranes  Neck: No jugular venous distention  Lungs: Air entry B/L, no crackles or rales, no use of accessory muscles  Heart: S1, S2 heard  GI: soft, non-tender, no guarding  Extremities: No sig LE edema     Medications:  Current Outpatient Medications   Medication Sig Dispense Refill    pravastatin (PRAVACHOL) 40 MG tablet TAKE 1 TABLET BY MOUTH EVERY EVENING 90 tablet 3    irbesartan (AVAPRO) 300 MG tablet TAKE 1 TABLET BY MOUTH DAILY 90 tablet 3    MYRBETRIQ 50 MG TB24 TAKE 1 TABLET BY MOUTH EVERY MORNING 30 tablet 3    aspirin 325 MG tablet Take 325 mg by mouth daily      hydrALAZINE (APRESOLINE) 25 MG tablet Take 1 tablet by mouth 3 times daily (Patient taking differently: Take 25 mg by mouth daily) 90 tablet 3    citalopram (CELEXA) 20 MG tablet TAKE 1 TABLET BY MOUTH DAILY 90 tablet 3    primidone (MYSOLINE) 50 MG tablet TAKE 1 TABLET BY MOUTH TWICE DAILY 180 tablet 3    triamterene-hydroCHLOROthiazide (MAXZIDE-25) 37.5-25 MG per tablet Take 1 tablet by mouth daily 90 tablet 1    amLODIPine (NORVASC) 10 MG tablet TAKE 1 TABLET BY MOUTH DAILY 90 tablet 3 pantoprazole (PROTONIX) 40 MG tablet Take 40 mg by mouth daily. No current facility-administered medications for this visit. Laboratory & Diagnostics:  Old labs  June 2022: K 4.5, Creat 1.6, Hb 12.7  UA: + protein    Dec 2022: Creat 1.7, K 4.9, Hb 12.5, UA: moderate blood, 30 protein,  mg/g     Impression/Plan:   1. CKD III; BP is chronically sup-optimally controlled. Advised better BP control to minimize progression of CKD. 9  2. HTN: on norvasc and triam-HCTZ. I reviewed his BP log- some high readings in 160 range and some in 140 to 130 range. He is only taking hydralazine 25 mg daily. He was supposed to be taking TID. He is taking avapro. He says he will increase hydralazine 25 mg BID and monitor BP at home. He says sometimes his blood pressure drops but I did not see any blood pressure readings below 120s. -He will call me next week with some blood pressure readings. Patient and wife agreeable with the plan  3. Hx BPH s/p green light  4. Mild hard of hearing. 5. Left kidney cyst/lesion. Abnormal kidney ultrasound.: Radiologist recommending CT scan to evaluate this further. Will avoid contrast due to CKD. Discussed with patient  6. Elevated Folic acid; will repeat levels. Patient is not taking any supplements    Orders Placed This Encounter   Procedures    CT ABDOMEN WO CONTRAST Additional Contrast? None    Basic Metabolic Panel    Protein / creatinine ratio, urine    Urinalysis    Folate     Return in about 6 months (around 6/19/2023).       Olivier Pierce MD  [facility]

## 2022-12-19 NOTE — PROGRESS NOTES
Vaccine Information Sheet, \"Influenza - Inactivated\"  given to Jay Mahmood, or parent/legal guardian of  Jay Mahmood and verbalized understanding. Patient responses:    Have you ever had a reaction to a flu vaccine? No  Do you have an allergy to eggs, neomycin or polymixin? No  Do you have an allergy to Thimerosal, contact lens solution, or Merthiolate? No  Have you ever had [place] Syndrome? No  Do you have any current illness? No  Do you have a temperature above 100 degrees? No  Are you pregnant? No  If pregnant, permission obtained from physician? Male  Do you have an active neurological disorder? No      Flu vaccine given per order. Please see immunization tab.

## 2022-12-21 ENCOUNTER — HOSPITAL ENCOUNTER (OUTPATIENT)
Dept: CT IMAGING | Age: 82
Discharge: HOME | End: 2022-12-21
Payer: MEDICARE

## 2022-12-21 DIAGNOSIS — N18.32 CHRONIC KIDNEY DISEASE, STAGE 3B (HCC): ICD-10-CM

## 2022-12-21 DIAGNOSIS — I12.9 HYPERTENSIVE RENAL DISEASE, STAGE 1 THROUGH STAGE 4 OR UNSPECIFIED CHRONIC KIDNEY DISEASE: ICD-10-CM

## 2022-12-21 DIAGNOSIS — N28.1 RENAL CYST: ICD-10-CM

## 2022-12-21 PROCEDURE — 74150 CT ABDOMEN W/O CONTRAST: CPT

## 2022-12-22 ENCOUNTER — HOSPITAL ENCOUNTER (EMERGENCY)
Age: 82
Discharge: HOME | End: 2022-12-22
Payer: MEDICARE

## 2022-12-22 VITALS
HEART RATE: 65 BPM | TEMPERATURE: 98 F | DIASTOLIC BLOOD PRESSURE: 67 MMHG | RESPIRATION RATE: 16 BRPM | OXYGEN SATURATION: 95 % | SYSTOLIC BLOOD PRESSURE: 153 MMHG

## 2022-12-22 DIAGNOSIS — U07.1 COVID-19: Primary | ICD-10-CM

## 2022-12-22 LAB
FLU A ANTIGEN: NEGATIVE
FLU B ANTIGEN: NEGATIVE
SARS-COV-2, NAA: DETECTED

## 2022-12-22 PROCEDURE — 99213 OFFICE O/P EST LOW 20 MIN: CPT

## 2022-12-22 PROCEDURE — 87804 INFLUENZA ASSAY W/OPTIC: CPT

## 2022-12-22 PROCEDURE — 87635 SARS-COV-2 COVID-19 AMP PRB: CPT

## 2022-12-22 ASSESSMENT — ENCOUNTER SYMPTOMS
VOMITING: 0
DIARRHEA: 0
RHINORRHEA: 0
SORE THROAT: 0
COUGH: 0
NAUSEA: 0
CHEST TIGHTNESS: 0
SHORTNESS OF BREATH: 0

## 2022-12-22 ASSESSMENT — PAIN - FUNCTIONAL ASSESSMENT: PAIN_FUNCTIONAL_ASSESSMENT: NONE - DENIES PAIN

## 2022-12-22 NOTE — ED PROVIDER NOTES
[facility]  Urgent Care Encounter       CHIEF COMPLAINT       Chief Complaint   Patient presents with    Concern For COVID-19       Nurses Notes reviewed and I agree except as noted in the HPI. HISTORY OF PRESENT ILLNESS   Edgardo Dash is a 78 y.o. male who presents to the [address] [place] [address] urgent care for evaluation of fatigue. He reports that his symptoms started yesterday. He denies known exposure to someone ill. He does report his symptoms as fever, fatigue, chills, and myalgias. He denies nasal congestion, rhinorrhea, or postnasal drainage. He denies cough or dyspnea. Denies nausea, vomiting or diarrhea. The history is provided by the patient and the spouse. No  was used. REVIEW OF SYSTEMS     Review of Systems   Constitutional:  Positive for chills, fatigue and fever. Negative for activity change and appetite change. HENT:  Negative for ear discharge, ear pain, rhinorrhea and sore throat. Respiratory:  Negative for cough, chest tightness and shortness of breath. Cardiovascular:  Negative for chest pain. Gastrointestinal:  Negative for diarrhea, nausea and vomiting. Genitourinary:  Negative for dysuria. Musculoskeletal:  Positive for myalgias. Skin:  Negative for rash. Allergic/Immunologic: Negative for environmental allergies and food allergies. Neurological:  Negative for dizziness and headaches.      PAST MEDICAL HISTORY         Diagnosis Date    Anemia     Arthritis     Bronchiolitis 11/2016    Cancer [facility])     SKIN CANCER    CKD (chronic kidney disease), stage III ([facility] [address]     Diverticula of colon 05/2014    tx with antibiotics per pt; NO surgery    Diverticulosis     DVT (deep venous thrombosis) ([facility])     s/p    Esophagitis 12/10/2014    Hiatal hernia     [place] (hard of hearing)     not able to hear out of rt ear    HTN (hypertension)     Hyperlipidemia     Kidney stones     Nephrolithiasis     Obesity     Prostate enlargement     benign Renal insufficiency        SURGICALHISTORY     Patient  has a past surgical history that includes skin biopsy; TURP (0590,9636); Nasal sinus surgery; Tonsillectomy; Skin cancer excision (Feb 2015); blepharoplasty (Bilateral, 03/2010); Cataract removal with implant (Bilateral, [id number]); hernia repair (Bilateral, 1996); Cardiac catheterization (8175,2980); Upper gastrointestinal endoscopy; shoulder surgery; Colonoscopy (05/26/2017); Endoscopy, colon, diagnostic; TURP (N/A, 4/2/2022); Cystoscopy (N/A, 10/27/2022); and Cystoscopy (N/A, 12/1/2022). CURRENT MEDICATIONS       Discharge Medication List as of 12/22/2022  4:40 PM        CONTINUE these medications which have NOT CHANGED    Details   hydrALAZINE (APRESOLINE) 25 MG tablet Take 1 tablet by mouth in the morning and at bedtime, Disp-60 tablet, R-3Adjust Sig      pravastatin (PRAVACHOL) 40 MG tablet TAKE 1 TABLET BY MOUTH EVERY EVENING, Disp-90 tablet, R-3Normal      irbesartan (AVAPRO) 300 MG tablet TAKE 1 TABLET BY MOUTH DAILY, Disp-90 tablet, R-3Normal      MYRBETRIQ 50 MG TB24 TAKE 1 TABLET BY MOUTH EVERY MORNING, Disp-30 tablet, R-3Normal      aspirin 325 MG tablet Take 325 mg by mouth dailyHistorical Med      citalopram (CELEXA) 20 MG tablet TAKE 1 TABLET BY MOUTH DAILY, Disp-90 tablet, R-3Normal      primidone (MYSOLINE) 50 MG tablet TAKE 1 TABLET BY MOUTH TWICE DAILY, Disp-180 tablet, R-3Normal      triamterene-hydroCHLOROthiazide (MAXZIDE-25) 37.5-25 MG per tablet Take 1 tablet by mouth daily, Disp-90 tablet, R-1**Patient requests 90 days supply**Adjust Sig      amLODIPine (NORVASC) 10 MG tablet TAKE 1 TABLET BY MOUTH DAILY, Disp-90 tablet, R-3Normal      pantoprazole (PROTONIX) 40 MG tablet Take 40 mg by mouth daily. ALLERGIES     Patient is is allergic to carafate [sucralfate], iv dye [iodides], lopressor [metoprolol], motrin [ibuprofen], ace inhibitors, flomax [tamsulosin hcl], and lipitor [atorvastatin].     Patients   Immunization History   Administered Date(s) Administered    COVID-19, PFIZER PURPLE top, DILUTE for use, (age 15 y+), 30mcg/0.3mL 03/04/2021, 03/25/2021    Influenza Virus Vaccine 09/01/2014, 01/20/2016    Influenza Whole 09/01/2014    Influenza, FLUAD, (age 72 y+), Adjuvanted, 0.5mL 11/20/2020, 12/17/2021, 12/19/2022    Influenza, FLUARIX, FLULAVAL, FLUZONE (age 10 mo+) AND AFLURIA, (age 1 y+), PF, 0.5mL 01/23/2017, 01/23/2018    Influenza, High Dose (Fluzone 65 yrs and older) 12/06/2018    Influenza, Triv, inactivated, subunit, adjuvanted, IM (Fluad 65 yrs and older) 12/05/2019    Pneumococcal Conjugate 13-valent (Rosalind Netta) 01/20/2016       FAMILY HISTORY     Patient's family history includes Diabetes in his mother; Heart Disease in his father; High Blood Pressure in his father. SOCIAL HISTORY     Patient  reports that he has never smoked. He has never used smokeless tobacco. He reports that he does not drink alcohol and does not use drugs. PHYSICAL EXAM     ED TRIAGE VITALS  BP: (!) 153/67, Temp: 98 °F (36.7 °C), Heart Rate: 65, Resp: 16, SpO2: 95 %,Estimated body mass index is 31.16 kg/m² as calculated from the following:    Height as of 12/1/22: 5' 9\" (1.753 m). Weight as of 12/19/22: 211 lb (95.7 kg). ,No LMP for male patient. Physical Exam  Vitals and nursing note reviewed. Constitutional:       General: He is not in acute distress. Appearance: Normal appearance. He is not ill-appearing, toxic-appearing or diaphoretic. HENT:      Head: Normocephalic. Right Ear: Ear canal and external ear normal.      Left Ear: Ear canal and external ear normal.      Nose: Nose normal. No congestion or rhinorrhea. Mouth/Throat:      Mouth: Mucous membranes are moist.      Pharynx: Oropharynx is clear. No oropharyngeal exudate or posterior oropharyngeal erythema. Cardiovascular:      Rate and Rhythm: Normal rate. Pulses: Normal pulses.    Pulmonary:      Effort: Pulmonary effort is normal. No respiratory distress. Breath sounds: No stridor. No wheezing or rhonchi. Abdominal:      General: Abdomen is flat. Bowel sounds are normal.      Palpations: Abdomen is soft. Musculoskeletal:         General: No swelling or tenderness. Normal range of motion. Cervical back: Normal range of motion. Neurological:      General: No focal deficit present. Mental Status: He is alert and oriented to person, place, and time. Psychiatric:         Mood and Affect: Mood normal.         Behavior: Behavior normal.       DIAGNOSTIC RESULTS     Labs:  Results for orders placed or performed during the hospital encounter of 12/22/22   COVID-19, Rapid   Result Value Ref Range    SARS-CoV-2, RICHY DETECTED (AA) NOT DETECTED   Rapid influenza A/B antigens   Result Value Ref Range    Flu A Antigen Negative NEGATIVE    Flu B Antigen Negative NEGATIVE       IMAGING:    No orders to display         EKG: None      URGENT CARE COURSE:     Vitals:    12/22/22 1611   BP: (!) 153/67   Pulse: 65   Resp: 16   Temp: 98 °F (36.7 °C)   TempSrc: Temporal   SpO2: 95%       Medications - No data to display         PROCEDURES:  None    FINAL IMPRESSION      1. COVID-19          DISPOSITION/ PLAN     Patient seen and evaluated for the above symptoms. A rapid Covid swab was obtained and positive. He is instructed to isolate for 5 days from symptom onset. He is instructed to wear a face covering for the next 5 days. He is provided a work note for 2 days with the current CDC recommendations for isolation. He is instructed to use over-the-counter Tylenol and Motrin for pain or fever. He is instructed to use zinc, vitamin C, vitamin D3, and Pepcid. He is instructed to use Mucinex D, Flonase, and Zyrtec for symptom relief. He is instructed to follow-up with his PCP in 3 to 5 days with new or worsening symptoms. He is agreeable with the above plan and denies questions or concerns at this time.         PATIENT REFERRED TO:  Bigg Henley, [facility] [address] / [place] [id number]      DISCHARGE MEDICATIONS:  Discharge Medication List as of 12/22/2022  4:40 PM          Discharge Medication List as of 12/22/2022  4:40 PM          Discharge Medication List as of 12/22/2022  4:40 PM          ALVINO Bray CNP    (Please note that portions of this note were completed with a voice recognition program. Efforts were made to edit the dictations but occasionally words are mis-transcribed.)           ALVINO Bray CNP  12/22/22 2694

## 2023-01-11 ENCOUNTER — NURSE ONLY (OUTPATIENT)
Dept: LAB | Age: 83
End: 2023-01-11

## 2023-01-11 DIAGNOSIS — N18.32 CHRONIC KIDNEY DISEASE, STAGE 3B (HCC): ICD-10-CM

## 2023-01-11 DIAGNOSIS — I12.9 HYPERTENSIVE RENAL DISEASE, STAGE 1 THROUGH STAGE 4 OR UNSPECIFIED CHRONIC KIDNEY DISEASE: ICD-10-CM

## 2023-01-11 DIAGNOSIS — N18.32 CHRONIC KIDNEY DISEASE, STAGE 3B (HCC): Primary | ICD-10-CM

## 2023-01-11 DIAGNOSIS — N28.1 RENAL CYST: ICD-10-CM

## 2023-01-11 LAB — FOLATE: 9.6 NG/ML (ref 4.8–24.2)

## 2023-01-13 RX ORDER — HYDRALAZINE HYDROCHLORIDE 25 MG/1
25 TABLET, FILM COATED ORAL 2 TIMES DAILY
Qty: 180 TABLET | Refills: 3 | Status: SHIPPED | OUTPATIENT
Start: 2023-01-13

## 2023-02-28 DIAGNOSIS — I10 ESSENTIAL HYPERTENSION: ICD-10-CM

## 2023-02-28 RX ORDER — TRIAMTERENE AND HYDROCHLOROTHIAZIDE 37.5; 25 MG/1; MG/1
TABLET ORAL
Qty: 135 TABLET | OUTPATIENT
Start: 2023-02-28

## 2023-02-28 RX ORDER — AMLODIPINE BESYLATE 10 MG/1
10 TABLET ORAL DAILY
Qty: 90 TABLET | Refills: 3 | Status: SHIPPED | OUTPATIENT
Start: 2023-02-28

## 2023-02-28 NOTE — TELEPHONE ENCOUNTER
Recent Visits  Date Type Provider Dept   09/26/22 Office Visit Bautista Lombardi, [address]   03/24/22 Office Visit Bautista Lombardi, [address] [facility]   12/17/21 Office Visit Calderoncarlos HarjitMaria M [address]   12/14/21 Office Visit Itzel Galan, APRN - CNP [facility] [address]   09/13/21 Office Visit Bautista Lombardi, [address] [place] [address] recent visits within past 540 days with a meds authorizing provider and meeting all other requirements  Future Appointments  No visits were found meeting these conditions.   Showing future appointments within next 150 days with a meds authorizing provider and meeting all other requirements    Future Appointments   Date Time Provider Marianna Hernandez   3/6/2023  1:45 PM MD Tyler Hong [facility] - [address]   6/19/2023  1:40 PM Edward Ledbetter MD [facility]. [facility]   10/5/2023  1:30 PM [address], MD [facility]

## 2023-03-06 ENCOUNTER — OFFICE VISIT (OUTPATIENT)
Dept: UROLOGY | Age: 83
End: 2023-03-06
Payer: MEDICARE

## 2023-03-06 VITALS
BODY MASS INDEX: 30.96 KG/M2 | DIASTOLIC BLOOD PRESSURE: 60 MMHG | WEIGHT: 209 LBS | HEIGHT: 69 IN | SYSTOLIC BLOOD PRESSURE: 132 MMHG

## 2023-03-06 DIAGNOSIS — R33.9 INCOMPLETE BLADDER EMPTYING: ICD-10-CM

## 2023-03-06 DIAGNOSIS — N39.0 RECURRENT UTI: Primary | ICD-10-CM

## 2023-03-06 DIAGNOSIS — R35.0 URINARY FREQUENCY: ICD-10-CM

## 2023-03-06 DIAGNOSIS — N40.1 BPH WITH OBSTRUCTION/LOWER URINARY TRACT SYMPTOMS: ICD-10-CM

## 2023-03-06 DIAGNOSIS — N13.8 BPH WITH OBSTRUCTION/LOWER URINARY TRACT SYMPTOMS: ICD-10-CM

## 2023-03-06 LAB
BILIRUBIN URINE: NEGATIVE
BLOOD URINE, POC: NEGATIVE
CHARACTER, URINE: CLEAR
COLOR, URINE: YELLOW
GLUCOSE URINE: NEGATIVE MG/DL
KETONES, URINE: ABNORMAL
LEUKOCYTE CLUMPS, URINE: NEGATIVE
NITRITE, URINE: NEGATIVE
PH, URINE: 5.5 (ref 5–9)
POST VOID RESIDUAL (PVR): 43 ML
PROTEIN, URINE: ABNORMAL MG/DL
SPECIFIC GRAVITY, URINE: 1.02 (ref 1–1.03)
UROBILINOGEN, URINE: 0.2 EU/DL (ref 0–1)

## 2023-03-06 PROCEDURE — 99214 OFFICE O/P EST MOD 30 MIN: CPT | Performed by: UROLOGY

## 2023-03-06 PROCEDURE — 1123F ACP DISCUSS/DSCN MKR DOCD: CPT | Performed by: UROLOGY

## 2023-03-06 PROCEDURE — 3078F DIAST BP <80 MM HG: CPT | Performed by: UROLOGY

## 2023-03-06 PROCEDURE — 51798 US URINE CAPACITY MEASURE: CPT | Performed by: UROLOGY

## 2023-03-06 PROCEDURE — 1036F TOBACCO NON-USER: CPT | Performed by: UROLOGY

## 2023-03-06 PROCEDURE — G8427 DOCREV CUR MEDS BY ELIG CLIN: HCPCS | Performed by: UROLOGY

## 2023-03-06 PROCEDURE — G8417 CALC BMI ABV UP PARAM F/U: HCPCS | Performed by: UROLOGY

## 2023-03-06 PROCEDURE — G8484 FLU IMMUNIZE NO ADMIN: HCPCS | Performed by: UROLOGY

## 2023-03-06 PROCEDURE — 3075F SYST BP GE 130 - 139MM HG: CPT | Performed by: UROLOGY

## 2023-03-06 PROCEDURE — 81003 URINALYSIS AUTO W/O SCOPE: CPT | Performed by: UROLOGY

## 2023-03-06 RX ORDER — NYSTATIN AND TRIAMCINOLONE ACETONIDE 100000; 1 [USP'U]/G; MG/G
OINTMENT TOPICAL
Qty: 1 EACH | Refills: 1 | Status: SHIPPED | OUTPATIENT
Start: 2023-03-06

## 2023-03-06 NOTE — PROGRESS NOTES
Ana Montiel MD        [address] [place] [address].  [address]  [facility] [address]  Dept: [phone]  Dept Fax: [phone]  Loc: [address] [address] Urology Office Note -     Patient:  Tanmay Chanel  YOB: 1940    The patient is a 78 y.o. male who presents today for evaluation of the following problems:   Chief Complaint   Patient presents with    Benign Prostatic Hypertrophy    Urinary Tract Infection    Follow-up        HISTORY OF PRESENT ILLNESS:     BPH  Hx of PVP and TURP in past- tough postop course  Cant take flomax  Ditropan not helpful  Gemtesa helpful     Bladder neck contracture  Recent TUIBNC  Voiding better    Urethral stricture  Recent cystoscopy findings: meatal stricture dilated up to a 24 fr without difficulty, open bladder neck, open prostatic fossa    Balanitis  Was circumcised as child but foreskin is causing issues  Dr Rufino Higgins placed on a cream recently    Prostate cancer screening  PSA acceptable for age      Summary of Previous Records:  Mr. Matthew Serrano continues to do well with stable symptoms of prostatism. His PSA is 2.97. He has symptoms of nocturia 1-2 times nightly. He admits to occasional oxybutynin usage at night which helps his nocturia symptoms. He denies difficulty urinating. He mainly complains of sunken penis that causes difficulty. Has hx of TURP in the past, abnormal ROSE MARIE was felt to be consistent with scar from previous TURPs in the past.    There is a family history of prostate cancer with his brother. MRI Pelvis on 06/2017 was consistent with PI-RADS score 2 lesions.       Requested/reviewed records from Jojo Sanchez DO office and/or outside physician/EMR    (Patient's old records have been requested, reviewed and pertinent findings summarized in today's note.)    Procedures Today: N/A    Last several PSA's:  Lab Results   Component Value Date    PSA 3.81 (H) 11/23/2021    PSA 2.82 (H) 11/20/2020    PSA 2.97 (H) 11/20/2019       Last total testosterone:  No results found for: TESTOSTERONE    Urinalysis today:  Results for POC orders placed in visit on 03/06/23   POCT Urinalysis No Micro (Auto)   Result Value Ref Range    Glucose, Ur Negative NEGATIVE mg/dl    Bilirubin Urine Negative     Ketones, Urine Trace (A) NEGATIVE    Specific Gravity, Urine 1.025 1.002 - 1.030    Blood, UA POC Negative NEGATIVE    pH, Urine 5.50 5.0 - 9.0    Protein, Urine Trace (A) NEGATIVE mg/dl    Urobilinogen, Urine 0.20 0.0 - 1.0 eu/dl    Nitrite, Urine Negative NEGATIVE    Leukocyte Clumps, Urine Negative NEGATIVE    Color, Urine Yellow YELLOW-STRAW    Character, Urine Clear CLR-SL.CLOUD   poct post void residual   Result Value Ref Range    post void residual 43 ml       Last BUN and creatinine:  Lab Results   Component Value Date    BUN 30 (H) 12/13/2022     Lab Results   Component Value Date    CREATININE 1.7 (H) 12/13/2022       Imaging Reviewed during this Office Visit:   Mis Oneill MD independently reviewed the images and verified the radiology reports from:    Xr Shoulder Left (min 2 Views)    Result Date: 3/6/2020  PROCEDURE: XR SHOULDER LEFT (MIN 2 VIEWS) CLINICAL INFORMATION: 78-year-old male with left shoulder pain. COMPARISON: No prior study. TECHNIQUE: 4 views of the left shoulder were obtained. FINDINGS: There is no fracture or dislocation. No suspicious osseous lesions are present. The joint spaces are preserved. The clavicle is normal.  The soft tissues are normal.  There are no suspicious findings in the visualized aspects of the upper lung. No acute fracture or dislocation involving the left shoulder. **This report has been created using voice recognition software. It may contain minor errors which are inherent in voice recognition technology. ** Final report electronically signed by Dr Trung Carranza on 3/6/2020 3:35 PM      PAST MEDICAL, FAMILY AND SOCIAL HISTORY:  Past Medical History: Diagnosis Date    Anemia     Arthritis     Bronchiolitis 11/2016    Cancer [facility])     SKIN CANCER    CKD (chronic kidney disease), stage III ([facility] [address]     Diverticula of colon 05/2014    tx with antibiotics per pt; NO surgery    Diverticulosis     DVT (deep venous thrombosis) ([facility])     s/p    Esophagitis 12/10/2014    Hiatal hernia     [place] (hard of hearing)     not able to hear out of rt ear    HTN (hypertension)     Hyperlipidemia     Kidney stones     Nephrolithiasis     Obesity     Prostate enlargement     benign    Renal insufficiency      Past Surgical History:   Procedure Laterality Date    BLEPHAROPLASTY Bilateral 03/2010    CARDIAC CATHETERIZATION  2000,2010    CATARACT REMOVAL WITH IMPLANT Bilateral 2006,2007    COLONOSCOPY  05/26/2017    CYSTOSCOPY N/A 10/27/2022    Cystoscopy Urethral Dilation performed by Benji Martines MD at [address] [place] [address] 12/1/2022    [address] performed by Benji Martines MD at [address], [facility], [address] [place] [address] [address]ilateral [address] [address]  Feb 2015    top of head    TONSILLECTOMY      TURP  2008,2008    TURP N/A 4/2/2022    CYSTOSCOPY GREENLIGHT PHOTOVAPORIZATION OF THE PROSTATE WITH URETHERAL DILATION performed by Benji Martines MD at [facility]       Family History   Problem Relation Age of Onset    Diabetes Mother     High Blood Pressure Father     Heart Disease Father      Outpatient Medications Marked as Taking for the 3/6/23 encounter (Office Visit) with Benji Martines MD   Medication Sig Dispense Refill    amLODIPine (NORVASC) 10 MG tablet TAKE 1 TABLET BY MOUTH DAILY 90 tablet 3    hydrALAZINE (APRESOLINE) 25 MG tablet Take 1 tablet by mouth in the morning and at bedtime 180 tablet 3    pravastatin (PRAVACHOL) 40 MG tablet TAKE 1 TABLET BY MOUTH EVERY EVENING 90 tablet 3    irbesartan (AVAPRO) 300 MG tablet TAKE 1 TABLET BY MOUTH DAILY 90 tablet 3    aspirin 325 MG tablet Take 325 mg by mouth daily      citalopram (CELEXA) 20 MG tablet TAKE 1 TABLET BY MOUTH DAILY 90 tablet 3    primidone (MYSOLINE) 50 MG tablet TAKE 1 TABLET BY MOUTH TWICE DAILY 180 tablet 3    triamterene-hydroCHLOROthiazide (MAXZIDE-25) 37.5-25 MG per tablet Take 1 tablet by mouth daily 90 tablet 1    pantoprazole (PROTONIX) 40 MG tablet Take 40 mg by mouth daily. Carafate [sucralfate], Iv dye [iodides], Lopressor [metoprolol], Motrin [ibuprofen], Ace inhibitors, Flomax [tamsulosin hcl], and Lipitor [atorvastatin]  Social History     Tobacco Use   Smoking Status Never   Smokeless Tobacco Never   Tobacco Comments    never smoked      (If patient a smoker, smoking cessation counseling offered)   Social History     Substance and Sexual Activity   Alcohol Use Never       REVIEW OF SYSTEMS:  Constitutional: negative  Eyes: negative  Respiratory: negative  Cardiovascular: negative  Gastrointestinal: negative  Genitourinary: see HPI  Musculoskeletal: negative  Skin: negative   Neurological: negative  Hematological/Lymphatic: negative  Psychological: negative      Physical Exam:    This a 78 y.o. male  Vitals:    03/06/23 1359   BP: 132/60     Body mass index is 30.86 kg/m². Constitutional: Patient in no acute distress;         Assessment and Plan        1. Recurrent UTI    2. Urinary frequency    3. BPH with obstruction/lower urinary tract symptoms    4. Incomplete bladder emptying               Plan:       BPH- hx of multiple transurethral procedures. Bladder neck contracture- s/p TUIBNC. Auass 6. Prostate cancer screening- psa acceptable for his age    Phimosis- can retract foreskin on exam. Don't recommend circumcision. mycolog PRN prescribed        Reassess in six months w pvr        Prescriptions Ordered:  No orders of the defined types were placed in this encounter.        Orders Placed:  Orders Placed This Encounter   Procedures POCT Urinalysis No Micro (Auto)    poct post void residual     Bladder scan    POCT Urinalysis No Micro (Auto)     Ordered by an unspecified provider              Supa Arrieta MD

## 2023-04-10 RX ORDER — MIRABEGRON 50 MG/1
TABLET, FILM COATED, EXTENDED RELEASE ORAL
Qty: 30 TABLET | Refills: 3 | OUTPATIENT
Start: 2023-04-10

## 2023-06-15 DIAGNOSIS — R06.02 SHORTNESS OF BREATH: ICD-10-CM

## 2023-06-15 DIAGNOSIS — F41.9 ANXIETY: ICD-10-CM

## 2023-06-15 DIAGNOSIS — G25.0 BENIGN ESSENTIAL TREMOR: ICD-10-CM

## 2023-06-15 RX ORDER — PRIMIDONE 50 MG/1
TABLET ORAL
Qty: 180 TABLET | Refills: 3 | Status: SHIPPED | OUTPATIENT
Start: 2023-06-15

## 2023-06-15 RX ORDER — CITALOPRAM 20 MG/1
20 TABLET ORAL DAILY
Qty: 90 TABLET | Refills: 3 | Status: SHIPPED | OUTPATIENT
Start: 2023-06-15

## 2023-07-03 ENCOUNTER — HOSPITAL ENCOUNTER (EMERGENCY)
Age: 83
Discharge: HOME | End: 2023-07-03
Payer: MEDICARE

## 2023-07-03 ENCOUNTER — APPOINTMENT (OUTPATIENT)
Dept: GENERAL RADIOLOGY | Age: 83
End: 2023-07-03
Payer: MEDICARE

## 2023-07-03 ENCOUNTER — APPOINTMENT (OUTPATIENT)
Dept: CT IMAGING | Age: 83
End: 2023-07-03
Payer: MEDICARE

## 2023-07-03 VITALS
DIASTOLIC BLOOD PRESSURE: 50 MMHG | OXYGEN SATURATION: 97 % | HEART RATE: 55 BPM | TEMPERATURE: 98.1 F | RESPIRATION RATE: 17 BRPM | SYSTOLIC BLOOD PRESSURE: 150 MMHG

## 2023-07-03 DIAGNOSIS — S39.012A LUMBOSACRAL STRAIN, INITIAL ENCOUNTER: ICD-10-CM

## 2023-07-03 DIAGNOSIS — M62.838 SPASM OF MUSCLE: Primary | ICD-10-CM

## 2023-07-03 LAB
AMORPH SED URNS QL MICRO: ABNORMAL
BACTERIA URNS QL MICRO: ABNORMAL /HPF
BILIRUB UR QL STRIP.AUTO: NEGATIVE
CASTS #/AREA URNS LPF: ABNORMAL /LPF
CASTS 2: ABNORMAL /LPF
CHARACTER UR: ABNORMAL
COLOR: YELLOW
CRYSTALS URNS MICRO: ABNORMAL
EPITHELIAL CELLS, UA: ABNORMAL /HPF
GLUCOSE UR QL STRIP.AUTO: NEGATIVE MG/DL
HGB UR QL STRIP.AUTO: ABNORMAL
KETONES UR QL STRIP.AUTO: NEGATIVE
MISCELLANEOUS 2: ABNORMAL
MUCOUS THREADS URNS QL MICRO: ABNORMAL
NITRITE UR QL STRIP: NEGATIVE
PH UR STRIP.AUTO: 6 [PH] (ref 5–9)
PROT UR STRIP.AUTO-MCNC: 30 MG/DL
RBC URINE: ABNORMAL /HPF
RENAL EPI CELLS #/AREA URNS HPF: ABNORMAL /[HPF]
SP GR UR REFRACT.AUTO: 1.02 (ref 1–1.03)
UROBILINOGEN, URINE: 0.2 EU/DL (ref 0–1)
WBC #/AREA URNS HPF: ABNORMAL /HPF
WBC #/AREA URNS HPF: ABNORMAL /[HPF]
YEAST LIKE FUNGI URNS QL MICRO: ABNORMAL

## 2023-07-03 PROCEDURE — 6370000000 HC RX 637 (ALT 250 FOR IP): Performed by: NURSE PRACTITIONER

## 2023-07-03 PROCEDURE — 72100 X-RAY EXAM L-S SPINE 2/3 VWS: CPT

## 2023-07-03 PROCEDURE — 81001 URINALYSIS AUTO W/SCOPE: CPT

## 2023-07-03 PROCEDURE — 99284 EMERGENCY DEPT VISIT MOD MDM: CPT

## 2023-07-03 PROCEDURE — 74176 CT ABD & PELVIS W/O CONTRAST: CPT

## 2023-07-03 RX ORDER — LIDOCAINE 4 G/G
1 PATCH TOPICAL ONCE
Status: DISCONTINUED | OUTPATIENT
Start: 2023-07-03 | End: 2023-07-03 | Stop reason: HOSPADM

## 2023-07-03 RX ORDER — HYDROCODONE BITARTRATE AND ACETAMINOPHEN 5; 325 MG/1; MG/1
1 TABLET ORAL EVERY 6 HOURS PRN
Qty: 10 TABLET | Refills: 0 | Status: SHIPPED | OUTPATIENT
Start: 2023-07-03 | End: 2023-07-06

## 2023-07-03 RX ORDER — CYCLOBENZAPRINE HCL 10 MG
10 TABLET ORAL 3 TIMES DAILY PRN
Qty: 30 TABLET | Refills: 0 | Status: SHIPPED | OUTPATIENT
Start: 2023-07-03 | End: 2023-07-13

## 2023-07-03 RX ORDER — CYCLOBENZAPRINE HCL 10 MG
10 TABLET ORAL ONCE
Status: COMPLETED | OUTPATIENT
Start: 2023-07-03 | End: 2023-07-03

## 2023-07-03 RX ORDER — HYDROCODONE BITARTRATE AND ACETAMINOPHEN 5; 325 MG/1; MG/1
1 TABLET ORAL ONCE
Status: COMPLETED | OUTPATIENT
Start: 2023-07-03 | End: 2023-07-03

## 2023-07-03 RX ADMIN — HYDROCODONE BITARTRATE AND ACETAMINOPHEN 1 TABLET: 5; 325 TABLET ORAL at 16:08

## 2023-07-03 RX ADMIN — CYCLOBENZAPRINE 10 MG: 10 TABLET, FILM COATED ORAL at 15:05

## 2023-07-03 ASSESSMENT — PAIN DESCRIPTION - LOCATION: LOCATION: BACK

## 2023-07-03 ASSESSMENT — PAIN - FUNCTIONAL ASSESSMENT
PAIN_FUNCTIONAL_ASSESSMENT: 0-10
PAIN_FUNCTIONAL_ASSESSMENT: 0-10

## 2023-07-03 ASSESSMENT — PAIN DESCRIPTION - FREQUENCY: FREQUENCY: CONTINUOUS

## 2023-07-03 ASSESSMENT — PAIN DESCRIPTION - ONSET: ONSET: ON-GOING

## 2023-07-03 ASSESSMENT — PAIN DESCRIPTION - ORIENTATION: ORIENTATION: RIGHT

## 2023-07-03 ASSESSMENT — PAIN SCALES - GENERAL
PAINLEVEL_OUTOF10: 6
PAINLEVEL_OUTOF10: 7

## 2023-07-03 ASSESSMENT — PAIN DESCRIPTION - PAIN TYPE: TYPE: ACUTE PAIN

## 2023-07-03 NOTE — ED TRIAGE NOTES
Pt presents to the ED with c/o RT lower back pain that started last night while trying to get into bed. Pt denies frequency, dysuria, hematuria. Pt denies this feeling like previous kidney stones or UTIs.

## 2023-07-03 NOTE — DISCHARGE INSTRUCTIONS
Drink plenty of water. Gentle stretches. Use pain medications as directed. Return if symptoms worsen.

## 2023-07-04 NOTE — ED PROVIDER NOTES
[address] [facility] EMERGENCY DEPT      EMERGENCY MEDICINE     Pt Name: Verena Hagen  MRN: [id number]  [address] [facility] 1940  Date of evaluation: 7/3/2023  Provider: ALVINO Carroll CNP    CHIEF COMPLAINT       Chief Complaint   Patient presents with    Back Pain     HISTORY OF PRESENT ILLNESS   Verena Hagen is a pleasant 78 y.o. male who presents to the emergency department from home with c/o right sided back/flank pain. The patient states he was sitting on the edge of the bed and went to lay back and kick his feet up and the pain started. It is intermittent and feels like a spasm. Denies change in urinary symptoms.         History is obtained from:  patient  PASTMEDICAL HISTORY     Past Medical History:   Diagnosis Date    Anemia     Arthritis     Bronchiolitis 11/2016    Cancer [facility])     SKIN CANCER    CKD (chronic kidney disease), stage III ([address] )     Diverticula of colon 05/2014    tx with antibiotics per pt; NO surgery    Diverticulosis     DVT (deep venous thrombosis) ([facility])     s/p    Esophagitis 12/10/2014    Hiatal hernia     [place] (hard of hearing)     not able to hear out of rt ear    HTN (hypertension)     Hyperlipidemia     Kidney stones     Nephrolithiasis     Obesity     Prostate enlargement     benign    Renal insufficiency        Patient Active Problem List   Diagnosis Code    CKD (chronic kidney disease), stage III ([address]) N18.30    Hyperlipidemia E78.5    Prostate enlargement N40.0    Anemia D64.9    HTN (hypertension) I10    Hiatal hernia K44.9    Diverticulosis K57.90    Obesity E66.9    Diverticulitis of large intestine with perforation K57.20    Esophagitis K20.90    Generalized abdominal pain R10.84    Diverticulitis of colon K57.32    Angina effort I20.8    Abnormal stress test R94.39    Benign essential tremor G25.0     SURGICAL HISTORY       Past Surgical History:   Procedure Laterality Date    BLEPHAROPLASTY Bilateral 03/2010    CARDIAC CATHETERIZATION  2000,2010    CATARACT REMOVAL

## 2023-07-14 RX ORDER — TRIAMTERENE AND HYDROCHLOROTHIAZIDE 37.5; 25 MG/1; MG/1
1 TABLET ORAL DAILY
Qty: 90 TABLET | Refills: 1 | Status: SHIPPED | OUTPATIENT
Start: 2023-07-14

## 2023-07-14 NOTE — TELEPHONE ENCOUNTER
----- Message from Byron Ramirez sent at 7/14/2023  1:51 PM EDT -----  Regarding: Refill for Triamterene  Contact: [phone]  Kosta Cruz has an appointment with you on July 31 and he needs to have his Iukfmgyfftz73/25 filled before that apt. His apt was rescheduled with you. Dr Beronica Dickens refilled prior, but you decreased the med to one a day from 1 1/2. He is out of refills and since Dr. Beronica Dickens has left [facility], and you are the actual dosing doctor for this med will you send a refill to [facility], [address] [place] [address] ? We need to get that by 10 days before he runs out. Thanks for the refill.

## 2023-07-18 ENCOUNTER — OFFICE VISIT (OUTPATIENT)
Dept: UROLOGY | Age: 83
End: 2023-07-18
Payer: MEDICARE

## 2023-07-18 VITALS — HEIGHT: 69 IN | WEIGHT: 207 LBS | RESPIRATION RATE: 16 BRPM | BODY MASS INDEX: 30.66 KG/M2

## 2023-07-18 DIAGNOSIS — N47.1 PHIMOSIS: Primary | ICD-10-CM

## 2023-07-18 DIAGNOSIS — N35.919 STRICTURE OF MALE URETHRA, UNSPECIFIED STRICTURE TYPE: ICD-10-CM

## 2023-07-18 DIAGNOSIS — N35.819 OTHER URETHRAL STRICTURE, MALE, UNSPECIFIED SITE: ICD-10-CM

## 2023-07-18 DIAGNOSIS — N13.8 BPH WITH OBSTRUCTION/LOWER URINARY TRACT SYMPTOMS: ICD-10-CM

## 2023-07-18 DIAGNOSIS — N40.1 BPH WITH OBSTRUCTION/LOWER URINARY TRACT SYMPTOMS: ICD-10-CM

## 2023-07-18 DIAGNOSIS — N39.0 RECURRENT UTI: ICD-10-CM

## 2023-07-18 DIAGNOSIS — N32.0 BLADDER NECK CONTRACTURE: ICD-10-CM

## 2023-07-18 LAB — POST VOID RESIDUAL (PVR): 74 ML

## 2023-07-18 PROCEDURE — G8427 DOCREV CUR MEDS BY ELIG CLIN: HCPCS | Performed by: UROLOGY

## 2023-07-18 PROCEDURE — 1036F TOBACCO NON-USER: CPT | Performed by: UROLOGY

## 2023-07-18 PROCEDURE — G8417 CALC BMI ABV UP PARAM F/U: HCPCS | Performed by: UROLOGY

## 2023-07-18 PROCEDURE — 51798 US URINE CAPACITY MEASURE: CPT | Performed by: UROLOGY

## 2023-07-18 PROCEDURE — 1123F ACP DISCUSS/DSCN MKR DOCD: CPT | Performed by: UROLOGY

## 2023-07-18 PROCEDURE — 99214 OFFICE O/P EST MOD 30 MIN: CPT | Performed by: UROLOGY

## 2023-07-18 NOTE — PROGRESS NOTES
Hiatal hernia     [place] (hard of hearing)     not able to hear out of rt ear    HTN (hypertension)     Hyperlipidemia     Kidney stones     Nephrolithiasis     Obesity     Prostate enlargement     benign    Renal insufficiency      Past Surgical History:   Procedure Laterality Date    BLEPHAROPLASTY Bilateral 03/2010    CARDIAC CATHETERIZATION  2000,2010    CATARACT REMOVAL WITH IMPLANT Bilateral 2006,2007    COLONOSCOPY  05/26/2017    CYSTOSCOPY N/A 10/27/2022    Cystoscopy Urethral Dilation performed by Clair Sousa MD at [address] [facility] N/A 12/1/2022    [address] performed by Clair Sousa MD at [address] [facility], COLON, [address] Bilateral [facility]  Feb 2015    top of head    TONSILLECTOMY      TURP  2008,2008    TURP N/A 4/2/2022    CYSTOSCOPY GREENLIGHT PHOTOVAPORIZATION OF THE PROSTATE WITH URETHERAL DILATION performed by Clair Sousa MD at [facility]       Family History   Problem Relation Age of Onset    Diabetes Mother     High Blood Pressure Father     Heart Disease Father      Outpatient Medications Marked as Taking for the 7/18/23 encounter (Office Visit) with Clair Sousa MD   Medication Sig Dispense Refill    triamterene-hydroCHLOROthiazide (MAXZIDE-25) 37.5-25 MG per tablet Take 1 tablet by mouth daily 90 tablet 1    citalopram (CELEXA) 20 MG tablet TAKE 1 TABLET BY MOUTH DAILY 90 tablet 3    primidone (MYSOLINE) 50 MG tablet TAKE 1 TABLET BY MOUTH TWICE DAILY 180 tablet 3    nystatin-triamcinolone (MYCOLOG) 684615-1.1 UNIT/GM-% ointment Apply topically 2 times daily.  1 each 1    amLODIPine (NORVASC) 10 MG tablet TAKE 1 TABLET BY MOUTH DAILY 90 tablet 3    hydrALAZINE (APRESOLINE) 25 MG tablet Take 1 tablet by mouth in the morning and at bedtime 180 tablet 3    pravastatin (PRAVACHOL) 40 MG tablet TAKE 1 TABLET BY MOUTH

## 2023-07-24 ENCOUNTER — NURSE ONLY (OUTPATIENT)
Dept: LAB | Age: 83
End: 2023-07-24

## 2023-07-24 DIAGNOSIS — I12.9 HYPERTENSIVE RENAL DISEASE, STAGE 1 THROUGH STAGE 4 OR UNSPECIFIED CHRONIC KIDNEY DISEASE: ICD-10-CM

## 2023-07-24 DIAGNOSIS — N18.32 CHRONIC KIDNEY DISEASE, STAGE 3B (HCC): ICD-10-CM

## 2023-07-24 DIAGNOSIS — N28.1 RENAL CYST: ICD-10-CM

## 2023-07-24 LAB
ANION GAP SERPL CALC-SCNC: 10 MEQ/L (ref 8–16)
BACTERIA: ABNORMAL
BILIRUB UR QL STRIP: NEGATIVE
BUN SERPL-MCNC: 29 MG/DL (ref 7–22)
CALCIUM SERPL-MCNC: 9.5 MG/DL (ref 8.5–10.5)
CASTS #/AREA URNS LPF: ABNORMAL /LPF
CASTS #/AREA URNS LPF: ABNORMAL /LPF
CHARACTER UR: ABNORMAL
CHARCOAL URNS QL MICRO: ABNORMAL
CHLORIDE SERPL-SCNC: 103 MEQ/L (ref 98–111)
CO2 SERPL-SCNC: 25 MEQ/L (ref 23–33)
COLOR UR: YELLOW
CREAT SERPL-MCNC: 1.7 MG/DL (ref 0.4–1.2)
CREAT UR-MCNC: 229.8 MG/DL
CRYSTALS URNS QL MICRO: ABNORMAL
EPITHELIAL CELLS, UA: ABNORMAL /HPF
FOLATE SERPL-MCNC: 8.4 NG/ML (ref 4.8–24.2)
GFR SERPL CREATININE-BSD FRML MDRD: 39 ML/MIN/1.73M2
GLUCOSE SERPL-MCNC: 107 MG/DL (ref 70–108)
GLUCOSE UR QL STRIP.AUTO: NEGATIVE MG/DL
HGB UR QL STRIP.AUTO: NEGATIVE
KETONES UR QL STRIP.AUTO: NEGATIVE
LEUKOCYTE ESTERASE UR QL STRIP.AUTO: ABNORMAL
NITRITE UR QL STRIP.AUTO: NEGATIVE
PH UR STRIP.AUTO: 6.5 [PH] (ref 5–9)
POTASSIUM SERPL-SCNC: 4.1 MEQ/L (ref 3.5–5.2)
PROT UR STRIP.AUTO-MCNC: 30 MG/DL
PROT UR-MCNC: 49.7 MG/DL
PROT/CREAT 24H UR: 0.22 MG/G{CREAT}
RBC #/AREA URNS HPF: ABNORMAL /HPF
RENAL EPI CELLS #/AREA URNS HPF: ABNORMAL /[HPF]
SODIUM SERPL-SCNC: 138 MEQ/L (ref 135–145)
SP GR UR REFRACT.AUTO: 1.02 (ref 1–1.03)
UROBILINOGEN UR QL STRIP.AUTO: 0.2 EU/DL (ref 0–1)
WBC #/AREA URNS HPF: ABNORMAL /HPF
YEAST LIKE FUNGI URNS QL MICRO: ABNORMAL

## 2023-07-25 ENCOUNTER — TELEPHONE (OUTPATIENT)
Dept: UROLOGY | Age: 83
End: 2023-07-25

## 2023-07-25 DIAGNOSIS — I10 PRIMARY HYPERTENSION: ICD-10-CM

## 2023-07-25 DIAGNOSIS — Z01.818 PRE-OP TESTING: Primary | ICD-10-CM

## 2023-07-25 DIAGNOSIS — N47.1 PHIMOSIS: Primary | ICD-10-CM

## 2023-07-25 DIAGNOSIS — N39.0 RECURRENT UTI: ICD-10-CM

## 2023-07-25 DIAGNOSIS — Z01.818 PRE-OP TESTING: ICD-10-CM

## 2023-07-25 DIAGNOSIS — E78.00 PURE HYPERCHOLESTEROLEMIA: ICD-10-CM

## 2023-07-25 DIAGNOSIS — R06.02 SOB (SHORTNESS OF BREATH): ICD-10-CM

## 2023-07-25 NOTE — TELEPHONE ENCOUNTER
Circumcision with Dr. Kamryn Mariscal on 8/25/2023. Pre op Risk Assessment        Last OV 10/6/22  Last Stress 1/26/18  Last Echo 6/18/21  Last Cath 2/26/18  Last Stent none in chart   Is patient on blood thinners asa  Hold Meds/how many days  ? ?

## 2023-07-25 NOTE — TELEPHONE ENCOUNTER
DO NOT TAKE  FISH OIL, MOBIC, IBUPROFEN, MOTRIN-LIKE DRUGS AND ANY MULTIVITAMINS OR OVER THE COUNTER SUPPLEMENTS 14 DAYS PRIOR TO SURGERY. HOLD THE ASPIRIN 5 DAYS PRIOR TO THE SURGERY; UNLESS OTHERWISE DIRECTED BY DR Jack Sep    MUST HAVE AN ADULT OVER THE AGE OF 18 WITH YOU AT THE TIME OF THE DISCHARGE AND WITH YOU AT HOME AFTER THE PROCEDURE FOR 24 HOURS        Lori Lao 1940 Diagnosis:     Surgical Physician: Dr. Hallie Nageotte have been scheduled for the procedure marked below:      Surgery: Circumcision         Date: 8/25/2023     Anesthesia: Anesthesiologist ([address] [place] [address])     Place of Service: [address] Second Floor Same Day Surgery         Arrive to same day surgery at:  8:30am  (Surgery time is subject to change)      INSTRUCTIONS AS MARKED BELOW:    1.  DO NOT eat or drink anything after midnight before surgery. 2.  We prefer you shower or bathe with an antibacterial soap (Dial) the morning of surgery. 3  Please bring a current medication list, photo ID and insurance card(s) with you  4. Okay to take Tylenol  5. If you take Glucophage, Metformin or Janumet, hold 48-hours prior to surgery  6  Take blood pressure or heart medication as directed, if taken in the morning take with a small sip of water  7. The office will call you in 1-2 days after your procedure to schedule a follow up. DATE SENSITIVE TESTING-DO ON THE DATE LISTED *WALK IN *NO APPOINTMENT    DO EKG NOW; ORDERS INCLUDED. DO URINE CULTURE AND FASTING LABS ON 8/11/2023; ORDERS INCLUDED.       Date: 7/25/2023

## 2023-07-25 NOTE — TELEPHONE ENCOUNTER
Patient scheduled with Dr. Susu Naqvi on 8/25/2023. Surgery consent to be done upon arrival.  Dr. Jeffries [place] to clear. Patient to do EKG now; do urine culture and fasting labs on 8/11/2023; orders mailed to patient. Surgery instructions mailed to patient. Patient will have an adult over the age of 25 with them at discharge and 24 hours after procedure.

## 2023-07-25 NOTE — TELEPHONE ENCOUNTER
SURGERY [address] [facility] [address] [facility], [address] [facility]      Phone *[phone]   Surgical Scheduling Direct Line Phone *[phone] Fax *[phone]      Karyn Newell 1940 male    [address] [facility]  [address] [place] [address] Avenue   Marital Status:          Home Phone: [phone]      Cell Phone:    Telephone Information:   Mobile [phone]          Surgeon: Dr. Tammy Shoemaker Surgery Date: 8/25/2023   Time: 10:30am    Procedure: Circumcision    Diagnosis: Phimosis     Important Medical History:  In Epic    Special Inst/Equip: Regular    CPT Codes:    84963  Latex Allergy: No     Cardiac Device:  No    Anesthesia:  General          Admission Type:  Same Day                        Admit Prior to Day of Surgery: No    Case Location:  Main OR            Preadmission Testing:  Phone Call          PAT Date and Time:______________________________________________________    PAT Confirmation #: ______________________________________________________    Post Op Visit: ___________________________________________________________    Need Preop Cardiac Clearance: Yes    Does Patient have Cardiologist/physician?      Dr. Debo Keane    Surgery Confirmation #: __________________________________________________    Hawley Petite: ________________________   Date: __________________________     [employer] Name: Medicare

## 2023-07-25 NOTE — TELEPHONE ENCOUNTER
Patient scheduled for Circumcision with Dr. Sonal Lawson on 8/25/2023. We are requesting clearance and direction on holding 325mg aspirin from Dr. Shannan Summers. Thank you.

## 2023-07-28 ENCOUNTER — HOSPITAL ENCOUNTER (OUTPATIENT)
Age: 83
Discharge: HOME | End: 2023-07-28
Payer: MEDICARE

## 2023-07-28 DIAGNOSIS — Z01.818 PRE-OP TESTING: ICD-10-CM

## 2023-07-28 DIAGNOSIS — N47.1 PHIMOSIS: ICD-10-CM

## 2023-07-28 LAB
EKG ATRIAL RATE: 60 BPM
EKG P AXIS: 52 DEGREES
EKG P-R INTERVAL: 224 MS
EKG Q-T INTERVAL: 474 MS
EKG QRS DURATION: 86 MS
EKG QTC CALCULATION (BAZETT): 474 MS
EKG R AXIS: -25 DEGREES
EKG T AXIS: 59 DEGREES
EKG VENTRICULAR RATE: 60 BPM

## 2023-07-28 PROCEDURE — 93005 ELECTROCARDIOGRAM TRACING: CPT

## 2023-07-31 ENCOUNTER — OFFICE VISIT (OUTPATIENT)
Dept: NEPHROLOGY | Age: 83
End: 2023-07-31
Payer: MEDICARE

## 2023-07-31 VITALS
SYSTOLIC BLOOD PRESSURE: 152 MMHG | BODY MASS INDEX: 30.27 KG/M2 | DIASTOLIC BLOOD PRESSURE: 70 MMHG | HEART RATE: 60 BPM | WEIGHT: 205 LBS | OXYGEN SATURATION: 98 %

## 2023-07-31 DIAGNOSIS — N28.1 RENAL CYST: ICD-10-CM

## 2023-07-31 DIAGNOSIS — N18.31 CHRONIC KIDNEY DISEASE, STAGE 3A (HCC): Primary | ICD-10-CM

## 2023-07-31 DIAGNOSIS — I10 ESSENTIAL HYPERTENSION: ICD-10-CM

## 2023-07-31 PROBLEM — F33.9 MAJOR DEPRESSIVE DISORDER, RECURRENT, UNSPECIFIED (HCC): Status: ACTIVE | Noted: 2023-07-31

## 2023-07-31 PROBLEM — F33.0 MAJOR DEPRESSIVE DISORDER, RECURRENT, MILD (HCC): Status: ACTIVE | Noted: 2023-07-31

## 2023-07-31 PROBLEM — F33.1 MAJOR DEPRESSIVE DISORDER, RECURRENT, MODERATE (HCC): Status: ACTIVE | Noted: 2023-07-31

## 2023-07-31 PROCEDURE — G8427 DOCREV CUR MEDS BY ELIG CLIN: HCPCS | Performed by: INTERNAL MEDICINE

## 2023-07-31 PROCEDURE — 3077F SYST BP >= 140 MM HG: CPT | Performed by: INTERNAL MEDICINE

## 2023-07-31 PROCEDURE — 99213 OFFICE O/P EST LOW 20 MIN: CPT | Performed by: INTERNAL MEDICINE

## 2023-07-31 PROCEDURE — G8417 CALC BMI ABV UP PARAM F/U: HCPCS | Performed by: INTERNAL MEDICINE

## 2023-07-31 PROCEDURE — 1123F ACP DISCUSS/DSCN MKR DOCD: CPT | Performed by: INTERNAL MEDICINE

## 2023-07-31 PROCEDURE — 1036F TOBACCO NON-USER: CPT | Performed by: INTERNAL MEDICINE

## 2023-07-31 PROCEDURE — 3078F DIAST BP <80 MM HG: CPT | Performed by: INTERNAL MEDICINE

## 2023-07-31 NOTE — PROGRESS NOTES
[facility]  [address] [facility]  [address]  [facility] [address]  Dept: [phone]  Loc: [phone]  Office Progress Note  7/31/2023 8:55 AM      Pt Name:    Lori Lao  YOB: 1940  Primary Care Physician:  Allegra Liz, APRN - CNP     Chief Complaint:   Chief Complaint   Patient presents with    Chronic Kidney Disease     Stage 3        Background Information/Interval History:   77 yo white male with hx HTN, CKD who is here for 6 months follow-up. He was seeing Dr. Nita Sanders in the past and now seeing me. He says BP is usually high at home. He has BPH s/p TURP and underwent green light and dilation by urology. He reports \"leaky valves. \"     Seeing urology for prostate problems. Says BP is usually in 150 range at doctors office but at home staying in 130s. No leg swelling.       Past History:  Past Medical History:   Diagnosis Date    Anemia     Arthritis     Bronchiolitis 11/2016    Cancer [facility])     SKIN CANCER    CKD (chronic kidney disease), stage III ([address])     Diverticula of colon 05/2014    tx with antibiotics per pt; NO surgery    Diverticulosis     DVT (deep venous thrombosis) ([facility])     s/p    Esophagitis 12/10/2014    Hiatal hernia     [place] (hard of hearing)     not able to hear out of rt ear    HTN (hypertension)     Hyperlipidemia     Kidney stones     Nephrolithiasis     Obesity     Prostate enlargement     benign    Renal insufficiency      Past Surgical History:   Procedure Laterality Date    BLEPHAROPLASTY Bilateral 03/2010    CARDIAC CATHETERIZATION  2000,2010    CATARACT REMOVAL WITH IMPLANT Bilateral 2006,2007    COLONOSCOPY  05/26/2017    CYSTOSCOPY N/A 10/27/2022    Cystoscopy Urethral Dilation performed by Alia Biggs MD at [facility] N/A 12/1/2022    [address] performed by Alia Biggs MD at [address] [facility]

## 2023-08-04 ENCOUNTER — PREP FOR PROCEDURE (OUTPATIENT)
Dept: UROLOGY | Age: 83
End: 2023-08-04

## 2023-08-05 RX ORDER — SODIUM CHLORIDE 0.9 % (FLUSH) 0.9 %
5-40 SYRINGE (ML) INJECTION PRN
Status: CANCELLED | OUTPATIENT
Start: 2023-08-05

## 2023-08-05 RX ORDER — SODIUM CHLORIDE 9 MG/ML
INJECTION, SOLUTION INTRAVENOUS PRN
Status: CANCELLED | OUTPATIENT
Start: 2023-08-05

## 2023-08-05 RX ORDER — SODIUM CHLORIDE 0.9 % (FLUSH) 0.9 %
5-40 SYRINGE (ML) INJECTION EVERY 12 HOURS SCHEDULED
Status: CANCELLED | OUTPATIENT
Start: 2023-08-05

## 2023-08-08 ENCOUNTER — TELEPHONE (OUTPATIENT)
Dept: CARDIOLOGY CLINIC | Age: 83
End: 2023-08-08

## 2023-08-08 DIAGNOSIS — E78.01 FAMILIAL HYPERCHOLESTEROLEMIA: ICD-10-CM

## 2023-08-08 DIAGNOSIS — I10 PRIMARY HYPERTENSION: ICD-10-CM

## 2023-08-08 DIAGNOSIS — Z01.818 PRE-OP EXAM: Primary | ICD-10-CM

## 2023-08-08 DIAGNOSIS — I25.10 CORONARY ARTERY DISEASE INVOLVING NATIVE CORONARY ARTERY OF NATIVE HEART WITHOUT ANGINA PECTORIS: ICD-10-CM

## 2023-08-08 NOTE — TELEPHONE ENCOUNTER
August 6, 2023  Sandhya Garrett MD  to [facility] Clinical Staff       3:23 PM  Note     Okay. Ying and echo       From Cardiac Clearance encounter from 7/25/23 under Urology. Orders placed and brought to scheduling.

## 2023-08-08 NOTE — TELEPHONE ENCOUNTER
Call to pt, stress test and echo scheduled 08-14-23  Date, time and instructions reviewed and emailed to pt

## 2023-08-14 ENCOUNTER — HOSPITAL ENCOUNTER (OUTPATIENT)
Dept: NON INVASIVE DIAGNOSTICS | Age: 83
Discharge: HOME | End: 2023-08-14
Attending: NUCLEAR MEDICINE
Payer: MEDICARE

## 2023-08-14 DIAGNOSIS — I10 PRIMARY HYPERTENSION: ICD-10-CM

## 2023-08-14 DIAGNOSIS — I25.10 CORONARY ARTERY DISEASE INVOLVING NATIVE CORONARY ARTERY OF NATIVE HEART WITHOUT ANGINA PECTORIS: ICD-10-CM

## 2023-08-14 DIAGNOSIS — E78.01 FAMILIAL HYPERCHOLESTEROLEMIA: ICD-10-CM

## 2023-08-14 DIAGNOSIS — Z01.818 PRE-OP EXAM: ICD-10-CM

## 2023-08-14 LAB
LV EF: 55 %
LVEF MODALITY: NORMAL

## 2023-08-14 PROCEDURE — A9500 TC99M SESTAMIBI: HCPCS | Performed by: NUCLEAR MEDICINE

## 2023-08-14 PROCEDURE — 3430000000 HC RX DIAGNOSTIC RADIOPHARMACEUTICAL: Performed by: NUCLEAR MEDICINE

## 2023-08-14 PROCEDURE — 78452 HT MUSCLE IMAGE SPECT MULT: CPT | Performed by: NUCLEAR MEDICINE

## 2023-08-14 PROCEDURE — 6360000002 HC RX W HCPCS

## 2023-08-14 PROCEDURE — 93017 CV STRESS TEST TRACING ONLY: CPT | Performed by: NUCLEAR MEDICINE

## 2023-08-14 PROCEDURE — 93306 TTE W/DOPPLER COMPLETE: CPT

## 2023-08-14 RX ORDER — TETRAKIS(2-METHOXYISOBUTYLISOCYANIDE)COPPER(I) TETRAFLUOROBORATE 1 MG/ML
30.7 INJECTION, POWDER, LYOPHILIZED, FOR SOLUTION INTRAVENOUS
Status: COMPLETED | OUTPATIENT
Start: 2023-08-14 | End: 2023-08-14

## 2023-08-14 RX ORDER — TETRAKIS(2-METHOXYISOBUTYLISOCYANIDE)COPPER(I) TETRAFLUOROBORATE 1 MG/ML
10 INJECTION, POWDER, LYOPHILIZED, FOR SOLUTION INTRAVENOUS
Status: COMPLETED | OUTPATIENT
Start: 2023-08-14 | End: 2023-08-14

## 2023-08-14 RX ADMIN — Medication 30.7 MILLICURIE: at 10:58

## 2023-08-14 RX ADMIN — Medication 10 MILLICURIE: at 09:45

## 2023-08-15 ENCOUNTER — TELEPHONE (OUTPATIENT)
Dept: CARDIOLOGY CLINIC | Age: 83
End: 2023-08-15

## 2023-08-15 ENCOUNTER — TELEPHONE (OUTPATIENT)
Dept: FAMILY MEDICINE CLINIC | Age: 83
End: 2023-08-15

## 2023-08-15 DIAGNOSIS — M54.50 ACUTE MIDLINE LOW BACK PAIN, UNSPECIFIED WHETHER SCIATICA PRESENT: Primary | ICD-10-CM

## 2023-08-15 NOTE — TELEPHONE ENCOUNTER
Pt experiences stabbing pain in his back when he goes from sitting to standing, this will then make him walk \"bent over\". The stabbing pain is not consistent, pt is wanting an x-ray prior to seeing his chiropractor.     Please advise

## 2023-08-15 NOTE — TELEPHONE ENCOUNTER
----- Message from Tony Agosto sent at 8/15/2023  1:24 PM EDT -----  Subject: Referral Request    Reason for referral request? Lower back xray request   Provider patient wants to be referred to(if known):     Provider Phone Number(if known): Additional Information for Provider?  Patient would like if clinical staff   could call back with questions and concerns he have  ---------------------------------------------------------------------------  --------------  [address]    [phone]; OK to leave message on voicemail  ---------------------------------------------------------------------------  --------------

## 2023-08-15 NOTE — TELEPHONE ENCOUNTER
Pt needs clearance for circumcision with Dr Josue Alvarado completed   Form in Dr Christian olivares

## 2023-08-16 ENCOUNTER — HOSPITAL ENCOUNTER (OUTPATIENT)
Age: 83
Discharge: HOME | End: 2023-08-16
Payer: MEDICARE

## 2023-08-16 ENCOUNTER — HOSPITAL ENCOUNTER (OUTPATIENT)
Dept: GENERAL RADIOLOGY | Age: 83
Discharge: HOME | End: 2023-08-16
Payer: MEDICARE

## 2023-08-16 DIAGNOSIS — N47.1 PHIMOSIS: ICD-10-CM

## 2023-08-16 DIAGNOSIS — M54.50 ACUTE MIDLINE LOW BACK PAIN, UNSPECIFIED WHETHER SCIATICA PRESENT: ICD-10-CM

## 2023-08-16 DIAGNOSIS — Z01.818 PRE-OP TESTING: ICD-10-CM

## 2023-08-16 DIAGNOSIS — N39.0 RECURRENT UTI: ICD-10-CM

## 2023-08-16 LAB
ANION GAP SERPL CALC-SCNC: 10 MEQ/L (ref 8–16)
BASOPHILS ABSOLUTE: 0 THOU/MM3 (ref 0–0.1)
BASOPHILS NFR BLD AUTO: 1 %
BUN SERPL-MCNC: 28 MG/DL (ref 7–22)
CALCIUM SERPL-MCNC: 9.2 MG/DL (ref 8.5–10.5)
CHLORIDE SERPL-SCNC: 103 MEQ/L (ref 98–111)
CO2 SERPL-SCNC: 24 MEQ/L (ref 23–33)
CREAT SERPL-MCNC: 1.5 MG/DL (ref 0.4–1.2)
DEPRECATED RDW RBC AUTO: 46.8 FL (ref 35–45)
EOSINOPHIL NFR BLD AUTO: 7.4 %
EOSINOPHILS ABSOLUTE: 0.3 THOU/MM3 (ref 0–0.4)
ERYTHROCYTE [DISTWIDTH] IN BLOOD BY AUTOMATED COUNT: 13.8 % (ref 11.5–14.5)
GFR SERPL CREATININE-BSD FRML MDRD: 45 ML/MIN/1.73M2
GLUCOSE SERPL-MCNC: 110 MG/DL (ref 70–108)
HCT VFR BLD AUTO: 38.6 % (ref 42–52)
HGB BLD-MCNC: 12.7 GM/DL (ref 14–18)
IMM GRANULOCYTES # BLD AUTO: 0.02 THOU/MM3 (ref 0–0.07)
IMM GRANULOCYTES NFR BLD AUTO: 0.5 %
LYMPHOCYTES ABSOLUTE: 0.8 THOU/MM3 (ref 1–4.8)
LYMPHOCYTES NFR BLD AUTO: 21.1 %
MCH RBC QN AUTO: 30.4 PG (ref 26–33)
MCHC RBC AUTO-ENTMCNC: 32.9 GM/DL (ref 32.2–35.5)
MCV RBC AUTO: 92.3 FL (ref 80–94)
MONOCYTES ABSOLUTE: 0.3 THOU/MM3 (ref 0.4–1.3)
MONOCYTES NFR BLD AUTO: 7.9 %
NEUTROPHILS NFR BLD AUTO: 62.1 %
NRBC BLD AUTO-RTO: 0 /100 WBC
PLATELET # BLD AUTO: 173 THOU/MM3 (ref 130–400)
PMV BLD AUTO: 10.9 FL (ref 9.4–12.4)
POTASSIUM SERPL-SCNC: 4.3 MEQ/L (ref 3.5–5.2)
RBC # BLD AUTO: 4.18 MILL/MM3 (ref 4.7–6.1)
SEGMENTED NEUTROPHILS ABSOLUTE COUNT: 2.4 THOU/MM3 (ref 1.8–7.7)
SODIUM SERPL-SCNC: 137 MEQ/L (ref 135–145)
WBC # BLD AUTO: 3.9 THOU/MM3 (ref 4.8–10.8)

## 2023-08-16 PROCEDURE — 36415 COLL VENOUS BLD VENIPUNCTURE: CPT

## 2023-08-16 PROCEDURE — 85025 COMPLETE CBC W/AUTO DIFF WBC: CPT

## 2023-08-16 PROCEDURE — 87086 URINE CULTURE/COLONY COUNT: CPT

## 2023-08-16 PROCEDURE — 80048 BASIC METABOLIC PNL TOTAL CA: CPT

## 2023-08-16 PROCEDURE — 72100 X-RAY EXAM L-S SPINE 2/3 VWS: CPT

## 2023-08-17 ENCOUNTER — TELEPHONE (OUTPATIENT)
Dept: FAMILY MEDICINE CLINIC | Age: 83
End: 2023-08-17

## 2023-08-17 NOTE — FLOWSHEET NOTE
Follow all instructions given by your physician  NPO after midnight  Sips of water am of surgery with allowed medications  Bring insurance info and 's license  Wear clean comfortable , loose-fitting clothing  No jewelry or contact lenses to be worn day of surgery  No glue on dentures morning of surgery; you will be asked to remove them for surgery. Case for glasses. Shower the night before and the morning of surgery with a liquid antibacterial soap, dry with new fresh clean towel after each shower, no lotions, creams or powder. Clean sheets and pillowcase on bed night before surgery  Bring medications in original bottles  Bring CPAP/BIPAP machine if you have one ( you may be charged if one is needed in recovery room ) no pacemaker no     Our pharmacy has a Meds to [facility] program where they will deliver any new prescriptions you may have to your room before you leave. Our Pharmacy will clear it through your insurance; for example (same co pay). This enables you to take your new RX as soon as you need when you get home and avoids stop/wait delays on the way home. Please have a form of payment with you and have someone designated as your Pharmacy contact with their phone # as you may not feel well or still be under the influence of anesthesia. Please refer to the SSI-Surgical Site Infection Flyer you hopefully received in the mail-together we can prevent infections; signs and symptoms reviewed. When discharged be sure you understand how to care for your wound and that you have the Dr./office phone # to call if you have any concerns or questions about your wound.      needed at discharge and someone over 25 to stay with you for 24 hours overnight (surgery may be cancelled if you don't have this) wife   Report to [facility] on 2nd floor  If you would become ill prior to surgery, please call the surgeon  May have a visitor with you, we request that you limit to 2 visitors in pre-op area  Masks are recommended but

## 2023-08-17 NOTE — TELEPHONE ENCOUNTER
----- Message from ALVINO Suazo CNP sent at 8/16/2023  5:25 PM EDT -----  No change from prior study  Some degenerative changes .

## 2023-08-18 LAB — BACTERIA UR CULT: NORMAL

## 2023-08-25 ENCOUNTER — ANESTHESIA (OUTPATIENT)
Dept: OPERATING ROOM | Age: 83
End: 2023-08-25
Payer: MEDICARE

## 2023-08-25 ENCOUNTER — HOSPITAL ENCOUNTER (OUTPATIENT)
Age: 83
Setting detail: OUTPATIENT SURGERY
Discharge: HOME | End: 2023-08-25
Attending: UROLOGY | Admitting: UROLOGY
Payer: MEDICARE

## 2023-08-25 ENCOUNTER — ANESTHESIA EVENT (OUTPATIENT)
Dept: OPERATING ROOM | Age: 83
End: 2023-08-25
Payer: MEDICARE

## 2023-08-25 VITALS
HEART RATE: 65 BPM | BODY MASS INDEX: 30.36 KG/M2 | DIASTOLIC BLOOD PRESSURE: 58 MMHG | OXYGEN SATURATION: 96 % | TEMPERATURE: 96.7 F | HEIGHT: 69 IN | SYSTOLIC BLOOD PRESSURE: 142 MMHG | WEIGHT: 205 LBS | RESPIRATION RATE: 15 BRPM

## 2023-08-25 DIAGNOSIS — G89.18 POST-OPERATIVE PAIN: Primary | ICD-10-CM

## 2023-08-25 PROCEDURE — 6360000002 HC RX W HCPCS: Performed by: UROLOGY

## 2023-08-25 PROCEDURE — 7100000001 HC PACU RECOVERY - ADDTL 15 MIN: Performed by: UROLOGY

## 2023-08-25 PROCEDURE — 6360000002 HC RX W HCPCS: Performed by: NURSE ANESTHETIST, CERTIFIED REGISTERED

## 2023-08-25 PROCEDURE — 3600000012 HC SURGERY LEVEL 2 ADDTL 15MIN: Performed by: UROLOGY

## 2023-08-25 PROCEDURE — 7100000011 HC PHASE II RECOVERY - ADDTL 15 MIN: Performed by: UROLOGY

## 2023-08-25 PROCEDURE — 3700000000 HC ANESTHESIA ATTENDED CARE: Performed by: UROLOGY

## 2023-08-25 PROCEDURE — 2709999900 HC NON-CHARGEABLE SUPPLY: Performed by: UROLOGY

## 2023-08-25 PROCEDURE — 2580000003 HC RX 258: Performed by: UROLOGY

## 2023-08-25 PROCEDURE — 3700000001 HC ADD 15 MINUTES (ANESTHESIA): Performed by: UROLOGY

## 2023-08-25 PROCEDURE — 7100000010 HC PHASE II RECOVERY - FIRST 15 MIN: Performed by: UROLOGY

## 2023-08-25 PROCEDURE — 3600000002 HC SURGERY LEVEL 2 BASE: Performed by: UROLOGY

## 2023-08-25 PROCEDURE — 7100000000 HC PACU RECOVERY - FIRST 15 MIN: Performed by: UROLOGY

## 2023-08-25 PROCEDURE — 2500000003 HC RX 250 WO HCPCS: Performed by: NURSE ANESTHETIST, CERTIFIED REGISTERED

## 2023-08-25 RX ORDER — FENTANYL CITRATE 50 UG/ML
INJECTION, SOLUTION INTRAMUSCULAR; INTRAVENOUS PRN
Status: DISCONTINUED | OUTPATIENT
Start: 2023-08-25 | End: 2023-08-25 | Stop reason: SDUPTHER

## 2023-08-25 RX ORDER — LIDOCAINE HYDROCHLORIDE 20 MG/ML
INJECTION, SOLUTION INTRAVENOUS PRN
Status: DISCONTINUED | OUTPATIENT
Start: 2023-08-25 | End: 2023-08-25 | Stop reason: SDUPTHER

## 2023-08-25 RX ORDER — CEPHALEXIN 500 MG/1
500 CAPSULE ORAL 2 TIMES DAILY
Qty: 14 CAPSULE | Refills: 0 | Status: SHIPPED | OUTPATIENT
Start: 2023-08-25 | End: 2023-09-01

## 2023-08-25 RX ORDER — GLYCOPYRROLATE 0.2 MG/ML
INJECTION INTRAMUSCULAR; INTRAVENOUS PRN
Status: DISCONTINUED | OUTPATIENT
Start: 2023-08-25 | End: 2023-08-25 | Stop reason: SDUPTHER

## 2023-08-25 RX ORDER — ONDANSETRON 2 MG/ML
INJECTION INTRAMUSCULAR; INTRAVENOUS PRN
Status: DISCONTINUED | OUTPATIENT
Start: 2023-08-25 | End: 2023-08-25 | Stop reason: SDUPTHER

## 2023-08-25 RX ORDER — PROPOFOL 10 MG/ML
INJECTION, EMULSION INTRAVENOUS PRN
Status: DISCONTINUED | OUTPATIENT
Start: 2023-08-25 | End: 2023-08-25 | Stop reason: SDUPTHER

## 2023-08-25 RX ORDER — SODIUM CHLORIDE 0.9 % (FLUSH) 0.9 %
5-40 SYRINGE (ML) INJECTION PRN
Status: DISCONTINUED | OUTPATIENT
Start: 2023-08-25 | End: 2023-08-25 | Stop reason: HOSPADM

## 2023-08-25 RX ORDER — DEXAMETHASONE SODIUM PHOSPHATE 4 MG/ML
INJECTION, SOLUTION INTRA-ARTICULAR; INTRALESIONAL; INTRAMUSCULAR; INTRAVENOUS; SOFT TISSUE PRN
Status: DISCONTINUED | OUTPATIENT
Start: 2023-08-25 | End: 2023-08-25 | Stop reason: SDUPTHER

## 2023-08-25 RX ORDER — HYDROCODONE BITARTRATE AND ACETAMINOPHEN 5; 325 MG/1; MG/1
1 TABLET ORAL EVERY 6 HOURS PRN
Qty: 10 TABLET | Refills: 0 | Status: SHIPPED | OUTPATIENT
Start: 2023-08-25 | End: 2023-08-28

## 2023-08-25 RX ORDER — SODIUM CHLORIDE 9 MG/ML
INJECTION, SOLUTION INTRAVENOUS PRN
Status: DISCONTINUED | OUTPATIENT
Start: 2023-08-25 | End: 2023-08-25 | Stop reason: HOSPADM

## 2023-08-25 RX ORDER — EPHEDRINE SULFATE/0.9% NACL/PF 50 MG/5 ML
SYRINGE (ML) INTRAVENOUS PRN
Status: DISCONTINUED | OUTPATIENT
Start: 2023-08-25 | End: 2023-08-25 | Stop reason: SDUPTHER

## 2023-08-25 RX ORDER — SODIUM CHLORIDE 0.9 % (FLUSH) 0.9 %
5-40 SYRINGE (ML) INJECTION EVERY 12 HOURS SCHEDULED
Status: DISCONTINUED | OUTPATIENT
Start: 2023-08-25 | End: 2023-08-25 | Stop reason: HOSPADM

## 2023-08-25 RX ADMIN — GLYCOPYRROLATE 0.2 MG: 0.2 INJECTION INTRAMUSCULAR; INTRAVENOUS at 09:27

## 2023-08-25 RX ADMIN — FENTANYL CITRATE 25 MCG: 50 INJECTION, SOLUTION INTRAMUSCULAR; INTRAVENOUS at 09:12

## 2023-08-25 RX ADMIN — Medication 10 MG: at 09:32

## 2023-08-25 RX ADMIN — SODIUM CHLORIDE: 9 INJECTION, SOLUTION INTRAVENOUS at 08:39

## 2023-08-25 RX ADMIN — Medication 2000 MG: at 09:15

## 2023-08-25 RX ADMIN — FENTANYL CITRATE 50 MCG: 50 INJECTION, SOLUTION INTRAMUSCULAR; INTRAVENOUS at 09:41

## 2023-08-25 RX ADMIN — ONDANSETRON 4 MG: 2 INJECTION INTRAMUSCULAR; INTRAVENOUS at 10:00

## 2023-08-25 RX ADMIN — PROPOFOL 170 MG: 10 INJECTION, EMULSION INTRAVENOUS at 09:12

## 2023-08-25 RX ADMIN — LIDOCAINE HYDROCHLORIDE 60 MG: 20 INJECTION, SOLUTION INTRAVENOUS at 09:12

## 2023-08-25 RX ADMIN — FENTANYL CITRATE 25 MCG: 50 INJECTION, SOLUTION INTRAMUSCULAR; INTRAVENOUS at 09:48

## 2023-08-25 RX ADMIN — DEXAMETHASONE SODIUM PHOSPHATE 8 MG: 4 INJECTION, SOLUTION INTRAMUSCULAR; INTRAVENOUS at 09:15

## 2023-08-25 ASSESSMENT — PAIN SCALES - GENERAL: PAINLEVEL_OUTOF10: 0

## 2023-08-25 ASSESSMENT — PAIN - FUNCTIONAL ASSESSMENT: PAIN_FUNCTIONAL_ASSESSMENT: 0-10

## 2023-08-25 NOTE — H&P
Barabra Negron MD  History and Physical    Patient:  Von Cook  MRN: [id number]  YOB: 1940    HISTORY OF PRESENT ILLNESS:     The patient is a 78 y.o. male who presents with phimosis. Here for procedure. Patient's old records, notes and chart reviewed and summarized above. Barbara Negron MD independently reviewed the images and verified the radiology reports from:    XR LUMBAR SPINE (2-3 VIEWS)    Result Date: 8/16/2023  LUMBAR SPINE 2 VIEWS: CLINICAL INFORMATION: Acute midline low back pain, unspecified whether sciatica present COMPARISON: 7/3/2023 TECHNIQUE: Standard AP and lateral views of lumbar spine were obtained. 1. Moderate vertebral body spondylosis. Large bulky bridging osteophyte L3-4 anteriorly and a smaller bulky bridging osteophyte T12/L1 anteriorly. 2. No fracture seen. Disc spaces well-maintained. Moderate degenerative facet arthropathy lower 2 lumbar levels. Partial fusion right sacroiliac joint. 3. No acute findings. No change from prior study. **This report has been created using voice recognition software. It may contain minor errors which are inherent in voice recognition technology. ** Final report electronically signed by Dr. Nicole Dewitt on 8/16/2023 1:08 PM        Past Medical History:    Past Medical History:   Diagnosis Date    Anemia     Arthritis     Bronchiolitis 11/2016    Cancer [facility])     SKIN CANCER    CKD (chronic kidney disease), stage III ([address])     Diverticula of colon 05/2014    tx with antibiotics per pt; NO surgery    Diverticulosis     DVT (deep venous thrombosis) ([address])     s/p    Esophagitis 12/10/2014    Hiatal hernia     [place] (hard of hearing)     not able to hear out of rt ear    HTN (hypertension)     Hyperlipidemia     Kidney stones     Nephrolithiasis     Obesity     Prostate enlargement     benign    Renal insufficiency     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 Constitutional: Patient in no acute distress; Neuro: alert and oriented to person place and time. Psych: Mood and affect normal.  Skin: Normal  Lungs: Respiratory effort normal, CTA  Cardiovascular:  Normal peripheral pulses; no murmur. Normal rhythm  Abdomen: Soft, non-tender, non-distended with no CVA, flank pain, hepatosplenomegaly or hernia. Kidneys normal.  Bladder non-tender and not distended. LABS:   No results for input(s): WBC, HGB, HCT, MCV, PLT in the last 72 hours. No results for input(s): NA, K, CL, CO2, PHOS, BUN, CREATININE, CA in the last 72 hours. Lab Results   Component Value Date    PSA 3.81 (H) 11/23/2021    PSA 2.82 (H) 11/20/2020    PSA 2.97 (H) 11/20/2019         Urinalysis: No results for input(s): COLORU, PHUR, LABCAST, WBCUA, RBCUA, MUCUS, TRICHOMONAS, YEAST, BACTERIA, CLARITYU, SPECGRAV, LEUKOCYTESUR, UROBILINOGEN, BILIRUBINUR, BLOODU in the last 72 hours.     Invalid input(s): NITRATE, GLUCOSEUKETONESUAMORPHOUS     -----------------------------------------------------------------      Assessment and Plan     Impression:    Patient Active Problem List   Diagnosis    CKD (chronic kidney disease), stage III (H[facility])    Hyperlipidemia    Prostate enlargement    Anemia    HTN (hypertension)    Hiatal hernia    Diverticulosis    Obesity    Diverticulitis of large intestine with perforation    Esophagitis    Generalized abdominal pain    Diverticulitis of colon    Angina effort    Abnormal stress test    Benign essential tremor    Major depressive disorder, recurrent, mild    Major depressive disorder, recurrent, moderate    Major depressive disorder, recurrent, unspecified       Plan:     Consent obtained; circumcision in OR today    Nelly White MD  6:46 AM 8/25/2023

## 2023-08-25 NOTE — PROGRESS NOTES
1007 - pt arrives to pacu, respirations unlabored on room air, pt denies pain, VSS    1020 - pt denies pain at this time, pt resting comfortably    1037  -pt meets criteria for discharge from pacu at this time, pt transported to \[facility]\"" in stable condition

## 2023-08-25 NOTE — DISCHARGE INSTRUCTIONS
Finish antibiotics     Pt ok to discharge home in good condition  No heavy lifting, >10 lbs for today  Pt should avoid strenuous activity for today  Pt should walk moderately at home  Pt ok to shower   Pt may resume diet as tolerated  Pt should take Rx as directed  No driving while on narcotics  Please call attending physician or hospital  with questions  Call or Present to ED if fever (> 101F), intractable nausea vomiting or pain.   Rx in chart     Pt should follow up with Dr Kamryn Manzanares, call to confirm appointment

## 2023-08-25 NOTE — PROGRESS NOTES
Pt has met discharge criteria and states he is ready for discharge to home. IV removed, gauze and tape applied. Dressed in own clothes and personal belongings gathered. Discharge instructions (with opioid medication education information) given to pt and family. Pt and family verbalized understanding of discharge instructions, prescriptions and follow up appointments. Pt transported to discharge lobby by Taya Aguilera Principal [facility] staff.

## 2023-08-25 NOTE — PROGRESS NOTES
Patient oriented to Same Day department and admitted to Same Day Surgery room 2. Patient verbalized approval for first name, last initial with physician name on unit whiteboard. Plan of care reviewed with patient. Patient room whiteboard filled out and discussed with patient and responsible adult. Patient and responsible adult offered Same Day Welcome Packet to review. Call light in reach. Bed in lowest position, locked, with one bed rail up. SCDs and warming blanket in place. Appropriate arm bands on patient. Bathroom offered. All questions and concerns of patient addressed. Meds to Beds:   Patient informed of [facility]'s Meds to [facility] program during admission.  Patient is agreeable to program.   Contact information for the pharmacy and the Meds to Beds program:   Name: Fidencio Ladd   Relationship to patient:spouse/significant other   Phone number: [phone]

## 2023-08-28 NOTE — BRIEF OP NOTE
Brief Postoperative Note      Patient: Lori Lao  YOB: 1940  MRN: [id number]    Date of Procedure: 8/25/2023    Pre-Op Diagnosis Codes:     * Phimosis [N47.1]    Post-Op Diagnosis: Same       Procedure(s):  CIRCUMCISION    Surgeon(s):  Alia Biggs MD    Assistant:  * No surgical staff found *    Anesthesia: General    Estimated Blood Loss (mL): Minimal    Complications: None    Specimens:   * No specimens in log *    Implants:  * No implants in log *      Drains: * No LDAs found *    Findings: one month jatinder post op check      Electronically signed by Vernell Ron MD on 8/28/2023 at 9:05 AM

## 2023-09-01 NOTE — OP NOTE
[address] [place], [place]. [facility]    DATE: 8/25/2023  Patient:  Karyn Newell  MRN: [id number]  YOB: 1940    SURGEON: Glory Zelaya MD.    ASSISTANT: none    PREOPERATIVE DIAGNOSIS: Phimosis    POSTOPERATIVE DIAGNOSIS: Phimosis    PROCEDURE PERFORMED: Circumcision, dorsal penile nerve block for postoperative pain    ANESTHESIA: General    COMPLICATIONS: none    OR BLOOD LOSS:  Minimal    FLUIDS: Cystalloids per Anesthesia    SPECIMENS:  * No specimens in log *  none    DRAINS: none    INDICATIONS FOR PROCEDURE:  The patient is a 78 y.o. male who presents today with Phimosis [N47.1] here for CIRCUMCISION. After risks, benefits and alternatives of the procedure were discussed with the patient, the patient elected to proceed. PROCEDURE:  The patient was brought back to the operating room, was laid in the supine position. Antibiotics were administered. General anesthesia was induced. He was then scrubbed and prepped in the usual sterile fashion. A proper time out was performed. a dorsal slit was performed due to the very tight phimosis. After retracting the foreskin, we then made a circumferential paul around the distal shaft, approximately 4 mm from the coronal sulcus. A 15 blade scalpel was used to make an incision through the epidermis. We then pulled the foreskin back to normal position, and marked out the proximal incision. We used a 15 blade scalpel again to make an incision through the epidermis. We then used bovie electrocautery to amputate the foreskin. Meticulous hemostasis was achieved. We then re-approximated the penile shaft skin with 3-0 chromic interrupted sutures. Hemostasis was persistent. Bacitracin ointment was placed around the incision. .  dorsal penile nerve block for postop pain was performed. Kat Gaffney His anesthesia was reversed and he was taken to recovery in stable condition. I was present for all critical portions of the case.

## 2023-09-25 ENCOUNTER — OFFICE VISIT (OUTPATIENT)
Dept: UROLOGY | Age: 83
End: 2023-09-25
Payer: MEDICARE

## 2023-09-25 VITALS — HEIGHT: 69 IN | BODY MASS INDEX: 31.37 KG/M2 | WEIGHT: 211.8 LBS | RESPIRATION RATE: 16 BRPM

## 2023-09-25 DIAGNOSIS — N39.0 RECURRENT UTI: ICD-10-CM

## 2023-09-25 DIAGNOSIS — N47.1 PHIMOSIS: Primary | ICD-10-CM

## 2023-09-25 DIAGNOSIS — N13.8 BPH WITH OBSTRUCTION/LOWER URINARY TRACT SYMPTOMS: ICD-10-CM

## 2023-09-25 DIAGNOSIS — N40.1 BPH WITH OBSTRUCTION/LOWER URINARY TRACT SYMPTOMS: ICD-10-CM

## 2023-09-25 DIAGNOSIS — N48.83 ACQUIRED BURIED PENIS: ICD-10-CM

## 2023-09-25 LAB
BILIRUBIN URINE: NEGATIVE
BLOOD URINE, POC: NEGATIVE
CHARACTER, URINE: CLEAR
COLOR, URINE: YELLOW
GLUCOSE URINE: NEGATIVE MG/DL
KETONES, URINE: NEGATIVE
LEUKOCYTE CLUMPS, URINE: NEGATIVE
NITRITE, URINE: NEGATIVE
PH, URINE: 6 (ref 5–9)
PROTEIN, URINE: 100 MG/DL
SPECIFIC GRAVITY, URINE: 1.02 (ref 1–1.03)
UROBILINOGEN, URINE: 0.2 EU/DL (ref 0–1)

## 2023-09-25 PROCEDURE — 99214 OFFICE O/P EST MOD 30 MIN: CPT

## 2023-09-25 PROCEDURE — 1036F TOBACCO NON-USER: CPT

## 2023-09-25 PROCEDURE — G8427 DOCREV CUR MEDS BY ELIG CLIN: HCPCS

## 2023-09-25 PROCEDURE — G8417 CALC BMI ABV UP PARAM F/U: HCPCS

## 2023-09-25 PROCEDURE — 81003 URINALYSIS AUTO W/O SCOPE: CPT

## 2023-09-25 PROCEDURE — 1123F ACP DISCUSS/DSCN MKR DOCD: CPT

## 2023-09-25 NOTE — PROGRESS NOTES
[address] [facility].  [address]  [facility] [address]  Dept: [phone]  Loc: [phone]  Visit Date: 9/25/2023    RICARDO Ramirez is a 78 y.o. male that presents to the urology clinic for post-operative check. S/P Circumcision by Dr. Rubens Manrique on 8/25/23 to address tight foreskin/phimosis. He is doing very well post-operatively. Healing as intended. No difficulty with urination. No concern for infection. Wilson Back notes that he has been more intentional with revealing the urethral meatus (partially buried penis). Sitting to urinate as this is easier for him. Patient also with BPH- history of multiple TURP's. S/P TUIBNC. Most Recent PSA: No longer checking per patient preference. UA: Negative.   Lab Results   Component Value Date/Time    COLORU Yellow 09/25/2023 10:41 AM    COLORU YELLOW 07/24/2023 12:50 PM    LABSPEC 1.025 09/25/2023 10:41 AM    LABPH 6.00 09/25/2023 10:41 AM    NITRU Negative 09/25/2023 10:41 AM    GLUCOSEU Negative 09/25/2023 10:41 AM    KETUA Negative 09/25/2023 10:41 AM    UROBILINOGEN 0.20 09/25/2023 10:41 AM    BILIRUBINUR Negative 09/25/2023 10:41 AM            Last BUN and creatinine:  Lab Results   Component Value Date    BUN 28 (H) 08/16/2023     Lab Results   Component Value Date    CREATININE 1.5 (H) 08/16/2023           PAST MEDICAL, FAMILY AND SOCIAL HISTORY UPDATE:  Past Medical History:   Diagnosis Date    Anemia     Arthritis     Bronchiolitis 11/2016    Cancer ([facility])     SKIN CANCER    CKD (chronic kidney disease), stage III ([facility])     Diverticula of colon 05/2014    tx with antibiotics per pt; NO surgery    Diverticulosis     DVT (deep venous thrombosis) (HCC)     s/p    Esophagitis 12/10/2014    Hiatal hernia     [place] (hard of hearing)     not able to hear out of rt ear    HTN (hypertension)     Hyperlipidemia     Kidney stones     Nephrolithiasis     Obesity     Prostate enlargement     benign    Renal

## 2023-10-12 ENCOUNTER — OFFICE VISIT (OUTPATIENT)
Dept: CARDIOLOGY CLINIC | Age: 83
End: 2023-10-12
Payer: MEDICARE

## 2023-10-12 VITALS
HEIGHT: 69 IN | DIASTOLIC BLOOD PRESSURE: 60 MMHG | SYSTOLIC BLOOD PRESSURE: 158 MMHG | BODY MASS INDEX: 31.25 KG/M2 | HEART RATE: 60 BPM | WEIGHT: 211 LBS

## 2023-10-12 DIAGNOSIS — I35.1 NONRHEUMATIC AORTIC VALVE INSUFFICIENCY: ICD-10-CM

## 2023-10-12 DIAGNOSIS — I10 PRIMARY HYPERTENSION: ICD-10-CM

## 2023-10-12 DIAGNOSIS — I25.10 CORONARY ARTERY DISEASE INVOLVING NATIVE CORONARY ARTERY OF NATIVE HEART WITHOUT ANGINA PECTORIS: Primary | ICD-10-CM

## 2023-10-12 PROCEDURE — 3077F SYST BP >= 140 MM HG: CPT | Performed by: NUCLEAR MEDICINE

## 2023-10-12 PROCEDURE — G8484 FLU IMMUNIZE NO ADMIN: HCPCS | Performed by: NUCLEAR MEDICINE

## 2023-10-12 PROCEDURE — G8428 CUR MEDS NOT DOCUMENT: HCPCS | Performed by: NUCLEAR MEDICINE

## 2023-10-12 PROCEDURE — 1123F ACP DISCUSS/DSCN MKR DOCD: CPT | Performed by: NUCLEAR MEDICINE

## 2023-10-12 PROCEDURE — 1036F TOBACCO NON-USER: CPT | Performed by: NUCLEAR MEDICINE

## 2023-10-12 PROCEDURE — 99214 OFFICE O/P EST MOD 30 MIN: CPT | Performed by: NUCLEAR MEDICINE

## 2023-10-12 PROCEDURE — G8417 CALC BMI ABV UP PARAM F/U: HCPCS | Performed by: NUCLEAR MEDICINE

## 2023-10-12 PROCEDURE — 3078F DIAST BP <80 MM HG: CPT | Performed by: NUCLEAR MEDICINE

## 2023-10-12 NOTE — PROGRESS NOTES
02/2015    top of head    TONSILLECTOMY      TURP  2008,2008    TURP N/A 04/02/2022    CYSTOSCOPY GREENLIGHT PHOTOVAPORIZATION OF THE PROSTATE WITH URETHERAL DILATION performed by Harvinder Velez MD at [address] [place] [address]       Family History   Problem Relation Age of Onset    Diabetes Mother     High Blood Pressure Father     Heart Disease Father      Social History     Tobacco Use    Smoking status: Never    Smokeless tobacco: Never    Tobacco comments:     never smoked   Substance Use Topics    Alcohol use: Never      Current Outpatient Medications   Medication Sig Dispense Refill    triamterene-hydroCHLOROthiazide (MAXZIDE-25) 37.5-25 MG per tablet Take 1 tablet by mouth daily 90 tablet 1    citalopram (CELEXA) 20 MG tablet TAKE 1 TABLET BY MOUTH DAILY 90 tablet 3    primidone (MYSOLINE) 50 MG tablet TAKE 1 TABLET BY MOUTH TWICE DAILY 180 tablet 3    amLODIPine (NORVASC) 10 MG tablet TAKE 1 TABLET BY MOUTH DAILY (Patient taking differently: Take 1 tablet by mouth every evening) 90 tablet 3    hydrALAZINE (APRESOLINE) 25 MG tablet Take 1 tablet by mouth in the morning and at bedtime 180 tablet 3    pravastatin (PRAVACHOL) 40 MG tablet TAKE 1 TABLET BY MOUTH EVERY EVENING 90 tablet 3    irbesartan (AVAPRO) 300 MG tablet TAKE 1 TABLET BY MOUTH DAILY 90 tablet 3    aspirin 325 MG tablet Take 1 tablet by mouth daily      pantoprazole (PROTONIX) 40 MG tablet Take 1 tablet by mouth daily      nystatin-triamcinolone (MYCOLOG) 224279-9.1 UNIT/GM-% ointment Apply topically 2 times daily. (Patient not taking: Reported on 10/12/2023) 1 each 1     No current facility-administered medications for this visit.      Allergies   Allergen Reactions    Carafate [Sucralfate] Other (See Comments)     Tongue swelling    Iv Dye [Iodides] Shortness Of Breath    Lopressor [Metoprolol]      Bradycardia    Motrin [Ibuprofen]      Was told not to take due to kidneys    Ace Inhibitors      Intolerant to causes muscle

## 2023-11-28 ENCOUNTER — OFFICE VISIT (OUTPATIENT)
Dept: FAMILY MEDICINE CLINIC | Age: 83
End: 2023-11-28
Payer: MEDICARE

## 2023-11-28 VITALS
WEIGHT: 211.2 LBS | BODY MASS INDEX: 31.28 KG/M2 | OXYGEN SATURATION: 98 % | RESPIRATION RATE: 14 BRPM | HEART RATE: 75 BPM | TEMPERATURE: 97.6 F | DIASTOLIC BLOOD PRESSURE: 68 MMHG | SYSTOLIC BLOOD PRESSURE: 118 MMHG | HEIGHT: 69 IN

## 2023-11-28 DIAGNOSIS — Z23 ENCOUNTER FOR VACCINATION: ICD-10-CM

## 2023-11-28 DIAGNOSIS — R25.1 TREMOR: ICD-10-CM

## 2023-11-28 DIAGNOSIS — Z23 FLU VACCINE NEED: ICD-10-CM

## 2023-11-28 DIAGNOSIS — E78.5 HYPERLIPIDEMIA, UNSPECIFIED HYPERLIPIDEMIA TYPE: ICD-10-CM

## 2023-11-28 DIAGNOSIS — Z13.220 SCREENING FOR HYPERLIPIDEMIA: ICD-10-CM

## 2023-11-28 DIAGNOSIS — Z00.00 MEDICARE ANNUAL WELLNESS VISIT, SUBSEQUENT: Primary | ICD-10-CM

## 2023-11-28 PROCEDURE — G0008 ADMIN INFLUENZA VIRUS VAC: HCPCS | Performed by: NURSE PRACTITIONER

## 2023-11-28 PROCEDURE — 3074F SYST BP LT 130 MM HG: CPT | Performed by: NURSE PRACTITIONER

## 2023-11-28 PROCEDURE — G0009 ADMIN PNEUMOCOCCAL VACCINE: HCPCS | Performed by: NURSE PRACTITIONER

## 2023-11-28 PROCEDURE — 3078F DIAST BP <80 MM HG: CPT | Performed by: NURSE PRACTITIONER

## 2023-11-28 PROCEDURE — 90732 PPSV23 VACC 2 YRS+ SUBQ/IM: CPT | Performed by: NURSE PRACTITIONER

## 2023-11-28 PROCEDURE — 90694 VACC AIIV4 NO PRSRV 0.5ML IM: CPT | Performed by: NURSE PRACTITIONER

## 2023-11-28 PROCEDURE — G8484 FLU IMMUNIZE NO ADMIN: HCPCS | Performed by: NURSE PRACTITIONER

## 2023-11-28 PROCEDURE — G0439 PPPS, SUBSEQ VISIT: HCPCS | Performed by: NURSE PRACTITIONER

## 2023-11-28 PROCEDURE — 1123F ACP DISCUSS/DSCN MKR DOCD: CPT | Performed by: NURSE PRACTITIONER

## 2023-11-28 RX ORDER — PRAVASTATIN SODIUM 40 MG
40 TABLET ORAL NIGHTLY
Qty: 90 TABLET | Refills: 3 | Status: SHIPPED | OUTPATIENT
Start: 2023-11-28

## 2023-11-28 SDOH — ECONOMIC STABILITY: FOOD INSECURITY: WITHIN THE PAST 12 MONTHS, YOU WORRIED THAT YOUR FOOD WOULD RUN OUT BEFORE YOU GOT MONEY TO BUY MORE.: NEVER TRUE

## 2023-11-28 SDOH — ECONOMIC STABILITY: INCOME INSECURITY: HOW HARD IS IT FOR YOU TO PAY FOR THE VERY BASICS LIKE FOOD, HOUSING, MEDICAL CARE, AND HEATING?: NOT HARD AT ALL

## 2023-11-28 SDOH — ECONOMIC STABILITY: HOUSING INSECURITY
IN THE LAST 12 MONTHS, WAS THERE A TIME WHEN YOU DID NOT HAVE A STEADY PLACE TO SLEEP OR SLEPT IN A SHELTER (INCLUDING NOW)?: NO

## 2023-11-28 SDOH — ECONOMIC STABILITY: FOOD INSECURITY: WITHIN THE PAST 12 MONTHS, THE FOOD YOU BOUGHT JUST DIDN'T LAST AND YOU DIDN'T HAVE MONEY TO GET MORE.: NEVER TRUE

## 2023-11-28 ASSESSMENT — PATIENT HEALTH QUESTIONNAIRE - PHQ9
SUM OF ALL RESPONSES TO PHQ QUESTIONS 1-9: 0
1. LITTLE INTEREST OR PLEASURE IN DOING THINGS: 0
SUM OF ALL RESPONSES TO PHQ9 QUESTIONS 1 & 2: 0
3. TROUBLE FALLING OR STAYING ASLEEP: 0
7. TROUBLE CONCENTRATING ON THINGS, SUCH AS READING THE NEWSPAPER OR WATCHING TELEVISION: 0
5. POOR APPETITE OR OVEREATING: 0
8. MOVING OR SPEAKING SO SLOWLY THAT OTHER PEOPLE COULD HAVE NOTICED. OR THE OPPOSITE, BEING SO FIGETY OR RESTLESS THAT YOU HAVE BEEN MOVING AROUND A LOT MORE THAN USUAL: 0
6. FEELING BAD ABOUT YOURSELF - OR THAT YOU ARE A FAILURE OR HAVE LET YOURSELF OR YOUR FAMILY DOWN: 0
SUM OF ALL RESPONSES TO PHQ QUESTIONS 1-9: 0
2. FEELING DOWN, DEPRESSED OR HOPELESS: 0
9. THOUGHTS THAT YOU WOULD BE BETTER OFF DEAD, OR OF HURTING YOURSELF: 0
4. FEELING TIRED OR HAVING LITTLE ENERGY: 0
10. IF YOU CHECKED OFF ANY PROBLEMS, HOW DIFFICULT HAVE THESE PROBLEMS MADE IT FOR YOU TO DO YOUR WORK, TAKE CARE OF THINGS AT HOME, OR GET ALONG WITH OTHER PEOPLE: 0

## 2023-11-28 ASSESSMENT — COLUMBIA-SUICIDE SEVERITY RATING SCALE - C-SSRS
3. HAVE YOU BEEN THINKING ABOUT HOW YOU MIGHT KILL YOURSELF?: NO
5. HAVE YOU STARTED TO WORK OUT OR WORKED OUT THE DETAILS OF HOW TO KILL YOURSELF? DO YOU INTEND TO CARRY OUT THIS PLAN?: NO
4. HAVE YOU HAD THESE THOUGHTS AND HAD SOME INTENTION OF ACTING ON THEM?: NO
7. DID THIS OCCUR IN THE LAST THREE MONTHS: NO

## 2023-11-28 NOTE — PATIENT INSTRUCTIONS
loss plan that best meets your needs. You may want to take a class on nutrition or exercise, or you could join a weight loss support group. If you have questions about how to make changes to your eating or exercise habits, ask your doctor about seeing a registered dietitian or an exercise specialist.  It can be a big challenge to lose weight. But you don't have to make huge changes at once. Make small changes, and stick with them. When those changes become habit, add a few more changes. If you don't think you're ready to make changes right now, try to pick a date in the future. Make an appointment to see your doctor to discuss whether the time is right for you to start a plan. Follow-up care is a key part of your treatment and safety. Be sure to make and go to all appointments, and call your doctor if you are having problems. It's also a good idea to know your test results and keep a list of the medicines you take. How can you care for yourself at home? Set realistic goals. Many people expect to lose much more weight than is likely. A weight loss of 5% to 10% of your body weight may be enough to improve your health. Get family and friends involved to provide support. Talk to them about why you are trying to lose weight, and ask them to help. They can help by participating in exercise and having meals with you, even if they may be eating something different. Find what works best for you. If you do not have time or do not like to cook, a program that offers meal replacement bars or shakes may be better for you. Or if you like to prepare meals, finding a plan that includes daily menus and recipes may be best.  Ask your doctor about other health professionals who can help you achieve your weight loss goals. A dietitian can help you make healthy changes in your diet.   An exercise specialist or  can help you develop a safe and effective exercise program.  A counselor or psychiatrist can help you

## 2023-11-28 NOTE — PROGRESS NOTES
Vaccine Information Sheet, \"Influenza - Inactivated\"  given to Ju Monahan, or parent/legal guardian of  Ju Monahan and verbalized understanding. Patient responses:    Have you ever had a reaction to a flu vaccine? No  Do you have an allergy to eggs, neomycin or polymixin? No  Do you have an allergy to Thimerosal, contact lens solution, or Merthiolate? No  Have you ever had [place] Syndrome? No  Do you have any current illness? No  Do you have a temperature above 100 degrees? No  Are you pregnant? No  If pregnant, permission obtained from physician? No  Do you have an active neurological disorder? Yes patient has trimmers       Flu vaccine given per order. Please see immunization tab.   Immunization(s) given during visit:    Immunizations Administered       Name Date Dose Route    Influenza, FLUAD, (age 72 y+), Adjuvanted, 0.5mL 11/28/2023 0.5 mL Intramuscular    Site: Deltoid- Left    Lot: [id number]    NDC: 66968-353-89    Pneumococcal, PPSV23, PNEUMOVAX 23, (age 2y+), SC/IM, 0.5mL 11/28/2023 0.5 mL Intramuscular    Site: Deltoid- Right    Lot: [id number]    NDC: 6826-2699-19            Most recent Vaccine Information Sheet dated 06.08.2021 10.30.2019 given to pt

## 2023-11-30 RX ORDER — IRBESARTAN 300 MG/1
300 TABLET ORAL DAILY
Qty: 90 TABLET | Refills: 3 | Status: SHIPPED | OUTPATIENT
Start: 2023-11-30

## 2023-12-06 ENCOUNTER — HOSPITAL ENCOUNTER (EMERGENCY)
Age: 83
Discharge: HOME | End: 2023-12-06
Payer: MEDICARE

## 2023-12-06 ENCOUNTER — APPOINTMENT (OUTPATIENT)
Dept: GENERAL RADIOLOGY | Age: 83
End: 2023-12-06
Payer: MEDICARE

## 2023-12-06 VITALS
OXYGEN SATURATION: 97 % | DIASTOLIC BLOOD PRESSURE: 63 MMHG | RESPIRATION RATE: 16 BRPM | SYSTOLIC BLOOD PRESSURE: 142 MMHG | TEMPERATURE: 98 F | HEART RATE: 73 BPM

## 2023-12-06 DIAGNOSIS — S52.502A CLOSED FRACTURE OF DISTAL END OF LEFT RADIUS, UNSPECIFIED FRACTURE MORPHOLOGY, INITIAL ENCOUNTER: Primary | ICD-10-CM

## 2023-12-06 PROCEDURE — 99212 OFFICE O/P EST SF 10 MIN: CPT | Performed by: NURSE PRACTITIONER

## 2023-12-06 PROCEDURE — 73110 X-RAY EXAM OF WRIST: CPT

## 2023-12-06 ASSESSMENT — ENCOUNTER SYMPTOMS
RHINORRHEA: 0
COUGH: 0
CHEST TIGHTNESS: 0
NAUSEA: 0
DIARRHEA: 0
SORE THROAT: 0
VOMITING: 0
SHORTNESS OF BREATH: 0

## 2023-12-06 NOTE — ED NOTES
To [facility] complaints of left wrist pain. States he was playing with his dog and tripped and fell in driveway. Left wrist pain and swelling.       Maggi Hurst RN  12/06/23 [id number]

## 2023-12-29 RX ORDER — HYDRALAZINE HYDROCHLORIDE 25 MG/1
25 TABLET, FILM COATED ORAL 2 TIMES DAILY
Qty: 180 TABLET | Refills: 3 | Status: SHIPPED | OUTPATIENT
Start: 2023-12-29

## 2023-12-29 RX ORDER — TRIAMTERENE AND HYDROCHLOROTHIAZIDE 37.5; 25 MG/1; MG/1
1 TABLET ORAL DAILY
Qty: 90 TABLET | Refills: 1 | Status: SHIPPED | OUTPATIENT
Start: 2023-12-29

## 2024-01-05 ENCOUNTER — NURSE ONLY (OUTPATIENT)
Dept: LAB | Age: 84
End: 2024-01-05

## 2024-01-05 DIAGNOSIS — E78.5 HYPERLIPIDEMIA, UNSPECIFIED HYPERLIPIDEMIA TYPE: ICD-10-CM

## 2024-01-05 DIAGNOSIS — N18.31 CHRONIC KIDNEY DISEASE, STAGE 3A (HCC): ICD-10-CM

## 2024-01-05 LAB
ANION GAP SERPL CALC-SCNC: 15 MEQ/L (ref 8–16)
BUN SERPL-MCNC: 32 MG/DL (ref 7–22)
CALCIUM SERPL-MCNC: 9.4 MG/DL (ref 8.5–10.5)
CHLORIDE SERPL-SCNC: 101 MEQ/L (ref 98–111)
CHOLEST SERPL-MCNC: 188 MG/DL (ref 100–199)
CO2 SERPL-SCNC: 22 MEQ/L (ref 23–33)
CREAT SERPL-MCNC: 1.8 MG/DL (ref 0.4–1.2)
GFR SERPL CREATININE-BSD FRML MDRD: 36 ML/MIN/1.73M2
GLUCOSE SERPL-MCNC: 105 MG/DL (ref 70–108)
HDLC SERPL-MCNC: 51 MG/DL
LDLC SERPL CALC-MCNC: 94 MG/DL
POTASSIUM SERPL-SCNC: 4.4 MEQ/L (ref 3.5–5.2)
SODIUM SERPL-SCNC: 138 MEQ/L (ref 135–145)
TRIGL SERPL-MCNC: 215 MG/DL (ref 0–199)

## 2024-01-09 ENCOUNTER — TELEPHONE (OUTPATIENT)
Dept: FAMILY MEDICINE CLINIC | Age: 84
End: 2024-01-09

## 2024-01-09 NOTE — TELEPHONE ENCOUNTER
Called pt and wife answered. Pt was still sleeping, was not able to give wife the information due to  HIPAA.    Sending [employer] message to pt

## 2024-01-09 NOTE — TELEPHONE ENCOUNTER
----- Message from ALVINO Tan - CNP sent at 1/8/2024  5:25 PM EST -----  Lipids stable  Recommend continuing pravastatin

## 2024-01-11 ENCOUNTER — OFFICE VISIT (OUTPATIENT)
Dept: NEUROLOGY | Age: 84
End: 2024-01-11
Payer: MEDICARE

## 2024-01-11 VITALS
OXYGEN SATURATION: 97 % | BODY MASS INDEX: 30.96 KG/M2 | SYSTOLIC BLOOD PRESSURE: 130 MMHG | DIASTOLIC BLOOD PRESSURE: 60 MMHG | HEART RATE: 62 BPM | HEIGHT: 69 IN | WEIGHT: 209 LBS

## 2024-01-11 DIAGNOSIS — G25.0 ESSENTIAL TREMOR: Primary | ICD-10-CM

## 2024-01-11 PROCEDURE — 3078F DIAST BP <80 MM HG: CPT | Performed by: PSYCHIATRY & NEUROLOGY

## 2024-01-11 PROCEDURE — 99204 OFFICE O/P NEW MOD 45 MIN: CPT | Performed by: PSYCHIATRY & NEUROLOGY

## 2024-01-11 PROCEDURE — 1123F ACP DISCUSS/DSCN MKR DOCD: CPT | Performed by: PSYCHIATRY & NEUROLOGY

## 2024-01-11 PROCEDURE — G8417 CALC BMI ABV UP PARAM F/U: HCPCS | Performed by: PSYCHIATRY & NEUROLOGY

## 2024-01-11 PROCEDURE — G8484 FLU IMMUNIZE NO ADMIN: HCPCS | Performed by: PSYCHIATRY & NEUROLOGY

## 2024-01-11 PROCEDURE — G8427 DOCREV CUR MEDS BY ELIG CLIN: HCPCS | Performed by: PSYCHIATRY & NEUROLOGY

## 2024-01-11 PROCEDURE — 3075F SYST BP GE 130 - 139MM HG: CPT | Performed by: PSYCHIATRY & NEUROLOGY

## 2024-01-11 PROCEDURE — 1036F TOBACCO NON-USER: CPT | Performed by: PSYCHIATRY & NEUROLOGY

## 2024-01-11 RX ORDER — PRIMIDONE 50 MG/1
100 TABLET ORAL 2 TIMES DAILY
Qty: 120 TABLET | Refills: 3 | Status: SHIPPED | OUTPATIENT
Start: 2024-01-11

## 2024-01-11 NOTE — PATIENT INSTRUCTIONS
Change Primidone to 100 mg two times a day, take at 10am and 8 pm daily  TSH with reflex T4  Please call us with an update as to how you are doing in 1-2 weeks  Call with any new symptoms or concerns.   Follow up in 2-3 months.

## 2024-01-12 RX ORDER — PRIMIDONE 50 MG/1
TABLET ORAL
Qty: 360 TABLET | OUTPATIENT
Start: 2024-01-12

## 2024-01-15 NOTE — PROGRESS NOTES
There is no carotid bruit.  No neck lymphadenopathy. No thyroid enlargement   Neurological -   Cranial Miaejn-AS-CWD:.   Cranial nerve II: Normal   Cranial nerve III: Pupils: equal, round, reactive to light  Cranial nerves III, IV, VI: Extraocular Movements: intact   Cranial nerve V: Facial sensation: intact   Cranial nerve VII:Facial strength: intact   Cranial nerve VIII: Hearing: intact    Cranial nerve IX: Palate Elevation intact bilaterally  Cranial nerve XI: Shoulder shrug intact bilaterally  Cranial nerve XII: Tongue midline   neck supple without rigidity, there is no limitation of range of motion of the neck.  DTR's are  decreased distal and symmetric  Babinski sign negative   Motor exam is 5/5 in the upper and lower extremities. Normal muscle tone. There is no muscle atrophy.  Sensory is intact for light touch   Coordination: finger to nose,   intact  Gait and station intact   Abnormal movement bilateral hand tremor, vibration absent distally  Skin - warm, dry to touch, normal coloration, no rashes, no suspicious skin lesions  Superficial temporal artery pulses are normal.   There is no resting tremor, no pin rolling, no bradykinesia, no Hypohonia, normal blink rate.  Musculoskeletal: Has no hand arthritis, no limitation of ROM in any of the four extremities.  There is no leg edema .   The Heart was regular in rate and rhythm. No heart murmur  Chest Clear, with  good effort.   Abdomen soft, intact bowel sounds.          CT Head WO Contrast  Narrative  PROCEDURE: CT HEAD WO CONTRAST  CLINICAL INFORMATION: Dizziness  TECHNIQUE: CT scan of the head was performed from the vertex to the skull base without use of intravenous contrast.  All CT scans at this facility use dose modulation, iterative reconstruction, and/or weight-based dosing when appropriate to reduce radiation dose to as low as reasonably achievable.  COMPARISON: None.  FINDINGS: There is no mass effect, midline shift or acute hemorrhage.

## 2024-01-16 ENCOUNTER — NURSE ONLY (OUTPATIENT)
Dept: LAB | Age: 84
End: 2024-01-16

## 2024-01-16 DIAGNOSIS — G25.0 ESSENTIAL TREMOR: ICD-10-CM

## 2024-01-16 LAB
T4 FREE SERPL-MCNC: 1.01 NG/DL (ref 0.93–1.76)
TSH SERPL DL<=0.005 MIU/L-ACNC: 5.65 UIU/ML (ref 0.4–4.2)

## 2024-01-17 ENCOUNTER — TELEPHONE (OUTPATIENT)
Dept: NEUROLOGY | Age: 84
End: 2024-01-17

## 2024-01-17 NOTE — TELEPHONE ENCOUNTER
----- Message from Paige L Leopold, APRN - CNP sent at 1/17/2024  8:01 AM EST -----  Please let patient know his TSH is elevated=5.650. Please ask him to follow up with his PCP regarding this  Paige Leopold, CNP

## 2024-01-17 NOTE — TELEPHONE ENCOUNTER
Results sent to pcp.   Spoke with wife- Linnea on HIPAA regarding results. Verbalized understanding with no questions at this time.

## 2024-01-18 ENCOUNTER — OFFICE VISIT (OUTPATIENT)
Dept: FAMILY MEDICINE CLINIC | Age: 84
End: 2024-01-18
Payer: MEDICARE

## 2024-01-18 VITALS
BODY MASS INDEX: 31.37 KG/M2 | RESPIRATION RATE: 12 BRPM | HEART RATE: 62 BPM | DIASTOLIC BLOOD PRESSURE: 72 MMHG | SYSTOLIC BLOOD PRESSURE: 126 MMHG | TEMPERATURE: 97.3 F | OXYGEN SATURATION: 97 % | WEIGHT: 211.8 LBS | HEIGHT: 69 IN

## 2024-01-18 DIAGNOSIS — E03.9 HYPOTHYROIDISM, UNSPECIFIED TYPE: Primary | ICD-10-CM

## 2024-01-18 DIAGNOSIS — E04.9 ENLARGED THYROID: ICD-10-CM

## 2024-01-18 DIAGNOSIS — E03.9 HYPOTHYROIDISM, UNSPECIFIED TYPE: ICD-10-CM

## 2024-01-18 PROCEDURE — 99214 OFFICE O/P EST MOD 30 MIN: CPT | Performed by: NURSE PRACTITIONER

## 2024-01-18 PROCEDURE — 1123F ACP DISCUSS/DSCN MKR DOCD: CPT | Performed by: NURSE PRACTITIONER

## 2024-01-18 PROCEDURE — 3078F DIAST BP <80 MM HG: CPT | Performed by: NURSE PRACTITIONER

## 2024-01-18 PROCEDURE — G8417 CALC BMI ABV UP PARAM F/U: HCPCS | Performed by: NURSE PRACTITIONER

## 2024-01-18 PROCEDURE — 1036F TOBACCO NON-USER: CPT | Performed by: NURSE PRACTITIONER

## 2024-01-18 PROCEDURE — 3074F SYST BP LT 130 MM HG: CPT | Performed by: NURSE PRACTITIONER

## 2024-01-18 PROCEDURE — G8427 DOCREV CUR MEDS BY ELIG CLIN: HCPCS | Performed by: NURSE PRACTITIONER

## 2024-01-18 PROCEDURE — G8484 FLU IMMUNIZE NO ADMIN: HCPCS | Performed by: NURSE PRACTITIONER

## 2024-01-18 RX ORDER — LEVOTHYROXINE SODIUM 0.03 MG/1
25 TABLET ORAL DAILY
Qty: 90 TABLET | Refills: 0 | Status: SHIPPED | OUTPATIENT
Start: 2024-01-18

## 2024-01-18 RX ORDER — LEVOTHYROXINE SODIUM 0.03 MG/1
25 TABLET ORAL DAILY
Qty: 30 TABLET | Refills: 1 | Status: SHIPPED | OUTPATIENT
Start: 2024-01-18 | End: 2024-01-18

## 2024-01-18 RX ORDER — DICYCLOMINE HYDROCHLORIDE 10 MG/1
10 CAPSULE ORAL PRN
COMMUNITY

## 2024-01-18 ASSESSMENT — PATIENT HEALTH QUESTIONNAIRE - PHQ9
SUM OF ALL RESPONSES TO PHQ QUESTIONS 1-9: 0
5. POOR APPETITE OR OVEREATING: 0
SUM OF ALL RESPONSES TO PHQ9 QUESTIONS 1 & 2: 0
8. MOVING OR SPEAKING SO SLOWLY THAT OTHER PEOPLE COULD HAVE NOTICED. OR THE OPPOSITE, BEING SO FIGETY OR RESTLESS THAT YOU HAVE BEEN MOVING AROUND A LOT MORE THAN USUAL: 0
6. FEELING BAD ABOUT YOURSELF - OR THAT YOU ARE A FAILURE OR HAVE LET YOURSELF OR YOUR FAMILY DOWN: 0
9. THOUGHTS THAT YOU WOULD BE BETTER OFF DEAD, OR OF HURTING YOURSELF: 0
7. TROUBLE CONCENTRATING ON THINGS, SUCH AS READING THE NEWSPAPER OR WATCHING TELEVISION: 0
4. FEELING TIRED OR HAVING LITTLE ENERGY: 0
SUM OF ALL RESPONSES TO PHQ QUESTIONS 1-9: 0
SUM OF ALL RESPONSES TO PHQ QUESTIONS 1-9: 0
2. FEELING DOWN, DEPRESSED OR HOPELESS: 0
3. TROUBLE FALLING OR STAYING ASLEEP: 0
1. LITTLE INTEREST OR PLEASURE IN DOING THINGS: 0
SUM OF ALL RESPONSES TO PHQ QUESTIONS 1-9: 0
10. IF YOU CHECKED OFF ANY PROBLEMS, HOW DIFFICULT HAVE THESE PROBLEMS MADE IT FOR YOU TO DO YOUR WORK, TAKE CARE OF THINGS AT HOME, OR GET ALONG WITH OTHER PEOPLE: 0

## 2024-01-18 NOTE — TELEPHONE ENCOUNTER
Please approve or refuse Rx request:  Requested Prescriptions     Pending Prescriptions Disp Refills    levothyroxine (SYNTHROID) 25 MCG tablet [Pharmacy Med Name: LEVOTHYROXINE 0.025MG (25MCG) TAB] 90 tablet      Sig: TAKE 1 TABLET BY MOUTH DAILY       Next appointment:  2/29/2024

## 2024-01-18 NOTE — PROGRESS NOTES
Jose Daniel Catalan is a 83 y.o. male thatpresents for Follow-up      History obtained today from Patient and Wife.  Here to discuss recent labs.  TSH was 5.65, FT4 was 1.01.     Hypothyroidism  Currently treated for Hypothyroidism?  Yes  Fatigue?  Yes  Recent change in weight?  No  Cold/Heat intolerance?  Yes - cold intolerance  Diarrhea/Constipation?  Yes - goes back and forth between both  Diaphoresis?  No  Anxiety?  No  Palpitations?  No   Hair Loss?  Yes - but had for awhile      I have reviewed the patient's past medical history, past surgical history, allergies,medications, social and family history and I have made updates where appropriate.    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)     due to blood clot behind eyes get injection every 8 weeks       Social History     Tobacco Use    Smoking status: Never    Smokeless tobacco: Never    Tobacco comments:     never smoked   Vaping Use    Vaping Use: Never used   Substance Use Topics    Alcohol use: Never    Drug use: No       Family History   Problem Relation Age of Onset    Diabetes Mother     High Blood Pressure Father     Heart Disease Father     Tremors Father     Tremors Sister     Tremors Brother     Tremors Brother          Review of Systems        PHYSICAL EXAM:  /72   Pulse 62   Temp 97.3 °F (36.3 °C) (Temporal)   Resp 12   Ht 1.753 m (5' 9\")   Wt 96.1

## 2024-01-24 ENCOUNTER — NURSE ONLY (OUTPATIENT)
Dept: LAB | Age: 84
End: 2024-01-24

## 2024-01-24 DIAGNOSIS — N18.31 CHRONIC KIDNEY DISEASE, STAGE 3A (HCC): ICD-10-CM

## 2024-01-24 DIAGNOSIS — I10 ESSENTIAL HYPERTENSION: ICD-10-CM

## 2024-01-24 LAB
ANION GAP SERPL CALC-SCNC: 14 MEQ/L (ref 8–16)
BACTERIA: ABNORMAL
BILIRUB UR QL STRIP: NEGATIVE
BUN SERPL-MCNC: 29 MG/DL (ref 7–22)
CALCIUM SERPL-MCNC: 9.4 MG/DL (ref 8.5–10.5)
CASTS #/AREA URNS LPF: ABNORMAL /LPF
CASTS #/AREA URNS LPF: ABNORMAL /LPF
CHARACTER UR: ABNORMAL
CHARCOAL URNS QL MICRO: ABNORMAL
CHLORIDE SERPL-SCNC: 103 MEQ/L (ref 98–111)
CO2 SERPL-SCNC: 25 MEQ/L (ref 23–33)
COLOR UR: YELLOW
CREAT SERPL-MCNC: 1.8 MG/DL (ref 0.4–1.2)
CREAT UR-MCNC: 242.4 MG/DL
CRYSTALS URNS QL MICRO: ABNORMAL
EPITHELIAL CELLS, UA: ABNORMAL /HPF
GFR SERPL CREATININE-BSD FRML MDRD: 36 ML/MIN/1.73M2
GLUCOSE SERPL-MCNC: 102 MG/DL (ref 70–108)
GLUCOSE UR QL STRIP.AUTO: NEGATIVE MG/DL
HGB UR QL STRIP.AUTO: NEGATIVE
KETONES UR QL STRIP.AUTO: NEGATIVE
LEUKOCYTE ESTERASE UR QL STRIP.AUTO: ABNORMAL
NITRITE UR QL STRIP.AUTO: NEGATIVE
PH UR STRIP.AUTO: 6 [PH] (ref 5–9)
POTASSIUM SERPL-SCNC: 4.5 MEQ/L (ref 3.5–5.2)
PROT UR STRIP.AUTO-MCNC: 30 MG/DL
PROT UR-MCNC: 50.5 MG/DL
PROT/CREAT 24H UR: 0.21 MG/G{CREAT}
RBC #/AREA URNS HPF: ABNORMAL /HPF
RENAL EPI CELLS #/AREA URNS HPF: ABNORMAL /[HPF]
SODIUM SERPL-SCNC: 142 MEQ/L (ref 135–145)
SP GR UR REFRACT.AUTO: 1.02 (ref 1–1.03)
UROBILINOGEN UR QL STRIP.AUTO: 0.2 EU/DL (ref 0–1)
WBC #/AREA URNS HPF: ABNORMAL /HPF
YEAST LIKE FUNGI URNS QL MICRO: ABNORMAL

## 2024-01-31 ENCOUNTER — HOSPITAL ENCOUNTER (OUTPATIENT)
Dept: ULTRASOUND IMAGING | Age: 84
Discharge: HOME | End: 2024-01-31
Attending: INTERNAL MEDICINE
Payer: MEDICARE

## 2024-01-31 DIAGNOSIS — N18.31 CHRONIC KIDNEY DISEASE, STAGE 3A (HCC): ICD-10-CM

## 2024-01-31 DIAGNOSIS — I10 ESSENTIAL HYPERTENSION: ICD-10-CM

## 2024-01-31 PROCEDURE — 76770 US EXAM ABDO BACK WALL COMP: CPT

## 2024-02-01 ENCOUNTER — OFFICE VISIT (OUTPATIENT)
Dept: NEPHROLOGY | Age: 84
End: 2024-02-01
Payer: MEDICARE

## 2024-02-01 VITALS
WEIGHT: 213.6 LBS | BODY MASS INDEX: 31.64 KG/M2 | HEART RATE: 58 BPM | HEIGHT: 69 IN | DIASTOLIC BLOOD PRESSURE: 68 MMHG | OXYGEN SATURATION: 97 % | SYSTOLIC BLOOD PRESSURE: 153 MMHG

## 2024-02-01 DIAGNOSIS — N28.1 RENAL CYST: ICD-10-CM

## 2024-02-01 DIAGNOSIS — I12.9 HYPERTENSIVE RENAL DISEASE, STAGE 1 THROUGH STAGE 4 OR UNSPECIFIED CHRONIC KIDNEY DISEASE: ICD-10-CM

## 2024-02-01 DIAGNOSIS — N18.32 CHRONIC KIDNEY DISEASE, STAGE 3B (HCC): Primary | ICD-10-CM

## 2024-02-01 PROCEDURE — G8417 CALC BMI ABV UP PARAM F/U: HCPCS | Performed by: INTERNAL MEDICINE

## 2024-02-01 PROCEDURE — 3077F SYST BP >= 140 MM HG: CPT | Performed by: INTERNAL MEDICINE

## 2024-02-01 PROCEDURE — 99213 OFFICE O/P EST LOW 20 MIN: CPT | Performed by: INTERNAL MEDICINE

## 2024-02-01 PROCEDURE — 1036F TOBACCO NON-USER: CPT | Performed by: INTERNAL MEDICINE

## 2024-02-01 PROCEDURE — 3078F DIAST BP <80 MM HG: CPT | Performed by: INTERNAL MEDICINE

## 2024-02-01 PROCEDURE — G8484 FLU IMMUNIZE NO ADMIN: HCPCS | Performed by: INTERNAL MEDICINE

## 2024-02-01 PROCEDURE — 1123F ACP DISCUSS/DSCN MKR DOCD: CPT | Performed by: INTERNAL MEDICINE

## 2024-02-01 PROCEDURE — G8427 DOCREV CUR MEDS BY ELIG CLIN: HCPCS | Performed by: INTERNAL MEDICINE

## 2024-02-01 NOTE — PROGRESS NOTES
[facility] [place] [facility]  [facility]  [address]  [address]  [facility] [address]  Dept: [phone]  Loc: [phone]  Office Progress Note  2/1/2024 9:27 AM      Pt Name:    Jose Daniel Catalan  YOB: 1940  Primary Care Physician:  Romelia Umanzor, APRN - CNP     Chief Complaint:   Chief Complaint   Patient presents with    Chronic Kidney Disease     Stage 3        Background Information/Interval History:   82 yo white male with hx HTN, CKD who is here for 6 months follow-up. He was seeing Dr. Nathan in the past and now seeing me. He says BP is usually high at home.  He has BPH s/p TURP and underwent green light and dilation by urology. He reports \"leaky valves.\"     Here for 6 months follow-up. No urinary complaints. BP is reported to be stable. He says BP at home is usually in 120-130 range per family member.      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

## 2024-02-02 ENCOUNTER — HOSPITAL ENCOUNTER (OUTPATIENT)
Dept: ULTRASOUND IMAGING | Age: 84
Discharge: HOME | End: 2024-02-02
Payer: MEDICARE

## 2024-02-02 DIAGNOSIS — E04.9 ENLARGED THYROID: ICD-10-CM

## 2024-02-02 PROCEDURE — 76536 US EXAM OF HEAD AND NECK: CPT

## 2024-02-05 ENCOUNTER — TELEPHONE (OUTPATIENT)
Dept: FAMILY MEDICINE CLINIC | Age: 84
End: 2024-02-05

## 2024-02-05 NOTE — TELEPHONE ENCOUNTER
----- Message from ALVINO Tan CNP sent at 2/5/2024  7:27 AM EST -----  He has couple of nodules on his thyroid gland  There is on the right side of thyroid that they would recommend monitor for any increase in size. So will recommend repeating US in 1 year. Can also discuss with Romelia at upcoming appt

## 2024-02-07 ENCOUNTER — TELEPHONE (OUTPATIENT)
Dept: FAMILY MEDICINE CLINIC | Age: 84
End: 2024-02-07

## 2024-02-07 NOTE — TELEPHONE ENCOUNTER
----- Message from ALVINO Yeung - CNP sent at 2/6/2024  5:15 PM EST -----  Thyroid US showed nodules, not large enough to be bx.  Radiologist recommended 1 year follow up US.

## 2024-02-16 DIAGNOSIS — I10 ESSENTIAL HYPERTENSION: ICD-10-CM

## 2024-02-16 RX ORDER — AMLODIPINE BESYLATE 10 MG/1
10 TABLET ORAL DAILY
Qty: 90 TABLET | Refills: 3 | Status: SHIPPED | OUTPATIENT
Start: 2024-02-16

## 2024-02-16 NOTE — TELEPHONE ENCOUNTER
Recent Visits  Date Type Provider Dept   01/18/24 Office Visit Romelia Umanzor APRN - CNP [facility]   11/28/23 Office Visit Romelia Umanzor APRN - CNP [facility]   09/26/22 Office Visit Dylon Shaikh,  [facility]   Showing recent visits within past 540 days with a meds authorizing provider and meeting all other requirements  Future Appointments  Date Type Provider Dept   02/29/24 Appointment Romelia Umanzor APRN - CNP [facility]   Showing future appointments within next 150 days with a meds authorizing provider and meeting all other requirements

## 2024-02-26 ENCOUNTER — NURSE ONLY (OUTPATIENT)
Dept: LAB | Age: 84
End: 2024-02-26

## 2024-02-26 DIAGNOSIS — E03.9 HYPOTHYROIDISM, UNSPECIFIED TYPE: ICD-10-CM

## 2024-02-26 LAB
T4 FREE SERPL-MCNC: 1.14 NG/DL (ref 0.93–1.68)
TSH SERPL DL<=0.005 MIU/L-ACNC: 3.31 UIU/ML (ref 0.4–4.2)

## 2024-02-27 ENCOUNTER — TELEPHONE (OUTPATIENT)
Dept: FAMILY MEDICINE CLINIC | Age: 84
End: 2024-02-27

## 2024-02-29 ENCOUNTER — OFFICE VISIT (OUTPATIENT)
Dept: FAMILY MEDICINE CLINIC | Age: 84
End: 2024-02-29
Payer: MEDICARE

## 2024-02-29 VITALS
WEIGHT: 212.4 LBS | TEMPERATURE: 97.7 F | DIASTOLIC BLOOD PRESSURE: 70 MMHG | RESPIRATION RATE: 16 BRPM | BODY MASS INDEX: 31.46 KG/M2 | HEART RATE: 68 BPM | HEIGHT: 69 IN | OXYGEN SATURATION: 97 % | SYSTOLIC BLOOD PRESSURE: 130 MMHG

## 2024-02-29 DIAGNOSIS — E04.1 THYROID NODULE: ICD-10-CM

## 2024-02-29 DIAGNOSIS — E03.9 HYPOTHYROIDISM, UNSPECIFIED TYPE: Primary | ICD-10-CM

## 2024-02-29 PROBLEM — F33.1 MAJOR DEPRESSIVE DISORDER, RECURRENT, MODERATE (HCC): Status: RESOLVED | Noted: 2023-07-31 | Resolved: 2024-02-29

## 2024-02-29 PROBLEM — F33.9 MAJOR DEPRESSIVE DISORDER, RECURRENT, UNSPECIFIED (HCC): Status: RESOLVED | Noted: 2023-07-31 | Resolved: 2024-02-29

## 2024-02-29 PROCEDURE — G8427 DOCREV CUR MEDS BY ELIG CLIN: HCPCS | Performed by: NURSE PRACTITIONER

## 2024-02-29 PROCEDURE — 1036F TOBACCO NON-USER: CPT | Performed by: NURSE PRACTITIONER

## 2024-02-29 PROCEDURE — 99213 OFFICE O/P EST LOW 20 MIN: CPT | Performed by: NURSE PRACTITIONER

## 2024-02-29 PROCEDURE — G8484 FLU IMMUNIZE NO ADMIN: HCPCS | Performed by: NURSE PRACTITIONER

## 2024-02-29 PROCEDURE — 3078F DIAST BP <80 MM HG: CPT | Performed by: NURSE PRACTITIONER

## 2024-02-29 PROCEDURE — G8417 CALC BMI ABV UP PARAM F/U: HCPCS | Performed by: NURSE PRACTITIONER

## 2024-02-29 PROCEDURE — 3075F SYST BP GE 130 - 139MM HG: CPT | Performed by: NURSE PRACTITIONER

## 2024-02-29 PROCEDURE — 1123F ACP DISCUSS/DSCN MKR DOCD: CPT | Performed by: NURSE PRACTITIONER

## 2024-02-29 RX ORDER — LEVOTHYROXINE SODIUM 0.03 MG/1
25 TABLET ORAL DAILY
Qty: 90 TABLET | Refills: 3 | Status: SHIPPED | OUTPATIENT
Start: 2024-02-29

## 2024-02-29 NOTE — PROGRESS NOTES
Jose Daniel Catalan is a 83 y.o. male thatpresents for Follow-up (Thyroid)      History obtained today from Patient.    Follow up since starting Levothyroxine  Feeling a little more energy  Recent labs were stable.    Overall, no complaints today      I have reviewed the patient's past medical history, past surgical history, allergies,medications, social and family history and I have made updates where appropriate.    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)     due to blood clot behind eyes get injection every 8 weeks       Social History     Tobacco Use    Smoking status: Never    Smokeless tobacco: Never    Tobacco comments:     never smoked   Vaping Use    Vaping Use: Never used   Substance Use Topics    Alcohol use: Never    Drug use: No       Family History   Problem Relation Age of Onset    Diabetes Mother     High Blood Pressure Father     Heart Disease Father     Tremors Father     Tremors Sister     Tremors Brother     Tremors Brother          Review of Systems        PHYSICAL EXAM:  /70   Pulse 68   Temp 97.7 °F (36.5 °C) (Oral)   Resp 16   Ht 1.753 m (5' 9\")   Wt 96.3 kg (212 lb 6.4 oz)   SpO2 97%   BMI 31.37 kg/m²     Physical Exam  Constitutional:       Appearance: Normal appearance.   HENT:      Head: Normocephalic and atraumatic.      Right Ear: External ear normal.      Left Ear: External ear normal.

## 2024-03-19 ENCOUNTER — OFFICE VISIT (OUTPATIENT)
Dept: UROLOGY | Age: 84
End: 2024-03-19
Payer: MEDICARE

## 2024-03-19 VITALS — HEIGHT: 69 IN | BODY MASS INDEX: 31.64 KG/M2 | RESPIRATION RATE: 16 BRPM | WEIGHT: 213.6 LBS

## 2024-03-19 DIAGNOSIS — N13.8 BPH WITH OBSTRUCTION/LOWER URINARY TRACT SYMPTOMS: ICD-10-CM

## 2024-03-19 DIAGNOSIS — N40.1 BPH WITH OBSTRUCTION/LOWER URINARY TRACT SYMPTOMS: ICD-10-CM

## 2024-03-19 DIAGNOSIS — N47.1 PHIMOSIS: Primary | ICD-10-CM

## 2024-03-19 DIAGNOSIS — N48.83 ACQUIRED BURIED PENIS: ICD-10-CM

## 2024-03-19 PROCEDURE — 1123F ACP DISCUSS/DSCN MKR DOCD: CPT | Performed by: UROLOGY

## 2024-03-19 PROCEDURE — G8484 FLU IMMUNIZE NO ADMIN: HCPCS | Performed by: UROLOGY

## 2024-03-19 PROCEDURE — 99214 OFFICE O/P EST MOD 30 MIN: CPT | Performed by: UROLOGY

## 2024-03-19 PROCEDURE — 1036F TOBACCO NON-USER: CPT | Performed by: UROLOGY

## 2024-03-19 PROCEDURE — G8427 DOCREV CUR MEDS BY ELIG CLIN: HCPCS | Performed by: UROLOGY

## 2024-03-19 PROCEDURE — G8417 CALC BMI ABV UP PARAM F/U: HCPCS | Performed by: UROLOGY

## 2024-03-19 RX ORDER — NYSTATIN AND TRIAMCINOLONE ACETONIDE 100000; 1 [USP'U]/G; MG/G
OINTMENT TOPICAL
Qty: 1 EACH | Refills: 5 | Status: SHIPPED | OUTPATIENT
Start: 2024-03-19

## 2024-03-19 NOTE — PROGRESS NOTES
negative  Cardiovascular: negative  Gastrointestinal: negative  Genitourinary: see HPI  Musculoskeletal: negative  Skin: negative   Neurological: negative  Hematological/Lymphatic: negative  Psychological: negative      Physical Exam:    This a 83 y.o. male  Vitals:    03/19/24 1418   Resp: 16     Body mass index is 31.54 kg/m².  Constitutional: Patient in no acute distress;         Assessment and Plan        1. Phimosis    2. BPH with obstruction/lower urinary tract symptoms    3. Acquired buried penis               Plan:       BPH- hx of multiple transurethral procedures. Not at goal with voiding.      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 TUIBNC in past. Not voiding well, but wants to avoid further interventions. We have not rescoped recently.     Prostate cancer screening- psa acceptable for his age. Annual checks at most    Phimosis- can retract foreskin on exam but with difficulty.  Refill mycolog. Pt with buried penis with chronic inflammation cannot recircumcise as it will not benefit.         F/u 6 mo w pvr            Prescriptions Ordered:  No orders of the defined types were placed in this encounter.         Orders Placed:  No orders of the defined types were placed in this encounter.           DEISY MATTHEWS MD

## 2024-03-22 ENCOUNTER — OFFICE VISIT (OUTPATIENT)
Dept: NEUROLOGY | Age: 84
End: 2024-03-22
Payer: MEDICARE

## 2024-03-22 VITALS
BODY MASS INDEX: 31.25 KG/M2 | HEIGHT: 69 IN | SYSTOLIC BLOOD PRESSURE: 139 MMHG | HEART RATE: 57 BPM | DIASTOLIC BLOOD PRESSURE: 71 MMHG | WEIGHT: 211 LBS | OXYGEN SATURATION: 98 %

## 2024-03-22 DIAGNOSIS — R29.898 LEFT HAND WEAKNESS: ICD-10-CM

## 2024-03-22 DIAGNOSIS — G25.0 ESSENTIAL TREMOR: Primary | ICD-10-CM

## 2024-03-22 DIAGNOSIS — R20.0 NUMBNESS OF LEFT HAND: ICD-10-CM

## 2024-03-22 PROCEDURE — G8484 FLU IMMUNIZE NO ADMIN: HCPCS | Performed by: NURSE PRACTITIONER

## 2024-03-22 PROCEDURE — 3075F SYST BP GE 130 - 139MM HG: CPT | Performed by: NURSE PRACTITIONER

## 2024-03-22 PROCEDURE — G8417 CALC BMI ABV UP PARAM F/U: HCPCS | Performed by: NURSE PRACTITIONER

## 2024-03-22 PROCEDURE — 1123F ACP DISCUSS/DSCN MKR DOCD: CPT | Performed by: NURSE PRACTITIONER

## 2024-03-22 PROCEDURE — 3078F DIAST BP <80 MM HG: CPT | Performed by: NURSE PRACTITIONER

## 2024-03-22 PROCEDURE — 1036F TOBACCO NON-USER: CPT | Performed by: NURSE PRACTITIONER

## 2024-03-22 PROCEDURE — G8427 DOCREV CUR MEDS BY ELIG CLIN: HCPCS | Performed by: NURSE PRACTITIONER

## 2024-03-22 PROCEDURE — 99214 OFFICE O/P EST MOD 30 MIN: CPT | Performed by: NURSE PRACTITIONER

## 2024-03-22 RX ORDER — PRIMIDONE 50 MG/1
100 TABLET ORAL 3 TIMES DAILY
Qty: 180 TABLET | Refills: 3 | Status: SHIPPED | OUTPATIENT
Start: 2024-03-22

## 2024-03-22 NOTE — PROGRESS NOTES
NEUROLOGY OUT PATIENT FOLLOW UP NOTE:  3/22/25267:35 PM    Jose Daniel Catalan is here for follow up for essential tremor.            Allergies   Allergen Reactions    Carafate [Sucralfate] Other (See Comments)     Tongue swelling    Iv Dye [Iodides] Shortness Of Breath    Lopressor [Metoprolol]      Bradycardia    Motrin [Ibuprofen]      Was told not to take due to kidneys    Ace Inhibitors      Intolerant to causes muscle aches      Flomax [Tamsulosin Hcl] Nausea Only    Lipitor [Atorvastatin] Other (See Comments)     Myalgias       Current Outpatient Medications:     nystatin-triamcinolone (MYCOLOG) 478319-8.1 UNIT/GM-% ointment, Apply topically 2 times daily., Disp: 1 each, Rfl: 5    levothyroxine (SYNTHROID) 25 MCG tablet, Take 1 tablet by mouth daily, Disp: 90 tablet, Rfl: 3    amLODIPine (NORVASC) 10 MG tablet, TAKE 1 TABLET BY MOUTH DAILY, Disp: 90 tablet, Rfl: 3    dicyclomine (BENTYL) 10 MG capsule, Take 1 capsule by mouth as needed Indications: patient sttaes that this was accidentaly taken off but that he is taking PRN, Disp: , Rfl:     primidone (MYSOLINE) 50 MG tablet, Take 2 tablets by mouth 2 times daily Take at 10am and 8 pm daily, Disp: 120 tablet, Rfl: 3    hydrALAZINE (APRESOLINE) 25 MG tablet, TAKE 1 TABLET BY MOUTH IN THE MORNING AND AT BEDTIME, Disp: 180 tablet, Rfl: 3    triamterene-hydroCHLOROthiazide (MAXZIDE-25) 37.5-25 MG per tablet, TAKE 1 TABLET BY MOUTH EVERY DAY, Disp: 90 tablet, Rfl: 1    irbesartan (AVAPRO) 300 MG tablet, TAKE 1 TABLET BY MOUTH DAILY, Disp: 90 tablet, Rfl: 3    pravastatin (PRAVACHOL) 40 MG tablet, Take 1 tablet by mouth nightly, Disp: 90 tablet, Rfl: 3    citalopram (CELEXA) 20 MG tablet, TAKE 1 TABLET BY MOUTH DAILY, Disp: 90 tablet, Rfl: 3    nystatin-triamcinolone (MYCOLOG) 448825-4.1 UNIT/GM-% ointment, Apply topically 2 times daily., Disp: 1 each, Rfl: 1    aspirin 325 MG tablet, Take 1 tablet by mouth daily, Disp: , Rfl:     I reviewed the past medical history,

## 2024-03-22 NOTE — PATIENT INSTRUCTIONS
EMG left upper extremity  Change Primidone to 100 mg three times a day   Call with any new symptoms or concerns.   Follow up in 3 months.

## 2024-03-25 RX ORDER — PRIMIDONE 50 MG/1
100 TABLET ORAL 3 TIMES DAILY
Qty: 540 TABLET | OUTPATIENT
Start: 2024-03-25

## 2024-04-14 DIAGNOSIS — E03.9 HYPOTHYROIDISM, UNSPECIFIED TYPE: ICD-10-CM

## 2024-04-15 RX ORDER — LEVOTHYROXINE SODIUM 0.03 MG/1
25 TABLET ORAL DAILY
Qty: 90 TABLET | Refills: 3 | OUTPATIENT
Start: 2024-04-15

## 2024-04-23 DIAGNOSIS — F41.9 ANXIETY: ICD-10-CM

## 2024-04-23 DIAGNOSIS — R06.02 SHORTNESS OF BREATH: ICD-10-CM

## 2024-04-23 RX ORDER — CITALOPRAM 20 MG/1
20 TABLET ORAL DAILY
Qty: 90 TABLET | Refills: 3 | Status: SHIPPED | OUTPATIENT
Start: 2024-04-23

## 2024-04-23 RX ORDER — TRIAMTERENE AND HYDROCHLOROTHIAZIDE 37.5; 25 MG/1; MG/1
1 TABLET ORAL DAILY
Qty: 90 TABLET | Refills: 3 | Status: SHIPPED | OUTPATIENT
Start: 2024-04-23

## 2024-04-23 NOTE — TELEPHONE ENCOUNTER
Future Appointments   Date Time Provider Department Center   5/8/2024 12:00 PM [facility]   6/21/2024  2:30 PM [facility]   9/24/2024  2:30 PM Shon Colon MD N [place] [facility]   10/21/2024  8:30 AM Kal Grace MD N [facility]   12/3/2024  2:20 PM Romelia Umanzor APRN - CNP [facility] [place]   2/6/2025 11:00 AM Mau Kelley MD N [place] [facility]

## 2024-05-08 ENCOUNTER — PROCEDURE VISIT (OUTPATIENT)
Dept: NEUROLOGY | Age: 84
End: 2024-05-08
Payer: MEDICARE

## 2024-05-08 DIAGNOSIS — G56.02 LEFT CARPAL TUNNEL SYNDROME: Primary | ICD-10-CM

## 2024-05-08 DIAGNOSIS — R20.0 NUMBNESS OF LEFT HAND: ICD-10-CM

## 2024-05-08 DIAGNOSIS — M54.12 CERVICAL RADICULOPATHY: ICD-10-CM

## 2024-05-08 DIAGNOSIS — R29.898 LEFT HAND WEAKNESS: ICD-10-CM

## 2024-05-08 PROCEDURE — 95886 MUSC TEST DONE W/N TEST COMP: CPT | Performed by: PSYCHIATRY & NEUROLOGY

## 2024-05-08 PROCEDURE — 95909 NRV CNDJ TST 5-6 STUDIES: CPT | Performed by: PSYCHIATRY & NEUROLOGY

## 2024-05-28 ENCOUNTER — NURSE ONLY (OUTPATIENT)
Dept: LAB | Age: 84
End: 2024-05-28

## 2024-05-28 DIAGNOSIS — E03.9 HYPOTHYROIDISM, UNSPECIFIED TYPE: ICD-10-CM

## 2024-05-28 LAB
T4 FREE SERPL-MCNC: 1.02 NG/DL (ref 0.93–1.68)
TSH SERPL DL<=0.005 MIU/L-ACNC: 4.74 UIU/ML (ref 0.4–4.2)

## 2024-05-30 ENCOUNTER — TELEPHONE (OUTPATIENT)
Dept: FAMILY MEDICINE CLINIC | Age: 84
End: 2024-05-30

## 2024-05-30 DIAGNOSIS — E03.9 HYPOTHYROIDISM, UNSPECIFIED TYPE: ICD-10-CM

## 2024-05-30 RX ORDER — LEVOTHYROXINE SODIUM 0.05 MG/1
50 TABLET ORAL DAILY
Qty: 90 TABLET | Refills: 1 | Status: SHIPPED | OUTPATIENT
Start: 2024-05-30

## 2024-05-30 NOTE — TELEPHONE ENCOUNTER
Was checked January, Feb, early May and late May.      Will increase to 50 mcg daily  Take first thing in am on an empty stomach separate from other pills   Will recheck labs in 6 weeks.  Orders placed.

## 2024-05-30 NOTE — TELEPHONE ENCOUNTER
----- Message from ALVINO Yeung CNP sent at 5/30/2024  7:36 AM EDT -----  Thyroid labs were a little off but not much.  If he is feeling well its not enough of a change that I would change his dose.  However, if he is feeling more fatigued, hair loss, cold intolerance, etc, than we should consider increasing his meds.

## 2024-05-30 NOTE — TELEPHONE ENCOUNTER
Patient informed, verbally understood.    Pt states he is \"off balance\". Was unable to give direct answers regarding his fatigue, stating \"I'm always tired\", with the hair loss he has \"been losing hair for a long time\", and he is always cold. Pt doesn't know if this is related to his thyroid do to not knowing when his thyroid was last checked.      Please advise.

## 2024-06-21 ENCOUNTER — OFFICE VISIT (OUTPATIENT)
Dept: NEUROLOGY | Age: 84
End: 2024-06-21
Payer: MEDICARE

## 2024-06-21 VITALS
WEIGHT: 210 LBS | OXYGEN SATURATION: 97 % | BODY MASS INDEX: 31.1 KG/M2 | DIASTOLIC BLOOD PRESSURE: 60 MMHG | HEART RATE: 58 BPM | SYSTOLIC BLOOD PRESSURE: 144 MMHG | HEIGHT: 69 IN

## 2024-06-21 DIAGNOSIS — G25.0 ESSENTIAL TREMOR: ICD-10-CM

## 2024-06-21 DIAGNOSIS — R29.898 LEFT HAND WEAKNESS: ICD-10-CM

## 2024-06-21 DIAGNOSIS — G25.0 ESSENTIAL TREMOR: Primary | ICD-10-CM

## 2024-06-21 PROCEDURE — 3078F DIAST BP <80 MM HG: CPT | Performed by: PSYCHIATRY & NEUROLOGY

## 2024-06-21 PROCEDURE — G8427 DOCREV CUR MEDS BY ELIG CLIN: HCPCS | Performed by: PSYCHIATRY & NEUROLOGY

## 2024-06-21 PROCEDURE — 99213 OFFICE O/P EST LOW 20 MIN: CPT | Performed by: PSYCHIATRY & NEUROLOGY

## 2024-06-21 PROCEDURE — G8417 CALC BMI ABV UP PARAM F/U: HCPCS | Performed by: PSYCHIATRY & NEUROLOGY

## 2024-06-21 PROCEDURE — 1036F TOBACCO NON-USER: CPT | Performed by: PSYCHIATRY & NEUROLOGY

## 2024-06-21 PROCEDURE — 1123F ACP DISCUSS/DSCN MKR DOCD: CPT | Performed by: PSYCHIATRY & NEUROLOGY

## 2024-06-21 PROCEDURE — 3077F SYST BP >= 140 MM HG: CPT | Performed by: PSYCHIATRY & NEUROLOGY

## 2024-06-21 RX ORDER — PRIMIDONE 50 MG/1
150 TABLET ORAL 3 TIMES DAILY
Qty: 270 TABLET | Refills: 6 | Status: SHIPPED | OUTPATIENT
Start: 2024-06-21

## 2024-06-21 NOTE — PROGRESS NOTES
NEUROLOGY OUT PATIENT FOLLOW UP NOTE:  6/21/202412:15 PM    Jose Daniel Catalan is here for follow up for essential tremor. He is here to go over plan.        Allergies   Allergen Reactions    Carafate [Sucralfate] Other (See Comments)     Tongue swelling    Iv Dye [Iodides] Shortness Of Breath    Lopressor [Metoprolol]      Bradycardia    Motrin [Ibuprofen]      Was told not to take due to kidneys    Ace Inhibitors      Intolerant to causes muscle aches      Flomax [Tamsulosin Hcl] Nausea Only    Lipitor [Atorvastatin] Other (See Comments)     Myalgias       Current Outpatient Medications:     levothyroxine (SYNTHROID) 50 MCG tablet, Take 1 tablet by mouth daily, Disp: 90 tablet, Rfl: 1    citalopram (CELEXA) 20 MG tablet, TAKE 1 TABLET BY MOUTH DAILY, Disp: 90 tablet, Rfl: 3    triamterene-hydroCHLOROthiazide (MAXZIDE-25) 37.5-25 MG per tablet, TAKE 1 TABLET BY MOUTH EVERY DAY, Disp: 90 tablet, Rfl: 3    primidone (MYSOLINE) 50 MG tablet, Take 2 tablets by mouth 3 times daily, Disp: 180 tablet, Rfl: 3    nystatin-triamcinolone (MYCOLOG) 757688-1.1 UNIT/GM-% ointment, Apply topically 2 times daily., Disp: 1 each, Rfl: 5    amLODIPine (NORVASC) 10 MG tablet, TAKE 1 TABLET BY MOUTH DAILY, Disp: 90 tablet, Rfl: 3    dicyclomine (BENTYL) 10 MG capsule, Take 1 capsule by mouth as needed Indications: patient sttaes that this was accidentaly taken off but that he is taking PRN, Disp: , Rfl:     hydrALAZINE (APRESOLINE) 25 MG tablet, TAKE 1 TABLET BY MOUTH IN THE MORNING AND AT BEDTIME, Disp: 180 tablet, Rfl: 3    irbesartan (AVAPRO) 300 MG tablet, TAKE 1 TABLET BY MOUTH DAILY, Disp: 90 tablet, Rfl: 3    pravastatin (PRAVACHOL) 40 MG tablet, Take 1 tablet by mouth nightly, Disp: 90 tablet, Rfl: 3    nystatin-triamcinolone (MYCOLOG) 510424-1.1 UNIT/GM-% ointment, Apply topically 2 times daily., Disp: 1 each, Rfl: 1    aspirin 325 MG tablet, Take 1 tablet by mouth daily, Disp: , Rfl:     I reviewed the past medical history,

## 2024-06-21 NOTE — PATIENT INSTRUCTIONS
Change Primidone to 150 mg three times a day   Call with any new symptoms or concerns.   Follow up in 7 months.

## 2024-06-24 RX ORDER — PRIMIDONE 50 MG/1
TABLET ORAL
Qty: 810 TABLET | OUTPATIENT
Start: 2024-06-24

## 2024-07-16 ENCOUNTER — LAB (OUTPATIENT)
Dept: LAB | Age: 84
End: 2024-07-16

## 2024-07-16 DIAGNOSIS — E03.9 HYPOTHYROIDISM, UNSPECIFIED TYPE: ICD-10-CM

## 2024-07-16 LAB
T4 FREE SERPL-MCNC: 1.22 NG/DL (ref 0.93–1.68)
TSH SERPL DL<=0.005 MIU/L-ACNC: 2.53 UIU/ML (ref 0.4–4.2)

## 2024-07-17 ENCOUNTER — TELEPHONE (OUTPATIENT)
Dept: FAMILY MEDICINE CLINIC | Age: 84
End: 2024-07-17

## 2024-07-17 NOTE — TELEPHONE ENCOUNTER
----- Message from ALVINO Yeung CNP sent at 7/17/2024  4:22 PM EDT -----  Thyroid studies stable.

## 2024-09-26 ENCOUNTER — OFFICE VISIT (OUTPATIENT)
Dept: UROLOGY | Age: 84
End: 2024-09-26
Payer: MEDICARE

## 2024-09-26 VITALS — WEIGHT: 215 LBS | BODY MASS INDEX: 31.84 KG/M2 | HEIGHT: 69 IN | RESPIRATION RATE: 15 BRPM

## 2024-09-26 DIAGNOSIS — N48.83 ACQUIRED BURIED PENIS: ICD-10-CM

## 2024-09-26 DIAGNOSIS — N39.0 RECURRENT UTI: ICD-10-CM

## 2024-09-26 DIAGNOSIS — N40.1 BPH WITH OBSTRUCTION/LOWER URINARY TRACT SYMPTOMS: Primary | ICD-10-CM

## 2024-09-26 DIAGNOSIS — N13.8 BPH WITH OBSTRUCTION/LOWER URINARY TRACT SYMPTOMS: Primary | ICD-10-CM

## 2024-09-26 DIAGNOSIS — N47.1 PHIMOSIS: ICD-10-CM

## 2024-09-26 LAB — POST VOID RESIDUAL (PVR): 88 ML

## 2024-09-26 PROCEDURE — 1123F ACP DISCUSS/DSCN MKR DOCD: CPT | Performed by: UROLOGY

## 2024-09-26 PROCEDURE — 51798 US URINE CAPACITY MEASURE: CPT | Performed by: UROLOGY

## 2024-09-26 PROCEDURE — G8427 DOCREV CUR MEDS BY ELIG CLIN: HCPCS | Performed by: UROLOGY

## 2024-09-26 PROCEDURE — 99214 OFFICE O/P EST MOD 30 MIN: CPT | Performed by: UROLOGY

## 2024-09-26 PROCEDURE — 1036F TOBACCO NON-USER: CPT | Performed by: UROLOGY

## 2024-09-26 PROCEDURE — G8417 CALC BMI ABV UP PARAM F/U: HCPCS | Performed by: UROLOGY

## 2024-10-01 RX ORDER — VIBEGRON 75 MG/1
75 TABLET, FILM COATED ORAL DAILY
Qty: 28 TABLET | Refills: 0 | Status: SHIPPED | COMMUNITY
Start: 2024-10-01

## 2024-10-18 DIAGNOSIS — G25.0 ESSENTIAL TREMOR: ICD-10-CM

## 2024-10-21 RX ORDER — PRIMIDONE 50 MG/1
100 TABLET ORAL 3 TIMES DAILY
Qty: 180 TABLET | Refills: 3 | Status: SHIPPED | OUTPATIENT
Start: 2024-10-21

## 2024-10-21 NOTE — TELEPHONE ENCOUNTER
Please approve or deny     Last Visit Date:  6/21/2024   Dr. Rowland    Next Visit Date:    1/24/2025  Dr. Rowland

## 2024-10-25 DIAGNOSIS — E78.5 HYPERLIPIDEMIA, UNSPECIFIED HYPERLIPIDEMIA TYPE: ICD-10-CM

## 2024-10-25 RX ORDER — PRAVASTATIN SODIUM 40 MG
40 TABLET ORAL NIGHTLY
Qty: 90 TABLET | Refills: 3 | Status: SHIPPED | OUTPATIENT
Start: 2024-10-25

## 2024-10-25 RX ORDER — IRBESARTAN 300 MG/1
300 TABLET ORAL DAILY
Qty: 90 TABLET | Refills: 0 | Status: SHIPPED | OUTPATIENT
Start: 2024-10-25

## 2024-10-25 NOTE — TELEPHONE ENCOUNTER
Recent Visits  Date Type Provider Dept   02/29/24 Office Visit Romelia Umanzor APRN - CNP [facility]   01/18/24 Office Visit Romelia Umanzor APRN - CNP [facility]   11/28/23 Office Visit Romelia Umanzor APRN - CNP [facility]   Showing recent visits within past 540 days with a meds authorizing provider and meeting all other requirements  Future Appointments  Date Type Provider Dept   12/03/24 Appointment Romelia Umanzor APRN - CNP [facility]   Showing future appointments within next 150 days with a meds authorizing provider and meeting all other requirements

## 2024-11-04 ENCOUNTER — OFFICE VISIT (OUTPATIENT)
Dept: CARDIOLOGY CLINIC | Age: 84
End: 2024-11-04

## 2024-11-04 VITALS
SYSTOLIC BLOOD PRESSURE: 138 MMHG | HEIGHT: 69 IN | DIASTOLIC BLOOD PRESSURE: 61 MMHG | OXYGEN SATURATION: 97 % | HEART RATE: 60 BPM | BODY MASS INDEX: 31.7 KG/M2 | WEIGHT: 214 LBS

## 2024-11-04 DIAGNOSIS — I10 PRIMARY HYPERTENSION: ICD-10-CM

## 2024-11-04 DIAGNOSIS — I25.10 CORONARY ARTERY DISEASE INVOLVING NATIVE CORONARY ARTERY OF NATIVE HEART WITHOUT ANGINA PECTORIS: Primary | ICD-10-CM

## 2024-11-04 NOTE — PROGRESS NOTES
[facility] [place] [facility]  [facility] CARDIOLOGY  [address]  [address] [place] [id number]  Dept: [phone]  Dept Fax: [phone]  Loc: [phone]    Chief Complaint   Patient presents with    Follow-up     1 yr f/u      Coronary Artery Disease    Shortness of Breath     Patient with a history of nonobstructive coronary artery disease presents for follow-up.  Patient states he has been doing well since his previous visit.  He has some intermittent shortness of breath which has not worsened in severity or frequency.  He denies any chest pain or palpitations.  Cardiologist:  Dr. Floyd        General:   No fever, no chills, No fatigue or weight loss  Pulmonary:   Intermittent shortness of breath   cardiac:    Denies recent chest pain   GI:     No nausea or vomiting, no abdominal pain  Neuro:    No dizziness or light headedness  Musculoskeletal:  No recent active issues  Extremities:   No edema, good peripheral pulses      Past Medical History:   Diagnosis Date    Anemia     Arthritis     Bronchiolitis 11/2016    Cancer (HCC)     SKIN CANCER    CKD (chronic kidney disease), stage III ([facility])     Diverticula of colon 05/2014    tx with antibiotics per pt; NO surgery    Diverticulosis     DVT (deep venous thrombosis) ([facility])     s/p    Esophagitis 12/10/2014    Hiatal hernia     [place] (hard of hearing)     not able to hear out of rt ear    HTN (hypertension)     Hyperlipidemia     Kidney stones     Nephrolithiasis     Obesity     Prostate enlargement     benign    Renal insufficiency     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

## 2024-11-04 NOTE — PROGRESS NOTES
EKG obtained  Denies Chest pain  Admits to SOB with exertion, increased swelling in ankles leeann.

## 2024-11-04 NOTE — PATIENT INSTRUCTIONS
Have a Great Day!          If your physician has ordered procedures/ testing and you have not been contacted with in 2 days after your office visit,  please call our office.     If you have had your testing performed -  please allow 48 hours for our office to notify you of the results.  If you have not heard from us with in the 48 hrs,  please call the office.     Results for the 14 day and 30 day heart monitor - please allow 7-10 business days after the monitor is mailed back.    You may receive a survey regarding the care you received during your visit.  Your input is valuable to us.  We encourage you to complete and return your survey.  We hope you will choose us in the future for your healthcare needs.  Thank you for choosing [facility]!    Your Medical Assistant Today:  Manju AGUILAR       Your Provider for Today: Dylon SOLIS  Ph. [phone]

## 2024-11-28 DIAGNOSIS — E03.9 HYPOTHYROIDISM, UNSPECIFIED TYPE: ICD-10-CM

## 2024-11-29 RX ORDER — LEVOTHYROXINE SODIUM 50 UG/1
50 TABLET ORAL DAILY
Qty: 90 TABLET | Refills: 3 | Status: SHIPPED | OUTPATIENT
Start: 2024-11-29

## 2024-12-02 ENCOUNTER — TELEPHONE (OUTPATIENT)
Dept: FAMILY MEDICINE CLINIC | Age: 84
End: 2024-12-02

## 2024-12-02 DIAGNOSIS — E04.1 THYROID NODULE: Primary | ICD-10-CM

## 2024-12-02 SDOH — ECONOMIC STABILITY: FOOD INSECURITY: WITHIN THE PAST 12 MONTHS, YOU WORRIED THAT YOUR FOOD WOULD RUN OUT BEFORE YOU GOT MONEY TO BUY MORE.: NEVER TRUE

## 2024-12-02 SDOH — ECONOMIC STABILITY: FOOD INSECURITY: WITHIN THE PAST 12 MONTHS, THE FOOD YOU BOUGHT JUST DIDN'T LAST AND YOU DIDN'T HAVE MONEY TO GET MORE.: NEVER TRUE

## 2024-12-02 SDOH — HEALTH STABILITY: PHYSICAL HEALTH: ON AVERAGE, HOW MANY MINUTES DO YOU ENGAGE IN EXERCISE AT THIS LEVEL?: 10 MIN

## 2024-12-02 SDOH — ECONOMIC STABILITY: INCOME INSECURITY: HOW HARD IS IT FOR YOU TO PAY FOR THE VERY BASICS LIKE FOOD, HOUSING, MEDICAL CARE, AND HEATING?: NOT HARD AT ALL

## 2024-12-02 SDOH — ECONOMIC STABILITY: TRANSPORTATION INSECURITY
IN THE PAST 12 MONTHS, HAS LACK OF TRANSPORTATION KEPT YOU FROM MEETINGS, WORK, OR FROM GETTING THINGS NEEDED FOR DAILY LIVING?: NO

## 2024-12-02 SDOH — HEALTH STABILITY: PHYSICAL HEALTH: ON AVERAGE, HOW MANY DAYS PER WEEK DO YOU ENGAGE IN MODERATE TO STRENUOUS EXERCISE (LIKE A BRISK WALK)?: 1 DAY

## 2024-12-02 ASSESSMENT — LIFESTYLE VARIABLES
HOW OFTEN DO YOU HAVE SIX OR MORE DRINKS ON ONE OCCASION: 1
HOW MANY STANDARD DRINKS CONTAINING ALCOHOL DO YOU HAVE ON A TYPICAL DAY: 0
HOW OFTEN DO YOU HAVE A DRINK CONTAINING ALCOHOL: 1
HOW MANY STANDARD DRINKS CONTAINING ALCOHOL DO YOU HAVE ON A TYPICAL DAY: PATIENT DOES NOT DRINK
HOW OFTEN DO YOU HAVE A DRINK CONTAINING ALCOHOL: NEVER

## 2024-12-02 ASSESSMENT — PATIENT HEALTH QUESTIONNAIRE - PHQ9
SUM OF ALL RESPONSES TO PHQ QUESTIONS 1-9: 0
1. LITTLE INTEREST OR PLEASURE IN DOING THINGS: NOT AT ALL
2. FEELING DOWN, DEPRESSED OR HOPELESS: NOT AT ALL
SUM OF ALL RESPONSES TO PHQ9 QUESTIONS 1 & 2: 0
SUM OF ALL RESPONSES TO PHQ QUESTIONS 1-9: 0

## 2024-12-02 NOTE — TELEPHONE ENCOUNTER
Left message on answering machine. Requested pt to call back at [phone], at their earliest convenience.

## 2024-12-02 NOTE — TELEPHONE ENCOUNTER
----- Message from ALVINO Mejia CNP sent at 2/6/2024  5:15 PM EST -----  Order repeat thyroid us for nodules

## 2024-12-03 ENCOUNTER — OFFICE VISIT (OUTPATIENT)
Dept: FAMILY MEDICINE CLINIC | Age: 84
End: 2024-12-03
Payer: MEDICARE

## 2024-12-03 VITALS
BODY MASS INDEX: 31.96 KG/M2 | OXYGEN SATURATION: 98 % | RESPIRATION RATE: 14 BRPM | SYSTOLIC BLOOD PRESSURE: 120 MMHG | HEIGHT: 69 IN | DIASTOLIC BLOOD PRESSURE: 64 MMHG | TEMPERATURE: 97.6 F | HEART RATE: 58 BPM | WEIGHT: 215.8 LBS

## 2024-12-03 DIAGNOSIS — I20.89 OTHER FORMS OF ANGINA PECTORIS (HCC): ICD-10-CM

## 2024-12-03 DIAGNOSIS — Z00.00 MEDICARE ANNUAL WELLNESS VISIT, SUBSEQUENT: Primary | ICD-10-CM

## 2024-12-03 DIAGNOSIS — Z23 NEEDS FLU SHOT: ICD-10-CM

## 2024-12-03 DIAGNOSIS — R10.9 ABDOMINAL CRAMPING: ICD-10-CM

## 2024-12-03 PROCEDURE — G0439 PPPS, SUBSEQ VISIT: HCPCS | Performed by: NURSE PRACTITIONER

## 2024-12-03 PROCEDURE — 1159F MED LIST DOCD IN RCRD: CPT | Performed by: NURSE PRACTITIONER

## 2024-12-03 PROCEDURE — G8482 FLU IMMUNIZE ORDER/ADMIN: HCPCS | Performed by: NURSE PRACTITIONER

## 2024-12-03 PROCEDURE — 90653 IIV ADJUVANT VACCINE IM: CPT | Performed by: NURSE PRACTITIONER

## 2024-12-03 PROCEDURE — 1123F ACP DISCUSS/DSCN MKR DOCD: CPT | Performed by: NURSE PRACTITIONER

## 2024-12-03 PROCEDURE — G0008 ADMIN INFLUENZA VIRUS VAC: HCPCS | Performed by: NURSE PRACTITIONER

## 2024-12-03 RX ORDER — DICYCLOMINE HYDROCHLORIDE 10 MG/1
CAPSULE ORAL
Qty: 360 CAPSULE | OUTPATIENT
Start: 2024-12-03

## 2024-12-03 RX ORDER — DICYCLOMINE HYDROCHLORIDE 10 MG/1
10 CAPSULE ORAL EVERY 6 HOURS PRN
Qty: 120 CAPSULE | Refills: 1 | Status: SHIPPED | OUTPATIENT
Start: 2024-12-03

## 2024-12-03 SDOH — ECONOMIC STABILITY: INCOME INSECURITY: HOW HARD IS IT FOR YOU TO PAY FOR THE VERY BASICS LIKE FOOD, HOUSING, MEDICAL CARE, AND HEATING?: NOT HARD AT ALL

## 2024-12-03 SDOH — ECONOMIC STABILITY: FOOD INSECURITY: WITHIN THE PAST 12 MONTHS, THE FOOD YOU BOUGHT JUST DIDN'T LAST AND YOU DIDN'T HAVE MONEY TO GET MORE.: NEVER TRUE

## 2024-12-03 SDOH — ECONOMIC STABILITY: FOOD INSECURITY: WITHIN THE PAST 12 MONTHS, YOU WORRIED THAT YOUR FOOD WOULD RUN OUT BEFORE YOU GOT MONEY TO BUY MORE.: NEVER TRUE

## 2024-12-03 NOTE — TELEPHONE ENCOUNTER
Left message on answering machine. Requested pt to call back at [phone], at their earliest convenience.

## 2024-12-03 NOTE — PROGRESS NOTES
Medicare Annual Wellness Visit    Jose Daniel Catalan is here for Medicare AWV    Assessment & Plan   Medicare annual wellness visit, subsequent  Needs flu shot  -     Influenza, FLUAD Trivalent, (age 65 y+), IM, Preservative Free, 0.5mL  Abdominal cramping  -     dicyclomine (BENTYL) 10 MG capsule; Take 1 capsule by mouth every 6 hours as needed (cramping) Indications: patient sttaes that this was accidentaly taken off but that he is taking PRN, Disp-120 capsule, R-1Normal  Other forms of angina pectoris (HCC)    Recommendations for Preventive Services Due: see orders and patient instructions/AVS.  Recommended screening schedule for the next 5-10 years is provided to the patient in written form: see Patient Instructions/AVS.     No follow-ups on file.     Subjective   The following acute and/or chronic problems were also addressed today:  HTN, angina    Appetite is good  Trying to get more water in  No issues sleeping  Voiding well, bowels moving  No cp, sob, edema    Patient's complete Health Risk Assessment and screening values have been reviewed and are found in Flowsheets. The following problems were reviewed today and where indicated follow up appointments were made and/or referrals ordered.    Positive Risk Factor Screenings with Interventions:    Fall Risk:  Do you feel unsteady or are you worried about falling? : no  2 or more falls in past year?: no  Fall with injury in past year?: (!) yes     Interventions:    Reviewed medications, home hazards, visual acuity, and co-morbidities that can increase risk for falls  See AVS for additional education material             Inactivity:  On average, how many days per week do you engage in moderate to strenuous exercise (like a brisk walk)?: 1 day (!) Abnormal  On average, how many minutes do you engage in exercise at this level?: 10 min  Interventions:  See AVS for additional education material     Abnormal BMI (obese):  Body mass index is 31.87 kg/m². (!)

## 2024-12-03 NOTE — PATIENT INSTRUCTIONS
device in the bathroom with you.   Where can you learn more?  Go to https://www.[employer].net/patientEd and enter G117 to learn more about \"Preventing Falls: Care Instructions.\"  Current as of: July 17, 2023  Content Version: 14.2  © 2024 [employer].   Care instructions adapted under license by [email or web address]. If you have questions about a medical condition or this instruction, always ask your healthcare professional. [employer] disclaims any warranty or liability for your use of this information.           Learning About Being Active as an Older Adult  Why is being active important as you get older?     Being active is one of the best things you can do for your health. And it's never too late to start. Being active--or getting active, if you aren't already--has definite benefits. It can:  Give you more energy,  Keep your mind sharp.  Improve balance to reduce your risk of falls.  Help you manage chronic illness with fewer medicines.  No matter how old you are, how fit you are, or what health problems you have, there is a form of activity that will work for you. And the more physical activity you can do, the better your overall health will be.  What kinds of activity can help you stay healthy?  Being more active will make your daily activities easier. Physical activity includes planned exercise and things you do in daily life. There are four types of activity:  Aerobic.  Doing aerobic activity makes your heart and lungs strong.  Includes walking, dancing, and gardening.  Aim for at least 2½ hours spread throughout the week.  It improves your energy and can help you sleep better.  Muscle-strengthening.  This type of activity can help maintain muscle and strengthen bones.  Includes climbing stairs, using resistance bands, and lifting or carrying heavy loads.  Aim for at least twice a week.  It can help protect the knees and other joints.  Stretching.  Stretching gives you better range of

## 2024-12-03 NOTE — PROGRESS NOTES
Vaccine Information Sheet, \"Influenza - Inactivated\"  given to Jose Daniel Catalan, or parent/legal guardian of  Jose Daniel Catalan and verbalized understanding.    Patient responses:    Have you ever had a reaction to a flu vaccine? No  Do you have an allergy to eggs, neomycin or polymixin?  No  Do you have an allergy to Thimerosal, contact lens solution, or Merthiolate? No  Have you ever had [place] Syndrome?  No  Do you have any current illness?  No  Do you have a temperature above 100 degrees? No  Are you pregnant? No  If pregnant, permission obtained from physician? No  Do you have an active neurological disorder? No

## 2024-12-04 NOTE — TELEPHONE ENCOUNTER
Left message on answering machine. Requested pt to call back at [phone], at their earliest convenience.    Letter sent in mail.

## 2024-12-27 RX ORDER — HYDRALAZINE HYDROCHLORIDE 25 MG/1
25 TABLET, FILM COATED ORAL 2 TIMES DAILY
Qty: 180 TABLET | Refills: 3 | Status: SHIPPED | OUTPATIENT
Start: 2024-12-27

## 2025-01-21 DIAGNOSIS — I10 ESSENTIAL HYPERTENSION: ICD-10-CM

## 2025-01-21 RX ORDER — IRBESARTAN 300 MG/1
300 TABLET ORAL DAILY
Qty: 90 TABLET | Refills: 3 | Status: SHIPPED | OUTPATIENT
Start: 2025-01-21

## 2025-01-21 RX ORDER — AMLODIPINE BESYLATE 10 MG/1
10 TABLET ORAL DAILY
Qty: 90 TABLET | Refills: 3 | OUTPATIENT
Start: 2025-01-21

## 2025-01-28 ENCOUNTER — LAB (OUTPATIENT)
Dept: LAB | Age: 85
End: 2025-01-28

## 2025-01-28 DIAGNOSIS — N18.32 CHRONIC KIDNEY DISEASE, STAGE 3B (HCC): ICD-10-CM

## 2025-01-28 DIAGNOSIS — N28.1 RENAL CYST: ICD-10-CM

## 2025-01-28 DIAGNOSIS — I12.9 HYPERTENSIVE RENAL DISEASE, STAGE 1 THROUGH STAGE 4 OR UNSPECIFIED CHRONIC KIDNEY DISEASE: ICD-10-CM

## 2025-01-28 LAB
ANION GAP SERPL CALC-SCNC: 9 MEQ/L (ref 8–16)
BUN SERPL-MCNC: 38 MG/DL (ref 7–22)
CALCIUM SERPL-MCNC: 8.8 MG/DL (ref 8.5–10.5)
CHLORIDE SERPL-SCNC: 105 MEQ/L (ref 98–111)
CO2 SERPL-SCNC: 25 MEQ/L (ref 23–33)
CREAT SERPL-MCNC: 1.7 MG/DL (ref 0.4–1.2)
GFR SERPL CREATININE-BSD FRML MDRD: 39 ML/MIN/1.73M2
GLUCOSE SERPL-MCNC: 100 MG/DL (ref 70–108)
POTASSIUM SERPL-SCNC: 4.4 MEQ/L (ref 3.5–5.2)
SODIUM SERPL-SCNC: 139 MEQ/L (ref 135–145)

## 2025-02-04 ENCOUNTER — HOSPITAL ENCOUNTER (OUTPATIENT)
Dept: ULTRASOUND IMAGING | Age: 85
Discharge: HOME | End: 2025-02-04
Payer: MEDICARE

## 2025-02-04 DIAGNOSIS — E04.1 THYROID NODULE: ICD-10-CM

## 2025-02-04 PROCEDURE — 76536 US EXAM OF HEAD AND NECK: CPT

## 2025-02-06 ENCOUNTER — OFFICE VISIT (OUTPATIENT)
Dept: NEPHROLOGY | Age: 85
End: 2025-02-06
Payer: MEDICARE

## 2025-02-06 ENCOUNTER — TELEPHONE (OUTPATIENT)
Dept: FAMILY MEDICINE CLINIC | Age: 85
End: 2025-02-06

## 2025-02-06 VITALS
OXYGEN SATURATION: 97 % | DIASTOLIC BLOOD PRESSURE: 88 MMHG | WEIGHT: 211 LBS | HEART RATE: 54 BPM | HEIGHT: 69 IN | BODY MASS INDEX: 31.25 KG/M2 | SYSTOLIC BLOOD PRESSURE: 219 MMHG

## 2025-02-06 DIAGNOSIS — I12.9 HYPERTENSIVE RENAL DISEASE, STAGE 1 THROUGH STAGE 4 OR UNSPECIFIED CHRONIC KIDNEY DISEASE: ICD-10-CM

## 2025-02-06 DIAGNOSIS — N28.1 RENAL CYST: ICD-10-CM

## 2025-02-06 DIAGNOSIS — N18.32 CHRONIC KIDNEY DISEASE, STAGE 3B (HCC): Primary | ICD-10-CM

## 2025-02-06 PROCEDURE — 1123F ACP DISCUSS/DSCN MKR DOCD: CPT | Performed by: INTERNAL MEDICINE

## 2025-02-06 PROCEDURE — G8417 CALC BMI ABV UP PARAM F/U: HCPCS | Performed by: INTERNAL MEDICINE

## 2025-02-06 PROCEDURE — 99214 OFFICE O/P EST MOD 30 MIN: CPT | Performed by: INTERNAL MEDICINE

## 2025-02-06 PROCEDURE — 1036F TOBACCO NON-USER: CPT | Performed by: INTERNAL MEDICINE

## 2025-02-06 PROCEDURE — G8427 DOCREV CUR MEDS BY ELIG CLIN: HCPCS | Performed by: INTERNAL MEDICINE

## 2025-02-06 PROCEDURE — 1159F MED LIST DOCD IN RCRD: CPT | Performed by: INTERNAL MEDICINE

## 2025-02-06 RX ORDER — HYDRALAZINE HYDROCHLORIDE 50 MG/1
50 TABLET, FILM COATED ORAL 2 TIMES DAILY
Qty: 180 TABLET | OUTPATIENT
Start: 2025-02-06

## 2025-02-06 RX ORDER — HYDRALAZINE HYDROCHLORIDE 50 MG/1
50 TABLET, FILM COATED ORAL 2 TIMES DAILY
Qty: 60 TABLET | Refills: 3 | Status: SHIPPED | OUTPATIENT
Start: 2025-02-06

## 2025-02-06 NOTE — TELEPHONE ENCOUNTER
----- Message from ALVINO Mejia CNP sent at 2/6/2025  1:41 PM EST -----  US stable.  Repeat in 1 year

## 2025-02-06 NOTE — TELEPHONE ENCOUNTER
Left message on answering machine. Requested pt to call back at [phone], at their earliest convenience.

## 2025-02-07 ENCOUNTER — TELEPHONE (OUTPATIENT)
Dept: NEPHROLOGY | Age: 85
End: 2025-02-07

## 2025-02-07 NOTE — TELEPHONE ENCOUNTER
Blood pressure is improving.  Hydralazine adjusted yesterday  It was in systolic 200s yesterday when he saw me in the office  Please advise patient to call us next week with some more readings

## 2025-02-07 NOTE — TELEPHONE ENCOUNTER
Left message on answering machine. Requested pt to call back at [phone], at their earliest convenience.

## 2025-02-07 NOTE — TELEPHONE ENCOUNTER
Patient called in with BP readings from yesterday and this morning.    2/6/25 12pm: 175/71          5:30pm: 160/69         Midnight: 192/74    2/7/25 10:30am: 186/79 (before BP meds)              11:30am: 142/59 (after BP meds, rt arm)        138/58 (left arm)

## 2025-02-08 ENCOUNTER — PATIENT MESSAGE (OUTPATIENT)
Dept: FAMILY MEDICINE CLINIC | Age: 85
End: 2025-02-08

## 2025-02-08 DIAGNOSIS — I10 ESSENTIAL HYPERTENSION: ICD-10-CM

## 2025-02-10 RX ORDER — AMLODIPINE BESYLATE 10 MG/1
10 TABLET ORAL DAILY
Qty: 90 TABLET | Refills: 3 | Status: SHIPPED | OUTPATIENT
Start: 2025-02-10

## 2025-02-10 NOTE — TELEPHONE ENCOUNTER
Left message informing pt blood pressure is improving.  Hydralazine was adjusted 2/7/25 and his systolic was 200s yesterday when he saw me in the office  Please advise patient to call us next week with some more readings. Asked for a call back if any questions.

## 2025-02-10 NOTE — TELEPHONE ENCOUNTER
Recent Visits  Date Type Provider Dept   12/03/24 Office Visit Romelia Umanzor APRN - CNP [facility]   02/29/24 Office Visit Romelia Umanzor APRN - CNP [facility]   01/18/24 Office Visit Romelia Umanzor, APRN - CNP [facility]   11/28/23 Office Visit Romelia Umanzor, APRN - CNP [facility]   Showing recent visits within past 540 days with a meds authorizing provider and meeting all other requirements  Future Appointments  No visits were found meeting these conditions.  Showing future appointments within next 150 days with a meds authorizing provider and meeting all other requirements

## 2025-02-11 NOTE — TELEPHONE ENCOUNTER
Spoke to pt, he stated that  he understood to increase his hydralazine from 50 mg to 100 mg bid. He does not need a script at this time, due to he just got the 50 mg refilled. He stated he will take 2 in am and 2 in pm. He will call with readings next week.

## 2025-02-12 NOTE — TELEPHONE ENCOUNTER
Wife called to inform us that pt is not tolerating the 100 mg hydralazine to well during the day. She wants to know can she have pt take 50 mg when gets up, 50 mg 4 hours later and then 100 mg at night.    BP tthis morning was 193/79 am, after pills 162/70 and then 148/42 after eating.

## 2025-02-13 NOTE — TELEPHONE ENCOUNTER
Spoke to pt's wife, she understands that pt can take hydralazine 50 mg in am 50 mg 4 hours later and 100 mg in evening.    MAR updated

## 2025-02-19 ENCOUNTER — OFFICE VISIT (OUTPATIENT)
Dept: NEUROLOGY | Age: 85
End: 2025-02-19
Payer: MEDICARE

## 2025-02-19 VITALS
DIASTOLIC BLOOD PRESSURE: 75 MMHG | BODY MASS INDEX: 31.1 KG/M2 | SYSTOLIC BLOOD PRESSURE: 135 MMHG | OXYGEN SATURATION: 96 % | WEIGHT: 210 LBS | HEIGHT: 69 IN | HEART RATE: 77 BPM

## 2025-02-19 DIAGNOSIS — G25.0 ESSENTIAL TREMOR: Primary | ICD-10-CM

## 2025-02-19 PROCEDURE — 1036F TOBACCO NON-USER: CPT | Performed by: NURSE PRACTITIONER

## 2025-02-19 PROCEDURE — 1123F ACP DISCUSS/DSCN MKR DOCD: CPT | Performed by: NURSE PRACTITIONER

## 2025-02-19 PROCEDURE — 1159F MED LIST DOCD IN RCRD: CPT | Performed by: NURSE PRACTITIONER

## 2025-02-19 PROCEDURE — 99213 OFFICE O/P EST LOW 20 MIN: CPT | Performed by: NURSE PRACTITIONER

## 2025-02-19 PROCEDURE — G8417 CALC BMI ABV UP PARAM F/U: HCPCS | Performed by: NURSE PRACTITIONER

## 2025-02-19 PROCEDURE — G8427 DOCREV CUR MEDS BY ELIG CLIN: HCPCS | Performed by: NURSE PRACTITIONER

## 2025-02-19 RX ORDER — PRIMIDONE 50 MG/1
100 TABLET ORAL 3 TIMES DAILY
Qty: 180 TABLET | Refills: 3 | Status: SHIPPED | OUTPATIENT
Start: 2025-02-19

## 2025-02-19 NOTE — PATIENT INSTRUCTIONS
Continue with Primidone  150 mg three times a day   Call with any new symptoms or concerns.   Follow up in 7 months.

## 2025-02-19 NOTE — PROGRESS NOTES
NEUROLOGY OUT PATIENT FOLLOW UP NOTE:  2/19/20251:03 PM    Jose Daniel Catalan is here for follow up for essential tremor.            Allergies   Allergen Reactions    Carafate [Sucralfate] Other (See Comments)     Tongue swelling    Iv Dye [Iodides] Shortness Of Breath    Lopressor [Metoprolol]      Bradycardia    Motrin [Ibuprofen]      Was told not to take due to kidneys    Ace Inhibitors      Intolerant to causes muscle aches      Flomax [Tamsulosin Hcl] Nausea Only    Lipitor [Atorvastatin] Other (See Comments)     Myalgias       Current Outpatient Medications:     amLODIPine (NORVASC) 10 MG tablet, Take 1 tablet by mouth daily, Disp: 90 tablet, Rfl: 3    hydrALAZINE (APRESOLINE) 50 MG tablet, Take 1 tablet by mouth in the morning and at bedtime (Patient taking differently: Take 2 tablets by mouth in the morning and at bedtime 50 mg in am 50 mg 4 hours after am dose   100 mg in pm), Disp: 60 tablet, Rfl: 3    irbesartan (AVAPRO) 300 MG tablet, TAKE 1 TABLET BY MOUTH DAILY, Disp: 90 tablet, Rfl: 3    dicyclomine (BENTYL) 10 MG capsule, Take 1 capsule by mouth every 6 hours as needed (cramping) Indications: patient sttaes that this was accidentaly taken off but that he is taking PRN, Disp: 120 capsule, Rfl: 1    levothyroxine (SYNTHROID) 50 MCG tablet, TAKE 1 TABLET BY MOUTH DAILY, Disp: 90 tablet, Rfl: 3    pravastatin (PRAVACHOL) 40 MG tablet, TAKE 1 TABLET BY MOUTH EVERY NIGHT, Disp: 90 tablet, Rfl: 3    primidone (MYSOLINE) 50 MG tablet, TAKE 2 TABLETS BY MOUTH THREE TIMES DAILY, Disp: 180 tablet, Rfl: 3    vibegron (GEMTESA) 75 MG TABS tablet, Take 1 tablet by mouth daily, Disp: 28 tablet, Rfl: 0    citalopram (CELEXA) 20 MG tablet, TAKE 1 TABLET BY MOUTH DAILY, Disp: 90 tablet, Rfl: 3    triamterene-hydroCHLOROthiazide (MAXZIDE-25) 37.5-25 MG per tablet, TAKE 1 TABLET BY MOUTH EVERY DAY, Disp: 90 tablet, Rfl: 3    nystatin-triamcinolone (MYCOLOG) 739180-6.1 UNIT/GM-% ointment, Apply topically 2 times daily.,

## 2025-02-20 RX ORDER — HYDRALAZINE HYDROCHLORIDE 50 MG/1
TABLET, FILM COATED ORAL
Qty: 360 TABLET | Refills: 3 | Status: SHIPPED | OUTPATIENT
Start: 2025-02-20

## 2025-02-20 NOTE — TELEPHONE ENCOUNTER
Pt called to inform us that he does not have enough pills of his hydralazine 50 mg to get him through. The script on 2/6/25 was only for 60 pills.    Script pending

## 2025-02-26 ENCOUNTER — TELEPHONE (OUTPATIENT)
Dept: CARDIOLOGY CLINIC | Age: 85
End: 2025-02-26

## 2025-02-26 NOTE — TELEPHONE ENCOUNTER
Fluctuating systolic BP  (Newest Message First)  View All Conversations on this Encounter  Jose Daniel WYLIE Alayna \"Jayden\"  P [facility] Clinical Staff (supporting Kal Grace MD)13 hours ago (5:06 PM)       Hi Dr Floyd, Jayden was seen for his routine kidney visit with DR. Kelley and his systolic BP was over 200+. He increased his Hydralazine 25 BID to 50 BID and given to report  BP readings in 3 days. You can see his notes from that visit. Three days later he told him to take 100mg BID. He did not tolerate the 100 in the AM, so cut to 50 and 50 to follow 4 hours later. He tolerates that OK, but BP still elevated most of his checks. Sometimes he will drop to 140+, then in a couple of hours go back up to 170+-180+ He does not feel good with the 40 point variation most of the time that happens. My main concern is I know Dr Kelley is working on protecting his kidneys. The Hydralazine dilates the blood vessels, but they do not stay dilated apparently. He know this. My concern is, does this fluctuating systolic through out the day cause any harm to his heart valves? We are not trying to bypass Dr Kelley, but we are dealing with a kidney issue and maybe a heart issue also that is not being addressed. Is Jayden Ok to keep doing what he is doing for a while with this fluctuation, or does he need to be seen by you for maybe a heart circulation issue that is not  just kidney related? He feels OK,  is getting nervous with having to watch this BP so much. I do not know how long this has been going on, I do know that when Jayden is active his BP is better. BUT, is not an active selwyn any more and sits too much! This is not helping his circulation. Does he need cardiology eval as well as nephrology?

## 2025-02-27 ENCOUNTER — OFFICE VISIT (OUTPATIENT)
Dept: CARDIOLOGY CLINIC | Age: 85
End: 2025-02-27
Payer: MEDICARE

## 2025-02-27 VITALS
SYSTOLIC BLOOD PRESSURE: 152 MMHG | DIASTOLIC BLOOD PRESSURE: 54 MMHG | WEIGHT: 211.6 LBS | HEIGHT: 69 IN | BODY MASS INDEX: 31.34 KG/M2 | HEART RATE: 64 BPM

## 2025-02-27 DIAGNOSIS — I10 PRIMARY HYPERTENSION: Primary | ICD-10-CM

## 2025-02-27 DIAGNOSIS — E78.01 FAMILIAL HYPERCHOLESTEROLEMIA: ICD-10-CM

## 2025-02-27 DIAGNOSIS — I25.10 CORONARY ARTERY DISEASE INVOLVING NATIVE CORONARY ARTERY OF NATIVE HEART WITHOUT ANGINA PECTORIS: ICD-10-CM

## 2025-02-27 PROCEDURE — 1123F ACP DISCUSS/DSCN MKR DOCD: CPT | Performed by: NUCLEAR MEDICINE

## 2025-02-27 PROCEDURE — 99214 OFFICE O/P EST MOD 30 MIN: CPT | Performed by: NUCLEAR MEDICINE

## 2025-02-27 PROCEDURE — G8417 CALC BMI ABV UP PARAM F/U: HCPCS | Performed by: NUCLEAR MEDICINE

## 2025-02-27 PROCEDURE — 1036F TOBACCO NON-USER: CPT | Performed by: NUCLEAR MEDICINE

## 2025-02-27 PROCEDURE — G8427 DOCREV CUR MEDS BY ELIG CLIN: HCPCS | Performed by: NUCLEAR MEDICINE

## 2025-02-27 PROCEDURE — 1159F MED LIST DOCD IN RCRD: CPT | Performed by: NUCLEAR MEDICINE

## 2025-02-27 RX ORDER — VALSARTAN 320 MG/1
320 TABLET ORAL DAILY
Qty: 90 TABLET | Refills: 4 | Status: SHIPPED | OUTPATIENT
Start: 2025-02-27

## 2025-02-27 NOTE — PROGRESS NOTES
Patient here to follow up on statusboom message sent on 2-26-25.  Patient didn't bring medication list or bottles today.  Patient states list hasn't changed since last reviewed on 2-19-25.

## 2025-02-27 NOTE — PROGRESS NOTES
[facility] [place] [facility]  [facility] CARDIOLOGY  [address]  [address]  [facility] [address]  Dept: [phone]  Dept Fax: [phone]  Loc: [phone]    Visit Date: 2/27/2025    Jose Daniel Catalan is a 84 y.o. male who presents todayfor:  Chief Complaint   Patient presents with    Follow-up    Hypertension    Hyperlipidemia    Coronary Artery Disease     Running high BP  Also following with renal   Know moderate CAD  No chest pain   Some gradual changes in breathing  Some baseline dyspnea  some atypical symptoms  No dizziness  No syncope  On statins for hyperlipidemia  No issues with the meds     HPI:  HPI  Past Medical History:   Diagnosis Date    Anemia     Arthritis     Bronchiolitis 11/2016    Cancer (HCC)     SKIN CANCER    CKD (chronic kidney disease), stage III (HCC)     Diverticula of colon 05/2014    tx with antibiotics per pt; NO surgery    Diverticulosis     DVT (deep venous thrombosis) (HCC)     s/p    Esophagitis 12/10/2014    Hiatal hernia     [place] (hard of hearing)     not able to hear out of rt ear    HTN (hypertension)     Hyperlipidemia     Kidney stones     Nephrolithiasis     Obesity     Prostate enlargement     benign    Renal insufficiency     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)     due to blood clot behind eyes get injection every 8 weeks      Past Surgical History:   Procedure Laterality Date    BLEPHAROPLASTY Bilateral 03/2010    CARDIAC CATHETERIZATION  2000,2010    CARPAL TUNNEL RELEASE Left     CATARACT REMOVAL WITH IMPLANT Bilateral 2006,2007    CIRCUMCISION N/A 08/25/2023    CIRCUMCISION performed by Shon Colon MD at [facility] OR    COLONOSCOPY  05/26/2017    CYSTOSCOPY N/A 10/27/2022    Cystoscopy Urethral Dilation performed by Shon Colon MD at [facility] OR    CYSTOSCOPY N/A 12/01/2022    CYSTOSCOPY TRANSURETHRAL INCISION BLADDER NECK CONTRACTURE performed by Shon Colon MD at [facility] OR    ENDOSCOPY, COLON, DIAGNOSTIC      HERNIA

## 2025-03-26 ENCOUNTER — OFFICE VISIT (OUTPATIENT)
Dept: UROLOGY | Age: 85
End: 2025-03-26
Payer: MEDICARE

## 2025-03-26 VITALS — BODY MASS INDEX: 31.7 KG/M2 | HEIGHT: 69 IN | WEIGHT: 214 LBS | RESPIRATION RATE: 10 BRPM

## 2025-03-26 DIAGNOSIS — N40.1 BPH WITH OBSTRUCTION/LOWER URINARY TRACT SYMPTOMS: Primary | ICD-10-CM

## 2025-03-26 DIAGNOSIS — N13.8 BPH WITH OBSTRUCTION/LOWER URINARY TRACT SYMPTOMS: Primary | ICD-10-CM

## 2025-03-26 DIAGNOSIS — N39.0 RECURRENT UTI: ICD-10-CM

## 2025-03-26 DIAGNOSIS — N47.1 PHIMOSIS: ICD-10-CM

## 2025-03-26 LAB — POST VOID RESIDUAL (PVR): 19 ML

## 2025-03-26 PROCEDURE — G8427 DOCREV CUR MEDS BY ELIG CLIN: HCPCS

## 2025-03-26 PROCEDURE — G8417 CALC BMI ABV UP PARAM F/U: HCPCS

## 2025-03-26 PROCEDURE — 1159F MED LIST DOCD IN RCRD: CPT

## 2025-03-26 PROCEDURE — 1036F TOBACCO NON-USER: CPT

## 2025-03-26 PROCEDURE — 1123F ACP DISCUSS/DSCN MKR DOCD: CPT

## 2025-03-26 PROCEDURE — 99213 OFFICE O/P EST LOW 20 MIN: CPT

## 2025-03-26 PROCEDURE — 51798 US URINE CAPACITY MEASURE: CPT

## 2025-03-26 RX ORDER — NYSTATIN AND TRIAMCINOLONE ACETONIDE 100000; 1 [USP'U]/G; MG/G
OINTMENT TOPICAL
Qty: 30 G | Refills: 5 | Status: SHIPPED | OUTPATIENT
Start: 2025-03-26

## 2025-03-26 NOTE — PROGRESS NOTES
[facility] [place] [facility]  [facility] UROLOGY  [address]  [address]  [facility] [address]  Dept: [phone]  Loc: [phone]  Visit Date: 3/26/2025    HPI  Jose Daniel Catalan is a 84 y.o. male that presents to the urology clinic for BPH and phimosis follow-up.    BPH  History of TURP's x 2. S/P TUIBNC.     IPSS of 15/3. + Weak stream, split and intermittent stream. Sitting to urinate. Does not want to add medication. Emptying appropriately: 19 mL in the office today.    Phimosis  S/P Circumcision by Dr. Colon on 8/25/23 to address tight foreskin/phimosis. He is doing very well post-operatively. Healed. Jayden notes that he has been more intentional with revealing the urethral meatus (partially buried penis) since surgery but does admit that sitting to urinate is easier for him.    No new UTI's to note.    Most Recent PSA: No longer checking per patient preference.        Results for orders placed or performed in visit on 03/26/25   poct post void residual   Result Value Ref Range    post void residual 19 ml         Last BUN and creatinine:  Lab Results   Component Value Date    BUN 38 (H) 01/28/2025     Lab Results   Component Value Date    CREATININE 1.7 (H) 01/28/2025           PAST MEDICAL, FAMILY AND SOCIAL HISTORY UPDATE:  Past Medical History:   Diagnosis Date    Anemia     Arthritis     Bronchiolitis 11/2016    Cancer ([facility])     SKIN CANCER    CKD (chronic kidney disease), stage III ([facility])     Diverticula of colon 05/2014    tx with antibiotics per pt; NO surgery    Diverticulosis     DVT (deep venous thrombosis) ([facility])     s/p    Esophagitis 12/10/2014    Hiatal hernia     [place] (hard of hearing)     not able to hear out of rt ear    HTN (hypertension)     Hyperlipidemia     Kidney stones     Nephrolithiasis     Obesity     Prostate enlargement     benign    Renal insufficiency     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

## 2025-04-22 DIAGNOSIS — F41.9 ANXIETY: ICD-10-CM

## 2025-04-22 DIAGNOSIS — R06.02 SHORTNESS OF BREATH: ICD-10-CM

## 2025-04-22 RX ORDER — CITALOPRAM HYDROBROMIDE 20 MG/1
20 TABLET ORAL DAILY
Qty: 90 TABLET | Refills: 3 | Status: SHIPPED | OUTPATIENT
Start: 2025-04-22

## 2025-04-30 ENCOUNTER — HOSPITAL ENCOUNTER (OUTPATIENT)
Dept: ULTRASOUND IMAGING | Age: 85
Discharge: HOME | End: 2025-04-30
Attending: INTERNAL MEDICINE
Payer: MEDICARE

## 2025-04-30 ENCOUNTER — LAB (OUTPATIENT)
Dept: LAB | Age: 85
End: 2025-04-30

## 2025-04-30 DIAGNOSIS — I12.9 HYPERTENSIVE RENAL DISEASE, STAGE 1 THROUGH STAGE 4 OR UNSPECIFIED CHRONIC KIDNEY DISEASE: ICD-10-CM

## 2025-04-30 DIAGNOSIS — N28.1 RENAL CYST: ICD-10-CM

## 2025-04-30 DIAGNOSIS — N18.32 CHRONIC KIDNEY DISEASE, STAGE 3B (HCC): ICD-10-CM

## 2025-04-30 LAB
ANION GAP SERPL CALC-SCNC: 11 MEQ/L (ref 8–16)
BUN SERPL-MCNC: 34 MG/DL (ref 8–23)
CALCIUM SERPL-MCNC: 9 MG/DL (ref 8.8–10.2)
CHLORIDE SERPL-SCNC: 101 MEQ/L (ref 98–111)
CO2 SERPL-SCNC: 22 MEQ/L (ref 22–29)
CREAT SERPL-MCNC: 1.7 MG/DL (ref 0.7–1.2)
GFR SERPL CREATININE-BSD FRML MDRD: 39 ML/MIN/1.73M2
GLUCOSE SERPL-MCNC: 100 MG/DL (ref 74–109)
POTASSIUM SERPL-SCNC: 4.3 MEQ/L (ref 3.5–5.2)
SODIUM SERPL-SCNC: 134 MEQ/L (ref 135–145)

## 2025-04-30 PROCEDURE — 76770 US EXAM ABDO BACK WALL COMP: CPT

## 2025-05-08 ENCOUNTER — OFFICE VISIT (OUTPATIENT)
Dept: NEPHROLOGY | Age: 85
End: 2025-05-08
Payer: MEDICARE

## 2025-05-08 VITALS
DIASTOLIC BLOOD PRESSURE: 60 MMHG | SYSTOLIC BLOOD PRESSURE: 160 MMHG | HEART RATE: 60 BPM | HEIGHT: 69 IN | BODY MASS INDEX: 31.7 KG/M2 | OXYGEN SATURATION: 97 % | WEIGHT: 214 LBS

## 2025-05-08 DIAGNOSIS — N18.32 CHRONIC KIDNEY DISEASE, STAGE 3B (HCC): Primary | ICD-10-CM

## 2025-05-08 DIAGNOSIS — I12.9 HYPERTENSIVE RENAL DISEASE, STAGE 1 THROUGH STAGE 4 OR UNSPECIFIED CHRONIC KIDNEY DISEASE: ICD-10-CM

## 2025-05-08 PROCEDURE — 1159F MED LIST DOCD IN RCRD: CPT | Performed by: INTERNAL MEDICINE

## 2025-05-08 PROCEDURE — 99214 OFFICE O/P EST MOD 30 MIN: CPT | Performed by: INTERNAL MEDICINE

## 2025-05-08 PROCEDURE — 1123F ACP DISCUSS/DSCN MKR DOCD: CPT | Performed by: INTERNAL MEDICINE

## 2025-05-08 PROCEDURE — G8417 CALC BMI ABV UP PARAM F/U: HCPCS | Performed by: INTERNAL MEDICINE

## 2025-05-08 PROCEDURE — 1036F TOBACCO NON-USER: CPT | Performed by: INTERNAL MEDICINE

## 2025-05-08 PROCEDURE — G8427 DOCREV CUR MEDS BY ELIG CLIN: HCPCS | Performed by: INTERNAL MEDICINE

## 2025-05-08 RX ORDER — DOXAZOSIN 2 MG/1
2 TABLET ORAL NIGHTLY
Qty: 90 TABLET | Refills: 1 | Status: SHIPPED | OUTPATIENT
Start: 2025-05-08

## 2025-05-08 NOTE — PROGRESS NOTES
[facility] [place] [facility]  [facility]  [address] [facility]  [address]  [place] [id number]  Dept: [phone]  Loc: [phone]  Office Progress Note  5/8/2025 11:06 AM      Pt Name:    Jose Daniel Catalan  YOB: 1940  Primary Care Physician:  Romelia Umanzor, APRN - CNP     Chief Complaint:   Chief Complaint   Patient presents with    Chronic Kidney Disease     Stage 3        Background Information/Interval History:   85 yo white male with hx HTN, CKD who is here for 3 months follow-up. He was seeing Dr. Nathan in the past and now seeing me. He says BP is usually high at home.  He has BPH s/p TURP and underwent green light and dilation by urology. He reports \"leaky valves.\"     Here for 3 months follow-up.  No urinary complaints. Feels well. BP today at office visit is 160/60. Reviewed home readings. Some readings are in 160-170 range.      Past History:  Past Medical History:   Diagnosis Date    Anemia     Arthritis     Bronchiolitis 11/2016    Cancer (HCC)     SKIN CANCER    CKD (chronic kidney disease), stage III (HCC)     Diverticula of colon 05/2014    tx with antibiotics per pt; NO surgery    Diverticulosis     DVT (deep venous thrombosis) (HCC)     s/p    Esophagitis 12/10/2014    Hiatal hernia     [place] [employer] [place] (hard of hearing)     not able to hear out of rt ear    HTN (hypertension)     Hyperlipidemia     Kidney stones     Nephrolithiasis     Obesity     Prostate enlargement     benign    Renal insufficiency     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

## 2025-05-08 NOTE — PROGRESS NOTES
Pt states on his hydralazine he takes it different based on what time he gets up. If he takes hydralazine 100 mg at night he has urine frequency.

## 2025-05-21 ENCOUNTER — TELEPHONE (OUTPATIENT)
Dept: FAMILY MEDICINE CLINIC | Age: 85
End: 2025-05-21

## 2025-05-21 DIAGNOSIS — E83.51 HYPOCALCEMIA: Primary | ICD-10-CM

## 2025-05-21 NOTE — TELEPHONE ENCOUNTER
Reviewed recent labs  Everything was stable except Calcium was low  Would recommend rechecking labs in the next couple weeks  Will place an order for this  Fast overnight   2-4 times/mo

## 2025-05-22 NOTE — TELEPHONE ENCOUNTER
Pt informed of labs . Pt voiced understanding with no further questions at this time.     He will go to [facility] and have Calcium rechecked

## 2025-06-23 RX ORDER — TRIAMTERENE AND HYDROCHLOROTHIAZIDE 37.5; 25 MG/1; MG/1
1 TABLET ORAL DAILY
Qty: 90 TABLET | Refills: 3 | Status: SHIPPED | OUTPATIENT
Start: 2025-06-23

## 2025-06-25 ENCOUNTER — TELEPHONE (OUTPATIENT)
Dept: NEUROLOGY | Age: 85
End: 2025-06-25

## 2025-06-25 NOTE — TELEPHONE ENCOUNTER
Patient called office requesting an alternative medication for tremor.   Currently complaining of continued hand tremors effecting daily activities. Denies specific time of day that tremors are worse.   Patient does have a hx of htn  and chronic kidney disease and does not want a medication that would effect either.   Currently taking Primidone 150 mg TID.       Last seen 2/19/2025 by Paige Leopold CNP:   Plan:  Continue with Primidone  150 mg three times a day   Call with any new symptoms or concerns.   Follow up in 7 months.      Pharmacy- [facility] on [address]   Please advise.

## 2025-07-10 ENCOUNTER — OFFICE VISIT (OUTPATIENT)
Dept: NEUROLOGY | Age: 85
End: 2025-07-10
Payer: MEDICARE

## 2025-07-10 VITALS
HEART RATE: 69 BPM | SYSTOLIC BLOOD PRESSURE: 140 MMHG | BODY MASS INDEX: 31.1 KG/M2 | HEIGHT: 69 IN | DIASTOLIC BLOOD PRESSURE: 68 MMHG | OXYGEN SATURATION: 98 % | WEIGHT: 210 LBS

## 2025-07-10 DIAGNOSIS — G25.0 ESSENTIAL TREMOR: Primary | ICD-10-CM

## 2025-07-10 PROCEDURE — G8417 CALC BMI ABV UP PARAM F/U: HCPCS | Performed by: PSYCHIATRY & NEUROLOGY

## 2025-07-10 PROCEDURE — 1159F MED LIST DOCD IN RCRD: CPT | Performed by: PSYCHIATRY & NEUROLOGY

## 2025-07-10 PROCEDURE — 1036F TOBACCO NON-USER: CPT | Performed by: PSYCHIATRY & NEUROLOGY

## 2025-07-10 PROCEDURE — 1123F ACP DISCUSS/DSCN MKR DOCD: CPT | Performed by: PSYCHIATRY & NEUROLOGY

## 2025-07-10 PROCEDURE — 99214 OFFICE O/P EST MOD 30 MIN: CPT | Performed by: PSYCHIATRY & NEUROLOGY

## 2025-07-10 PROCEDURE — G8427 DOCREV CUR MEDS BY ELIG CLIN: HCPCS | Performed by: PSYCHIATRY & NEUROLOGY

## 2025-07-10 RX ORDER — PRIMIDONE 50 MG/1
150 TABLET ORAL 3 TIMES DAILY
Qty: 270 TABLET | Refills: 3 | Status: SHIPPED | OUTPATIENT
Start: 2025-07-10

## 2025-07-10 RX ORDER — DOXYCYCLINE 100 MG/1
100 CAPSULE ORAL 2 TIMES DAILY
COMMUNITY
Start: 2025-07-02

## 2025-07-10 RX ORDER — GABAPENTIN 100 MG/1
100 CAPSULE ORAL 2 TIMES DAILY
Qty: 60 CAPSULE | Refills: 3 | Status: SHIPPED | OUTPATIENT
Start: 2025-07-10 | End: 2025-11-07

## 2025-07-10 NOTE — PATIENT INSTRUCTIONS
Restart Primidone 150 mg twice a day.   Start Neurontin 100 mg twice a day.    Call with any new symptoms or concerns.   Follow up in 7 months.

## 2025-07-10 NOTE — PROGRESS NOTES
NEUROLOGY OUT PATIENT FOLLOW UP NOTE:  7/10/80733:08 AM    Jose Daniel Catalan is here for follow up for essential tremor.            Allergies   Allergen Reactions    Carafate [Sucralfate] Other (See Comments)     Tongue swelling    Iv Dye [Iodides] Shortness Of Breath    Lopressor [Metoprolol]      Bradycardia    Motrin [Ibuprofen]      Was told not to take due to kidneys    Ace Inhibitors      Intolerant to causes muscle aches      Flomax [Tamsulosin Hcl] Nausea Only    Lipitor [Atorvastatin] Other (See Comments)     Myalgias       Current Outpatient Medications:     doxycycline monohydrate (MONODOX) 100 MG capsule, Take 1 capsule by mouth 2 times daily, Disp: , Rfl:     triamterene-hydroCHLOROthiazide (MAXZIDE-25) 37.5-25 MG per tablet, TAKE 1 TABLET BY MOUTH EVERY DAY, Disp: 90 tablet, Rfl: 3    doxazosin (CARDURA) 2 MG tablet, Take 1 tablet by mouth nightly, Disp: 90 tablet, Rfl: 1    citalopram (CELEXA) 20 MG tablet, TAKE 1 TABLET BY MOUTH DAILY, Disp: 90 tablet, Rfl: 3    nystatin-triamcinolone (MYCOLOG) 558573-6.1 UNIT/GM-% ointment, Apply topically 2 times daily., Disp: 30 g, Rfl: 5    valsartan (DIOVAN) 320 MG tablet, Take 1 tablet by mouth daily, Disp: 90 tablet, Rfl: 4    hydrALAZINE (APRESOLINE) 50 MG tablet, Take 50 mg in a.m. Take 50 mg at noon. Take 100 mg in p.m., Disp: 360 tablet, Rfl: 3    primidone (MYSOLINE) 50 MG tablet, Take 2 tablets by mouth in the morning, at noon, and at bedtime, Disp: 180 tablet, Rfl: 3    amLODIPine (NORVASC) 10 MG tablet, Take 1 tablet by mouth daily, Disp: 90 tablet, Rfl: 3    dicyclomine (BENTYL) 10 MG capsule, Take 1 capsule by mouth every 6 hours as needed (cramping) Indications: patient sttaes that this was accidentaly taken off but that he is taking PRN, Disp: 120 capsule, Rfl: 1    levothyroxine (SYNTHROID) 50 MCG tablet, TAKE 1 TABLET BY MOUTH DAILY, Disp: 90 tablet, Rfl: 3    pravastatin (PRAVACHOL) 40 MG tablet, TAKE 1 TABLET BY MOUTH EVERY NIGHT, Disp: 90

## 2025-07-21 ENCOUNTER — TELEPHONE (OUTPATIENT)
Dept: NEUROLOGY | Age: 85
End: 2025-07-21

## 2025-07-21 DIAGNOSIS — G25.0 ESSENTIAL TREMOR: ICD-10-CM

## 2025-07-21 RX ORDER — GABAPENTIN 300 MG/1
300 CAPSULE ORAL DAILY
Qty: 30 CAPSULE | Refills: 0 | Status: SHIPPED | OUTPATIENT
Start: 2025-07-21 | End: 2025-11-18

## 2025-07-22 NOTE — TELEPHONE ENCOUNTER
Spoke to the patient regarding provider response. Verbalized understanding with no additional questions at this time.

## 2025-07-28 ENCOUNTER — TELEPHONE (OUTPATIENT)
Dept: NEUROLOGY | Age: 85
End: 2025-07-28

## 2025-07-28 NOTE — TELEPHONE ENCOUNTER
7/22/25 patient was instructed to increase Gabapentin to 300 mg once a day for 1 wk then twice a day there after.   Patient called to report he was unable to take the increased dose of Gabapentin due to side effects; changes in balance with walking, states his head \"didn't feel right\".   He tried to decrease the dose down to 200 which did decrease side effects. But still was not able to tolerate.    Currently discontinued the Gabapentin. Tremor has returned. Patient requesting alternative.   Please advise.

## 2025-07-29 NOTE — TELEPHONE ENCOUNTER
Spoke to wife- Linnea regarding provider response.     Patient is going to take Primidone 50 mg - 3 tablets by mouth 3 times daily. Discontinue the Gabapentin. Will contact the office back in 1-2 weeks with update regarding tremor. Will consider alternatives suggested by provider and let the office know.

## (undated) DEVICE — SOLUTION IV IRRIG WATER 1000ML POUR BRL 2F7114

## (undated) DEVICE — 20 ML SYRINGE LUER-LOCK TIP: Brand: MONOJECT

## (undated) DEVICE — CYSTO PACK: Brand: MEDLINE INDUSTRIES, INC.

## (undated) DEVICE — SUTURE CHROMIC GUT SZ 3-0 L27IN ABSRB BRN L19MM FS-2 3/8 636H

## (undated) DEVICE — STRAP,CATHETER,ELASTIC,HOOK&LOOP: Brand: MEDLINE

## (undated) DEVICE — SET IRRIG L94IN ID0.281IN W/ 4.5IN DST FLX CONN 2 LD ON OFF

## (undated) DEVICE — CATHETER URETH 22FR 30CC BLLN F 3 W SPEC M RND TIP TWO

## (undated) DEVICE — ELECTRODE PT RET AD L9FT HI MOIST COND ADH HYDRGEL CORDED

## (undated) DEVICE — GLOVE ORANGE PI 7 1/2   MSG9075

## (undated) DEVICE — ELECTRODE ELECSURG NDL 2.8 INX7.2 CM COAT INSUL EDGE

## (undated) DEVICE — DRAINBAG,ANTI-REFLUX TOWER,L/F,2000ML,LL: Brand: MEDLINE

## (undated) DEVICE — SOLUTION IRRIG 1000ML 0.9% SOD CHL USP POUR PLAS BTL

## (undated) DEVICE — LASER FIBER: Brand: MOXY

## (undated) DEVICE — CATHETER FOL 2 W 20 FR 5 CC URETH COUNCL TIP SIL LUBRI-SIL

## (undated) DEVICE — GOWN,SIRUS,NONRNF,SETINSLV,XL,20/CS: Brand: MEDLINE

## (undated) DEVICE — BLADE,CARBON-STEEL,10,STRL,DISPOSABLE,TB: Brand: MEDLINE

## (undated) DEVICE — UROLOGY MINOR: Brand: MEDLINE INDUSTRIES, INC.

## (undated) DEVICE — SOLUTION IRRIG 2000ML STRL H2O UROMATIC PLAS CONT USP

## (undated) DEVICE — S-CURVE URETHRAL DILATOR SET WITH AQ, HYDROPHILIC COATING: Brand: S~CURVE

## (undated) DEVICE — GUIDEWIRE URO L150CM DIA0.035IN STIFF NIT HYDRPHLC STR TIP

## (undated) DEVICE — LASER SURG W22XH58IN D36IN 475LB SLD STATE FREQ DOUBLED